# Patient Record
Sex: FEMALE | Race: WHITE
[De-identification: names, ages, dates, MRNs, and addresses within clinical notes are randomized per-mention and may not be internally consistent; named-entity substitution may affect disease eponyms.]

---

## 2020-06-03 ENCOUNTER — HOSPITAL ENCOUNTER (EMERGENCY)
Dept: HOSPITAL 8 - ED | Age: 65
End: 2020-06-03
Payer: COMMERCIAL

## 2020-06-03 VITALS — SYSTOLIC BLOOD PRESSURE: 185 MMHG | DIASTOLIC BLOOD PRESSURE: 110 MMHG

## 2020-06-03 VITALS — WEIGHT: 144.84 LBS | BODY MASS INDEX: 24.13 KG/M2 | HEIGHT: 65 IN

## 2020-06-03 DIAGNOSIS — M10.072: Primary | ICD-10-CM

## 2020-06-03 DIAGNOSIS — R60.0: ICD-10-CM

## 2020-06-03 DIAGNOSIS — F17.200: ICD-10-CM

## 2020-06-03 LAB
ALBUMIN SERPL-MCNC: 3.2 G/DL (ref 3.4–5)
ALP SERPL-CCNC: 128 U/L (ref 45–117)
ALT SERPL-CCNC: 61 U/L (ref 12–78)
ANION GAP SERPL CALC-SCNC: 5 MMOL/L (ref 5–15)
BASOPHILS # BLD AUTO: 0.01 X10^3/UL (ref 0–0.1)
BASOPHILS NFR BLD AUTO: 0 % (ref 0–1)
BILIRUB SERPL-MCNC: 0.5 MG/DL (ref 0.2–1)
CALCIUM SERPL-MCNC: 8.9 MG/DL (ref 8.5–10.1)
CHLORIDE SERPL-SCNC: 105 MMOL/L (ref 98–107)
CREAT SERPL-MCNC: 1.11 MG/DL (ref 0.55–1.02)
EOSINOPHIL # BLD AUTO: 0.28 X10^3/UL (ref 0–0.4)
EOSINOPHIL NFR BLD AUTO: 3 % (ref 1–7)
ERYTHROCYTE [DISTWIDTH] IN BLOOD BY AUTOMATED COUNT: 12.9 % (ref 9.6–15.2)
ERYTHROCYTE [SEDIMENTATION RATE] IN BLOOD BY WESTERGREN METHOD: 48 MM/HR (ref 0–20)
HCT (SEDRATE): 41.4 % (ref 34.6–47.8)
LYMPHOCYTES # BLD AUTO: 1.88 X10^3/UL (ref 1–3.4)
LYMPHOCYTES NFR BLD AUTO: 21 % (ref 22–44)
MCH RBC QN AUTO: 31.2 PG (ref 27–34.8)
MCHC RBC AUTO-ENTMCNC: 33.5 G/DL (ref 32.4–35.8)
MCV RBC AUTO: 93.2 FL (ref 80–100)
MD: NO
MONOCYTES # BLD AUTO: 0.12 X10^3/UL (ref 0.2–0.8)
MONOCYTES NFR BLD AUTO: 1 % (ref 2–9)
NEUTROPHILS # BLD AUTO: 6.83 X10^3/UL (ref 1.8–6.8)
NEUTROPHILS NFR BLD AUTO: 75 % (ref 42–75)
PLATELET # BLD AUTO: 297 X10^3/UL (ref 130–400)
PMV BLD AUTO: 7.5 FL (ref 7.4–10.4)
PROT SERPL-MCNC: 8.8 G/DL (ref 6.4–8.2)
RBC # BLD AUTO: 4.37 X10^6/UL (ref 3.82–5.3)

## 2020-06-03 PROCEDURE — 99285 EMERGENCY DEPT VISIT HI MDM: CPT

## 2020-06-03 PROCEDURE — 73630 X-RAY EXAM OF FOOT: CPT

## 2020-06-03 PROCEDURE — 96375 TX/PRO/DX INJ NEW DRUG ADDON: CPT

## 2020-06-03 PROCEDURE — 96374 THER/PROPH/DIAG INJ IV PUSH: CPT

## 2020-06-03 PROCEDURE — 80053 COMPREHEN METABOLIC PANEL: CPT

## 2020-06-03 PROCEDURE — 85025 COMPLETE CBC W/AUTO DIFF WBC: CPT

## 2020-06-03 PROCEDURE — 85651 RBC SED RATE NONAUTOMATED: CPT

## 2020-06-03 PROCEDURE — 84550 ASSAY OF BLOOD/URIC ACID: CPT

## 2020-06-03 PROCEDURE — 93970 EXTREMITY STUDY: CPT

## 2020-06-03 PROCEDURE — 36415 COLL VENOUS BLD VENIPUNCTURE: CPT

## 2020-06-03 PROCEDURE — 83880 ASSAY OF NATRIURETIC PEPTIDE: CPT

## 2020-06-03 NOTE — NUR
IV ESTABLISHED AND PT MEDICATED PER MAR, US DONE. AWAITING LAB AND RAD. PT 
RESTING IN [facility] ON MONITOR WITH CALL LIGHT WITHIN REACH. NO NEEDS AT THIS 
TIME.

## 2021-07-28 ENCOUNTER — APPOINTMENT (RX ONLY)
Dept: URBAN - METROPOLITAN AREA CLINIC 36 | Facility: CLINIC | Age: 66
Setting detail: DERMATOLOGY
End: 2021-07-28

## 2021-07-28 PROBLEM — C44.519 BASAL CELL CARCINOMA OF SKIN OF OTHER PART OF TRUNK: Status: ACTIVE | Noted: 2021-07-28

## 2021-07-28 PROCEDURE — 17314 MOHS ADDL STAGE T/A/L: CPT

## 2021-07-28 PROCEDURE — 17313 MOHS 1 STAGE T/A/L: CPT

## 2021-07-28 PROCEDURE — 13101 CMPLX RPR TRUNK 2.6-7.5 CM: CPT

## 2021-07-28 PROCEDURE — ? MOHS SURGERY WITH PERMANENT FINAL MARGIN

## 2021-07-28 PROCEDURE — ? ADDITIONAL NOTES

## 2021-07-28 NOTE — PROCEDURE: MOHS SURGERY WITH PERMANENT FINAL MARGIN
Body Location Override (Optional - Billing Will Still Be Based On Selected Body Map Location If Applicable): left upper back
Mohs Case Number: [id number]
Date Of Previous Biopsy (Optional): 7-9-21
Previous Accession (Optional): outside path
Referring Physician (Optional): Zach Oliveira
Consent Type: Consent 1 (Standard)
Eye Shield Used: No
Initial Size Of Lesion: 2
X Size Of Lesion In Cm (Optional): 1.3
Primary Defect Length In Cm (Final Defect Size - Required For Flaps/Grafts): 2.5
Primary Defect Width In Cm (Final Defect Size - Required For Flaps/Grafts): 2.2
Repair Type: Complex Repair
Oculoplastic Surgeon Procedure Text (A): After obtaining clear surgical margins the patient was sent to oculoplastics for surgical repair.  The patient understands they will receive post-surgical care and follow-up from the referring physician's office.
Otolaryngologist Procedure Text (A): After obtaining clear surgical margins the patient was sent to otolaryngology for surgical repair.  The patient understands they will receive post-surgical care and follow-up from the referring physician's office.
Plastic Surgeon Procedure Text (A): After obtaining clear surgical margins the patient was sent to plastics for surgical repair.  The patient understands they will receive post-surgical care and follow-up from the referring physician's office.
Mid-Level Procedure Text (A): After obtaining clear surgical margins the patient was sent to a mid-level provider for surgical repair.  The patient understands they will receive post-surgical care and follow-up from the mid-level provider.
Provider Procedure Text (A): After obtaining clear surgical margins the defect was repaired by another provider.
Asc Procedure Text (A): After obtaining clear surgical margins the patient was sent to an ASC for surgical repair.  The patient understands they will receive post-surgical care and follow-up from the ASC physician.
Include Suturegard In Note?: Yes
Suturegard Retention Suture: 2-0 Nylon
Retention Suture Bite Size: 3 mm
Length To Time In Minutes Device Was In Place: 60
Number Of Hemigard Strips Per Side: 1
Simple / Intermediate / Complex Repair - Final Wound Length In Cm: 5.4
Undermining Type: Entire Wound
Debridement Text: The wound edges were debrided prior to proceeding with the closure to facilitate wound healing.
Helical Rim Text: The closure involved the helical rim.
Vermilion Border Text: The closure involved the vermilion border.
Nostril Rim Text: The closure involved the nostril rim.
Retention Suture Text: Retention sutures were placed to support the closure and prevent dehiscence.
Secondary Defect Length In Cm (Required For Flaps): 0
Lab: [id number]
Lab Facility: 
Location Indication Override (Is Already Calculated Based On Selected Body Location): Area L
Area H Indication Text: Tumors in this location are included in Area H (eyelids, eyebrows, nose, lips, chin, ear, pre-auricular, post-auricular, temple, genitalia, hands, feet, ankles and areola).  Tissue conservation is critical in these anatomic locations.
Area M Indication Text: Tumors in this location are included in Area M (cheek, forehead, scalp, neck, jawline and pretibial skin).  Mohs surgery is indicated for tumors in these anatomic locations.
Area L Indication Text: Tumors in this location are included in Area L (trunk and extremities).  Mohs surgery is indicated for larger tumors, or tumors with aggressive histologic features, in these anatomic locations.
Special Stains Stage 1 - Results: Base On Clearance Noted Above
Stage 1 Add-On Histology Text: slides reviewed with Dr. Mendoza
Stage 2: Additional Anesthesia Type: 1% lidocaine with epinephrine
Include Tumor Staging In Mohs Note?: Please Select the Appropriate Response
Staging Info: By selecting yes to the question above you will include information on AJCC 8 tumor staging in your Mohs note. Information on tumor staging will be automatically added for SCCs on the head and neck. AJCC 8 includes tumor size, tumor depth, perineural involvement and bone invasion.
Tumor Depth: Less than 6mm from granular layer and no invasion beyond the subcutaneous fat
Medical Necessity Statement: Based on my medical judgement, Mohs surgery is the most appropriate treatment for this cancer compared to other treatments.
Alternatives Discussed Intro (Do Not Add Period): I discussed alternative treatments to Mohs surgery and specifically discussed the risks and benefits of
Consent 1/Introductory Paragraph: The rationale for Mohs was explained to the patient and consent was obtained. The risks, benefits and alternatives to therapy were discussed in detail. Specifically, the risks of infection, scarring, bleeding, prolonged wound healing, incomplete removal, allergy to anesthesia, nerve injury and recurrence were addressed. Prior to the procedure, the treatment site was clearly identified and confirmed by the patient. All components of Universal Protocol/PAUSE Rule completed.
Consent 2/Introductory Paragraph: Mohs surgery was explained to the patient and consent was obtained. The risks, benefits and alternatives to therapy were discussed in detail. Specifically, the risks of infection, scarring, bleeding, prolonged wound healing, incomplete removal, allergy to anesthesia, nerve injury and recurrence were addressed. Prior to the procedure, the treatment site was clearly identified and confirmed by the patient. All components of Universal Protocol/PAUSE Rule completed.
Consent 3/Introductory Paragraph: I gave the patient a chance to ask questions they had about the procedure.  Following this I explained the Mohs procedure and consent was obtained. The risks, benefits and alternatives to therapy were discussed in detail. Specifically, the risks of infection, scarring, bleeding, prolonged wound healing, incomplete removal, allergy to anesthesia, nerve injury and recurrence were addressed. Prior to the procedure, the treatment site was clearly identified and confirmed by the patient. All components of Universal Protocol/PAUSE Rule completed.
Consent (Temporal Branch)/Introductory Paragraph: The rationale for Mohs was explained to the patient and consent was obtained. The risks, benefits and alternatives to therapy were discussed in detail. Specifically, the risks of damage to the temporal branch of the facial nerve, infection, scarring, bleeding, prolonged wound healing, incomplete removal, allergy to anesthesia, and recurrence were addressed. Prior to the procedure, the treatment site was clearly identified and confirmed by the patient. All components of Universal Protocol/PAUSE Rule completed.
Consent (Marginal Mandibular)/Introductory Paragraph: The rationale for Mohs was explained to the patient and consent was obtained. The risks, benefits and alternatives to therapy were discussed in detail. Specifically, the risks of damage to the marginal mandibular branch of the facial nerve, infection, scarring, bleeding, prolonged wound healing, incomplete removal, allergy to anesthesia, and recurrence were addressed. Prior to the procedure, the treatment site was clearly identified and confirmed by the patient. All components of Universal Protocol/PAUSE Rule completed.
Consent (Spinal Accessory)/Introductory Paragraph: The rationale for Mohs was explained to the patient and consent was obtained. The risks, benefits and alternatives to therapy were discussed in detail. Specifically, the risks of damage to the spinal accessory nerve, infection, scarring, bleeding, prolonged wound healing, incomplete removal, allergy to anesthesia, and recurrence were addressed. Prior to the procedure, the treatment site was clearly identified and confirmed by the patient. All components of Universal Protocol/PAUSE Rule completed.
Consent (Near Eyelid Margin)/Introductory Paragraph: The rationale for Mohs was explained to the patient and consent was obtained. The risks, benefits and alternatives to therapy were discussed in detail. Specifically, the risks of ectropion or eyelid deformity, infection, scarring, bleeding, prolonged wound healing, incomplete removal, allergy to anesthesia, nerve injury and recurrence were addressed. Prior to the procedure, the treatment site was clearly identified and confirmed by the patient. All components of Universal Protocol/PAUSE Rule completed.
Consent (Ear)/Introductory Paragraph: The rationale for Mohs was explained to the patient and consent was obtained. The risks, benefits and alternatives to therapy were discussed in detail. Specifically, the risks of ear deformity, infection, scarring, bleeding, prolonged wound healing, incomplete removal, allergy to anesthesia, nerve injury and recurrence were addressed. Prior to the procedure, the treatment site was clearly identified and confirmed by the patient. All components of Universal Protocol/PAUSE Rule completed.
Consent (Nose)/Introductory Paragraph: The rationale for Mohs was explained to the patient and consent was obtained. The risks, benefits and alternatives to therapy were discussed in detail. Specifically, the risks of nasal deformity, changes in the flow of air through the nose, infection, scarring, bleeding, prolonged wound healing, incomplete removal, allergy to anesthesia, nerve injury and recurrence were addressed. Prior to the procedure, the treatment site was clearly identified and confirmed by the patient. All components of Universal Protocol/PAUSE Rule completed.
Consent (Lip)/Introductory Paragraph: The rationale for Mohs was explained to the patient and consent was obtained. The risks, benefits and alternatives to therapy were discussed in detail. Specifically, the risks of lip deformity, changes in the oral aperture, infection, scarring, bleeding, prolonged wound healing, incomplete removal, allergy to anesthesia, nerve injury and recurrence were addressed. Prior to the procedure, the treatment site was clearly identified and confirmed by the patient. All components of Universal Protocol/PAUSE Rule completed.
Consent (Scalp)/Introductory Paragraph: The rationale for Mohs was explained to the patient and consent was obtained. The risks, benefits and alternatives to therapy were discussed in detail. Specifically, the risks of changes in hair growth pattern secondary to repair, infection, scarring, bleeding, prolonged wound healing, incomplete removal, allergy to anesthesia, nerve injury and recurrence were addressed. Prior to the procedure, the treatment site was clearly identified and confirmed by the patient. All components of Universal Protocol/PAUSE Rule completed.
Final Margin Text: After acheiving histologic clearance, an additional specimen was obtained using a similar surgical technique as the previous stages and then sent for permanent section evaluation of the final margin.
Detail Level: Detailed
Postop Diagnosis: same
Postop Dx Override (Will Override Above Choices): melanophages in papillary dermis plan to send for further permanent staining
Anesthesia Volume In Cc: 18
Hemostasis: Electrocautery
Estimated Blood Loss (Cc): minimal
Anesthesia Volume In Cc: 6
Brow Lift Text: A midfrontal incision was made medially to the defect to allow access to the tissues just superior to the left eyebrow. Following careful dissection inferiorly in a supraperiosteal plane to the level of the left eyebrow, several 3-0 monocryl sutures were used to resuspend the eyebrow orbicularis oculi muscular unit to the superior frontal bone periosteum. This resulted in an appropriate reapproximation of static eyebrow symmetry and correction of the left brow ptosis.
Deep Sutures: 3-0 Maxon
Epidermal Sutures: 3-0 Surgipro
Epidermal Closure: running horizontal mattress
Additional Epidermal Closure (Optional): simple interrupted
Suturegard Intro: Intraoperative tissue expansion was performed, utilizing the SUTUREGARD device, in order to reduce wound tension.
Suturegard Body: The suture ends were repeatedly re-tightened and re-clamped to achieve the desired tissue expansion.
Hemigard Intro: Due to skin fragility and wound tension, it was decided to use HEMIGARD adhesive retention suture devices to permit a linear closure. The skin was cleaned and dried for a 6cm distance away from the wound. Excessive hair, if present, was removed to allow for adhesion.
Hemigard Postcare Instructions: The HEMIGARD strips are to remain completely dry for at least 5-7 days.
Donor Site Anesthesia Type: same as repair anesthesia
Graft Donor Site Bandage (Optional-Leave Blank If You Don't Want In Note): Steri-strips and a pressure bandage were applied to the donor site.
Closure 2 Information: This tab is for additional flaps and grafts, including complex repair and grafts and complex repair and flaps. You can also specify a different location for the additional defect, if the location is the same you do not need to select a new one. We will insert the automated text for the repair you select below just as we do for solitary flaps and grafts. Please note that at this time if you select a location with a different insurance zone you will need to override the ICD10 and CPT if appropriate.
Closure 3 Information: This tab is for additional flaps and grafts above and beyond our usual structured repairs.  Please note if you enter information here it will not currently bill and you will need to add the billing information manually.
Wound Care: Petrolatum
Dressing: dry sterile dressing
Suture Removal: 14 days
Unna Boot Text: An Unna boot was placed to help immobilize the limb and facilitate more rapid healing.
Home Suture Removal Text: Patient was provided instructions on removing sutures and will remove their sutures at home.  If they have any questions or difficulties they will call the office.
Post-Care Instructions: I reviewed with the patient in detail post-care instructions. Patient is not to engage in any heavy lifting, exercise, or swimming for the next 14 days. Should the patient develop any fevers, chills, bleeding, severe pain patient will contact the office immediately.
Pain Refusal Text: I offered to prescribe pain medication but the patient refused to take this medication.
Mauc Instructions: By selecting yes to the question below the MAUC number will be added into the note.  This will be calculated automatically based on the diagnosis chosen, the size entered, the body zone selected (H,M,L) and the specific indications you chose. You will also have the option to override the Mohs AUC if you disagree with the automatically calculated number and this option is found in the Case Summary tab.
Where Do You Want The Question To Include Opioid Counseling Located?: Case Summary Tab
Eye Protection Verbiage: Before proceeding with the stage, a plastic scleral shield was inserted. The globe was anesthetized with a few drops of 1% lidocaine with 1:100,000 epinephrine. Then, an appropriate sized scleral shield was chosen and coated with lacrilube ointment. The shield was gently inserted and left in place for the duration of each stage. After the stage was completed, the shield was gently removed.
Mohs Method Verbiage: An incision at a 45 degree angle following the standard Mohs approach was done and the specimen was harvested as a microscopic controlled layer.
Surgeon/Pathologist Verbiage (Will Incorporate Name Of Surgeon From Intro If Not Blank): operated in two distinct and integrated capacities as the surgeon and pathologist.
Mohs Histo Method Verbiage: Each section was then chromacoded and processed in the Mohs lab using the Mohs protocol and submitted for frozen section.
Subsequent Stages Histo Method Verbiage: Using a similar technique to that described above, a thin layer of tissue was removed from all areas where tumor was visible on the previous stage.  The tissue was again oriented, mapped, dyed, and processed as above.
Mohs Rapid Report Verbiage: The area of clinically evident tumor was marked with skin marking ink and appropriately hatched.  The initial incision was made following the Mohs approach through the skin.  The specimen was taken to the lab, divided into the necessary number of pieces, chromacoded and processed according to the Mohs protocol.  This was repeated in successive stages until a tumor free defect was achieved.
Complex Repair Preamble Text (Leave Blank If You Do Not Want): Extensive wide undermining was performed.
Intermediate Repair Preamble Text (Leave Blank If You Do Not Want): Undermining was performed with blunt dissection.
Non-Graft Cartilage Fenestration Text: The cartilage was fenestrated with a 2mm punch biopsy to help facilitate healing.
Graft Cartilage Fenestration Text: The cartilage was fenestrated with a 2mm punch biopsy to help facilitate graft survival and healing.
Secondary Intention Text (Leave Blank If You Do Not Want): The defect will heal with secondary intention.
No Repair - Repaired With Adjacent Surgical Defect Text (Leave Blank If You Do Not Want): After obtaining clear surgical margins the defect was repaired concurrently with another surgical defect which was in close approximation.
Advancement Flap (Single) Text: The defect edges were debeveled with a #15 scalpel blade.  Given the location of the defect and the proximity to free margins a single advancement flap was deemed most appropriate.  Using a sterile surgical marker, an appropriate advancement flap was drawn incorporating the defect and placing the expected incisions within the relaxed skin tension lines where possible.    The area thus outlined was incised deep to adipose tissue with a #15 scalpel blade.  The skin margins were undermined to an appropriate distance in all directions utilizing iris scissors.
Advancement Flap (Double) Text: The defect edges were debeveled with a #15 scalpel blade.  Given the location of the defect and the proximity to free margins a double advancement flap was deemed most appropriate.  Using a sterile surgical marker, the appropriate advancement flaps were drawn incorporating the defect and placing the expected incisions within the relaxed skin tension lines where possible.    The area thus outlined was incised deep to adipose tissue with a #15 scalpel blade.  The skin margins were undermined to an appropriate distance in all directions utilizing iris scissors.
Burow's Advancement Flap Text: The defect edges were debeveled with a #15 scalpel blade.  Given the location of the defect and the proximity to free margins a Burow's advancement flap was deemed most appropriate.  Using a sterile surgical marker, the appropriate advancement flap was drawn incorporating the defect and placing the expected incisions within the relaxed skin tension lines where possible.    The area thus outlined was incised deep to adipose tissue with a #15 scalpel blade.  The skin margins were undermined to an appropriate distance in all directions utilizing iris scissors.
Chonodrocutaneous Helical Advancement Flap Text: The defect edges were debeveled with a #15 scalpel blade.  Given the location of the defect and the proximity to free margins a chondrocutaneous helical advancement flap was deemed most appropriate.  Using a sterile surgical marker, the appropriate advancement flap was drawn incorporating the defect and placing the expected incisions within the relaxed skin tension lines where possible.    The area thus outlined was incised deep to adipose tissue with a #15 scalpel blade.  The skin margins were undermined to an appropriate distance in all directions utilizing iris scissors.
Crescentic Advancement Flap Text: The defect edges were debeveled with a #15 scalpel blade.  Given the location of the defect and the proximity to free margins a crescentic advancement flap was deemed most appropriate.  Using a sterile surgical marker, the appropriate advancement flap was drawn incorporating the defect and placing the expected incisions within the relaxed skin tension lines where possible.    The area thus outlined was incised deep to adipose tissue with a #15 scalpel blade.  The skin margins were undermined to an appropriate distance in all directions utilizing iris scissors.
A-T Advancement Flap Text: The defect edges were debeveled with a #15 scalpel blade.  Given the location of the defect, shape of the defect and the proximity to free margins an A-T advancement flap was deemed most appropriate.  Using a sterile surgical marker, an appropriate advancement flap was drawn incorporating the defect and placing the expected incisions within the relaxed skin tension lines where possible.    The area thus outlined was incised deep to adipose tissue with a #15 scalpel blade.  The skin margins were undermined to an appropriate distance in all directions utilizing iris scissors.
O-T Advancement Flap Text: The defect edges were debeveled with a #15 scalpel blade.  Given the location of the defect, shape of the defect and the proximity to free margins an O-T advancement flap was deemed most appropriate.  Using a sterile surgical marker, an appropriate advancement flap was drawn incorporating the defect and placing the expected incisions within the relaxed skin tension lines where possible.    The area thus outlined was incised deep to adipose tissue with a #15 scalpel blade.  The skin margins were undermined to an appropriate distance in all directions utilizing iris scissors.
O-L Flap Text: The defect edges were debeveled with a #15 scalpel blade.  Given the location of the defect, shape of the defect and the proximity to free margins an O-L flap was deemed most appropriate.  Using a sterile surgical marker, an appropriate advancement flap was drawn incorporating the defect and placing the expected incisions within the relaxed skin tension lines where possible.    The area thus outlined was incised deep to adipose tissue with a #15 scalpel blade.  The skin margins were undermined to an appropriate distance in all directions utilizing iris scissors.
O-Z Flap Text: The defect edges were debeveled with a #15 scalpel blade.  Given the location of the defect, shape of the defect and the proximity to free margins an O-Z flap was deemed most appropriate.  Using a sterile surgical marker, an appropriate transposition flap was drawn incorporating the defect and placing the expected incisions within the relaxed skin tension lines where possible. The area thus outlined was incised deep to adipose tissue with a #15 scalpel blade.  The skin margins were undermined to an appropriate distance in all directions utilizing iris scissors.
Double O-Z Flap Text: The defect edges were debeveled with a #15 scalpel blade.  Given the location of the defect, shape of the defect and the proximity to free margins a Double O-Z flap was deemed most appropriate.  Using a sterile surgical marker, an appropriate transposition flap was drawn incorporating the defect and placing the expected incisions within the relaxed skin tension lines where possible. The area thus outlined was incised deep to adipose tissue with a #15 scalpel blade.  The skin margins were undermined to an appropriate distance in all directions utilizing iris scissors.
V-Y Flap Text: The defect edges were debeveled with a #15 scalpel blade.  Given the location of the defect, shape of the defect and the proximity to free margins a V-Y flap was deemed most appropriate.  Using a sterile surgical marker, an appropriate advancement flap was drawn incorporating the defect and placing the expected incisions within the relaxed skin tension lines where possible.    The area thus outlined was incised deep to adipose tissue with a #15 scalpel blade.  The skin margins were undermined to an appropriate distance in all directions utilizing iris scissors.
Advancement-Rotation Flap Text: The defect edges were debeveled with a #15 scalpel blade.  Given the location of the defect, shape of the defect and the proximity to free margins an advancement-rotation flap was deemed most appropriate.  Using a sterile surgical marker, an appropriate flap was drawn incorporating the defect and placing the expected incisions within the relaxed skin tension lines where possible. The area thus outlined was incised deep to adipose tissue with a #15 scalpel blade.  The skin margins were undermined to an appropriate distance in all directions utilizing iris scissors.
Mercedes Flap Text: The defect edges were debeveled with a #15 scalpel blade.  Given the location of the defect, shape of the defect and the proximity to free margins a Mercedes flap was deemed most appropriate.  Using a sterile surgical marker, an appropriate advancement flap was drawn incorporating the defect and placing the expected incisions within the relaxed skin tension lines where possible. The area thus outlined was incised deep to adipose tissue with a #15 scalpel blade.  The skin margins were undermined to an appropriate distance in all directions utilizing iris scissors.
Modified Advancement Flap Text: The defect edges were debeveled with a #15 scalpel blade.  Given the location of the defect, shape of the defect and the proximity to free margins a modified advancement flap was deemed most appropriate.  Using a sterile surgical marker, an appropriate advancement flap was drawn incorporating the defect and placing the expected incisions within the relaxed skin tension lines where possible.    The area thus outlined was incised deep to adipose tissue with a #15 scalpel blade.  The skin margins were undermined to an appropriate distance in all directions utilizing iris scissors.
Mucosal Advancement Flap Text: Given the location of the defect, shape of the defect and the proximity to free margins a mucosal advancement flap was deemed most appropriate. Incisions were made with a 15 blade scalpel in the appropriate fashion along the cutaneous vermilion border and the mucosal lip. The remaining actinically damaged mucosal tissue was excised.  The mucosal advancement flap was then elevated to the gingival sulcus with care taken to preserve the neurovascular structures and advanced into the primary defect. Care was taken to ensure that precise realignment of the vermilion border was achieved.
Peng Advancement Flap Text: The defect edges were debeveled with a #15 scalpel blade.  Given the location of the defect, shape of the defect and the proximity to free margins a Peng advancement flap was deemed most appropriate.  Using a sterile surgical marker, an appropriate advancement flap was drawn incorporating the defect and placing the expected incisions within the relaxed skin tension lines where possible. The area thus outlined was incised deep to adipose tissue with a #15 scalpel blade.  The skin margins were undermined to an appropriate distance in all directions utilizing iris scissors.
Hatchet Flap Text: The defect edges were debeveled with a #15 scalpel blade.  Given the location of the defect, shape of the defect and the proximity to free margins a hatchet flap was deemed most appropriate.  Using a sterile surgical marker, an appropriate hatchet flap was drawn incorporating the defect and placing the expected incisions within the relaxed skin tension lines where possible.    The area thus outlined was incised deep to adipose tissue with a #15 scalpel blade.  The skin margins were undermined to an appropriate distance in all directions utilizing iris scissors.
Rotation Flap Text: The defect edges were debeveled with a #15 scalpel blade.  Given the location of the defect, shape of the defect and the proximity to free margins a rotation flap was deemed most appropriate.  Using a sterile surgical marker, an appropriate rotation flap was drawn incorporating the defect and placing the expected incisions within the relaxed skin tension lines where possible.    The area thus outlined was incised deep to adipose tissue with a #15 scalpel blade.  The skin margins were undermined to an appropriate distance in all directions utilizing iris scissors.
Spiral Flap Text: The defect edges were debeveled with a #15 scalpel blade.  Given the location of the defect, shape of the defect and the proximity to free margins a spiral flap was deemed most appropriate.  Using a sterile surgical marker, an appropriate rotation flap was drawn incorporating the defect and placing the expected incisions within the relaxed skin tension lines where possible. The area thus outlined was incised deep to adipose tissue with a #15 scalpel blade.  The skin margins were undermined to an appropriate distance in all directions utilizing iris scissors.
Staged Advancement Flap Text: The defect edges were debeveled with a #15 scalpel blade.  Given the location of the defect, shape of the defect and the proximity to free margins a staged advancement flap was deemed most appropriate.  Using a sterile surgical marker, an appropriate advancement flap was drawn incorporating the defect and placing the expected incisions within the relaxed skin tension lines where possible. The area thus outlined was incised deep to adipose tissue with a #15 scalpel blade.  The skin margins were undermined to an appropriate distance in all directions utilizing iris scissors.
Star Wedge Flap Text: The defect edges were debeveled with a #15 scalpel blade.  Given the location of the defect, shape of the defect and the proximity to free margins a star wedge flap was deemed most appropriate.  Using a sterile surgical marker, an appropriate rotation flap was drawn incorporating the defect and placing the expected incisions within the relaxed skin tension lines where possible. The area thus outlined was incised deep to adipose tissue with a #15 scalpel blade.  The skin margins were undermined to an appropriate distance in all directions utilizing iris scissors.
Transposition Flap Text: The defect edges were debeveled with a #15 scalpel blade.  Given the location of the defect and the proximity to free margins a transposition flap was deemed most appropriate.  Using a sterile surgical marker, an appropriate transposition flap was drawn incorporating the defect.    The area thus outlined was incised deep to adipose tissue with a #15 scalpel blade.  The skin margins were undermined to an appropriate distance in all directions utilizing iris scissors.
Muscle Hinge Flap Text: The defect edges were debeveled with a #15 scalpel blade.  Given the size, depth and location of the defect and the proximity to free margins a muscle hinge flap was deemed most appropriate.  Using a sterile surgical marker, an appropriate hinge flap was drawn incorporating the defect. The area thus outlined was incised with a #15 scalpel blade.  The skin margins were undermined to an appropriate distance in all directions utilizing iris scissors.
Nasal Turnover Hinge Flap Text: The defect edges were debeveled with a #15 scalpel blade.  Given the size, depth, location of the defect and the defect being full thickness a nasal turnover hinge flap was deemed most appropriate.  Using a sterile surgical marker, an appropriate hinge flap was drawn incorporating the defect. The area thus outlined was incised with a #15 scalpel blade. The flap was designed to recreate the nasal mucosal lining and the alar rim. The skin margins were undermined to an appropriate distance in all directions utilizing iris scissors.
Nasalis-Muscle-Based Myocutaneous Island Pedicle Flap Text: Using a #15 blade, an incision was made around the donor flap to the level of the nasalis muscle. Wide lateral undermining was then performed in both the subcutaneous plane above the nasalis muscle, and in a submuscular plane just above periosteum. This allowed the formation of a free nasalis muscle axial pedicle (based on the angular artery) which was still attached to the actual cutaneous flap, increasing its mobility and vascular viability. Hemostasis was obtained with pinpoint electrocoagulation. The flap was mobilized into position and the pivotal anchor points positioned and stabilized with buried interrupted sutures. Subcutaneous and dermal tissues were closed in a multilayered fashion with sutures. Tissue redundancies were excised, and the epidermal edges were apposed without significant tension and sutured with sutures.
Orbicularis Oris Muscle Flap Text: The defect edges were debeveled with a #15 scalpel blade.  Given that the defect affected the competency of the oral sphincter an orbicularis oris muscle flap was deemed most appropriate to restore this competency and normal muscle function.  Using a sterile surgical marker, an appropriate flap was drawn incorporating the defect. The area thus outlined was incised with a #15 scalpel blade.
Melolabial Transposition Flap Text: The defect edges were debeveled with a #15 scalpel blade.  Given the location of the defect and the proximity to free margins a melolabial flap was deemed most appropriate.  Using a sterile surgical marker, an appropriate melolabial transposition flap was drawn incorporating the defect.    The area thus outlined was incised deep to adipose tissue with a #15 scalpel blade.  The skin margins were undermined to an appropriate distance in all directions utilizing iris scissors.
Rhombic Flap Text: The defect edges were debeveled with a #15 scalpel blade.  Given the location of the defect and the proximity to free margins a rhombic flap was deemed most appropriate.  Using a sterile surgical marker, an appropriate rhombic flap was drawn incorporating the defect.    The area thus outlined was incised deep to adipose tissue with a #15 scalpel blade.  The skin margins were undermined to an appropriate distance in all directions utilizing iris scissors.
Rhomboid Transposition Flap Text: The defect edges were debeveled with a #15 scalpel blade.  Given the location of the defect and the proximity to free margins a rhomboid transposition flap was deemed most appropriate.  Using a sterile surgical marker, an appropriate rhomboid flap was drawn incorporating the defect.    The area thus outlined was incised deep to adipose tissue with a #15 scalpel blade.  The skin margins were undermined to an appropriate distance in all directions utilizing iris scissors.
Bi-Rhombic Flap Text: The defect edges were debeveled with a #15 scalpel blade.  Given the location of the defect and the proximity to free margins a bi-rhombic flap was deemed most appropriate.  Using a sterile surgical marker, an appropriate rhombic flap was drawn incorporating the defect. The area thus outlined was incised deep to adipose tissue with a #15 scalpel blade.  The skin margins were undermined to an appropriate distance in all directions utilizing iris scissors.
Helical Rim Advancement Flap Text: The defect edges were debeveled with a #15 blade scalpel.  Given the location of the defect and the proximity to free margins (helical rim) a double helical rim advancement flap was deemed most appropriate.  Using a sterile surgical marker, the appropriate advancement flaps were drawn incorporating the defect and placing the expected incisions between the helical rim and antihelix where possible.  The area thus outlined was incised through and through with a #15 scalpel blade.  With a skin hook and iris scissors, the flaps were gently and sharply undermined and freed up.
Bilateral Helical Rim Advancement Flap Text: The defect edges were debeveled with a #15 blade scalpel.  Given the location of the defect and the proximity to free margins (helical rim) a bilateral helical rim advancement flap was deemed most appropriate.  Using a sterile surgical marker, the appropriate advancement flaps were drawn incorporating the defect and placing the expected incisions between the helical rim and antihelix where possible.  The area thus outlined was incised through and through with a #15 scalpel blade.  With a skin hook and iris scissors, the flaps were gently and sharply undermined and freed up.
Ear Star Wedge Flap Text: The defect edges were debeveled with a #15 blade scalpel.  Given the location of the defect and the proximity to free margins (helical rim) an ear star wedge flap was deemed most appropriate.  Using a sterile surgical marker, the appropriate flap was drawn incorporating the defect and placing the expected incisions between the helical rim and antihelix where possible.  The area thus outlined was incised through and through with a #15 scalpel blade.
Banner Transposition Flap Text: The defect edges were debeveled with a #15 scalpel blade.  Given the location of the defect and the proximity to free margins a Banner transposition flap was deemed most appropriate.  Using a sterile surgical marker, an appropriate flap drawn around the defect. The area thus outlined was incised deep to adipose tissue with a #15 scalpel blade.  The skin margins were undermined to an appropriate distance in all directions utilizing iris scissors.
Bilobed Flap Text: The defect edges were debeveled with a #15 scalpel blade.  Given the location of the defect and the proximity to free margins a bilobe flap was deemed most appropriate.  Using a sterile surgical marker, an appropriate bilobe flap drawn around the defect.    The area thus outlined was incised deep to adipose tissue with a #15 scalpel blade.  The skin margins were undermined to an appropriate distance in all directions utilizing iris scissors.
Bilobed Transposition Flap Text: The defect edges were debeveled with a #15 scalpel blade.  Given the location of the defect and the proximity to free margins a bilobed transposition flap was deemed most appropriate.  Using a sterile surgical marker, an appropriate bilobe flap drawn around the defect.    The area thus outlined was incised deep to adipose tissue with a #15 scalpel blade.  The skin margins were undermined to an appropriate distance in all directions utilizing iris scissors.
Trilobed Flap Text: The defect edges were debeveled with a #15 scalpel blade.  Given the location of the defect and the proximity to free margins a trilobed flap was deemed most appropriate.  Using a sterile surgical marker, an appropriate trilobed flap drawn around the defect.    The area thus outlined was incised deep to adipose tissue with a #15 scalpel blade.  The skin margins were undermined to an appropriate distance in all directions utilizing iris scissors.
Dorsal Nasal Flap Text: The defect edges were debeveled with a #15 scalpel blade.  Given the location of the defect and the proximity to free margins a dorsal nasal flap was deemed most appropriate.  Using a sterile surgical marker, an appropriate dorsal nasal flap was drawn around the defect.    The area thus outlined was incised deep to adipose tissue with a #15 scalpel blade.  The skin margins were undermined to an appropriate distance in all directions utilizing iris scissors.
Island Pedicle Flap Text: The defect edges were debeveled with a #15 scalpel blade.  Given the location of the defect, shape of the defect and the proximity to free margins an island pedicle advancement flap was deemed most appropriate.  Using a sterile surgical marker, an appropriate advancement flap was drawn incorporating the defect, outlining the appropriate donor tissue and placing the expected incisions within the relaxed skin tension lines where possible.    The area thus outlined was incised deep to adipose tissue with a #15 scalpel blade.  The skin margins were undermined to an appropriate distance in all directions around the primary defect and laterally outward around the island pedicle utilizing iris scissors.  There was minimal undermining beneath the pedicle flap.
Island Pedicle Flap With Canthal Suspension Text: The defect edges were debeveled with a #15 scalpel blade.  Given the location of the defect, shape of the defect and the proximity to free margins an island pedicle advancement flap was deemed most appropriate.  Using a sterile surgical marker, an appropriate advancement flap was drawn incorporating the defect, outlining the appropriate donor tissue and placing the expected incisions within the relaxed skin tension lines where possible. The area thus outlined was incised deep to adipose tissue with a #15 scalpel blade.  The skin margins were undermined to an appropriate distance in all directions around the primary defect and laterally outward around the island pedicle utilizing iris scissors.  There was minimal undermining beneath the pedicle flap. A suspension suture was placed in the canthal tendon to prevent tension and prevent ectropion.
Alar Island Pedicle Flap Text: The defect edges were debeveled with a #15 scalpel blade.  Given the location of the defect, shape of the defect and the proximity to the alar rim an island pedicle advancement flap was deemed most appropriate.  Using a sterile surgical marker, an appropriate advancement flap was drawn incorporating the defect, outlining the appropriate donor tissue and placing the expected incisions within the nasal ala running parallel to the alar rim. The area thus outlined was incised with a #15 scalpel blade.  The skin margins were undermined minimally to an appropriate distance in all directions around the primary defect and laterally outward around the island pedicle utilizing iris scissors.  There was minimal undermining beneath the pedicle flap.
Double Island Pedicle Flap Text: The defect edges were debeveled with a #15 scalpel blade.  Given the location of the defect, shape of the defect and the proximity to free margins a double island pedicle advancement flap was deemed most appropriate.  Using a sterile surgical marker, an appropriate advancement flap was drawn incorporating the defect, outlining the appropriate donor tissue and placing the expected incisions within the relaxed skin tension lines where possible.    The area thus outlined was incised deep to adipose tissue with a #15 scalpel blade.  The skin margins were undermined to an appropriate distance in all directions around the primary defect and laterally outward around the island pedicle utilizing iris scissors.  There was minimal undermining beneath the pedicle flap.
Island Pedicle Flap-Requiring Vessel Identification Text: The defect edges were debeveled with a #15 scalpel blade.  Given the location of the defect, shape of the defect and the proximity to free margins an island pedicle advancement flap was deemed most appropriate.  Using a sterile surgical marker, an appropriate advancement flap was drawn, based on the axial vessel mentioned above, incorporating the defect, outlining the appropriate donor tissue and placing the expected incisions within the relaxed skin tension lines where possible.    The area thus outlined was incised deep to adipose tissue with a #15 scalpel blade.  The skin margins were undermined to an appropriate distance in all directions around the primary defect and laterally outward around the island pedicle utilizing iris scissors.  There was minimal undermining beneath the pedicle flap.
Keystone Flap Text: The defect edges were debeveled with a #15 scalpel blade.  Given the location of the defect, shape of the defect a keystone flap was deemed most appropriate.  Using a sterile surgical marker, an appropriate keystone flap was drawn incorporating the defect, outlining the appropriate donor tissue and placing the expected incisions within the relaxed skin tension lines where possible. The area thus outlined was incised deep to adipose tissue with a #15 scalpel blade.  The skin margins were undermined to an appropriate distance in all directions around the primary defect and laterally outward around the flap utilizing iris scissors.
O-T Plasty Text: The defect edges were debeveled with a #15 scalpel blade.  Given the location of the defect, shape of the defect and the proximity to free margins an O-T plasty was deemed most appropriate.  Using a sterile surgical marker, an appropriate O-T plasty was drawn incorporating the defect and placing the expected incisions within the relaxed skin tension lines where possible.    The area thus outlined was incised deep to adipose tissue with a #15 scalpel blade.  The skin margins were undermined to an appropriate distance in all directions utilizing iris scissors.
O-Z Plasty Text: The defect edges were debeveled with a #15 scalpel blade.  Given the location of the defect, shape of the defect and the proximity to free margins an O-Z plasty (double transposition flap) was deemed most appropriate.  Using a sterile surgical marker, the appropriate transposition flaps were drawn incorporating the defect and placing the expected incisions within the relaxed skin tension lines where possible.    The area thus outlined was incised deep to adipose tissue with a #15 scalpel blade.  The skin margins were undermined to an appropriate distance in all directions utilizing iris scissors.  Hemostasis was achieved with electrocautery.  The flaps were then transposed into place, one clockwise and the other counterclockwise, and anchored with interrupted buried subcutaneous sutures.
Double O-Z Plasty Text: The defect edges were debeveled with a #15 scalpel blade.  Given the location of the defect, shape of the defect and the proximity to free margins a Double O-Z plasty (double transposition flap) was deemed most appropriate.  Using a sterile surgical marker, the appropriate transposition flaps were drawn incorporating the defect and placing the expected incisions within the relaxed skin tension lines where possible. The area thus outlined was incised deep to adipose tissue with a #15 scalpel blade.  The skin margins were undermined to an appropriate distance in all directions utilizing iris scissors.  Hemostasis was achieved with electrocautery.  The flaps were then transposed into place, one clockwise and the other counterclockwise, and anchored with interrupted buried subcutaneous sutures.
V-Y Plasty Text: The defect edges were debeveled with a #15 scalpel blade.  Given the location of the defect, shape of the defect and the proximity to free margins an V-Y advancement flap was deemed most appropriate.  Using a sterile surgical marker, an appropriate advancement flap was drawn incorporating the defect and placing the expected incisions within the relaxed skin tension lines where possible.    The area thus outlined was incised deep to adipose tissue with a #15 scalpel blade.  The skin margins were undermined to an appropriate distance in all directions utilizing iris scissors.
H Plasty Text: Given the location of the defect, shape of the defect and the proximity to free margins a H-plasty was deemed most appropriate for repair.  Using a sterile surgical marker, the appropriate advancement arms of the H-plasty were drawn incorporating the defect and placing the expected incisions within the relaxed skin tension lines where possible. The area thus outlined was incised deep to adipose tissue with a #15 scalpel blade. The skin margins were undermined to an appropriate distance in all directions utilizing iris scissors.  The opposing advancement arms were then advanced into place in opposite direction and anchored with interrupted buried subcutaneous sutures.
W Plasty Text: The lesion was extirpated to the level of the fat with a #15 scalpel blade.  Given the location of the defect, shape of the defect and the proximity to free margins a W-plasty was deemed most appropriate for repair.  Using a sterile surgical marker, the appropriate transposition arms of the W-plasty were drawn incorporating the defect and placing the expected incisions within the relaxed skin tension lines where possible.    The area thus outlined was incised deep to adipose tissue with a #15 scalpel blade.  The skin margins were undermined to an appropriate distance in all directions utilizing iris scissors.  The opposing transposition arms were then transposed into place in opposite direction and anchored with interrupted buried subcutaneous sutures.
Z Plasty Text: The lesion was extirpated to the level of the fat with a #15 scalpel blade.  Given the location of the defect, shape of the defect and the proximity to free margins a Z-plasty was deemed most appropriate for repair.  Using a sterile surgical marker, the appropriate transposition arms of the Z-plasty were drawn incorporating the defect and placing the expected incisions within the relaxed skin tension lines where possible.    The area thus outlined was incised deep to adipose tissue with a #15 scalpel blade.  The skin margins were undermined to an appropriate distance in all directions utilizing iris scissors.  The opposing transposition arms were then transposed into place in opposite direction and anchored with interrupted buried subcutaneous sutures.
Zygomaticofacial Flap Text: Given the location of the defect, shape of the defect and the proximity to free margins a zygomaticofacial flap was deemed most appropriate for repair.  Using a sterile surgical marker, the appropriate flap was drawn incorporating the defect and placing the expected incisions within the relaxed skin tension lines where possible. The area thus outlined was incised deep to adipose tissue with a #15 scalpel blade with preservation of a vascular pedicle.  The skin margins were undermined to an appropriate distance in all directions utilizing iris scissors.  The flap was then placed into the defect and anchored with interrupted buried subcutaneous sutures.
Cheek Interpolation Flap Text: A decision was made to reconstruct the defect utilizing an interpolation axial flap and a staged reconstruction.  A telfa template was made of the defect.  This telfa template was then used to outline the Cheek Interpolation flap.  The donor area for the pedicle flap was then injected with anesthesia.  The flap was excised through the skin and subcutaneous tissue down to the layer of the underlying musculature.  The interpolation flap was carefully excised within this deep plane to maintain its blood supply.  The edges of the donor site were undermined.   The donor site was closed in a primary fashion.  The pedicle was then rotated into position and sutured.  Once the tube was sutured into place, adequate blood supply was confirmed with blanching and refill.  The pedicle was then wrapped with xeroform gauze and dressed appropriately with a telfa and gauze bandage to ensure continued blood supply and protect the attached pedicle.
Cheek-To-Nose Interpolation Flap Text: A decision was made to reconstruct the defect utilizing an interpolation axial flap and a staged reconstruction.  A telfa template was made of the defect.  This telfa template was then used to outline the Cheek-To-Nose Interpolation flap.  The donor area for the pedicle flap was then injected with anesthesia.  The flap was excised through the skin and subcutaneous tissue down to the layer of the underlying musculature.  The interpolation flap was carefully excised within this deep plane to maintain its blood supply.  The edges of the donor site were undermined.   The donor site was closed in a primary fashion.  The pedicle was then rotated into position and sutured.  Once the tube was sutured into place, adequate blood supply was confirmed with blanching and refill.  The pedicle was then wrapped with xeroform gauze and dressed appropriately with a telfa and gauze bandage to ensure continued blood supply and protect the attached pedicle.
Interpolation Flap Text: A decision was made to reconstruct the defect utilizing an interpolation axial flap and a staged reconstruction.  A telfa template was made of the defect.  This telfa template was then used to outline the interpolation flap.  The donor area for the pedicle flap was then injected with anesthesia.  The flap was excised through the skin and subcutaneous tissue down to the layer of the underlying musculature.  The interpolation flap was carefully excised within this deep plane to maintain its blood supply.  The edges of the donor site were undermined.   The donor site was closed in a primary fashion.  The pedicle was then rotated into position and sutured.  Once the tube was sutured into place, adequate blood supply was confirmed with blanching and refill.  The pedicle was then wrapped with xeroform gauze and dressed appropriately with a telfa and gauze bandage to ensure continued blood supply and protect the attached pedicle.
Melolabial Interpolation Flap Text: A decision was made to reconstruct the defect utilizing an interpolation axial flap and a staged reconstruction.  A telfa template was made of the defect.  This telfa template was then used to outline the melolabial interpolation flap.  The donor area for the pedicle flap was then injected with anesthesia.  The flap was excised through the skin and subcutaneous tissue down to the layer of the underlying musculature.  The pedicle flap was carefully excised within this deep plane to maintain its blood supply.  The edges of the donor site were undermined.   The donor site was closed in a primary fashion.  The pedicle was then rotated into position and sutured.  Once the tube was sutured into place, adequate blood supply was confirmed with blanching and refill.  The pedicle was then wrapped with xeroform gauze and dressed appropriately with a telfa and gauze bandage to ensure continued blood supply and protect the attached pedicle.
Mastoid Interpolation Flap Text: A decision was made to reconstruct the defect utilizing an interpolation axial flap and a staged reconstruction.  A telfa template was made of the defect.  This telfa template was then used to outline the mastoid interpolation flap.  The donor area for the pedicle flap was then injected with anesthesia.  The flap was excised through the skin and subcutaneous tissue down to the layer of the underlying musculature.  The pedicle flap was carefully excised within this deep plane to maintain its blood supply.  The edges of the donor site were undermined.   The donor site was closed in a primary fashion.  The pedicle was then rotated into position and sutured.  Once the tube was sutured into place, adequate blood supply was confirmed with blanching and refill.  The pedicle was then wrapped with xeroform gauze and dressed appropriately with a telfa and gauze bandage to ensure continued blood supply and protect the attached pedicle.
Posterior Auricular Interpolation Flap Text: A decision was made to reconstruct the defect utilizing an interpolation axial flap and a staged reconstruction.  A telfa template was made of the defect.  This telfa template was then used to outline the posterior auricular interpolation flap.  The donor area for the pedicle flap was then injected with anesthesia.  The flap was excised through the skin and subcutaneous tissue down to the layer of the underlying musculature.  The pedicle flap was carefully excised within this deep plane to maintain its blood supply.  The edges of the donor site were undermined.   The donor site was closed in a primary fashion.  The pedicle was then rotated into position and sutured.  Once the tube was sutured into place, adequate blood supply was confirmed with blanching and refill.  The pedicle was then wrapped with xeroform gauze and dressed appropriately with a telfa and gauze bandage to ensure continued blood supply and protect the attached pedicle.
Paramedian Forehead Flap Text: A decision was made to reconstruct the defect utilizing an interpolation axial flap and a staged reconstruction.  A telfa template was made of the defect.  This telfa template was then used to outline the paramedian forehead pedicle flap.  The donor area for the pedicle flap was then injected with anesthesia.  The flap was excised through the skin and subcutaneous tissue down to the layer of the underlying musculature.  The pedicle flap was carefully excised within this deep plane to maintain its blood supply.  The edges of the donor site were undermined.   The donor site was closed in a primary fashion.  The pedicle was then rotated into position and sutured.  Once the tube was sutured into place, adequate blood supply was confirmed with blanching and refill.  The pedicle was then wrapped with xeroform gauze and dressed appropriately with a telfa and gauze bandage to ensure continued blood supply and protect the attached pedicle.
Cheiloplasty (Less Than 50%) Text: A decision was made to reconstruct the defect with a  cheiloplasty.  The defect was undermined extensively.  Additional orbicularis oris muscle was excised with a 15 blade scalpel.  The defect was converted into a full thickness wedge, of less than 50% of the vertical height of the lip, to facilite a better cosmetic result.  Small vessels were then tied off with 5-0 monocyrl. The orbicularis oris, superficial fascia, adipose and dermis were then reapproximated.  After the deeper layers were approximated the epidermis was reapproximated with particular care given to realign the vermilion border.
Cheiloplasty (Complex) Text: A decision was made to reconstruct the defect with a  cheiloplasty.  The defect was undermined extensively.  Additional orbicularis oris muscle was excised with a 15 blade scalpel.  The defect was converted into a full thickness wedge to facilite a better cosmetic result.  Small vessels were then tied off with 5-0 monocyrl. The orbicularis oris, superficial fascia, adipose and dermis were then reapproximated.  After the deeper layers were approximated the epidermis was reapproximated with particular care given to realign the vermilion border.
Ear Wedge Repair Text: A wedge excision was completed by carrying down an excision through the full thickness of the ear and cartilage with an inward facing Burow's triangle. The wound was then closed in a layered fashion.
Full Thickness Lip Wedge Repair (Flap) Text: Given the location of the defect and the proximity to free margins a full thickness wedge repair was deemed most appropriate.  Using a sterile surgical marker, the appropriate repair was drawn incorporating the defect and placing the expected incisions perpendicular to the vermilion border.  The vermilion border was also meticulously outlined to ensure appropriate reapproximation during the repair.  The area thus outlined was incised through and through with a #15 scalpel blade.  The muscularis and dermis were reaproximated with deep sutures following hemostasis. Care was taken to realign the vermilion border before proceeding with the superficial closure.  Once the vermilion was realigned the superfical and mucosal closure was finished.
Ftsg Text: The defect edges were debeveled with a #15 scalpel blade.  Given the location of the defect, shape of the defect and the proximity to free margins a full thickness skin graft was deemed most appropriate.  Using a sterile surgical marker, the primary defect shape was transferred to the donor site. The area thus outlined was incised deep to adipose tissue with a #15 scalpel blade.  The harvested graft was then trimmed of adipose tissue until only dermis and epidermis was left.  The skin margins of the secondary defect were undermined to an appropriate distance in all directions utilizing iris scissors.  The secondary defect was closed with interrupted buried subcutaneous sutures.  The skin edges were then re-apposed with running  sutures.  The skin graft was then placed in the primary defect and oriented appropriately.
Split-Thickness Skin Graft Text: The defect edges were debeveled with a #15 scalpel blade.  Given the location of the defect, shape of the defect and the proximity to free margins a split thickness skin graft was deemed most appropriate.  Using a sterile surgical marker, the primary defect shape was transferred to the donor site. The split thickness graft was then harvested.  The skin graft was then placed in the primary defect and oriented appropriately.
Burow's Graft Text: The defect edges were debeveled with a #15 scalpel blade.  Given the location of the defect, shape of the defect, the proximity to free margins and the presence of a standing cone deformity a Burow's skin graft was deemed most appropriate. The standing cone was removed and this tissue was then trimmed to the shape of the primary defect. The adipose tissue was also removed until only dermis and epidermis were left.  The skin margins of the secondary defect were undermined to an appropriate distance in all directions utilizing iris scissors.  The secondary defect was closed with interrupted buried subcutaneous sutures.  The skin edges were then re-apposed with running  sutures.  The skin graft was then placed in the primary defect and oriented appropriately.
Cartilage Graft Text: The defect edges were debeveled with a #15 scalpel blade.  Given the location of the defect, shape of the defect, the fact the defect involved a full thickness cartilage defect a cartilage graft was deemed most appropriate.  An appropriate donor site was identified, cleansed, and anesthetized. The cartilage graft was then harvested and transferred to the recipient site, oriented appropriately and then sutured into place.  The secondary defect was then repaired using a primary closure.
Composite Graft Text: The defect edges were debeveled with a #15 scalpel blade.  Given the location of the defect, shape of the defect, the proximity to free margins and the fact the defect was full thickness a composite graft was deemed most appropriate.  The defect was outline and then transferred to the donor site.  A full thickness graft was then excised from the donor site. The graft was then placed in the primary defect, oriented appropriately and then sutured into place.  The secondary defect was then repaired using a primary closure.
Epidermal Autograft Text: The defect edges were debeveled with a #15 scalpel blade.  Given the location of the defect, shape of the defect and the proximity to free margins an epidermal autograft was deemed most appropriate.  Using a sterile surgical marker, the primary defect shape was transferred to the donor site. The epidermal graft was then harvested.  The skin graft was then placed in the primary defect and oriented appropriately.
Dermal Autograft Text: The defect edges were debeveled with a #15 scalpel blade.  Given the location of the defect, shape of the defect and the proximity to free margins a dermal autograft was deemed most appropriate.  Using a sterile surgical marker, the primary defect shape was transferred to the donor site. The area thus outlined was incised deep to adipose tissue with a #15 scalpel blade.  The harvested graft was then trimmed of adipose and epidermal tissue until only dermis was left.  The skin graft was then placed in the primary defect and oriented appropriately.
Skin Substitute Text: The defect edges were debeveled with a #15 scalpel blade.  Given the location of the defect, shape of the defect and the proximity to free margins a skin substitute graft was deemed most appropriate.  The graft material was trimmed to fit the size of the defect. The graft was then placed in the primary defect and oriented appropriately.
Tissue Cultured Epidermal Autograft Text: The defect edges were debeveled with a #15 scalpel blade.  Given the location of the defect, shape of the defect and the proximity to free margins a tissue cultured epidermal autograft was deemed most appropriate.  The graft was then trimmed to fit the size of the defect.  The graft was then placed in the primary defect and oriented appropriately.
Xenograft Text: The defect edges were debeveled with a #15 scalpel blade.  Given the location of the defect, shape of the defect and the proximity to free margins a xenograft was deemed most appropriate.  The graft was then trimmed to fit the size of the defect.  The graft was then placed in the primary defect and oriented appropriately.
Purse String (Simple) Text: Given the location of the defect and the characteristics of the surrounding skin a purse string closure was deemed most appropriate.  Undermining was performed circumfirentially around the surgical defect.  A purse string suture was then placed and tightened.
Purse String (Intermediate) Text: Given the location of the defect and the characteristics of the surrounding skin a purse string intermediate closure was deemed most appropriate.  Undermining was performed circumfirentially around the surgical defect.  A purse string suture was then placed and tightened.
Partial Purse String (Simple) Text: Given the location of the defect and the characteristics of the surrounding skin a simple purse string closure was deemed most appropriate.  Undermining was performed circumfirentially around the surgical defect.  A purse string suture was then placed and tightened. Wound tension only allowed a partial closure of the circular defect.
Partial Purse String (Intermediate) Text: Given the location of the defect and the characteristics of the surrounding skin an intermediate purse string closure was deemed most appropriate.  Undermining was performed circumfirentially around the surgical defect.  A purse string suture was then placed and tightened. Wound tension only allowed a partial closure of the circular defect.
Localized Dermabrasion With Wire Brush Text: The patient was draped in routine manner.  Localized dermabrasion using 3 x 17 mm wire brush was performed in routine manner to papillary dermis. This spot dermabrasion is being performed to complete skin cancer reconstruction. It also will eliminate the other sun damaged precancerous cells that are known to be part of the regional effect of a lifetime's worth of sun exposure. This localized dermabrasion is therapeutic and should not be considered cosmetic in any regard.
Tarsorrhaphy Text: A tarsorrhaphy was performed using Frost sutures.
Complex Repair And Flap Additional Text (Will Appearing After The Standard Complex Repair Text): The complex repair was not sufficient to completely close the primary defect. The remaining additional defect was repaired with the flap mentioned below.
Complex Repair And Graft Additional Text (Will Appearing After The Standard Complex Repair Text): The complex repair was not sufficient to completely close the primary defect. The remaining additional defect was repaired with the graft mentioned below.
Manual Repair Warning Statement: We plan on removing the manually selected variable below in favor of our much easier automatic structured text blocks found in the previous tab. We decided to do this to help make the flow better and give you the full power of structured data. Manual selection is never going to be ideal in our platform and I would encourage you to avoid using manual selection from this point on, especially since I will be sunsetting this feature. It is important that you do one of two things with the customized text below. First, you can save all of the text in a word file so you can have it for future reference. Second, transfer the text to the appropriate area in the Library tab. Lastly, if there is a flap or graft type which we do not have you need to let us know right away so I can add it in before the variable is hidden. No need to panic, we plan to give you roughly 6 months to make the change.
Same Histology In Subsequent Stages Text: The pattern and morphology of the tumor is as described in the first stage.
No Residual Tumor Seen Histology Text: There were no malignant cells seen in the sections examined.
Inflammation Suggestive Of Cancer Camouflage Histology Text: There was a dense lymphocytic infiltrate which prevented adequate histologic evaluation of adjacent structures.
Bcc Histology Text: There were numerous aggregates of basaloid cells.
Bcc Infiltrative Histology Text: There were numerous aggregates of basaloid cells demonstrating an infiltrative pattern.
Mart-1 - Positive Histology Text: MART-1 staining demonstrates areas of higher density and clustering of melanocytes with Pagetoid spread upwards within the epidermis. The surgical margins are positive for tumor cells.
Mart-1 - Negative Histology Text: MART-1 staining demonstrates a normal density and pattern of melanocytes along the dermal-epidermal junction. The surgical margins are negative for tumor cells.
Information: Selecting Yes will display possible errors in your note based on the variables you have selected. This validation is only offered as a suggestion for you. PLEASE NOTE THAT THE VALIDATION TEXT WILL BE REMOVED WHEN YOU FINALIZE YOUR NOTE. IF YOU WANT TO FAX A PRELIMINARY NOTE YOU WILL NEED TO TOGGLE THIS TO 'NO' IF YOU DO NOT WANT IT IN YOUR FAXED NOTE.
Billing Type: Third-Party Bill

## 2021-07-28 NOTE — HPI: PROCEDURE (MOHS)
Has The Growth Been Previously Biopsied?: has been previously biopsied
Additional History: History of hepatitis C
Body Location Override (Optional): Left upper back

## 2021-07-28 NOTE — PROCEDURE: ADDITIONAL NOTES
Additional Notes: clinically a pigmented macule was seen at the right superior margin superior to the basal cell carcinoma, there were multiple melanophages in the dermis at this location, reviewed slides with Dr Mendoza and will send first block for permanents to better evaluate origin of melanophages
Detail Level: Zone
Render Risk Assessment In Note?: yes

## 2021-08-11 ENCOUNTER — APPOINTMENT (RX ONLY)
Dept: URBAN - METROPOLITAN AREA CLINIC 36 | Facility: CLINIC | Age: 66
Setting detail: DERMATOLOGY
End: 2021-08-11

## 2021-08-11 DIAGNOSIS — Z48.02 ENCOUNTER FOR REMOVAL OF SUTURES: ICD-10-CM

## 2021-08-11 PROCEDURE — 99024 POSTOP FOLLOW-UP VISIT: CPT

## 2021-08-11 PROCEDURE — ? SUTURE REMOVAL (GLOBAL PERIOD)

## 2021-08-11 ASSESSMENT — LOCATION DETAILED DESCRIPTION DERM: LOCATION DETAILED: LEFT MID-UPPER BACK

## 2021-08-11 ASSESSMENT — LOCATION ZONE DERM: LOCATION ZONE: TRUNK

## 2021-08-11 ASSESSMENT — LOCATION SIMPLE DESCRIPTION DERM: LOCATION SIMPLE: LEFT UPPER BACK

## 2021-08-11 NOTE — PROCEDURE: SUTURE REMOVAL (GLOBAL PERIOD)
Detail Level: Detailed
Add 67739 Cpt? (Important Note: In 2017 The Use Of 88477 Is Being Tracked By Cms To Determine Future Global Period Reimbursement For Global Periods): yes

## 2021-09-08 ENCOUNTER — APPOINTMENT (RX ONLY)
Dept: URBAN - METROPOLITAN AREA CLINIC 36 | Facility: CLINIC | Age: 66
Setting detail: DERMATOLOGY
End: 2021-09-08

## 2021-09-08 DIAGNOSIS — L90.5 SCAR CONDITIONS AND FIBROSIS OF SKIN: ICD-10-CM

## 2021-09-08 PROCEDURE — ? COUNSELING

## 2021-09-08 NOTE — PROCEDURE: COUNSELING
Patient Specific Counseling (Will Not Stick From Patient To Patient): Dr. Monae states patients scar does not need any further treatment at this time. Patient advised to move forward full body skin exams every 6 months.
Detail Level: Detailed

## 2021-10-13 ENCOUNTER — PRE-ADMISSION TESTING (OUTPATIENT)
Dept: ADMISSIONS | Facility: MEDICAL CENTER | Age: 66
DRG: 657 | End: 2021-10-13
Attending: UROLOGY
Payer: COMMERCIAL

## 2021-10-13 ENCOUNTER — HOSPITAL ENCOUNTER (OUTPATIENT)
Dept: RADIOLOGY | Facility: MEDICAL CENTER | Age: 66
DRG: 657 | End: 2021-10-13
Attending: UROLOGY | Admitting: UROLOGY
Payer: COMMERCIAL

## 2021-10-13 DIAGNOSIS — Z01.810 PRE-OPERATIVE CARDIOVASCULAR EXAMINATION: ICD-10-CM

## 2021-10-13 DIAGNOSIS — Z01.811 PRE-OPERATIVE RESPIRATORY EXAMINATION: ICD-10-CM

## 2021-10-13 DIAGNOSIS — Z01.812 PRE-OPERATIVE LABORATORY EXAMINATION: ICD-10-CM

## 2021-10-13 LAB
ABO GROUP BLD: NORMAL
ANION GAP SERPL CALC-SCNC: 12 MMOL/L (ref 7–16)
APPEARANCE UR: CLEAR
BASOPHILS # BLD AUTO: 0.8 % (ref 0–1.8)
BASOPHILS # BLD: 0.07 K/UL (ref 0–0.12)
BILIRUB UR QL STRIP.AUTO: NEGATIVE
BLD GP AB SCN SERPL QL: NORMAL
BUN SERPL-MCNC: 11 MG/DL (ref 8–22)
CALCIUM SERPL-MCNC: 9.3 MG/DL (ref 8.5–10.5)
CHLORIDE SERPL-SCNC: 94 MMOL/L (ref 96–112)
CO2 SERPL-SCNC: 22 MMOL/L (ref 20–33)
COLOR UR: YELLOW
CREAT SERPL-MCNC: 0.95 MG/DL (ref 0.5–1.4)
EKG IMPRESSION: NORMAL
EOSINOPHIL # BLD AUTO: 0.13 K/UL (ref 0–0.51)
EOSINOPHIL NFR BLD: 1.5 % (ref 0–6.9)
ERYTHROCYTE [DISTWIDTH] IN BLOOD BY AUTOMATED COUNT: 44.5 FL (ref 35.9–50)
GLUCOSE SERPL-MCNC: 114 MG/DL (ref 65–99)
GLUCOSE UR STRIP.AUTO-MCNC: NEGATIVE MG/DL
HCT VFR BLD AUTO: 44.3 % (ref 37–47)
HGB BLD-MCNC: 15.1 G/DL (ref 12–16)
IMM GRANULOCYTES # BLD AUTO: 0.03 K/UL (ref 0–0.11)
IMM GRANULOCYTES NFR BLD AUTO: 0.3 % (ref 0–0.9)
KETONES UR STRIP.AUTO-MCNC: NEGATIVE MG/DL
LEUKOCYTE ESTERASE UR QL STRIP.AUTO: NEGATIVE
LYMPHOCYTES # BLD AUTO: 2.63 K/UL (ref 1–4.8)
LYMPHOCYTES NFR BLD: 29.8 % (ref 22–41)
MCH RBC QN AUTO: 30.6 PG (ref 27–33)
MCHC RBC AUTO-ENTMCNC: 34.1 G/DL (ref 33.6–35)
MCV RBC AUTO: 89.9 FL (ref 81.4–97.8)
MICRO URNS: NORMAL
MONOCYTES # BLD AUTO: 0.55 K/UL (ref 0–0.85)
MONOCYTES NFR BLD AUTO: 6.2 % (ref 0–13.4)
NEUTROPHILS # BLD AUTO: 5.43 K/UL (ref 2–7.15)
NEUTROPHILS NFR BLD: 61.4 % (ref 44–72)
NITRITE UR QL STRIP.AUTO: NEGATIVE
NRBC # BLD AUTO: 0 K/UL
NRBC BLD-RTO: 0 /100 WBC
PH UR STRIP.AUTO: 6.5 [PH] (ref 5–8)
PLATELET # BLD AUTO: 227 K/UL (ref 164–446)
PMV BLD AUTO: 9.6 FL (ref 9–12.9)
POTASSIUM SERPL-SCNC: 4.4 MMOL/L (ref 3.6–5.5)
PROT UR QL STRIP: NEGATIVE MG/DL
RBC # BLD AUTO: 4.93 M/UL (ref 4.2–5.4)
RBC UR QL AUTO: NEGATIVE
RH BLD: NORMAL
SODIUM SERPL-SCNC: 128 MMOL/L (ref 135–145)
SP GR UR STRIP.AUTO: 1.01
UROBILINOGEN UR STRIP.AUTO-MCNC: 0.2 MG/DL
WBC # BLD AUTO: 8.8 K/UL (ref 4.8–10.8)

## 2021-10-13 PROCEDURE — 36415 COLL VENOUS BLD VENIPUNCTURE: CPT

## 2021-10-13 PROCEDURE — 93005 ELECTROCARDIOGRAM TRACING: CPT

## 2021-10-13 PROCEDURE — 80048 BASIC METABOLIC PNL TOTAL CA: CPT

## 2021-10-13 PROCEDURE — 86901 BLOOD TYPING SEROLOGIC RH(D): CPT

## 2021-10-13 PROCEDURE — 71045 X-RAY EXAM CHEST 1 VIEW: CPT

## 2021-10-13 PROCEDURE — 81003 URINALYSIS AUTO W/O SCOPE: CPT

## 2021-10-13 PROCEDURE — 93010 ELECTROCARDIOGRAM REPORT: CPT | Performed by: INTERNAL MEDICINE

## 2021-10-13 PROCEDURE — 86900 BLOOD TYPING SEROLOGIC ABO: CPT

## 2021-10-13 PROCEDURE — 86850 RBC ANTIBODY SCREEN: CPT

## 2021-10-13 PROCEDURE — 87086 URINE CULTURE/COLONY COUNT: CPT

## 2021-10-13 PROCEDURE — 85025 COMPLETE CBC W/AUTO DIFF WBC: CPT

## 2021-10-13 RX ORDER — FLUTICASONE PROPIONATE 50 MCG
1 SPRAY, SUSPENSION (ML) NASAL DAILY
Status: ON HOLD | COMMUNITY
End: 2021-10-27

## 2021-10-13 RX ORDER — ALLOPURINOL 300 MG/1
300 TABLET ORAL DAILY
Status: ON HOLD | COMMUNITY
End: 2021-10-27

## 2021-10-13 RX ORDER — VALSARTAN 160 MG/1
160 TABLET ORAL DAILY
Status: ON HOLD | COMMUNITY
End: 2021-10-27

## 2021-10-13 RX ORDER — GABAPENTIN 300 MG/1
600 CAPSULE ORAL 2 TIMES DAILY
COMMUNITY
End: 2021-11-19

## 2021-10-15 LAB
BACTERIA UR CULT: NORMAL
SIGNIFICANT IND 70042: NORMAL
SITE SITE: NORMAL
SOURCE SOURCE: NORMAL

## 2021-10-25 ENCOUNTER — PRE-ADMISSION TESTING (OUTPATIENT)
Dept: ADMISSIONS | Facility: MEDICAL CENTER | Age: 66
DRG: 657 | End: 2021-10-25
Attending: UROLOGY
Payer: COMMERCIAL

## 2021-10-25 DIAGNOSIS — Z01.812 PRE-OPERATIVE LABORATORY EXAMINATION: ICD-10-CM

## 2021-10-25 LAB — COVID ORDER STATUS COVID19: NORMAL

## 2021-10-25 PROCEDURE — U0003 INFECTIOUS AGENT DETECTION BY NUCLEIC ACID (DNA OR RNA); SEVERE ACUTE RESPIRATORY SYNDROME CORONAVIRUS 2 (SARS-COV-2) (CORONAVIRUS DISEASE [COVID-19]), AMPLIFIED PROBE TECHNIQUE, MAKING USE OF HIGH THROUGHPUT TECHNOLOGIES AS DESCRIBED BY CMS-2020-01-R: HCPCS

## 2021-10-25 PROCEDURE — U0005 INFEC AGEN DETEC AMPLI PROBE: HCPCS

## 2021-10-26 LAB
SARS-COV-2 RNA RESP QL NAA+PROBE: NOTDETECTED
SPECIMEN SOURCE: NORMAL

## 2021-10-27 ENCOUNTER — ANESTHESIA EVENT (OUTPATIENT)
Dept: SURGERY | Facility: MEDICAL CENTER | Age: 66
DRG: 657 | End: 2021-10-27
Payer: COMMERCIAL

## 2021-10-27 ENCOUNTER — HOSPITAL ENCOUNTER (INPATIENT)
Facility: MEDICAL CENTER | Age: 66
LOS: 13 days | DRG: 657 | End: 2021-11-09
Attending: UROLOGY | Admitting: UROLOGY
Payer: COMMERCIAL

## 2021-10-27 ENCOUNTER — ANESTHESIA (OUTPATIENT)
Dept: SURGERY | Facility: MEDICAL CENTER | Age: 66
DRG: 657 | End: 2021-10-27
Payer: COMMERCIAL

## 2021-10-27 DIAGNOSIS — C64.9 RENAL CELL CARCINOMA, UNSPECIFIED LATERALITY (HCC): ICD-10-CM

## 2021-10-27 DIAGNOSIS — K56.609 SMALL BOWEL OBSTRUCTION (HCC): ICD-10-CM

## 2021-10-27 LAB
ABO + RH BLD: NORMAL
ANION GAP SERPL CALC-SCNC: 13 MMOL/L (ref 7–16)
BUN SERPL-MCNC: 15 MG/DL (ref 8–22)
CALCIUM SERPL-MCNC: 9.4 MG/DL (ref 8.5–10.5)
CHLORIDE SERPL-SCNC: 99 MMOL/L (ref 96–112)
CO2 SERPL-SCNC: 21 MMOL/L (ref 20–33)
CREAT SERPL-MCNC: 1.03 MG/DL (ref 0.5–1.4)
GLUCOSE SERPL-MCNC: 97 MG/DL (ref 65–99)
POTASSIUM SERPL-SCNC: 4.9 MMOL/L (ref 3.6–5.5)
SODIUM SERPL-SCNC: 133 MMOL/L (ref 135–145)

## 2021-10-27 PROCEDURE — 501576 HCHG TROCAR, SMTH CAN&SEAL12: Performed by: UROLOGY

## 2021-10-27 PROCEDURE — 500002 HCHG ADHESIVE, DERMABOND: Performed by: UROLOGY

## 2021-10-27 PROCEDURE — 700111 HCHG RX REV CODE 636 W/ 250 OVERRIDE (IP): Performed by: ANESTHESIOLOGY

## 2021-10-27 PROCEDURE — 0TB14ZZ EXCISION OF LEFT KIDNEY, PERCUTANEOUS ENDOSCOPIC APPROACH: ICD-10-PCS | Performed by: UROLOGY

## 2021-10-27 PROCEDURE — 160042 HCHG SURGERY MINUTES - EA ADDL 1 MIN LEVEL 5: Performed by: UROLOGY

## 2021-10-27 PROCEDURE — 700105 HCHG RX REV CODE 258: Performed by: UROLOGY

## 2021-10-27 PROCEDURE — 160009 HCHG ANES TIME/MIN: Performed by: UROLOGY

## 2021-10-27 PROCEDURE — 700101 HCHG RX REV CODE 250: Performed by: UROLOGY

## 2021-10-27 PROCEDURE — 160031 HCHG SURGERY MINUTES - 1ST 30 MINS LEVEL 5: Performed by: UROLOGY

## 2021-10-27 PROCEDURE — 160035 HCHG PACU - 1ST 60 MINS PHASE I: Performed by: UROLOGY

## 2021-10-27 PROCEDURE — 88307 TISSUE EXAM BY PATHOLOGIST: CPT

## 2021-10-27 PROCEDURE — 500376 HCHG DRAIN, J-P ROUND 15FR: Performed by: UROLOGY

## 2021-10-27 PROCEDURE — A9270 NON-COVERED ITEM OR SERVICE: HCPCS | Performed by: UROLOGY

## 2021-10-27 PROCEDURE — A9270 NON-COVERED ITEM OR SERVICE: HCPCS | Performed by: ANESTHESIOLOGY

## 2021-10-27 PROCEDURE — 770004 HCHG ROOM/CARE - ONCOLOGY PRIVATE *

## 2021-10-27 PROCEDURE — 160048 HCHG OR STATISTICAL LEVEL 1-5: Performed by: UROLOGY

## 2021-10-27 PROCEDURE — 700111 HCHG RX REV CODE 636 W/ 250 OVERRIDE (IP)

## 2021-10-27 PROCEDURE — 80048 BASIC METABOLIC PNL TOTAL CA: CPT

## 2021-10-27 PROCEDURE — 160002 HCHG RECOVERY MINUTES (STAT): Performed by: UROLOGY

## 2021-10-27 PROCEDURE — 501399 HCHG SPECIMAN BAG, ENDO CATC: Performed by: UROLOGY

## 2021-10-27 PROCEDURE — 500868 HCHG NEEDLE, SURGI(VARES): Performed by: UROLOGY

## 2021-10-27 PROCEDURE — 501571 HCHG TROCAR, SEPARATOR 12X100: Performed by: UROLOGY

## 2021-10-27 PROCEDURE — 700101 HCHG RX REV CODE 250: Performed by: ANESTHESIOLOGY

## 2021-10-27 PROCEDURE — 8E0W4CZ ROBOTIC ASSISTED PROCEDURE OF TRUNK REGION, PERCUTANEOUS ENDOSCOPIC APPROACH: ICD-10-PCS | Performed by: UROLOGY

## 2021-10-27 PROCEDURE — 700102 HCHG RX REV CODE 250 W/ 637 OVERRIDE(OP): Performed by: UROLOGY

## 2021-10-27 PROCEDURE — 502714 HCHG ROBOTIC SURGERY SERVICES: Performed by: UROLOGY

## 2021-10-27 PROCEDURE — A6402 STERILE GAUZE <= 16 SQ IN: HCPCS | Performed by: UROLOGY

## 2021-10-27 PROCEDURE — 700111 HCHG RX REV CODE 636 W/ 250 OVERRIDE (IP): Performed by: UROLOGY

## 2021-10-27 PROCEDURE — 160036 HCHG PACU - EA ADDL 30 MINS PHASE I: Performed by: UROLOGY

## 2021-10-27 PROCEDURE — 700102 HCHG RX REV CODE 250 W/ 637 OVERRIDE(OP): Performed by: ANESTHESIOLOGY

## 2021-10-27 PROCEDURE — 501838 HCHG SUTURE GENERAL: Performed by: UROLOGY

## 2021-10-27 PROCEDURE — 501338 HCHG SHEARS, ENDO: Performed by: UROLOGY

## 2021-10-27 PROCEDURE — 501570 HCHG TROCAR, SEPARATOR: Performed by: UROLOGY

## 2021-10-27 RX ORDER — ONDANSETRON 2 MG/ML
4 INJECTION INTRAMUSCULAR; INTRAVENOUS EVERY 4 HOURS PRN
Status: DISCONTINUED | OUTPATIENT
Start: 2021-10-27 | End: 2021-11-09 | Stop reason: HOSPADM

## 2021-10-27 RX ORDER — LIDOCAINE HYDROCHLORIDE 40 MG/ML
SOLUTION TOPICAL PRN
Status: DISCONTINUED | OUTPATIENT
Start: 2021-10-27 | End: 2021-10-27 | Stop reason: SURG

## 2021-10-27 RX ORDER — HYDROMORPHONE HYDROCHLORIDE 1 MG/ML
0.4 INJECTION, SOLUTION INTRAMUSCULAR; INTRAVENOUS; SUBCUTANEOUS
Status: DISCONTINUED | OUTPATIENT
Start: 2021-10-27 | End: 2021-10-27 | Stop reason: HOSPADM

## 2021-10-27 RX ORDER — CEFAZOLIN SODIUM 1 G/3ML
INJECTION, POWDER, FOR SOLUTION INTRAMUSCULAR; INTRAVENOUS PRN
Status: DISCONTINUED | OUTPATIENT
Start: 2021-10-27 | End: 2021-10-27 | Stop reason: SURG

## 2021-10-27 RX ORDER — SCOLOPAMINE TRANSDERMAL SYSTEM 1 MG/1
1 PATCH, EXTENDED RELEASE TRANSDERMAL
Status: DISCONTINUED | OUTPATIENT
Start: 2021-10-27 | End: 2021-10-29

## 2021-10-27 RX ORDER — ONDANSETRON 2 MG/ML
INJECTION INTRAMUSCULAR; INTRAVENOUS
Status: DISPENSED
Start: 2021-10-27 | End: 2021-10-28

## 2021-10-27 RX ORDER — ATROPA BELLADONNA AND OPIUM 16.2; 6 MG/1; MG/1
60 SUPPOSITORY RECTAL EVERY 12 HOURS PRN
Status: DISCONTINUED | OUTPATIENT
Start: 2021-10-27 | End: 2021-10-30

## 2021-10-27 RX ORDER — HYDROMORPHONE HYDROCHLORIDE 1 MG/ML
0.1 INJECTION, SOLUTION INTRAMUSCULAR; INTRAVENOUS; SUBCUTANEOUS
Status: DISCONTINUED | OUTPATIENT
Start: 2021-10-27 | End: 2021-10-27 | Stop reason: HOSPADM

## 2021-10-27 RX ORDER — HYDRALAZINE HYDROCHLORIDE 20 MG/ML
5 INJECTION INTRAMUSCULAR; INTRAVENOUS
Status: DISCONTINUED | OUTPATIENT
Start: 2021-10-27 | End: 2021-10-27 | Stop reason: HOSPADM

## 2021-10-27 RX ORDER — NICOTINE 21 MG/24HR
1 PATCH, TRANSDERMAL 24 HOURS TRANSDERMAL EVERY 24 HOURS
Status: DISCONTINUED | OUTPATIENT
Start: 2021-10-28 | End: 2021-11-09 | Stop reason: HOSPADM

## 2021-10-27 RX ORDER — BUPIVACAINE HYDROCHLORIDE AND EPINEPHRINE 5; 5 MG/ML; UG/ML
INJECTION, SOLUTION EPIDURAL; INTRACAUDAL; PERINEURAL
Status: DISCONTINUED | OUTPATIENT
Start: 2021-10-27 | End: 2021-10-27 | Stop reason: HOSPADM

## 2021-10-27 RX ORDER — VALSARTAN 320 MG/1
320 TABLET ORAL
Status: ON HOLD | COMMUNITY
End: 2021-11-16

## 2021-10-27 RX ORDER — ACETAMINOPHEN 500 MG
1000 TABLET ORAL ONCE
COMMUNITY

## 2021-10-27 RX ORDER — SODIUM CHLORIDE, SODIUM LACTATE, POTASSIUM CHLORIDE, CALCIUM CHLORIDE 600; 310; 30; 20 MG/100ML; MG/100ML; MG/100ML; MG/100ML
INJECTION, SOLUTION INTRAVENOUS CONTINUOUS
Status: DISCONTINUED | OUTPATIENT
Start: 2021-10-27 | End: 2021-10-27 | Stop reason: HOSPADM

## 2021-10-27 RX ORDER — DEXAMETHASONE SODIUM PHOSPHATE 4 MG/ML
4 INJECTION, SOLUTION INTRA-ARTICULAR; INTRALESIONAL; INTRAMUSCULAR; INTRAVENOUS; SOFT TISSUE
Status: DISCONTINUED | OUTPATIENT
Start: 2021-10-27 | End: 2021-10-29

## 2021-10-27 RX ORDER — SIMETHICONE 80 MG
80 TABLET,CHEWABLE ORAL EVERY 8 HOURS PRN
Status: DISCONTINUED | OUTPATIENT
Start: 2021-10-27 | End: 2021-11-09 | Stop reason: HOSPADM

## 2021-10-27 RX ORDER — CEFAZOLIN SODIUM 1 G/50ML
1 INJECTION, SOLUTION INTRAVENOUS EVERY 8 HOURS
Status: COMPLETED | OUTPATIENT
Start: 2021-10-28 | End: 2021-10-28

## 2021-10-27 RX ORDER — ACETAMINOPHEN 500 MG
1000 TABLET ORAL ONCE
Status: DISCONTINUED | OUTPATIENT
Start: 2021-10-27 | End: 2021-10-27 | Stop reason: HOSPADM

## 2021-10-27 RX ORDER — ACETAMINOPHEN 500 MG
1000 TABLET ORAL EVERY 6 HOURS PRN
Status: DISCONTINUED | OUTPATIENT
Start: 2021-11-02 | End: 2021-11-03

## 2021-10-27 RX ORDER — DIPHENHYDRAMINE HYDROCHLORIDE 50 MG/ML
12.5 INJECTION INTRAMUSCULAR; INTRAVENOUS
Status: DISCONTINUED | OUTPATIENT
Start: 2021-10-27 | End: 2021-10-27 | Stop reason: HOSPADM

## 2021-10-27 RX ORDER — PROPOFOL 10 MG/ML
INJECTION, EMULSION INTRAVENOUS PRN
Status: DISCONTINUED | OUTPATIENT
Start: 2021-10-27 | End: 2021-10-27 | Stop reason: SURG

## 2021-10-27 RX ORDER — SODIUM CHLORIDE, SODIUM LACTATE, POTASSIUM CHLORIDE, CALCIUM CHLORIDE 600; 310; 30; 20 MG/100ML; MG/100ML; MG/100ML; MG/100ML
INJECTION, SOLUTION INTRAVENOUS CONTINUOUS
Status: ACTIVE | OUTPATIENT
Start: 2021-10-27 | End: 2021-10-27

## 2021-10-27 RX ORDER — GABAPENTIN 300 MG/1
600 CAPSULE ORAL 2 TIMES DAILY
Status: DISCONTINUED | OUTPATIENT
Start: 2021-10-27 | End: 2021-11-03

## 2021-10-27 RX ORDER — ONDANSETRON 2 MG/ML
INJECTION INTRAMUSCULAR; INTRAVENOUS PRN
Status: DISCONTINUED | OUTPATIENT
Start: 2021-10-27 | End: 2021-10-27 | Stop reason: SURG

## 2021-10-27 RX ORDER — SODIUM CHLORIDE 9 MG/ML
500 INJECTION, SOLUTION INTRAVENOUS
Status: COMPLETED | OUTPATIENT
Start: 2021-10-27 | End: 2021-10-29

## 2021-10-27 RX ORDER — GABAPENTIN 300 MG/1
300 CAPSULE ORAL ONCE
Status: DISCONTINUED | OUTPATIENT
Start: 2021-10-27 | End: 2021-10-27 | Stop reason: HOSPADM

## 2021-10-27 RX ORDER — ALLOPURINOL 300 MG/1
300 TABLET ORAL DAILY
Status: DISCONTINUED | OUTPATIENT
Start: 2021-10-28 | End: 2021-11-09 | Stop reason: HOSPADM

## 2021-10-27 RX ORDER — OXYCODONE HCL 5 MG/5 ML
10 SOLUTION, ORAL ORAL
Status: COMPLETED | OUTPATIENT
Start: 2021-10-27 | End: 2021-10-27

## 2021-10-27 RX ORDER — HYDROMORPHONE HYDROCHLORIDE 1 MG/ML
0.2 INJECTION, SOLUTION INTRAMUSCULAR; INTRAVENOUS; SUBCUTANEOUS
Status: DISCONTINUED | OUTPATIENT
Start: 2021-10-27 | End: 2021-10-27 | Stop reason: HOSPADM

## 2021-10-27 RX ORDER — DEXAMETHASONE SODIUM PHOSPHATE 4 MG/ML
INJECTION, SOLUTION INTRA-ARTICULAR; INTRALESIONAL; INTRAMUSCULAR; INTRAVENOUS; SOFT TISSUE PRN
Status: DISCONTINUED | OUTPATIENT
Start: 2021-10-27 | End: 2021-10-27 | Stop reason: SURG

## 2021-10-27 RX ORDER — AMLODIPINE BESYLATE 10 MG/1
10 TABLET ORAL EVERY EVENING
COMMUNITY

## 2021-10-27 RX ORDER — HYDROMORPHONE HYDROCHLORIDE 2 MG/ML
INJECTION, SOLUTION INTRAMUSCULAR; INTRAVENOUS; SUBCUTANEOUS PRN
Status: DISCONTINUED | OUTPATIENT
Start: 2021-10-27 | End: 2021-10-27 | Stop reason: SURG

## 2021-10-27 RX ORDER — OXYCODONE HCL 5 MG/5 ML
5 SOLUTION, ORAL ORAL
Status: COMPLETED | OUTPATIENT
Start: 2021-10-27 | End: 2021-10-27

## 2021-10-27 RX ORDER — HALOPERIDOL 5 MG/ML
1 INJECTION INTRAMUSCULAR
Status: DISCONTINUED | OUTPATIENT
Start: 2021-10-27 | End: 2021-10-27 | Stop reason: HOSPADM

## 2021-10-27 RX ORDER — PHENAZOPYRIDINE HYDROCHLORIDE 200 MG/1
200 TABLET, FILM COATED ORAL
Status: DISCONTINUED | OUTPATIENT
Start: 2021-10-28 | End: 2021-10-30

## 2021-10-27 RX ORDER — LIDOCAINE HYDROCHLORIDE 20 MG/ML
INJECTION, SOLUTION EPIDURAL; INFILTRATION; INTRACAUDAL; PERINEURAL PRN
Status: DISCONTINUED | OUTPATIENT
Start: 2021-10-27 | End: 2021-10-27 | Stop reason: SURG

## 2021-10-27 RX ORDER — ACETAMINOPHEN 500 MG
1000 TABLET ORAL EVERY 6 HOURS
Status: DISPENSED | OUTPATIENT
Start: 2021-10-28 | End: 2021-11-01

## 2021-10-27 RX ORDER — LEVOTHYROXINE SODIUM 0.07 MG/1
37.5 TABLET ORAL
Status: DISCONTINUED | OUTPATIENT
Start: 2021-10-28 | End: 2021-11-04

## 2021-10-27 RX ORDER — ALLOPURINOL 300 MG/1
TABLET ORAL
COMMUNITY

## 2021-10-27 RX ORDER — OXYCODONE HYDROCHLORIDE 10 MG/1
10 TABLET ORAL
Status: DISCONTINUED | OUTPATIENT
Start: 2021-10-27 | End: 2021-10-29

## 2021-10-27 RX ORDER — AMOXICILLIN 250 MG
2 CAPSULE ORAL
Status: DISCONTINUED | OUTPATIENT
Start: 2021-10-27 | End: 2021-11-09 | Stop reason: HOSPADM

## 2021-10-27 RX ORDER — NICOTINE 21 MG/24H
1 PATCH, EXTENDED RELEASE TRANSDERMAL EVERY 24 HOURS
COMMUNITY
Start: 2021-10-15

## 2021-10-27 RX ORDER — AMLODIPINE BESYLATE 10 MG/1
10 TABLET ORAL EVERY EVENING
Status: DISCONTINUED | OUTPATIENT
Start: 2021-10-27 | End: 2021-10-28

## 2021-10-27 RX ORDER — ONDANSETRON 2 MG/ML
4 INJECTION INTRAMUSCULAR; INTRAVENOUS
Status: COMPLETED | OUTPATIENT
Start: 2021-10-27 | End: 2021-10-27

## 2021-10-27 RX ORDER — LEVOTHYROXINE SODIUM 0.03 MG/1
37.5 TABLET ORAL
Status: ON HOLD | COMMUNITY
Start: 2021-08-31 | End: 2021-11-16

## 2021-10-27 RX ORDER — LABETALOL HYDROCHLORIDE 5 MG/ML
5 INJECTION, SOLUTION INTRAVENOUS
Status: DISCONTINUED | OUTPATIENT
Start: 2021-10-27 | End: 2021-10-27 | Stop reason: HOSPADM

## 2021-10-27 RX ORDER — HYDROMORPHONE HYDROCHLORIDE 1 MG/ML
0.5 INJECTION, SOLUTION INTRAMUSCULAR; INTRAVENOUS; SUBCUTANEOUS
Status: DISCONTINUED | OUTPATIENT
Start: 2021-10-27 | End: 2021-10-28

## 2021-10-27 RX ORDER — AMOXICILLIN AND CLAVULANATE POTASSIUM 875; 125 MG/1; MG/1
1 TABLET, FILM COATED ORAL 2 TIMES DAILY
Status: ON HOLD | COMMUNITY
End: 2021-11-16

## 2021-10-27 RX ORDER — SODIUM CHLORIDE 9 MG/ML
INJECTION, SOLUTION INTRAVENOUS CONTINUOUS
Status: DISCONTINUED | OUTPATIENT
Start: 2021-10-27 | End: 2021-10-28

## 2021-10-27 RX ORDER — VALSARTAN 80 MG/1
320 TABLET ORAL
Status: DISCONTINUED | OUTPATIENT
Start: 2021-10-27 | End: 2021-10-28

## 2021-10-27 RX ORDER — OXYCODONE HYDROCHLORIDE 5 MG/1
5 TABLET ORAL
Status: DISCONTINUED | OUTPATIENT
Start: 2021-10-27 | End: 2021-10-29

## 2021-10-27 RX ORDER — HYDROXYZINE 50 MG/1
25 TABLET, FILM COATED ORAL NIGHTLY PRN
Status: DISCONTINUED | OUTPATIENT
Start: 2021-10-27 | End: 2021-11-09 | Stop reason: HOSPADM

## 2021-10-27 RX ORDER — HALOPERIDOL 5 MG/ML
1 INJECTION INTRAMUSCULAR EVERY 6 HOURS PRN
Status: DISCONTINUED | OUTPATIENT
Start: 2021-10-27 | End: 2021-10-31

## 2021-10-27 RX ORDER — ROCURONIUM BROMIDE 10 MG/ML
INJECTION, SOLUTION INTRAVENOUS PRN
Status: DISCONTINUED | OUTPATIENT
Start: 2021-10-27 | End: 2021-10-27 | Stop reason: SURG

## 2021-10-27 RX ORDER — DIPHENHYDRAMINE HYDROCHLORIDE 50 MG/ML
25 INJECTION INTRAMUSCULAR; INTRAVENOUS EVERY 6 HOURS PRN
Status: DISCONTINUED | OUTPATIENT
Start: 2021-10-27 | End: 2021-11-09 | Stop reason: HOSPADM

## 2021-10-27 RX ADMIN — ONDANSETRON 4 MG: 2 INJECTION INTRAMUSCULAR; INTRAVENOUS at 18:45

## 2021-10-27 RX ADMIN — FENTANYL CITRATE 50 MCG: 50 INJECTION, SOLUTION INTRAMUSCULAR; INTRAVENOUS at 19:04

## 2021-10-27 RX ADMIN — OXYCODONE HYDROCHLORIDE 10 MG: 5 SOLUTION ORAL at 19:18

## 2021-10-27 RX ADMIN — DEXAMETHASONE SODIUM PHOSPHATE 8 MG: 4 INJECTION, SOLUTION INTRA-ARTICULAR; INTRALESIONAL; INTRAMUSCULAR; INTRAVENOUS; SOFT TISSUE at 16:11

## 2021-10-27 RX ADMIN — PROPOFOL 200 MG: 10 INJECTION, EMULSION INTRAVENOUS at 15:59

## 2021-10-27 RX ADMIN — ROCURONIUM BROMIDE 50 MG: 10 INJECTION, SOLUTION INTRAVENOUS at 15:59

## 2021-10-27 RX ADMIN — HYDROMORPHONE HYDROCHLORIDE 0.2 MG: 1 INJECTION, SOLUTION INTRAMUSCULAR; INTRAVENOUS; SUBCUTANEOUS at 20:35

## 2021-10-27 RX ADMIN — LIDOCAINE HYDROCHLORIDE 4 ML: 40 SOLUTION TOPICAL at 15:59

## 2021-10-27 RX ADMIN — FENTANYL CITRATE 50 MCG: 50 INJECTION, SOLUTION INTRAMUSCULAR; INTRAVENOUS at 18:50

## 2021-10-27 RX ADMIN — SODIUM CHLORIDE: 9 INJECTION, SOLUTION INTRAVENOUS at 23:34

## 2021-10-27 RX ADMIN — ACETAMINOPHEN 1000 MG: 500 TABLET ORAL at 23:20

## 2021-10-27 RX ADMIN — ONDANSETRON 4 MG: 2 INJECTION INTRAMUSCULAR; INTRAVENOUS at 18:29

## 2021-10-27 RX ADMIN — GABAPENTIN 600 MG: 300 CAPSULE ORAL at 23:19

## 2021-10-27 RX ADMIN — ROCURONIUM BROMIDE 20 MG: 10 INJECTION, SOLUTION INTRAVENOUS at 17:29

## 2021-10-27 RX ADMIN — FENTANYL CITRATE 75 MCG: 50 INJECTION, SOLUTION INTRAMUSCULAR; INTRAVENOUS at 15:59

## 2021-10-27 RX ADMIN — LIDOCAINE HYDROCHLORIDE 0.5 ML: 10 INJECTION, SOLUTION EPIDURAL; INFILTRATION; INTRACAUDAL; PERINEURAL at 13:45

## 2021-10-27 RX ADMIN — HYDROMORPHONE HYDROCHLORIDE 1 MG: 2 INJECTION, SOLUTION INTRAMUSCULAR; INTRAVENOUS; SUBCUTANEOUS at 17:53

## 2021-10-27 RX ADMIN — OXYCODONE 10 MG: 5 TABLET ORAL at 23:20

## 2021-10-27 RX ADMIN — HYDROMORPHONE HYDROCHLORIDE 0.2 MG: 1 INJECTION, SOLUTION INTRAMUSCULAR; INTRAVENOUS; SUBCUTANEOUS at 20:52

## 2021-10-27 RX ADMIN — HYDROMORPHONE HYDROCHLORIDE 0.5 MG: 2 INJECTION, SOLUTION INTRAMUSCULAR; INTRAVENOUS; SUBCUTANEOUS at 18:33

## 2021-10-27 RX ADMIN — SUGAMMADEX 200 MG: 100 INJECTION, SOLUTION INTRAVENOUS at 18:29

## 2021-10-27 RX ADMIN — AMLODIPINE BESYLATE 10 MG: 10 TABLET ORAL at 23:21

## 2021-10-27 RX ADMIN — FENTANYL CITRATE 25 MCG: 50 INJECTION, SOLUTION INTRAMUSCULAR; INTRAVENOUS at 16:16

## 2021-10-27 RX ADMIN — LIDOCAINE HYDROCHLORIDE 80 MG: 20 INJECTION, SOLUTION EPIDURAL; INFILTRATION; INTRACAUDAL at 15:59

## 2021-10-27 RX ADMIN — HYDROMORPHONE HYDROCHLORIDE 0.2 MG: 1 INJECTION, SOLUTION INTRAMUSCULAR; INTRAVENOUS; SUBCUTANEOUS at 20:42

## 2021-10-27 RX ADMIN — CEFAZOLIN SODIUM 1 G: 1 INJECTION, SOLUTION INTRAVENOUS at 23:37

## 2021-10-27 RX ADMIN — SODIUM CHLORIDE, POTASSIUM CHLORIDE, SODIUM LACTATE AND CALCIUM CHLORIDE: 600; 310; 30; 20 INJECTION, SOLUTION INTRAVENOUS at 15:53

## 2021-10-27 RX ADMIN — HALOPERIDOL LACTATE 1 MG: 5 INJECTION, SOLUTION INTRAMUSCULAR at 19:09

## 2021-10-27 RX ADMIN — CEFAZOLIN 2 G: 330 INJECTION, POWDER, FOR SOLUTION INTRAMUSCULAR; INTRAVENOUS at 16:36

## 2021-10-27 ASSESSMENT — PAIN DESCRIPTION - PAIN TYPE
TYPE: SURGICAL PAIN

## 2021-10-27 NOTE — ANESTHESIA PROCEDURE NOTES
Airway    Date/Time: 10/27/2021 4:00 PM  Performed by: Gautam Garcia M.D.  Authorized by: Gautam Garcia M.D.     Location:  OR  Urgency:  Elective  Indications for Airway Management:  Anesthesia      Spontaneous Ventilation: absent    Sedation Level:  Deep  Preoxygenated: Yes    Patient Position:  Sniffing  Mask Difficulty Assessment:  0 - not attempted  Final Airway Type:  Endotracheal airway  Final Endotracheal Airway:  ETT  Cuffed: Yes    Technique Used for Successful ETT Placement:  Direct laryngoscopy    Insertion Site:  Oral  Blade Type:  Malgorzata  Laryngoscope Blade/Videolaryngoscope Blade Size:  3  ETT Size (mm):  7.0  Measured from:  Lips  ETT to Lips (cm):  21  Placement Verified by: auscultation and capnometry    Cormack-Lehane Classification:  Grade I - full view of glottis  Number of Attempts at Approach:  1

## 2021-10-27 NOTE — ANESTHESIA PREPROCEDURE EVALUATION
HTN, hep C, smoker, renal mass. Pt fainted yesterday and EMS was called. Her BP was low but other vitals / EKG were normal. She states she started a new BP med 10 days ago and has had dizzy spells since then. She was also taking a bowel prep. BP normal today.     Relevant Problems   No relevant active problems       Physical Exam    Airway   Mallampati: II  TM distance: >3 FB  Neck ROM: full       Cardiovascular - normal exam  Rhythm: regular  Rate: normal  (-) murmur     Dental - normal exam           Pulmonary - normal exam  Breath sounds clear to auscultation     Abdominal    Neurological - normal exam                 Anesthesia Plan    ASA 2       Plan - general       Airway plan will be ETT          Induction: intravenous    Postoperative Plan: Postoperative administration of opioids is intended.    Pertinent diagnostic labs and testing reviewed    Informed Consent:    Anesthetic plan and risks discussed with patient.    Use of blood products discussed with: patient whom consented to blood products.

## 2021-10-28 ENCOUNTER — APPOINTMENT (OUTPATIENT)
Dept: RADIOLOGY | Facility: MEDICAL CENTER | Age: 66
DRG: 657 | End: 2021-10-28
Attending: STUDENT IN AN ORGANIZED HEALTH CARE EDUCATION/TRAINING PROGRAM
Payer: COMMERCIAL

## 2021-10-28 PROBLEM — I95.2 HYPOTENSION DUE TO DRUGS: Status: ACTIVE | Noted: 2021-10-28

## 2021-10-28 PROBLEM — R73.9 HYPERGLYCEMIA: Status: ACTIVE | Noted: 2021-10-28

## 2021-10-28 PROBLEM — N28.89 RENAL MASS: Status: ACTIVE | Noted: 2021-10-28

## 2021-10-28 PROBLEM — R79.89 ELEVATED LACTIC ACID LEVEL: Status: ACTIVE | Noted: 2021-10-28

## 2021-10-28 PROBLEM — E87.1 HYPONATREMIA: Status: ACTIVE | Noted: 2021-10-28

## 2021-10-28 PROBLEM — D72.829 LEUKOCYTOSIS: Status: ACTIVE | Noted: 2021-10-28

## 2021-10-28 LAB
ALBUMIN SERPL BCP-MCNC: 3 G/DL (ref 3.2–4.9)
ALBUMIN/GLOB SERPL: 0.8 G/DL
ALP SERPL-CCNC: 79 U/L (ref 30–99)
ALT SERPL-CCNC: 17 U/L (ref 2–50)
ANION GAP SERPL CALC-SCNC: 12 MMOL/L (ref 7–16)
ANION GAP SERPL CALC-SCNC: 9 MMOL/L (ref 7–16)
AST SERPL-CCNC: 33 U/L (ref 12–45)
BASOPHILS # BLD AUTO: 0.2 % (ref 0–1.8)
BASOPHILS # BLD: 0.04 K/UL (ref 0–0.12)
BILIRUB SERPL-MCNC: 0.2 MG/DL (ref 0.1–1.5)
BUN SERPL-MCNC: 16 MG/DL (ref 8–22)
BUN SERPL-MCNC: 21 MG/DL (ref 8–22)
CALCIUM SERPL-MCNC: 7.7 MG/DL (ref 8.5–10.5)
CALCIUM SERPL-MCNC: 8.4 MG/DL (ref 8.5–10.5)
CHLORIDE SERPL-SCNC: 97 MMOL/L (ref 96–112)
CHLORIDE SERPL-SCNC: 99 MMOL/L (ref 96–112)
CO2 SERPL-SCNC: 19 MMOL/L (ref 20–33)
CO2 SERPL-SCNC: 20 MMOL/L (ref 20–33)
CREAT SERPL-MCNC: 1.27 MG/DL (ref 0.5–1.4)
CREAT SERPL-MCNC: 2.05 MG/DL (ref 0.5–1.4)
EKG IMPRESSION: NORMAL
EOSINOPHIL # BLD AUTO: 0.02 K/UL (ref 0–0.51)
EOSINOPHIL NFR BLD: 0.1 % (ref 0–6.9)
ERYTHROCYTE [DISTWIDTH] IN BLOOD BY AUTOMATED COUNT: 47.2 FL (ref 35.9–50)
ERYTHROCYTE [DISTWIDTH] IN BLOOD BY AUTOMATED COUNT: 48.8 FL (ref 35.9–50)
GLOBULIN SER CALC-MCNC: 3.7 G/DL (ref 1.9–3.5)
GLUCOSE BLD-MCNC: 104 MG/DL (ref 65–99)
GLUCOSE SERPL-MCNC: 102 MG/DL (ref 65–99)
GLUCOSE SERPL-MCNC: 198 MG/DL (ref 65–99)
HCT VFR BLD AUTO: 37.8 % (ref 37–47)
HCT VFR BLD AUTO: 41.3 % (ref 37–47)
HGB BLD-MCNC: 12.7 G/DL (ref 12–16)
HGB BLD-MCNC: 13.7 G/DL (ref 12–16)
IMM GRANULOCYTES # BLD AUTO: 0.08 K/UL (ref 0–0.11)
IMM GRANULOCYTES NFR BLD AUTO: 0.5 % (ref 0–0.9)
LACTATE BLD-SCNC: 2.2 MMOL/L (ref 0.5–2)
LYMPHOCYTES # BLD AUTO: 2.34 K/UL (ref 1–4.8)
LYMPHOCYTES NFR BLD: 14.1 % (ref 22–41)
MCH RBC QN AUTO: 30.5 PG (ref 27–33)
MCH RBC QN AUTO: 31.9 PG (ref 27–33)
MCHC RBC AUTO-ENTMCNC: 33.2 G/DL (ref 33.6–35)
MCHC RBC AUTO-ENTMCNC: 33.6 G/DL (ref 33.6–35)
MCV RBC AUTO: 92 FL (ref 81.4–97.8)
MCV RBC AUTO: 95 FL (ref 81.4–97.8)
MONOCYTES # BLD AUTO: 1.66 K/UL (ref 0–0.85)
MONOCYTES NFR BLD AUTO: 10 % (ref 0–13.4)
NEUTROPHILS # BLD AUTO: 12.48 K/UL (ref 2–7.15)
NEUTROPHILS NFR BLD: 75.1 % (ref 44–72)
NRBC # BLD AUTO: 0 K/UL
NRBC BLD-RTO: 0 /100 WBC
OSMOLALITY SERPL: 270 MOSM/KG H2O (ref 278–298)
PATHOLOGY CONSULT NOTE: NORMAL
PLATELET # BLD AUTO: 275 K/UL (ref 164–446)
PLATELET # BLD AUTO: 286 K/UL (ref 164–446)
PMV BLD AUTO: 9.5 FL (ref 9–12.9)
PMV BLD AUTO: 9.5 FL (ref 9–12.9)
POTASSIUM SERPL-SCNC: 4.2 MMOL/L (ref 3.6–5.5)
POTASSIUM SERPL-SCNC: 4.5 MMOL/L (ref 3.6–5.5)
PROT SERPL-MCNC: 6.7 G/DL (ref 6–8.2)
RBC # BLD AUTO: 3.98 M/UL (ref 4.2–5.4)
RBC # BLD AUTO: 4.49 M/UL (ref 4.2–5.4)
SODIUM SERPL-SCNC: 126 MMOL/L (ref 135–145)
SODIUM SERPL-SCNC: 130 MMOL/L (ref 135–145)
WBC # BLD AUTO: 11.6 K/UL (ref 4.8–10.8)
WBC # BLD AUTO: 16.6 K/UL (ref 4.8–10.8)

## 2021-10-28 PROCEDURE — 700101 HCHG RX REV CODE 250: Performed by: STUDENT IN AN ORGANIZED HEALTH CARE EDUCATION/TRAINING PROGRAM

## 2021-10-28 PROCEDURE — 99221 1ST HOSP IP/OBS SF/LOW 40: CPT | Performed by: STUDENT IN AN ORGANIZED HEALTH CARE EDUCATION/TRAINING PROGRAM

## 2021-10-28 PROCEDURE — 82962 GLUCOSE BLOOD TEST: CPT

## 2021-10-28 PROCEDURE — 80053 COMPREHEN METABOLIC PANEL: CPT

## 2021-10-28 PROCEDURE — 700111 HCHG RX REV CODE 636 W/ 250 OVERRIDE (IP): Performed by: UROLOGY

## 2021-10-28 PROCEDURE — 700102 HCHG RX REV CODE 250 W/ 637 OVERRIDE(OP): Performed by: UROLOGY

## 2021-10-28 PROCEDURE — 80048 BASIC METABOLIC PNL TOTAL CA: CPT

## 2021-10-28 PROCEDURE — 700105 HCHG RX REV CODE 258: Performed by: UROLOGY

## 2021-10-28 PROCEDURE — A9270 NON-COVERED ITEM OR SERVICE: HCPCS | Performed by: UROLOGY

## 2021-10-28 PROCEDURE — 71045 X-RAY EXAM CHEST 1 VIEW: CPT

## 2021-10-28 PROCEDURE — 93005 ELECTROCARDIOGRAM TRACING: CPT | Performed by: UROLOGY

## 2021-10-28 PROCEDURE — A9270 NON-COVERED ITEM OR SERVICE: HCPCS | Performed by: PHYSICIAN ASSISTANT

## 2021-10-28 PROCEDURE — 700102 HCHG RX REV CODE 250 W/ 637 OVERRIDE(OP): Performed by: STUDENT IN AN ORGANIZED HEALTH CARE EDUCATION/TRAINING PROGRAM

## 2021-10-28 PROCEDURE — 700102 HCHG RX REV CODE 250 W/ 637 OVERRIDE(OP): Performed by: PHYSICIAN ASSISTANT

## 2021-10-28 PROCEDURE — 85025 COMPLETE CBC W/AUTO DIFF WBC: CPT

## 2021-10-28 PROCEDURE — 85027 COMPLETE CBC AUTOMATED: CPT

## 2021-10-28 PROCEDURE — 700105 HCHG RX REV CODE 258: Performed by: STUDENT IN AN ORGANIZED HEALTH CARE EDUCATION/TRAINING PROGRAM

## 2021-10-28 PROCEDURE — A9270 NON-COVERED ITEM OR SERVICE: HCPCS | Performed by: STUDENT IN AN ORGANIZED HEALTH CARE EDUCATION/TRAINING PROGRAM

## 2021-10-28 PROCEDURE — 83605 ASSAY OF LACTIC ACID: CPT

## 2021-10-28 PROCEDURE — 83930 ASSAY OF BLOOD OSMOLALITY: CPT

## 2021-10-28 PROCEDURE — 93010 ELECTROCARDIOGRAM REPORT: CPT | Performed by: INTERNAL MEDICINE

## 2021-10-28 PROCEDURE — 770004 HCHG ROOM/CARE - ONCOLOGY PRIVATE *

## 2021-10-28 RX ORDER — SODIUM CHLORIDE 9 MG/ML
INJECTION, SOLUTION INTRAVENOUS CONTINUOUS
Status: ACTIVE | OUTPATIENT
Start: 2021-10-28 | End: 2021-10-29

## 2021-10-28 RX ORDER — SODIUM CHLORIDE 9 MG/ML
1000 INJECTION, SOLUTION INTRAVENOUS ONCE
Status: COMPLETED | OUTPATIENT
Start: 2021-10-28 | End: 2021-10-29

## 2021-10-28 RX ORDER — SODIUM CHLORIDE 9 MG/ML
500 INJECTION, SOLUTION INTRAVENOUS ONCE
Status: COMPLETED | OUTPATIENT
Start: 2021-10-28 | End: 2021-10-29

## 2021-10-28 RX ORDER — LIDOCAINE 50 MG/G
1 PATCH TOPICAL EVERY 24 HOURS
Status: DISCONTINUED | OUTPATIENT
Start: 2021-10-28 | End: 2021-11-06

## 2021-10-28 RX ORDER — GUAIFENESIN 600 MG/1
600 TABLET, EXTENDED RELEASE ORAL EVERY 12 HOURS PRN
Status: DISCONTINUED | OUTPATIENT
Start: 2021-10-28 | End: 2021-11-09 | Stop reason: HOSPADM

## 2021-10-28 RX ORDER — MIDODRINE HYDROCHLORIDE 5 MG/1
5 TABLET ORAL ONCE
Status: COMPLETED | OUTPATIENT
Start: 2021-10-28 | End: 2021-10-28

## 2021-10-28 RX ORDER — DOCUSATE SODIUM 100 MG/1
100 CAPSULE, LIQUID FILLED ORAL 2 TIMES DAILY
Status: DISCONTINUED | OUTPATIENT
Start: 2021-10-28 | End: 2021-10-30

## 2021-10-28 RX ADMIN — CEFAZOLIN SODIUM 1 G: 1 INJECTION, SOLUTION INTRAVENOUS at 08:00

## 2021-10-28 RX ADMIN — ENOXAPARIN SODIUM 40 MG: 40 INJECTION SUBCUTANEOUS at 06:27

## 2021-10-28 RX ADMIN — SODIUM CHLORIDE 500 ML: 9 INJECTION, SOLUTION INTRAVENOUS at 20:44

## 2021-10-28 RX ADMIN — LEVOTHYROXINE SODIUM 37.5 MCG: 0.03 TABLET ORAL at 06:26

## 2021-10-28 RX ADMIN — MIDODRINE HYDROCHLORIDE 5 MG: 5 TABLET ORAL at 20:49

## 2021-10-28 RX ADMIN — GABAPENTIN 600 MG: 300 CAPSULE ORAL at 04:27

## 2021-10-28 RX ADMIN — ALLOPURINOL 300 MG: 100 TABLET ORAL at 06:27

## 2021-10-28 RX ADMIN — ACETAMINOPHEN 1000 MG: 500 TABLET ORAL at 11:47

## 2021-10-28 RX ADMIN — LIDOCAINE 1 PATCH: 50 PATCH CUTANEOUS at 20:49

## 2021-10-28 RX ADMIN — ONDANSETRON 4 MG: 2 INJECTION INTRAMUSCULAR; INTRAVENOUS at 04:28

## 2021-10-28 RX ADMIN — SODIUM CHLORIDE 500 ML: 9 INJECTION, SOLUTION INTRAVENOUS at 20:43

## 2021-10-28 RX ADMIN — OXYCODONE 10 MG: 5 TABLET ORAL at 04:27

## 2021-10-28 RX ADMIN — PHENAZOPYRIDINE HYDROCHLORIDE 200 MG: 200 TABLET ORAL at 11:47

## 2021-10-28 RX ADMIN — PHENAZOPYRIDINE HYDROCHLORIDE 200 MG: 200 TABLET ORAL at 08:00

## 2021-10-28 RX ADMIN — ACETAMINOPHEN 1000 MG: 500 TABLET ORAL at 17:41

## 2021-10-28 RX ADMIN — OXYCODONE 10 MG: 5 TABLET ORAL at 08:50

## 2021-10-28 RX ADMIN — GUAIFENESIN 600 MG: 600 TABLET, EXTENDED RELEASE ORAL at 20:49

## 2021-10-28 RX ADMIN — PHENAZOPYRIDINE HYDROCHLORIDE 200 MG: 200 TABLET ORAL at 17:41

## 2021-10-28 RX ADMIN — SODIUM CHLORIDE: 9 INJECTION, SOLUTION INTRAVENOUS at 20:44

## 2021-10-28 RX ADMIN — DOCUSATE SODIUM 100 MG: 100 CAPSULE, LIQUID FILLED ORAL at 17:40

## 2021-10-28 RX ADMIN — SODIUM CHLORIDE 500 ML: 9 INJECTION, SOLUTION INTRAVENOUS at 20:54

## 2021-10-28 RX ADMIN — LIDOCAINE 1 PATCH: 50 PATCH CUTANEOUS at 21:15

## 2021-10-28 RX ADMIN — SODIUM CHLORIDE 1000 ML: 9 INJECTION, SOLUTION INTRAVENOUS at 20:15

## 2021-10-28 RX ADMIN — OXYCODONE 10 MG: 5 TABLET ORAL at 15:35

## 2021-10-28 RX ADMIN — GABAPENTIN 600 MG: 300 CAPSULE ORAL at 17:41

## 2021-10-28 RX ADMIN — ACETAMINOPHEN 1000 MG: 500 TABLET ORAL at 04:27

## 2021-10-28 RX ADMIN — NICOTINE TRANSDERMAL SYSTEM 21 MG: 21 PATCH, EXTENDED RELEASE TRANSDERMAL at 06:27

## 2021-10-28 RX ADMIN — AMLODIPINE BESYLATE 10 MG: 10 TABLET ORAL at 17:41

## 2021-10-28 ASSESSMENT — ENCOUNTER SYMPTOMS
CHILLS: 0
PALPITATIONS: 0
NAUSEA: 0
WEAKNESS: 0
ORTHOPNEA: 0
COUGH: 0
WHEEZING: 0
CONSTIPATION: 0
EYE PAIN: 0
DIARRHEA: 0
FEVER: 0
BACK PAIN: 1
SPUTUM PRODUCTION: 0
BLOOD IN STOOL: 0
COUGH: 1
HEMOPTYSIS: 0
VOMITING: 0
BLURRED VISION: 0
LOSS OF CONSCIOUSNESS: 0
SHORTNESS OF BREATH: 0
DIZZINESS: 1
ABDOMINAL PAIN: 1
SORE THROAT: 0
WEIGHT LOSS: 0
MYALGIAS: 0

## 2021-10-28 ASSESSMENT — PAIN DESCRIPTION - PAIN TYPE
TYPE: ACUTE PAIN;SURGICAL PAIN
TYPE: SURGICAL PAIN

## 2021-10-28 NOTE — OR NURSING
Patient in PACU in stable condition. VSS. Surgical dressing clean dry intact with Khoi to suction. Will continue to monitor and medicate appropriately.

## 2021-10-28 NOTE — OR SURGEON
Immediate Post OP Note    PreOp Diagnosis: left renal mass      PostOp Diagnosis: same      Procedure(s):  NEPHRECTOMY, PARTIAL, ROBOT-ASSISTED, USING DA NAT XI - Wound Class: Clean  ULTRASOUND GUIDANCE - Wound Class: Clean    Surgeon(s):  OFELIA Carlisle M.D.    Anesthesiologist/Type of Anesthesia:  Anesthesiologist: Gautam Garcia M.D./General    Surgical Staff:  Circulator: Mercedes Grajeda R.N.; Liz Wei R.N.  Relief Circulator: Stormy Vidales R.N.  Scrub Person: Allan Vaughn    Specimens removed if any:  ID Type Source Tests Collected by Time Destination   A : LEFT RENAL MASS Tissue Kidney PATHOLOGY SPECIMEN Peter Mccracken M.D. 10/27/2021  6:04 PM        Estimated Blood Loss: 100 cc    Findings: exophytic solid and cystic renal mass    Complications: none        10/27/2021 6:38 PM Peter Mccracken M.D.

## 2021-10-28 NOTE — CARE PLAN
COVID-19 surge in effect.     The patient is Stable - Low risk of patient condition declining or worsening    Shift Goals  Clinical Goals: Pain control; Safety  Patient Goals: Pain control    Progress made toward(s) clinical / shift goals: Patient medicated for pain per MAR. Patient educated on need to call for assistance before getting out of bed, pt verbalized understanding. Bed alarm active and sounding, bed locked and in lowest position, call light and personal belongings within reach.      Problem: Pain - Standard  Goal: Alleviation of pain or a reduction in pain to the patient’s comfort goal  Outcome: Progressing     Problem: Knowledge Deficit - Standard  Goal: Patient and family/care givers will demonstrate understanding of plan of care, disease process/condition, diagnostic tests and medications  Outcome: Progressing

## 2021-10-28 NOTE — DIETARY
Nutrition Services: Day 1 of admit.  Thais Munroe is a 66 y.o. female with admitting DX of Renal mass, left  Pt requested oral nutrition supplements. RD will place order and provide according to diet order/advancement.

## 2021-10-28 NOTE — ANESTHESIA TIME REPORT
Anesthesia Start and Stop Event Times     Date Time Event    10/27/2021 1540 Ready for Procedure     1553 Anesthesia Start     1842 Anesthesia Stop        Responsible Staff  10/27/21    Name Role Begin End    Gautam Garcia M.D. Anesth 1553 1842        Preop Diagnosis (Free Text):  Pre-op Diagnosis     LEFT RENAL MASS        Preop Diagnosis (Codes):    Premium Reason  A. 3PM - 7AM    Comments:

## 2021-10-28 NOTE — OP REPORT
DATE OF SERVICE:  10/27/2021     PREOPERATIVE DIAGNOSIS:  Left renal mass.     POSTOPERATIVE DIAGNOSIS:  Left renal mass.     PROCEDURES:  1.  Robotic-assisted laparoscopic left partial nephrectomy.  2.  Intraoperative renal ultrasonography for guidance.     SURGEON:  Peter Mccracken MD     ASSISTANT:  Los Castillo MD     ANESTHESIOLOGIST:  Gautam Garcia MD     TYPE OF ANESTHESIA:  General endotracheal tube.     INDICATIONS:  A 66-year-old female was noted to have a left renal mass on   abdominal imaging for pain.  Evaluation suggested a renal cell carcinoma.  She   is taken for partial nephrectomy.     FINDINGS:  There was an exophytic and endophytic renal mass.  Margins were   defined with ultrasound.  It was excised.  Renorrhaphy accomplished.  Under 24   minutes of warm ischemia and she tolerated the procedure well with minimal   blood loss.     DESCRIPTION OF PROCEDURE:  The patient was identified in the holding area.    She was taken to the operative suite.  General anesthesia was administered by   Dr. Garcia.  She was positioned in a supine position, an urethral catheter   placed.  She was transferred to the lateral decubitus position, left side up   and secured to the table with towels and tape.  Table was flexed at the level   of the iliac crest.  She was then sterilely prepped and draped.  Timeout was   called.  Correct patient and site of surgery was confirmed after administering   cefazolin intravenously.     A Veress needle was placed into the peritoneal cavity.  She was then   insufflated to 15 mmHg with carbon dioxide.  Robotic port sites were marked   from the left epigastrium just lateral to the rectus in a linear fashion out   towards the left anterior superior iliac spine.  Four robotic 8 mm ports were   placed, the first was placed blindly and the next 3 under direct vision.  A 12   mm assist port was placed in a periumbilical location and an EndoClose #1   Vicryl suture was placed to  close the fascia at the end of the procedure.     Robot was docked to the ports and the procedure initiated by mobilizing the   left colon and splenic flexure letting that fall medially.  The ureter and   gonadal vessels were identified in the retroperitoneum and retracted   laterally, dissected cephalad up to the renal hilum.  Attachments between the   fascia and lateral surface of the aorta were taken down with electrocautery.    The renal vein was identified and the origin of the renal artery skeletonized   just below this.  It was skeletonized just enough to place 2 bulldog clamps   later during the procedure.     I then divided Gerota's fascia in a craniocaudad fashion over the   anterolateral surface of the kidney.  This was then mobilized allowing me to   rotate the kidney medially.  Complete mobility of the upper and lower poles   was obtained for easy mobility.  The mass was identified visually and then   margins were marked using intraoperative renal ultrasonography to define the   margins and a safe surgical margin.  These were marked on the cortical surface   of the kidney with electrocautery.     Two bulldog clamps were placed on the renal artery and under 24 minutes of   warm ischemia time the mass was excised.  It was set aside.  Renorrhaphy   accomplished closing the open renal collecting system with 3-0 Vicryl and then   running the corticomedullary portion of the kidney with a 3-0 Monocryl suture   secured on each end on the cortical surface of the kidney with   Hem-o-adore/Lapra-Ty technique.  I then reapproximated the cut cortical edge of   the kidney with 5 separate 0 Vicryl sutures using the Hem-o-adore/Lapra-Ty   technique to cinch down the edges of the kidney.     Bulldog clamps were then removed.  There was minimal venous ooze, which was   controlled by tightening down the Hem-o-Adore on the Vicryl sutures.  Vistaseal   was then placed over the cut cortical edge of the kidney and this was tucked    back inside Gerota's fascia, which was reapproximated around the kidney and a   drain.  Drain was brought out through the #3 port site and secured to the skin   with 0 silk.     The ports were then removed under direct vision.  No active bleeding was   noted.  The 0 Vicryl suture was utilized to close the assist port site.  This   has been placed with an EndoClose at the beginning of the procedure.     Port sites were irrigated.  The most inferior lateral port site was extended   through a muscle splitting technique and the specimen extracted at that   location.  Fascia at that location was closed with 0 Vicryl.  The skin at all   sites was closed with 4-0 Monocryl subcuticular stitches and then Dermabond   was applied over this.     The specimen was sent for pathologic analysis.     Estimated blood loss was approximately 100 mL.     No intraoperative complications were noted.  The patient was sent to recovery   room in stable condition.        ______________________________  MD KENROY Maharaj/MARVIN    DD:  10/27/2021 18:46  DT:  10/27/2021 20:06    Job#:  [id number]

## 2021-10-28 NOTE — PROGRESS NOTES
Note to reader: this note follows the APSO format rather than the historical SOAP format. Assessment and plan located at the top of the note for ease of use.    Chief Complaint  66 y.o. year old female here with a left renal mass    Assessment/Plan  Interval History   Left renal mass s/p robotic assisted laparoscopic partial left nephrectomy 10/27/21    Ambulate TID  Advance diet  Encouraged non narcotic analgesics  Labs in am      Case discussed with patient and Urology-Dr. Nawaf NASCIMENTO  Patient seen and examined    10/28. POD #1. Somnolent during visit. Reports poor pain control initially but now improved. On soft diet but minimal intake thus far. No flatus. Has not yet ambulated. On Lovenox and has SCDs.  JARRETT 180 cc / 24 hours.            Review of Systems  Physical Exam   Review of Systems   Constitutional: Negative for chills and fever.   Respiratory: Negative for cough.    Cardiovascular: Negative for chest pain.   Gastrointestinal: Positive for abdominal pain. Negative for nausea and vomiting.   Genitourinary: Negative for hematuria.     Vitals:    10/27/21 2329 10/28/21 0430 10/28/21 0825 10/28/21 1000   BP: 129/91 117/83 107/65    Pulse: (!) 101 (!) 106 86    Resp: 18 18 18    Temp: 36.2 °C (97.1 °F) 36.3 °C (97.4 °F) 36.4 °C (97.5 °F)    TempSrc: Temporal Temporal Temporal    SpO2: 97% 97% 97% 93%   Weight:       Height:         Physical Exam  Vitals and nursing note reviewed.   Constitutional:       Appearance: Normal appearance.   Pulmonary:      Effort: Pulmonary effort is normal.   Abdominal:      Comments: Protuberant abdomen with mild diffuse tenderness. Post surgical incisions well approximated. JARRETT with serosanguinous drainage.    Skin:     General: Skin is warm and dry.   Neurological:      Mental Status: She is alert and oriented to person, place, and time.          Hematology Chemistry   Lab Results   Component Value Date/Time    WBC 11.6 (H) 10/28/2021 12:28 AM    HEMOGLOBIN 13.7 10/28/2021 12:28  AM    HEMATOCRIT 41.3 10/28/2021 12:28 AM    PLATELETCT 286 10/28/2021 12:28 AM     Lab Results   Component Value Date/Time    SODIUM 130 (L) 10/28/2021 12:28 AM    POTASSIUM 4.5 10/28/2021 12:28 AM    CHLORIDE 99 10/28/2021 12:28 AM    CO2 19 (L) 10/28/2021 12:28 AM    GLUCOSE 198 (H) 10/28/2021 12:28 AM    BUN 16 10/28/2021 12:28 AM    CREATININE 1.27 10/28/2021 12:28 AM         Labs not explicitly included in this progress note were reviewed by the author.   Radiology/imaging not explicitly included in this progress note was reviewed by the author.     Medications reviewed and Labs reviewed

## 2021-10-28 NOTE — OR NURSING
Patient to floor with transport in stable condition. VSS. Surgical dressing clean dry intact with Khoi to suction. Aox4 and on 3 L o2 attached to full tank. Belongings on bed. No further needs.

## 2021-10-29 ENCOUNTER — APPOINTMENT (OUTPATIENT)
Dept: RADIOLOGY | Facility: MEDICAL CENTER | Age: 66
DRG: 657 | End: 2021-10-29
Attending: STUDENT IN AN ORGANIZED HEALTH CARE EDUCATION/TRAINING PROGRAM
Payer: COMMERCIAL

## 2021-10-29 ENCOUNTER — APPOINTMENT (OUTPATIENT)
Dept: RADIOLOGY | Facility: MEDICAL CENTER | Age: 66
DRG: 657 | End: 2021-10-29
Attending: NURSE PRACTITIONER
Payer: COMMERCIAL

## 2021-10-29 PROBLEM — K56.609 BOWEL OBSTRUCTION (HCC): Status: ACTIVE | Noted: 2021-10-29

## 2021-10-29 PROBLEM — I95.9 HYPOTENSION, UNSPECIFIED: Status: ACTIVE | Noted: 2021-10-28

## 2021-10-29 LAB
ALBUMIN SERPL BCP-MCNC: 2.8 G/DL (ref 3.2–4.9)
ALBUMIN/GLOB SERPL: 0.8 G/DL
ALP SERPL-CCNC: 73 U/L (ref 30–99)
ALT SERPL-CCNC: 13 U/L (ref 2–50)
ANION GAP SERPL CALC-SCNC: 10 MMOL/L (ref 7–16)
ANION GAP SERPL CALC-SCNC: 13 MMOL/L (ref 7–16)
APPEARANCE UR: ABNORMAL
AST SERPL-CCNC: 35 U/L (ref 12–45)
BACTERIA #/AREA URNS HPF: ABNORMAL /HPF
BASE EXCESS BLDA CALC-SCNC: -12 MMOL/L (ref -4–3)
BASOPHILS # BLD AUTO: 0.2 % (ref 0–1.8)
BASOPHILS # BLD AUTO: 0.3 % (ref 0–1.8)
BASOPHILS # BLD: 0.03 K/UL (ref 0–0.12)
BASOPHILS # BLD: 0.04 K/UL (ref 0–0.12)
BILIRUB SERPL-MCNC: 0.2 MG/DL (ref 0.1–1.5)
BILIRUB UR QL STRIP.AUTO: NEGATIVE
BODY TEMPERATURE: ABNORMAL CENTIGRADE
BUN SERPL-MCNC: 18 MG/DL (ref 8–22)
BUN SERPL-MCNC: 20 MG/DL (ref 8–22)
CALCIUM SERPL-MCNC: 7.2 MG/DL (ref 8.5–10.5)
CALCIUM SERPL-MCNC: 7.4 MG/DL (ref 8.5–10.5)
CHLORIDE SERPL-SCNC: 100 MMOL/L (ref 96–112)
CHLORIDE SERPL-SCNC: 99 MMOL/L (ref 96–112)
CO2 SERPL-SCNC: 14 MMOL/L (ref 20–33)
CO2 SERPL-SCNC: 16 MMOL/L (ref 20–33)
COLOR UR: ABNORMAL
CORTIS SERPL-MCNC: 14.5 UG/DL (ref 0–23)
CREAT SERPL-MCNC: 1.55 MG/DL (ref 0.5–1.4)
CREAT SERPL-MCNC: 1.85 MG/DL (ref 0.5–1.4)
EKG IMPRESSION: NORMAL
EOSINOPHIL # BLD AUTO: 0.02 K/UL (ref 0–0.51)
EOSINOPHIL # BLD AUTO: 0.04 K/UL (ref 0–0.51)
EOSINOPHIL NFR BLD: 0.1 % (ref 0–6.9)
EOSINOPHIL NFR BLD: 0.3 % (ref 0–6.9)
EPI CELLS #/AREA URNS HPF: ABNORMAL /HPF
ERYTHROCYTE [DISTWIDTH] IN BLOOD BY AUTOMATED COUNT: 47.2 FL (ref 35.9–50)
ERYTHROCYTE [DISTWIDTH] IN BLOOD BY AUTOMATED COUNT: 50.2 FL (ref 35.9–50)
GLOBULIN SER CALC-MCNC: 3.4 G/DL (ref 1.9–3.5)
GLUCOSE SERPL-MCNC: 113 MG/DL (ref 65–99)
GLUCOSE SERPL-MCNC: 129 MG/DL (ref 65–99)
GLUCOSE UR STRIP.AUTO-MCNC: 250 MG/DL
HCO3 BLDA-SCNC: 15 MMOL/L (ref 17–25)
HCT VFR BLD AUTO: 36.9 % (ref 37–47)
HCT VFR BLD AUTO: 38.6 % (ref 37–47)
HGB BLD-MCNC: 12.4 G/DL (ref 12–16)
HGB BLD-MCNC: 12.4 G/DL (ref 12–16)
IMM GRANULOCYTES # BLD AUTO: 0.07 K/UL (ref 0–0.11)
IMM GRANULOCYTES # BLD AUTO: 0.09 K/UL (ref 0–0.11)
IMM GRANULOCYTES NFR BLD AUTO: 0.5 % (ref 0–0.9)
IMM GRANULOCYTES NFR BLD AUTO: 0.6 % (ref 0–0.9)
INR PPP: 1.18 (ref 0.87–1.13)
KETONES UR STRIP.AUTO-MCNC: 15 MG/DL
LACTATE BLD-SCNC: 0.8 MMOL/L (ref 0.5–2)
LACTATE BLD-SCNC: 1.1 MMOL/L (ref 0.5–2)
LACTATE BLD-SCNC: 3.2 MMOL/L (ref 0.5–2)
LEUKOCYTE ESTERASE UR QL STRIP.AUTO: ABNORMAL
LYMPHOCYTES # BLD AUTO: 2.03 K/UL (ref 1–4.8)
LYMPHOCYTES # BLD AUTO: 2.62 K/UL (ref 1–4.8)
LYMPHOCYTES NFR BLD: 13.7 % (ref 22–41)
LYMPHOCYTES NFR BLD: 17 % (ref 22–41)
MAGNESIUM SERPL-MCNC: 2.1 MG/DL (ref 1.5–2.5)
MCH RBC QN AUTO: 31.1 PG (ref 27–33)
MCH RBC QN AUTO: 31.2 PG (ref 27–33)
MCHC RBC AUTO-ENTMCNC: 32.1 G/DL (ref 33.6–35)
MCHC RBC AUTO-ENTMCNC: 33.6 G/DL (ref 33.6–35)
MCV RBC AUTO: 92.5 FL (ref 81.4–97.8)
MCV RBC AUTO: 97 FL (ref 81.4–97.8)
MICRO URNS: ABNORMAL
MONOCYTES # BLD AUTO: 1.14 K/UL (ref 0–0.85)
MONOCYTES # BLD AUTO: 1.25 K/UL (ref 0–0.85)
MONOCYTES NFR BLD AUTO: 7.4 % (ref 0–13.4)
MONOCYTES NFR BLD AUTO: 8.4 % (ref 0–13.4)
NEUTROPHILS # BLD AUTO: 11.42 K/UL (ref 2–7.15)
NEUTROPHILS # BLD AUTO: 11.53 K/UL (ref 2–7.15)
NEUTROPHILS NFR BLD: 74.7 % (ref 44–72)
NEUTROPHILS NFR BLD: 76.8 % (ref 44–72)
NITRITE UR QL STRIP.AUTO: POSITIVE
NRBC # BLD AUTO: 0 K/UL
NRBC # BLD AUTO: 0 K/UL
NRBC BLD-RTO: 0 /100 WBC
NRBC BLD-RTO: 0 /100 WBC
OSMOLALITY UR: 357 MOSM/KG H2O (ref 300–900)
PCO2 BLDA: 36.8 MMHG (ref 26–37)
PH BLDA: 7.23 [PH] (ref 7.4–7.5)
PH UR STRIP.AUTO: 5 [PH] (ref 5–8)
PLATELET # BLD AUTO: 234 K/UL (ref 164–446)
PLATELET # BLD AUTO: 250 K/UL (ref 164–446)
PMV BLD AUTO: 9.2 FL (ref 9–12.9)
PMV BLD AUTO: 9.4 FL (ref 9–12.9)
PO2 BLDA: 71.3 MMHG (ref 64–87)
POTASSIUM SERPL-SCNC: 4.1 MMOL/L (ref 3.6–5.5)
POTASSIUM SERPL-SCNC: 4.4 MMOL/L (ref 3.6–5.5)
PROCALCITONIN SERPL-MCNC: 0.06 NG/ML
PROT SERPL-MCNC: 6.2 G/DL (ref 6–8.2)
PROT UR QL STRIP: 100 MG/DL
PROTHROMBIN TIME: 14.7 SEC (ref 12–14.6)
RBC # BLD AUTO: 3.98 M/UL (ref 4.2–5.4)
RBC # BLD AUTO: 3.99 M/UL (ref 4.2–5.4)
RBC # URNS HPF: ABNORMAL /HPF
RBC UR QL AUTO: ABNORMAL
SAO2 % BLDA: 93.2 % (ref 93–99)
SODIUM SERPL-SCNC: 126 MMOL/L (ref 135–145)
SODIUM SERPL-SCNC: 126 MMOL/L (ref 135–145)
SODIUM UR-SCNC: <20 MMOL/L
SP GR UR STRIP.AUTO: 1.02
TROPONIN T SERPL-MCNC: 11 NG/L (ref 6–19)
UROBILINOGEN UR STRIP.AUTO-MCNC: >=8 MG/DL
WBC # BLD AUTO: 14.9 K/UL (ref 4.8–10.8)
WBC # BLD AUTO: 15.4 K/UL (ref 4.8–10.8)

## 2021-10-29 PROCEDURE — 83735 ASSAY OF MAGNESIUM: CPT

## 2021-10-29 PROCEDURE — 85610 PROTHROMBIN TIME: CPT

## 2021-10-29 PROCEDURE — 82533 TOTAL CORTISOL: CPT

## 2021-10-29 PROCEDURE — 700111 HCHG RX REV CODE 636 W/ 250 OVERRIDE (IP): Performed by: STUDENT IN AN ORGANIZED HEALTH CARE EDUCATION/TRAINING PROGRAM

## 2021-10-29 PROCEDURE — 93005 ELECTROCARDIOGRAM TRACING: CPT | Performed by: INTERNAL MEDICINE

## 2021-10-29 PROCEDURE — 93010 ELECTROCARDIOGRAM REPORT: CPT | Performed by: INTERNAL MEDICINE

## 2021-10-29 PROCEDURE — 83605 ASSAY OF LACTIC ACID: CPT | Mod: 91

## 2021-10-29 PROCEDURE — 700111 HCHG RX REV CODE 636 W/ 250 OVERRIDE (IP): Performed by: SURGERY

## 2021-10-29 PROCEDURE — 700105 HCHG RX REV CODE 258: Performed by: INTERNAL MEDICINE

## 2021-10-29 PROCEDURE — 82803 BLOOD GASES ANY COMBINATION: CPT

## 2021-10-29 PROCEDURE — 84145 PROCALCITONIN (PCT): CPT

## 2021-10-29 PROCEDURE — 74019 RADEX ABDOMEN 2 VIEWS: CPT

## 2021-10-29 PROCEDURE — 81001 URINALYSIS AUTO W/SCOPE: CPT

## 2021-10-29 PROCEDURE — 700101 HCHG RX REV CODE 250: Performed by: SURGERY

## 2021-10-29 PROCEDURE — 700102 HCHG RX REV CODE 250 W/ 637 OVERRIDE(OP): Performed by: UROLOGY

## 2021-10-29 PROCEDURE — 84484 ASSAY OF TROPONIN QUANT: CPT

## 2021-10-29 PROCEDURE — A9270 NON-COVERED ITEM OR SERVICE: HCPCS | Performed by: STUDENT IN AN ORGANIZED HEALTH CARE EDUCATION/TRAINING PROGRAM

## 2021-10-29 PROCEDURE — A9270 NON-COVERED ITEM OR SERVICE: HCPCS | Performed by: UROLOGY

## 2021-10-29 PROCEDURE — 700105 HCHG RX REV CODE 258: Performed by: STUDENT IN AN ORGANIZED HEALTH CARE EDUCATION/TRAINING PROGRAM

## 2021-10-29 PROCEDURE — 80048 BASIC METABOLIC PNL TOTAL CA: CPT

## 2021-10-29 PROCEDURE — 74018 RADEX ABDOMEN 1 VIEW: CPT

## 2021-10-29 PROCEDURE — 83935 ASSAY OF URINE OSMOLALITY: CPT

## 2021-10-29 PROCEDURE — 99233 SBSQ HOSP IP/OBS HIGH 50: CPT | Performed by: INTERNAL MEDICINE

## 2021-10-29 PROCEDURE — 87086 URINE CULTURE/COLONY COUNT: CPT

## 2021-10-29 PROCEDURE — 74176 CT ABD & PELVIS W/O CONTRAST: CPT

## 2021-10-29 PROCEDURE — 700111 HCHG RX REV CODE 636 W/ 250 OVERRIDE (IP): Performed by: UROLOGY

## 2021-10-29 PROCEDURE — 770022 HCHG ROOM/CARE - ICU (200)

## 2021-10-29 PROCEDURE — 99291 CRITICAL CARE FIRST HOUR: CPT | Mod: GC | Performed by: INTERNAL MEDICINE

## 2021-10-29 PROCEDURE — 700101 HCHG RX REV CODE 250: Performed by: STUDENT IN AN ORGANIZED HEALTH CARE EDUCATION/TRAINING PROGRAM

## 2021-10-29 PROCEDURE — 87040 BLOOD CULTURE FOR BACTERIA: CPT

## 2021-10-29 PROCEDURE — 700111 HCHG RX REV CODE 636 W/ 250 OVERRIDE (IP): Performed by: INTERNAL MEDICINE

## 2021-10-29 PROCEDURE — 700102 HCHG RX REV CODE 250 W/ 637 OVERRIDE(OP): Performed by: STUDENT IN AN ORGANIZED HEALTH CARE EDUCATION/TRAINING PROGRAM

## 2021-10-29 PROCEDURE — 85025 COMPLETE CBC W/AUTO DIFF WBC: CPT

## 2021-10-29 PROCEDURE — 84300 ASSAY OF URINE SODIUM: CPT

## 2021-10-29 PROCEDURE — 80053 COMPREHEN METABOLIC PANEL: CPT

## 2021-10-29 RX ORDER — SODIUM CHLORIDE 9 MG/ML
1000 INJECTION, SOLUTION INTRAVENOUS ONCE
Status: COMPLETED | OUTPATIENT
Start: 2021-10-29 | End: 2021-10-29

## 2021-10-29 RX ORDER — ALUMINA, MAGNESIA, AND SIMETHICONE 2400; 2400; 240 MG/30ML; MG/30ML; MG/30ML
10 SUSPENSION ORAL 4 TIMES DAILY PRN
Status: DISCONTINUED | OUTPATIENT
Start: 2021-10-29 | End: 2021-11-09 | Stop reason: HOSPADM

## 2021-10-29 RX ORDER — MORPHINE SULFATE 4 MG/ML
2-4 INJECTION, SOLUTION INTRAMUSCULAR; INTRAVENOUS EVERY 4 HOURS PRN
Status: DISCONTINUED | OUTPATIENT
Start: 2021-10-29 | End: 2021-10-31

## 2021-10-29 RX ORDER — SODIUM CHLORIDE, SODIUM LACTATE, POTASSIUM CHLORIDE, AND CALCIUM CHLORIDE .6; .31; .03; .02 G/100ML; G/100ML; G/100ML; G/100ML
1000 INJECTION, SOLUTION INTRAVENOUS
Status: DISCONTINUED | OUTPATIENT
Start: 2021-10-29 | End: 2021-10-30

## 2021-10-29 RX ORDER — SODIUM CHLORIDE 9 MG/ML
500 INJECTION, SOLUTION INTRAVENOUS ONCE
Status: DISCONTINUED | OUTPATIENT
Start: 2021-10-29 | End: 2021-10-29

## 2021-10-29 RX ORDER — NOREPINEPHRINE BITARTRATE 0.03 MG/ML
0-30 INJECTION, SOLUTION INTRAVENOUS CONTINUOUS
Status: DISCONTINUED | OUTPATIENT
Start: 2021-10-29 | End: 2021-10-30

## 2021-10-29 RX ORDER — ENEMA 19; 7 G/133ML; G/133ML
1 ENEMA RECTAL ONCE
Status: COMPLETED | OUTPATIENT
Start: 2021-10-29 | End: 2021-10-29

## 2021-10-29 RX ORDER — MIDODRINE HYDROCHLORIDE 5 MG/1
5 TABLET ORAL
Status: DISCONTINUED | OUTPATIENT
Start: 2021-10-29 | End: 2021-10-30

## 2021-10-29 RX ORDER — PANTOPRAZOLE SODIUM 40 MG/10ML
40 INJECTION, POWDER, LYOPHILIZED, FOR SOLUTION INTRAVENOUS
Status: DISCONTINUED | OUTPATIENT
Start: 2021-10-29 | End: 2021-10-31

## 2021-10-29 RX ADMIN — SODIUM PHOSPHATE 133 ML: 7; 19 ENEMA RECTAL at 13:27

## 2021-10-29 RX ADMIN — ALUMINUM HYDROXIDE, MAGNESIUM HYDROXIDE, AND DIMETHICONE 10 ML: 400; 400; 40 SUSPENSION ORAL at 01:47

## 2021-10-29 RX ADMIN — SODIUM CHLORIDE 1000 ML: 9 INJECTION, SOLUTION INTRAVENOUS at 14:51

## 2021-10-29 RX ADMIN — OXYCODONE 5 MG: 5 TABLET ORAL at 01:43

## 2021-10-29 RX ADMIN — SODIUM CHLORIDE 1000 ML: 9 INJECTION, SOLUTION INTRAVENOUS at 00:34

## 2021-10-29 RX ADMIN — CEFTRIAXONE SODIUM 2 G: 2 INJECTION, POWDER, FOR SOLUTION INTRAMUSCULAR; INTRAVENOUS at 06:26

## 2021-10-29 RX ADMIN — MORPHINE SULFATE 4 MG: 4 INJECTION INTRAVENOUS at 18:23

## 2021-10-29 RX ADMIN — ACETAMINOPHEN 1000 MG: 500 TABLET ORAL at 00:38

## 2021-10-29 RX ADMIN — MORPHINE SULFATE 4 MG: 4 INJECTION INTRAVENOUS at 13:27

## 2021-10-29 RX ADMIN — PIPERACILLIN AND TAZOBACTAM 4.5 G: 4; .5 INJECTION, POWDER, LYOPHILIZED, FOR SOLUTION INTRAVENOUS; PARENTERAL at 21:00

## 2021-10-29 RX ADMIN — MIDODRINE HYDROCHLORIDE 5 MG: 5 TABLET ORAL at 00:37

## 2021-10-29 RX ADMIN — LIDOCAINE 1 PATCH: 50 PATCH CUTANEOUS at 21:00

## 2021-10-29 RX ADMIN — ACETAMINOPHEN 1000 MG: 500 TABLET ORAL at 23:44

## 2021-10-29 RX ADMIN — LIDOCAINE 1 PATCH: 50 PATCH CUTANEOUS at 21:15

## 2021-10-29 RX ADMIN — PIPERACILLIN AND TAZOBACTAM 4.5 G: 4; .5 INJECTION, POWDER, LYOPHILIZED, FOR SOLUTION INTRAVENOUS; PARENTERAL at 11:23

## 2021-10-29 RX ADMIN — PIPERACILLIN AND TAZOBACTAM 4.5 G: 4; .5 INJECTION, POWDER, LYOPHILIZED, FOR SOLUTION INTRAVENOUS; PARENTERAL at 14:50

## 2021-10-29 RX ADMIN — ONDANSETRON 4 MG: 2 INJECTION INTRAMUSCULAR; INTRAVENOUS at 04:40

## 2021-10-29 RX ADMIN — MORPHINE SULFATE 4 MG: 4 INJECTION INTRAVENOUS at 23:44

## 2021-10-29 RX ADMIN — MIDODRINE HYDROCHLORIDE 5 MG: 5 TABLET ORAL at 08:31

## 2021-10-29 RX ADMIN — ONDANSETRON 4 MG: 2 INJECTION INTRAMUSCULAR; INTRAVENOUS at 11:46

## 2021-10-29 RX ADMIN — NICOTINE TRANSDERMAL SYSTEM 21 MG: 21 PATCH, EXTENDED RELEASE TRANSDERMAL at 08:30

## 2021-10-29 RX ADMIN — SODIUM CHLORIDE: 9 INJECTION, SOLUTION INTRAVENOUS at 11:23

## 2021-10-29 ASSESSMENT — PAIN DESCRIPTION - PAIN TYPE: TYPE: ACUTE PAIN;SURGICAL PAIN

## 2021-10-29 NOTE — ASSESSMENT & PLAN NOTE
-Lactic acid 2.2, then down to 1.1, surgery recs q 4 lactic acids, ordered  -Continue with IV fluids as above  -Follow-up CXR procalcitonin to rule out infectious etiology

## 2021-10-29 NOTE — CONSULTS
Surgical History and Physical    Date of Service: 10/29/2021    Requesting Physician: John Ireland MD - Critical Care    Reason for Consultation: Abdominal distention    HPI: This is a 66 y.o. female who is s/p left partial nephrectomy POD 2 who developed significant abdominal distention in the preceding 24hrs.  According to the Mid-level practitioner that had been seeing the patient on [facility] 3, she has been having issues with post-op pain control and has not been mobilizing/OOB.  This morning she was noted to be hypotensive with an elevated lactic acid.  Generous IV fluid resuscitation was commenced.  A non-contrast CT was obtained and showed significant distention of the ascending and transverse colon without wall thickening, inflammation, edema, or pneumatosis.  Her lactate completely normalized on 2 checks and her vitals improved after several liters of IV crystalloid.      I evaluated the patient at bedside in the ICU.  She is awake and able to answer questions.  An NGT was placed and returned minimal enteric contents.  She complains of ongoing pain control issues.    PAST MEDICAL HISTORY:   Past Medical History:   Diagnosis Date   • Blood clotting disorder (HCC)     leg    • Cancer (HCC)     basil cell   • Cancer (HCC) 10/13/2021    renal   • Coughing blood 10/13/2021    pt states dry clear cough   • Dental disorder 10/13/2021    upper lower dentures   • Disorder of thyroid    • Hepatitis C    • Hypertension 10/13/2021    pt states well controlled on meds   • Pain 10/13/2021    spine   • Urinary incontinence         PAST SURGICAL HISTORY:   Past Surgical History:   Procedure Laterality Date   • PB LAP,PARTIAL NEPHRECTOMY Left 10/27/2021    Procedure: NEPHRECTOMY, PARTIAL, ROBOT-ASSISTED, USING DA NAT XI;  Surgeon: Peter Mccracken M.D.;  Location: SURGERY [facility];  Service: Uro Robotic   • PB ULTRASONIC GUIDANCE, INTRAOPERATIVE Left 10/27/2021    Procedure: ULTRASOUND GUIDANCE;  Surgeon: Peter PERERA  OFELIA Mccracken;  Location: SURGERY [facility];  Service: Uro Robotic   • OTHER  1960    tonsils   • OTHER      basal cell removal back          ALLERGIES: Patient has no known allergies.       CURRENT MEDICATIONS:   Outpatient Medications Marked as Taking for the 10/27/21 encounter (Hospital Encounter)   Medication Sig   • valsartan (DIOVAN) 320 MG tablet Take 320 mg by mouth at bedtime.   • amLODIPine (NORVASC) 10 MG Tab Take 10 mg by mouth every evening.   • acetaminophen (TYLENOL) 500 MG Tab Take 1,000 mg by mouth one time. Indications: Pain         FAMILY HISTORY:   Reviewed and found to be non-contributory in regards to the above presentation    SOCIAL HISTORY:  reports that she has been smoking cigarettes. She has a 75.00 pack-year smoking history. She has never used smokeless tobacco. She reports current alcohol use. She reports previous drug use.      Review of Systems:  Constitutional: Negative for fever, chills, weight loss, malaise/fatigue and diaphoresis.   HENT: Negative for hearing loss, ear pain, nosebleeds, congestion, sore throat, neck pain, tinnitus and ear discharge.    Eyes: Negative for blurred vision, double vision, photophobia, pain, discharge and redness.   Respiratory: Negative for cough, hemoptysis, sputum production, shortness of breath, wheezing and stridor.    Cardiovascular: Negative for chest pain, palpitations, orthopnea, claudication, leg swelling and PND.   Gastrointestinal: Negative for heartburn, nausea, vomiting, abdominal pain, diarrhea, constipation, blood in stool and melena.   Genitourinary: Negative for dysuria, urgency, frequency, hematuria and flank pain.   Musculoskeletal: Negative for myalgias, back pain, joint pain and falls.   Skin: Negative for itching and rash.  Neurological: Negative for dizziness, tingling, tremors, sensory change, speech change, focal weakness, seizures, loss of consciousness, weakness and headaches.   Endo/Heme/Allergies: Negative for  "environmental allergies and polydipsia. Does not bruise/bleed easily.   Psychiatric/Behavioral: Negative for depression, suicidal ideas, hallucinations, memory loss and substance abuse. The patient is not nervous/anxious and does not have insomnia.    Physical Exam:  /61   Pulse 80   Temp 36.5 °C (97.7 °F) (Temporal)   Resp (!) 22   Ht 1.651 m (5' 5\")   Wt 77.3 kg (170 lb 6.7 oz)   SpO2 96%   Vitals:    10/29/21 1010   BP: 102/61   Pulse: 80   Resp: (!) 22   Temp:    SpO2: 96%     GENERAL:  Otherwise tired and ill appearing and in no acute distress  HEENT:  Atraumatic, normocephalic.  Normal pinna bilaterally.  External auditory canals are without discharge.  Conjunctivae and sclerae are clear. Extraocular movements are full. Pupils are equal, round, and reactive to light.  Oral mucosa is moist.  NECK:  Soft and supple without lymphadenopathy. No masses are noted.  Thyroid is of normal size and texture.  Trachea is midline.  CHEST:  Lungs are clear to auscultation bilaterally.  No masses, lesions, or signs of trauma were noted.     CARDIOVASCULAR:  Regular rate and rhythm.  No murmurs appreciated.  No JVD.  Palpable pulses present in all four extremities.   ABDOMEN:  Soft, tender x4 quadrants, markedly distended.  Tympanitic.  No hepatomegaly or splenomegaly.  No incisional, umbilical, or inguinal hernias were appreciated.  LLQ drain with minimal serous output.  GENITOURINARY:  Normal external reproductive anatomy.  D[place] in place draining arthur appearing urine.  MUSCULOSKELETAL: Normal range of motion x4 extremities.    SKIN:  Warm and well perfused. No rashes.  NEUROLOGIC:  Alert and oriented. Cranial nerves II through XII are grossly intact. Motor and sensory exams are normal in all four extremities. Motor and sensory reflexes are 2+ and symmetric with bilateral plantar responses.  PSYCHIATRIC: Affect and mood is appropriate for age and condition.    Labs:  Recent Labs     10/28/21  2019 " 10/29/21  0123 10/29/21  1046   WBC 16.6* 15.4* 14.9*   RBC 3.98* 3.98* 3.99*   HEMOGLOBIN 12.7 12.4 12.4   HEMATOCRIT 37.8 38.6 36.9*   MCV 95.0 97.0 92.5   MCH 31.9 31.2 31.1   MCHC 33.6 32.1* 33.6   RDW 48.8 50.2* 47.2   PLATELETCT 275 234 250   MPV 9.5 9.2 9.4     Recent Labs     10/28/21  2019 10/29/21  0123 10/29/21  1046   SODIUM 126* 126* 126*   POTASSIUM 4.2 4.4 4.1   CHLORIDE 97 99 100   CO2 20 14* 16*   GLUCOSE 102* 129* 113*   BUN 21 20 18   CREATININE 2.05* 1.85* 1.55*   CALCIUM 7.7* 7.4* 7.2*     Recent Labs     10/29/21  1046   INR 1.18*     Recent Labs     10/28/21  2019 10/29/21  1046   ASTSGOT 33 35   ALTSGPT 17 13   TBILIRUBIN 0.2 0.2   ALKPHOSPHAT 79 73   GLOBULIN 3.7* 3.4   INR  --  1.18*       Radiology:  [id number]   Final Result      1.  Mild colonic dilation      2.  Enteric catheter appears appropriately located      3.  Mild right basilar atelectasis and/or pleural effusion      DX-ABDOMEN FOR TUBE PLACEMENT   Final Result      Enteric tube has been placed and the tip projects over the stomach.      CT-ABDOMEN-PELVIS W/O   Final Result         1.  Air-filled distention of the cecum, follow-up recommended to exclude progression to [place]'s   2.  Fluid and air-filled distended loops of small bowel in the left abdomen, consider component of ileus.   3.  Intra-abdominal foci of free air, a nonspecific finding for recent postop changes with surgical drain in place   4.  Scattered mild abdominal ascites and hazy mesenteric inflammatory changes.   5.  Small right and trace left pleural effusion.   6.  Linear densities in the bilateral lung bases favors changes of atelectasis.   7.  Left nephrolithiasis without visualized obstructive changes.   8.  Hepatomegaly and changes of cirrhosis   9.  Diverticulosis   10.  Atherosclerosis      UH-[id number] VIEW   Final Result         1.  Air-filled distended bowel loops, appearance compatible with evolving obstruction versus ileus. Recommend  radiographic follow-up to resolution.      DX-CHEST-PORTABLE (1 VIEW)   Final Result         1.  No acute cardiopulmonary disease.   2.  Cardiomegaly      [id number] VIEWS    (Results Pending)   IR-PICC LINE PLACEMENT W/ GUIDANCE > AGE 5    (Results Pending)       Assessment/Plan:   1) Post-operative Colonic Ileus:    Likely secondary to age, recent intra-abdominal surgery, post-op narcotic use, and modest mobility/ambulation in the post-op period.      -NPO with NG  -Minimize narcotics, though I have ordered some prn IV morphine as she is only POD2  -Mobilize as tolerated  -IS and RT work  -Trend serial lactates  -Try Fleet enema to stimulate the colon from below  -No indication for surgery at this time    I independently reviewed pertinent clinical lab tests from the last 48 hours and ordered additional follow up clinical lab tests.  I independently reviewed pertinent radiographic images and the radiologist's reports from the last 48 hours and ordered additional follow up radiographic studies.  The details of the available patient records in [facility] (including laboratory tests, culture data, medications, imaging, and other pertinent diagnostic tests) and that information was utilized as warranted in today's medical decision making for this patient.  I personally evaluated the patient condition at bedside.    Aggregated care time spent evaluating, reassessing, reviewing documentation, providing care, and managing this patient exclusive of procedures: 60 minutes  ____________________________________   Mc Kate MD, FACS   U / MICHAEL     DD: 10/29/2021   DT: 11:37 AM

## 2021-10-29 NOTE — CARE PLAN
The patient is Watcher - Medium risk of patient condition declining or worsening    Shift Goals  Clinical Goals: (P) Stable BP/ Pain control  Patient Goals: (P) Pain control    Progress made toward(s) clinical / shift goals:  BP currently stable.     Patient is not progressing towards the following goals: Pt complaining of severe pain       Problem: Hemodynamics  Goal: Patient's hemodynamics, fluid balance and neurologic status will be stable or improve  Outcome: Progressing  Note: Pt was a HLC for HOTN and potentially septic shock. Boluses given PTA. Pt currently normotensive and not requiring vasopressors at this time      Problem: Respiratory  Goal: Patient will achieve/maintain optimum respiratory ventilation and gas exchange  Outcome: Progressing  Note: Pt currently on 3L nasal cannula. Will provide optimal positioning and actively attempt to wean O2 to patient baseline

## 2021-10-29 NOTE — HOSPITAL COURSE
Thais Munroe is a 66 y.o. female with PMH of hypertension, hep C, smoker, history of blood clots, hypothyroidism, and renal mass.  Who presented 10/27/2021 for resection of left adrenal mass with Dr. Mccracken.  Patient underwent robotic assisted laparoscopic partial left nephrectomy on 10/27/2021.  Surgery went without complication with an EBL of 100 mL, and a JARRETT drain was placed.  Was reported to have 180 cc / 24 hours.  Patient initially with complaints of poor pain control, but now improved.  Her postop course was complicated by pain control issues requiring significant narcotics.  She developed worsening abdominal pain and nausea/vomiting.  She was ultimately transferred to the ICU for hypotension refractory to fluid boluses.  Her antibiotics were changed to Zosyn.  General surgery was consulted CT scan ordered which showed significant distention of the ascending and transverse colon with wall thickening, inflammation, edema and possible pneumatosis.  NG tube was placed with minimal output at that time.  There was concern that she had either ileus versus colonic pseudoobstruction.  GI was consulted and she underwent colonoscopy with decompression however no mechanical obstruction was identified.  Repeat imaging showed SBO with incarcerated incisional hernia.  Patient went to the OR for reduction and repair on 11/2. Patient had improvement in her symptoms, passing gas and stool.  Had more distention 11/7 so KUB obtained, enema given 11/8.  Will having follow up with Dr. Banegas in 10 days and Dr. Mccracken next week to discuss cancer treatments/therapy.

## 2021-10-29 NOTE — ASSESSMENT & PLAN NOTE
Holding opiates for now secondary to hypotension.  Tylenol as needed.  Lidocaine patches for back pain.  Follow-up CBCs.  Can consider CT imaging if concern for be bleeding.  Further management per Urology.

## 2021-10-29 NOTE — ASSESSMENT & PLAN NOTE
White count 16.6 yesterday, 15.4 today.  Likely some reactive component secondary to recent surgery.  Question urosepsis versus postsurgical complication versus Jailene syndrome in the setting of recent hypotension.  Antibiotics escalated from ceftriaxone to Zosyn.

## 2021-10-29 NOTE — ASSESSMENT & PLAN NOTE
WBC 11.6-> 16.6  Possibly elevated secondary to recent surgery  -Follow-up with CXR and procalcitonin patient noted to have mild rhonchi and is currently requiring nasal cannula  -We will order UA, however, results may be unreliable as patient had recent renal surgery  -U/a dirty, started rocephin

## 2021-10-29 NOTE — PROGRESS NOTES
Tech from Lab called with critical result of arterial pH 7.23 at 1113. Critical lab result read back to tech. Primary RN notified.

## 2021-10-29 NOTE — CONSULTS
Pulmonary & Critical Care Consult Note    DATE OF CONSULTATION:  10/29/2021     REFERRING PHYSICIAN:  Peter Mccracken MD     CONSULTANT:  John Ireland III, MD  [facility] RESIDENT: Joshua Glover MD     REASON FOR CONSULTATION:  Hypotension     HISTORY OF PRESENT ILLNESS: 66-year-old female with past medical history of hypertension, hepatitis C, tobacco use, previous blood clots, hypothyroidism and renal mass.  Underwent laparoscopic partial left nephrectomy 2 days ago with Dr. Mccracken.  Overnight with hypotension to 57/48.  3 L fluid administered, rapid response called this morning around 9:30 AM for hypotension to 70s/40s.  Fourth liter running at the time of arrival.  Patient had been lethargic, dizzy, lightheaded overnight.  Alert and oriented at the time of arrival.  Recent urinalysis positive for bacteria, nitrates and leukocyte esterase.  Started on Rocephin this morning for UTI.  Patient reporting worsening abdominal pain along with distention and bloating.  Stat CT abdomen performed at 5 AM showing air-filled distention of cecum, small bowel, predominantly left side.  Bedside radiograph performed at time of rapid response consistent with previous findings.  Concern for urosepsis versus progression to Jailene syndrome versus postoperative changes.     PAST MEDICAL HISTORY:    Past Medical History:   Diagnosis Date   • Blood clotting disorder (HCC)     leg    • Cancer (HCC)     basil cell   • Cancer (HCC) 10/13/2021    renal   • Coughing blood 10/13/2021    pt states dry clear cough   • Dental disorder 10/13/2021    upper lower dentures   • Disorder of thyroid    • Hepatitis C    • Hypertension 10/13/2021    pt states well controlled on meds   • Pain 10/13/2021    spine   • Urinary incontinence         PAST SURGICAL HISTORY:    Past Surgical History:   Procedure Laterality Date   • PB LAP,PARTIAL NEPHRECTOMY Left 10/27/2021    Procedure: NEPHRECTOMY, PARTIAL, ROBOT-ASSISTED, USING DA NAT XI;  Surgeon: Peter PERERA  OFELIA Mccracken;  Location: SURGERY [facility];  Service: Uro Robotic   • PB ULTRASONIC GUIDANCE, INTRAOPERATIVE Left 10/27/2021    Procedure: ULTRASOUND GUIDANCE;  Surgeon: Peter Mccracken M.D.;  Location: SURGERY [facility];  Service: Uro Robotic   • OTHER  1960    tonsils   • OTHER      basal cell removal back        ALLERGIES:    Patient has no known allergies.     MEDICATIONS PRIOR TO ADMISSION:   No current facility-administered medications on file prior to encounter.     Current Outpatient Medications on File Prior to Encounter   Medication Sig Dispense Refill   • valsartan (DIOVAN) 320 MG tablet Take 320 mg by mouth at bedtime.     • amLODIPine (NORVASC) 10 MG Tab Take 10 mg by mouth every evening.     • acetaminophen (TYLENOL) 500 MG Tab Take 1,000 mg by mouth one time. Indications: Pain     • levothyroxine (SYNTHROID) 25 MCG Tab Take 37.5 mcg by mouth every morning on an empty stomach.     • amoxicillin-clavulanate (AUGMENTIN) 875-125 MG Tab Take 1 Tablet by mouth 2 times a day.     • [facility] NICOTINE 21 MG/24HR PATCH 24 HR Place 1 Patch on the skin every 24 hours.     • allopurinol (ZYLOPRIM) 300 MG Tab allopurinol 300 mg tablet   TAKE 1 TABLET BY MOUTH EVERY DAY         SOCIAL HISTORY:    Social History     Socioeconomic History   • Marital status: Single     Spouse name: Not on file   • Number of children: Not on file   • Years of education: Not on file   • Highest education level: Not on file   Occupational History   • Not on file   Tobacco Use   • Smoking status: Current Some Day Smoker     Packs/day: 1.50     Years: 50.00     Pack years: 75.00     Types: Cigarettes   • Smokeless tobacco: Never Used   Vaping Use   • Vaping Use: Never used   Substance and Sexual Activity   • Alcohol use: Yes     Comment: daily   • Drug use: Not Currently   • Sexual activity: Not on file   Other Topics Concern   • Not on file   Social History Narrative   • Not on file     Social Determinants of Health     Financial Resource  "Strain:    • Difficulty of Paying Living Expenses:    Food Insecurity:    • Worried About Running Out of Food in the Last Year:    • Ran Out of Food in the Last Year:    Transportation Needs:    • Lack of Transportation (Medical):    • Lack of Transportation (Non-Medical):    Physical Activity:    • Days of Exercise per Week:    • Minutes of Exercise per Session:    Stress:    • Feeling of Stress :    Social Connections:    • Frequency of Communication with Friends and Family:    • Frequency of Social Gatherings with Friends and Family:    • Attends [employer] Services:    • Active Member of Clubs or Organizations:    • Attends Club or Organization Meetings:    • Marital Status:    Intimate Partner Violence:    • Fear of Current or Ex-Partner:    • Emotionally Abused:    • Physically Abused:    • Sexually Abused:        FAMILY HISTORY:   History reviewed. No pertinent family history.     REVIEW OF SYSTEMS:    Constitutional: Lethargy, fatigue, no fever, no chills,  Respiratory: No cough, no hemoptysis, no dyspnea  Cardiovascular: No chest pain, no palpitations, no orthopnea, no PND, no lower extremity swelling  GI: Nausea, vomiting, abdominal pain, no diarrhea, no constipation, no hematochezia, no melena  : no dysuria, no frequency, no urgency  Endocrine: No polyuria, no polydipsia, no hair loss  Hematology: No easy bruising, no bleeding  Musculoskeletal: No joint swelling, no joint erythema, no arthralgia, no myalgias  Neuro: Headache, dizziness, No seizures, no numbness, no tingling sensation, no extremity weakness, no change in vision.  Psychiatry: no depression, no suicidal ideation     PHYSICAL EXAMINATION:  /61   Pulse 80   Temp 36.5 °C (97.7 °F) (Temporal)   Resp (!) 22   Ht 1.651 m (5' 5\")   Wt 77.3 kg (170 lb 6.7 oz)   SpO2 96% Comment: 4L  BMI 28.36 kg/m²     Physical Exam  Vitals and nursing note reviewed.   Constitutional:       General: Acute distress.     Appearance: Ill-appearing. "   HENT:      Mouth/Throat:      Mouth: Mucous membranes are moist.   Eyes:      Extraocular Movements: Extraocular movements intact.   Cardiovascular:      Rate and Rhythm: Normal rate and regular rhythm.      Pulses: Normal pulses.      Heart sounds: Normal heart sounds. No murmur heard. No gallop.    Pulmonary:      Effort: Pulmonary effort is normal. No respiratory distress.      Breath sounds: Rhonchi (mild, diffuse, possibly from upper airways) present. Diminished at bases.  Abdominal:      General: Abdomen is distended. Bowel sounds are normal. Tenderness: There is abdominal tenderness (mild). There is no right CVA tenderness, left CVA tenderness, guarding or rebound.      Hernia: No hernia is present.      Comments: JARRETT drain left lower quadrant with small amount of blood.  No significant bleeding around bandage site.  Musculoskeletal:         General: Tenderness (lower back) present. No deformity. Normal range of motion.      Right lower leg: No edema.      Left lower leg: No edema.   Skin:     General: Skin is warm and dry.      Coloration: Skin is not jaundiced.      Findings: No bruising (no flank bruising) or rash.   Neurological:      General: No focal deficit present.      Mental Status: She is alert and oriented to person, place, and time.      Motor: No weakness.   Psychiatric:         Mood and Affect: Mood normal.    LABORATORY DATA:    Lab Results   Component Value Date/Time    WBC 15.4 (H) 10/29/2021 01:23 AM    RBC 3.98 (L) 10/29/2021 01:23 AM    HEMOGLOBIN 12.4 10/29/2021 01:23 AM    HEMATOCRIT 38.6 10/29/2021 01:23 AM    MCV 97.0 10/29/2021 01:23 AM    MCH 31.2 10/29/2021 01:23 AM    MCHC 32.1 (L) 10/29/2021 01:23 AM    MPV 9.2 10/29/2021 01:23 AM    NEUTSPOLYS 74.70 (H) 10/29/2021 01:23 AM    LYMPHOCYTES 17.00 (L) 10/29/2021 01:23 AM    MONOCYTES 7.40 10/29/2021 01:23 AM    EOSINOPHILS 0.10 10/29/2021 01:23 AM    BASOPHILS 0.20 10/29/2021 01:23 AM      Lab Results   Component Value Date/Time     SODIUM 126 (L) 10/29/2021 01:23 AM    POTASSIUM 4.4 10/29/2021 01:23 AM    CHLORIDE 99 10/29/2021 01:23 AM    CO2 14 (L) 10/29/2021 01:23 AM    GLUCOSE 129 (H) 10/29/2021 01:23 AM    BUN 20 10/29/2021 01:23 AM    CREATININE 1.85 (H) 10/29/2021 01:23 AM      No results found for: PROTHROMBTM, INR      IMAGING:   CXR (personally reviewed) - Colonic dilation, mild right basilar atelectasis.  [id number] VIEWS   Final Result      1.  Mild colonic dilation      2.  Enteric catheter appears appropriately located      3.  Mild right basilar atelectasis and/or pleural effusion      DX-ABDOMEN FOR TUBE PLACEMENT   Final Result      Enteric tube has been placed and the tip projects over the stomach.      CT-ABDOMEN-PELVIS W/O   Final Result         1.  Air-filled distention of the cecum, follow-up recommended to exclude progression to Jailene's   2.  Fluid and air-filled distended loops of small bowel in the left abdomen, consider component of ileus.   3.  Intra-abdominal foci of free air, a nonspecific finding for recent postop changes with surgical drain in place   4.  Scattered mild abdominal ascites and hazy mesenteric inflammatory changes.   5.  Small right and trace left pleural effusion.   6.  Linear densities in the bilateral lung bases favors changes of atelectasis.   7.  Left nephrolithiasis without visualized obstructive changes.   8.  Hepatomegaly and changes of cirrhosis   9.  Diverticulosis   10.  Atherosclerosis      [id number] VIEW   Final Result         1.  Air-filled distended bowel loops, appearance compatible with evolving obstruction versus ileus. Recommend radiographic follow-up to resolution.      DX-CHEST-PORTABLE (1 VIEW)   Final Result         1.  No acute cardiopulmonary disease.   2.  Cardiomegaly      [id number] VIEWS    (Results Pending)   IR-PICC LINE PLACEMENT W/ GUIDANCE > AGE 5    (Results Pending)          ASSESSMENT/PLAN:    * Hypotension, unspecified  Assessment & Plan  Persistent  hypotension overnight with lowest BP reading of 57/48.  Improved status post bolus administration then hypotensive again.  3 L already administered, fourth in progress, fifth ordered.  Hemoglobin currently 12.4, monitor for dilution.  No obvious signs of bleeding on examination. Possibly secondary to amlodipine use, however noted to be normotensive yesterday.    At this point suspecting urosepsis versus postoperative complication.    If she remains hypotensive after fifth liter or during administration of fourth liter, will consider central line placement.  Blood cultures and labs ordered.  Antibiotics escalated to Zosyn.  Transfer to ICU.    Elevated lactic acid level  Assessment & Plan  2.2 then 3.2 then 1.1.  Likely decreasing in the setting of heavy IV fluid administration.  Chest x-ray clear for infectious etiology.  Procalcitonin negative.  Question urosepsis versus postoperative complication.    Leukocytosis  Assessment & Plan  White count 16.6 yesterday, 15.4 today.  Likely some reactive component secondary to recent surgery.  Question urosepsis versus postsurgical complication versus Jailene syndrome in the setting of recent hypotension.  Antibiotics escalated from ceftriaxone to Zosyn.    Hyponatremia  Assessment & Plan  Continue normal saline.  Follow-up repeat BMPs and daily labs.  Urine sodium, urine osmolarity, serum osmolarity.    Renal mass  Assessment & Plan  Holding opiates for now secondary to hypotension.  Tylenol as needed.  Lidocaine patches for back pain.  Follow-up CBCs.  Can consider CT imaging if concern for be bleeding.  Further management per Urology.    Patient is critically ill at this time.  I have spent 40 minutes examining this    patient, all lab data, x-ray, and discussion with RN, RT, Dr. John Ireland (attending physician).

## 2021-10-29 NOTE — PROGRESS NOTES
"Urology Progress Note    Left renal mass s/p robotic assisted laparoscopic partial left nephrectomy 10/27/21    S: Rapid response called due to hypotension.  Patient transferred to ICU.  Patel in place and draining well. NG tube placed.    O:   /66   Pulse 84   Temp 35.9 °C (96.6 °F) (Temporal)   Resp (!) 10   Ht 1.651 m (5' 5\")   Wt 77.3 kg (170 lb 6.7 oz)   SpO2 96%   Recent Labs     10/28/21  2019 10/29/21  0123 10/29/21  1046   SODIUM 126* 126* 126*   POTASSIUM 4.2 4.4 4.1   CHLORIDE 97 99 100   CO2 20 14* 16*   GLUCOSE 102* 129* 113*   BUN 21 20 18   CREATININE 2.05* 1.85* 1.55*   CALCIUM 7.7* 7.4* 7.2*     Recent Labs     10/28/21  2019 10/29/21  0123 10/29/21  1046   WBC 16.6* 15.4* 14.9*   RBC 3.98* 3.98* 3.99*   HEMOGLOBIN 12.7 12.4 12.4   HEMATOCRIT 37.8 38.6 36.9*   MCV 95.0 97.0 92.5   MCH 31.9 31.2 31.1   MCHC 33.6 32.1* 33.6   RDW 48.8 50.2* 47.2   PLATELETCT 275 234 250   MPV 9.5 9.2 9.4         Intake/Output Summary (Last 24 hours) at 10/29/2021 1616  Last data filed at 10/29/2021 1400  Gross per 24 hour   Intake 8279.42 ml   Output 780 ml   Net 7499.42 ml       Exam:  Gen: NAD   Abd: Distended.  Some abdominal tenderness. JARRETT with serosanguinous output  Incisions clean, dry, intact.   Urine: Patel with clear orange output    A/P:    Active Hospital Problems    Diagnosis    • Bowel obstruction (HCC) [K56.609]      - CT scan shows    Air-filled distention of the cecum, follow-up recommended to exclude progression to Jailene's   - NG tube to suction  -general surgery Dr Howard consulted     • Hypotension, unspecified [I95.9]    • Renal mass [N28.89]    • Hyponatremia [E87.1]    • Leukocytosis [D72.829]    • Elevated lactic acid level [R79.89]      Left renal mass s/p robotic assisted laparoscopic partial left nephrectomy 10/27/21. Transferred to ICU due to hypotension refractory to fluid bolus. Antibiotics changed to Zosyn.   Plan:  - monitor output via patel  - monitor pain control  - " Incentive spirometry  - monitor H/H  - appreciate hospitalist giana     Case reviewed with Dr. Mccracken

## 2021-10-29 NOTE — PROGRESS NOTES
Hospital Medicine Daily Progress Note    Date of Service  10/29/2021    Chief Complaint  Thais Munroe is a 66 y.o. female admitted 10/27/2021 with renal mass    Hospital Course  66 y.o. female with PMH of hypertension, hep C, smoker, history of blood clots, hypothyroidism, and renal mass.  Who presented 10/27/2021 for resection of left adrenal mass with Dr. Mccracken.  Patient underwent robotic assisted laparoscopic partial left nephrectomy on 10/27/2021.  Surgery went without complication with an EBL of 100 mL, and a JARRETT drain was placed.  Was reported to have 180 cc / 24 hours.  Patient initially with complaints of poor pain control, but now improved.  10/28 patient noted to have incident of hypotension with a low BP of 57/48.  Patient was ordered 1 L of NS in hospital medicine was consulted for further evaluation and assistance in management of hypotension.  On physician arrival, patient was reportedly to be very lethargic, dizzy, and lightheaded.  After completion of NS 1 L on physician arrival, patient now more alert and oriented, and reports minimal dizziness/lightheadedness.  Her blood pressure is also improved to 100/60, but appears to have decreased to 80s over 60s during initial physical examination that was confirmed with manual BP measurements.  On questioning, patient does admit that she has been having issues of low blood pressure for the past few weeks.  She reports that 10 days before surgery she was found to have a SBP into the 190s at which time she was started on amlodipine 10 mg.  Patient reports that she has been taking it as prescribed, but she has been having incidences of low blood pressure as well as syncope since initiation.  She reported that she had another fainting episode day prior to her arrival to the the hospital.  On review of MAR, patient is noted to be on amlodipine 10 mg and valsartan 320 mg.  However, patient has not received valsartan 320 mg since she has arrived to the  hospital, and her last dose of amlodipine 10 mg was last night.  On questioning, patient endorses diffuse abdominal pain, reports it is mild, and appears to be unchanged from prior.  She continues to endorse lower back pain which is chronic and is otherwise denying any pain at this time.  Additionally, patient endorses a nonproductive cough and congestion that she reports has been ongoing.  She denies any fevers, chills, chest pain, shortness of breath, nausea, vomiting, diarrhea.  She is noted to be on 1 L nasal cannula which was transitioned down from 6 L previously.    Early morning 10/29, UA found to have few bacteria but also with Nitrates and Leukocyte esterase. She was also noted to have SHEBA which improved with IVF as well as increasing lactic acid level. Pt was started on the 3rd liter of NS with improvement of BP to 100/68. She was also initiated on Rocephin for possible sepsis from UTI. Around 5am, received notification from overnight nurse that pt had vomiting episode of brown/orang liquid. She additionally reported recurrence of Hypotension to 85/60 as well as new onset worsening abdominal pain as well as distention and firmness.  pt remains AAOx4 but is in acute distress and moaning in pain. Abdomen appears mildly increased in size, and pt reports severe abdominal pain on the left and right side. There may be some slight increase in firmness to palpation. BP remains 85/60, but able to maintain a MAP 68. Blood cultures were ordered and follow-up with urine culture, broaden antibiotic coverage to Vanco and Rocephin.  Holding opiates for now due to hypotension, stat CT abdomen without contrast secondary to SHEBA ordered.  Continuing fluids NS at 125, monitoring closing, possible transfer to ICU.        Interval Problem Update  10/29 patient continued to decompensate despite fluid bolus, ABD is distended and patient did vom NG tube was placed per NOC hospitalist order. Rapid response was called and patient  will be transferred to unit. CT shows    Air-filled distention of the cecum, follow-up recommended to exclude progression to Provo's   General surgery, Dr Howard, consulted. Recommended Q 4 lactic acids, ordered.    I have personally seen and examined the patient at bedside. I discussed the plan of care with patient, bedside RN and critical care.    Consultants/Specialty  critical care   Surgery  urology    Code Status  Full Code    Disposition  Patient is not medically cleared.   Anticipate discharge to to skilled nursing facility.  I have placed the appropriate orders for post-discharge needs.    Review of Systems  ROS     Physical Exam  Temp:  [36.1 °C (96.9 °F)-36.4 °C (97.5 °F)] 36.2 °C (97.1 °F)  Pulse:  [66-86] 76  Resp:  [16-18] 18  BP: ()/(44-68) 85/60  SpO2:  [90 %-97 %] 94 %    Physical Exam    Fluids    Intake/Output Summary (Last 24 hours) at 10/29/2021 0636  Last data filed at 10/29/2021 0055  Gross per 24 hour   Intake 120 ml   Output 200 ml   Net -80 ml       Laboratory  Recent Labs     10/28/21  0028 10/28/21  2019 10/29/21  0123   WBC 11.6* 16.6* 15.4*   RBC 4.49 3.98* 3.98*   HEMOGLOBIN 13.7 12.7 12.4   HEMATOCRIT 41.3 37.8 38.6   MCV 92.0 95.0 97.0   MCH 30.5 31.9 31.2   MCHC 33.2* 33.6 32.1*   RDW 47.2 48.8 50.2*   PLATELETCT 286 275 234   MPV 9.5 9.5 9.2     Recent Labs     10/28/21  0028 10/28/21  2019 10/29/21  0123   SODIUM 130* 126* 126*   POTASSIUM 4.5 4.2 4.4   CHLORIDE 99 97 99   CO2 19* 20 14*   GLUCOSE 198* 102* 129*   BUN 16 21 20   CREATININE 1.27 2.05* 1.85*   CALCIUM 8.4* 7.7* 7.4*                   Imaging  CT-ABDOMEN-PELVIS W/O   Final Result         1.  Air-filled distention of the cecum, follow-up recommended to exclude progression to Provo's   2.  Fluid and air-filled distended loops of small bowel in the left abdomen, consider component of ileus.   3.  Intra-abdominal foci of free air, a nonspecific finding for recent postop changes with surgical drain in place   4.   Scattered mild abdominal ascites and hazy mesenteric inflammatory changes.   5.  Small right and trace left pleural effusion.   6.  Linear densities in the bilateral lung bases favors changes of atelectasis.   7.  Left nephrolithiasis without visualized obstructive changes.   8.  Hepatomegaly and changes of cirrhosis   9.  Diverticulosis   10.  Atherosclerosis      [id number] VIEW   Final Result         1.  Air-filled distended bowel loops, appearance compatible with evolving obstruction versus ileus. Recommend radiographic follow-up to resolution.      DX-CHEST-PORTABLE (1 VIEW)   Final Result         1.  No acute cardiopulmonary disease.   2.  Cardiomegaly      [id number] VIEWS    (Results Pending)   [id number] VIEWS    (Results Pending)   DX-ABDOMEN FOR TUBE PLACEMENT    (Results Pending)   IR-PICC LINE PLACEMENT W/ GUIDANCE > AGE 5    (Results Pending)        Assessment/Plan  * Hypotension due to drugs- (present on admission)  Assessment & Plan  Incident of hypotension with lowest BP reading of 57/48  Transient provement of BP status post 1 L NS  Hb 13.7-> 12.7 currently  No obvious signs of bleeding during my physical examination  Possibly secondary to amlodipine use, however, noted to be normotensive earlier in the day  Patient currently reports significant improvement of symptoms  -We will order additional 1 L NS  -Increase NS infusion from 75 cc to 125 cc/h following  -We will administer midodrine 5 mg p.o. x1  -We will DC amlodipine and losartan for now  -Follow-up with repeat CBC and consider CT imaging of the abdomen if there is concern for bleeding  -Follow-up with procalcitonin and CXR for possible infectious etiology as cause of hypotension  -Continue to monitor throughout the night, and will reevaluate after patient has finished IV fluids and midodrine    Elevated lactic acid level- (present on admission)  Assessment & Plan  Lactic acid 2.2  -Continue with IV fluids as above  -Follow-up CXR  procalcitonin to rule out infectious etiology      Leukocytosis- (present on admission)  Assessment & Plan  WBC 11.6-> 16.6  Possibly elevated secondary to recent surgery  -Follow-up with CXR and procalcitonin patient noted to have mild rhonchi and is currently requiring nasal cannula  -We will order UA, however, results may be unreliable as patient had recent renal surgery    Hyponatremia- (present on admission)  Assessment & Plan  Continue with NS  F/u with repeat bmp already drawn  Monitor  Urine Na, urine osm, Serum Osm    Renal mass- (present on admission)  Assessment & Plan  Holding opiate analgesics for now secondary to hypotension  Continue with Tylenol  Have ordered lidocaine patches for back pain  Follow-up with repeat CBC and consider CT imaging if there is concern for bleeding  Further management per urology       VTE prophylaxis: enoxaparin ppx    I have performed a physical exam and reviewed and updated ROS and Plan today (10/29/2021). In review of yesterday's note (10/28/2021), there are no changes except as documented above.

## 2021-10-29 NOTE — ASSESSMENT & PLAN NOTE
Persistent hypotension overnight with lowest BP reading of 57/48.  Improved status post bolus administration then hypotensive again.  3 L already administered, fourth in progress, fifth ordered.  Hemoglobin currently 12.4, monitor for dilution.  No obvious signs of bleeding on examination. Possibly secondary to amlodipine use, however noted to be normotensive yesterday.    At this point suspecting urosepsis versus postoperative complication.    If she remains hypotensive after fifth liter or during administration of fourth liter, will consider central line placement.  Blood cultures and labs ordered.  Antibiotics escalated to Zosyn.  Transfer to ICU.

## 2021-10-29 NOTE — CONSULTS
Hospital Medicine Consultation    Date of Service  10/28/2021    Referring Physician  Peter Mccracken M.D.    Consulting Physician  Nathan Castro M.D.    Reason for Consultation  Hypotension    History of Presenting Illness  66 y.o. female with PMH of hypertension, hep C, smoker, history of blood clots, hypothyroidism, and renal mass.  Who presented 10/27/2021 for resection of left adrenal mass with Dr. Mccracken.  Patient underwent robotic assisted laparoscopic partial left nephrectomy on 10/27/2021.  Surgery went without complication with an EBL of 100 mL, and a JARRETT drain was placed.  Was reported to have 180 cc / 24 hours.  Patient initially with complaints of poor pain control, but now improved.  Tonight, patient noted to have incident of hypotension with a low BP of 57/48.  Patient was ordered 1 L of NS in hospital medicine was consulted for further evaluation and assistance in management of hypotension.  On my arrival, patient was reportedly to be very lethargic, dizzy, and lightheaded.  After completion of NS 1 L on my arrival, patient now more alert and oriented, and reports minimal dizziness/lightheadedness.  Her blood pressure is also improved to 100/60, but appears to have decreased to 80s over 60s during my physical examination that was confirmed with manual BP measurements.  On questioning, patient does admit that she has been having issues of low blood pressure for the past few weeks.  She reports that 10 days before surgery she was found to have a SBP into the 190s at which time she was started on amlodipine 10 mg.  Patient reports that she has been taking it as prescribed, but she has been having incidences of low blood pressure as well as syncope since initiation.  She reported that she had another fainting episode day prior to her arrival to the the hospital.  On review of MAR, patient is noted to be on amlodipine 10 mg and valsartan 320 mg.  However, patient has not received valsartan 320 mg since she  has arrived to the hospital, and her last dose of amlodipine 10 mg was last night.  On questioning, patient endorses diffuse abdominal pain, reports it is mild, and appears to be unchanged from prior.  She continues to endorse lower back pain which is chronic and is otherwise denying any pain at this time.  Additionally, patient endorses a nonproductive cough and congestion that she reports has been ongoing.  She denies any fevers, chills, chest pain, shortness of breath, nausea, vomiting, diarrhea.  She is noted to be on 1 L nasal cannula which was transitioned down from 6 L previously.    Review of Systems  Review of Systems   Constitutional: Positive for malaise/fatigue. Negative for chills, fever and weight loss.   HENT: Negative for hearing loss and sore throat.    Eyes: Negative for blurred vision and pain.   Respiratory: Positive for cough. Negative for hemoptysis, sputum production, shortness of breath and wheezing.    Cardiovascular: Negative for chest pain, palpitations, orthopnea and leg swelling.   Gastrointestinal: Positive for abdominal pain. Negative for blood in stool, constipation, diarrhea, nausea and vomiting.   Genitourinary: Negative for dysuria and hematuria.   Musculoskeletal: Positive for back pain. Negative for joint pain and myalgias.   Skin: Negative for rash.   Neurological: Positive for dizziness. Negative for loss of consciousness and weakness.       Past Medical History   has a past medical history of Blood clotting disorder (HCC), Cancer (HCC), Cancer (HCC) (10/13/2021), Coughing blood (10/13/2021), Dental disorder (10/13/2021), Disorder of thyroid, Hepatitis C, Hypertension (10/13/2021), Pain (10/13/2021), and Urinary incontinence.    Surgical History   has a past surgical history that includes other (1960); other; pr lap,partial nephrectomy (Left, 10/27/2021); and pr ultrasonic guidance, intraoperative (Left, 10/27/2021).    Family History  family history is not on file.    Social  History   reports that she has been smoking cigarettes. She has a 75.00 pack-year smoking history. She has never used smokeless tobacco. She reports current alcohol use. She reports previous drug use.    Medications  Prior to Admission Medications   Prescriptions Last Dose Informant Patient Reported? Taking?   [facility] NICOTINE 21 MG/24HR PATCH 24 HR 10/27/2021 at on 1000 Patient Yes No   Sig: Place 1 Patch on the skin every 24 hours.   acetaminophen (TYLENOL) 500 MG Tab 10/26/2021 at 1300 Patient Yes Yes   Sig: Take 1,000 mg by mouth one time. Indications: Pain   allopurinol (ZYLOPRIM) 300 MG Tab 10/26/2021 at 2200 Patient Yes No   Sig: allopurinol 300 mg tablet   TAKE 1 TABLET BY MOUTH EVERY DAY   amLODIPine (NORVASC) 10 MG Tab 10/25/2021 at pm Patient Yes Yes   Sig: Take 10 mg by mouth every evening.   amoxicillin-clavulanate (AUGMENTIN) 875-125 MG Tab 10/26/2021 at finished Patient Yes No   Sig: Take 1 Tablet by mouth 2 times a day.   gabapentin (NEURONTIN) 300 MG Cap 10/26/2021 at 2200 Patient Yes No   Sig: Take 600 mg by mouth 2 times a day.   levothyroxine (SYNTHROID) 25 MCG Tab 10/27/2021 at 0630 Patient Yes No   Sig: Take 37.5 mcg by mouth every morning on an empty stomach.   valsartan (DIOVAN) 320 MG tablet 10/27/2021 at 0730 Patient Yes Yes   Sig: Take 320 mg by mouth at bedtime.      Facility-Administered Medications: None       Allergies  No Known Allergies    Physical Exam  Temp:  [36.1 °C (97 °F)-36.4 °C (97.5 °F)] 36.2 °C (97.2 °F)  Pulse:  [] 86  Resp:  [12-18] 18  BP: ()/(44-91) 80/54  SpO2:  [90 %-97 %] 92 %    Physical Exam  Vitals and nursing note reviewed.   Constitutional:       General: She is not in acute distress.     Appearance: Normal appearance. She is not ill-appearing.   HENT:      Mouth/Throat:      Mouth: Mucous membranes are moist.   Eyes:      Extraocular Movements: Extraocular movements intact.   Cardiovascular:      Rate and Rhythm: Normal rate and regular rhythm.       Pulses: Normal pulses.      Heart sounds: Normal heart sounds. No murmur heard.   No gallop.    Pulmonary:      Effort: Pulmonary effort is normal. No respiratory distress.      Breath sounds: Rhonchi (mild, diffuse, possibly from upper airways) present. No wheezing or rales.   Abdominal:      General: Abdomen is flat. Bowel sounds are normal. There is no distension.      Palpations: Abdomen is soft. There is no mass.      Tenderness: There is abdominal tenderness (mild). There is no right CVA tenderness, left CVA tenderness, guarding or rebound.      Hernia: No hernia is present.      Comments: JARRETT drain left lower quadrant with small amount of blood.  No significant bleeding around bandage site   Musculoskeletal:         General: Tenderness (lower back) present. No deformity. Normal range of motion.      Right lower leg: No edema.      Left lower leg: No edema.   Skin:     General: Skin is warm and dry.      Coloration: Skin is not jaundiced.      Findings: No bruising (no flank bruising) or rash.   Neurological:      General: No focal deficit present.      Mental Status: She is alert and oriented to person, place, and time.      Motor: No weakness.   Psychiatric:         Mood and Affect: Mood normal.         Fluids  Date 10/28/21 0700 - 10/29/21 0659   Shift 1378-5104 2647-2525 5468-4417 24 Hour Total   INTAKE   P.O. 120   120   Shift Total 120   120   OUTPUT   Drains 70 40  110   Shift Total 70 40  110   Weight (kg) 77.3 77.3 77.3 77.3       Laboratory  Recent Labs     10/28/21  0028 10/28/21  2019   WBC 11.6* 16.6*   RBC 4.49 3.98*   HEMOGLOBIN 13.7 12.7   HEMATOCRIT 41.3 37.8   MCV 92.0 95.0   MCH 30.5 31.9   MCHC 33.2* 33.6   RDW 47.2 48.8   PLATELETCT 286 275   MPV 9.5 9.5     Recent Labs     10/27/21  1340 10/28/21  0028   SODIUM 133* 130*   POTASSIUM 4.9 4.5   CHLORIDE 99 99   CO2 21 19*   GLUCOSE 97 198*   BUN 15 16   CREATININE 1.03 1.27   CALCIUM 9.4 8.4*                     Imaging  DX-CHEST-PORTABLE (1  VIEW)   Final Result         1.  No acute cardiopulmonary disease.   2.  Cardiomegaly          Assessment/Plan  * Hypotension due to drugs- (present on admission)  Assessment & Plan  Incident of hypotension with lowest BP reading of 57/48  Transient provement of BP status post 1 L NS  Hb 13.7-> 12.7 currently  No obvious signs of bleeding during my physical examination  Possibly secondary to amlodipine use, however, noted to be normotensive earlier in the day  Patient currently reports significant improvement of symptoms  -We will order additional 1 L NS  -Increase NS infusion from 75 cc to 125 cc/h following  -We will administer midodrine 5 mg p.o. x1  -We will DC amlodipine and losartan for now  -Follow-up with repeat CBC and consider CT imaging of the abdomen if there is concern for bleeding  -Follow-up with procalcitonin and CXR for possible infectious etiology as cause of hypotension  -Continue to monitor throughout the night, and will reevaluate after patient has finished IV fluids and midodrine    Elevated lactic acid level- (present on admission)  Assessment & Plan  Lactic acid 2.2  -Continue with IV fluids as above  -Follow-up CXR procalcitonin to rule out infectious etiology      Leukocytosis- (present on admission)  Assessment & Plan  WBC 11.6-> 16.6  Possibly elevated secondary to recent surgery  -Follow-up with CXR and procalcitonin patient noted to have mild rhonchi and is currently requiring nasal cannula  -We will order UA, however, results may be unreliable as patient had recent renal surgery    Hyponatremia- (present on admission)  Assessment & Plan  Continue with NS  F/u with repeat bmp already drawn  Monitor  Urine Na, urine osm, Serum Osm    Renal mass- (present on admission)  Assessment & Plan  Holding opiate analgesics for now secondary to hypotension  Continue with Tylenol  Have ordered lidocaine patches for back pain  Follow-up with repeat CBC and consider CT imaging if there is concern for  bleeding  Further management per urology

## 2021-10-29 NOTE — ASSESSMENT & PLAN NOTE
-Holding opiate analgesics for now secondary to hypotension  -Continue with Tylenol  -Have ordered lidocaine patches for back pain  -Follow-up with repeat CBC and consider CT imaging if there is concern for bleeding  -Further management per urology

## 2021-10-29 NOTE — ASSESSMENT & PLAN NOTE
Continue normal saline.  Follow-up repeat BMPs and daily labs.  Urine sodium, urine osmolarity, serum osmolarity.

## 2021-10-29 NOTE — PROGRESS NOTES
Change in patient condition due to: Other, hypotension  Rapid Response called at: 0945  Physician Baltazar SHERMAN notified at 0945    See Code Blue timeline for rapid response events. Patient Transferred to Higher Level of Care

## 2021-10-29 NOTE — ASSESSMENT & PLAN NOTE
2.2 then 3.2 then 1.1.  Likely decreasing in the setting of heavy IV fluid administration.  Chest x-ray clear for infectious etiology.  Procalcitonin negative.  Question urosepsis versus postoperative complication.

## 2021-10-29 NOTE — PROGRESS NOTES
Overnight, UA was performed and found to have few bacteria but also with Nitrates and Leukocyte esterase. She was also noted to have SHEBA which improved with IVF as well as increasing lactic acid level. Pt was started on the 3rd liter of NS with improvement of BP to 100/68. She was also initiated on Rocephin for possible sepsis from UTI. Around 5am, received notification from overnight nurse that pt had vomiting episode of brown/orang liquid. She additionally reported recurrence of Hypotension to 85/60 as well as new onset worsening abdominal pain as well as distention and firmness. ON my arrival to bedside, pt remains AAOx4 but is in acute distress and moaning in pain. Abdomen appears mildly increased in size, and pt reports severe abdominal pain on the left and right side. There may be some slight increase in firmness to palpation. BP remains 85/60, but able to maintain a MAP 68.     - F/u with Blood cultures and Urine culture  - will broaden abx coverage to Vancomycin/Rocephin  - continue to hold opiate analgesics for now due to hypotension  - Have ordered Stat CT abd w/o contrast secondary to SHEBA  - c/w current NS 125cc/hr  - If pt does not improve will consult ICU for possible pressor support

## 2021-10-29 NOTE — PROGRESS NOTES
Pt arrived to S-120 with RR RN and CC tech @ 1100.    VITALS  HR: 86  BP: 134/69  O2: 96  Temp: 96.9f    Belongings brought to room: clothing, drivers license, backpack not gone through. Cell phone and glasses placed in book bag.     4 Eyes Skin Assessment Completed by Randal RN and PATRICK Velásquez.    Head WDL  Ears WDL  Nose WDL  Mouth WDL  Neck WDL  Breast/Chest WDL  Shoulder Blades WDL  Spine WDL  (R) Arm/Elbow/Hand Redness, Blanching and Abrasion  (L) Arm/Elbow/Hand Redness, Blanching and Abrasion  Abdomen Redness and Incision. RUQ JARRETT in place, 3 additional incision sites   Groin WDL  Scrotum/Coccyx/Buttocks WDL  (R) Leg Blanching. Dry cracked  (L) Leg Blanching. Dry cracked   (R) Heel/Foot/Toe Blanching  (L) Heel/Foot/Toe Blanching          Devices In Places ECG, Blood Pressure Cuff, Pulse Ox, [place], SCD's, OG/NG and Nasal Cannula      Interventions In Place Gray Ear Foams, Sacral Mepilex, Pillows, Q2 Turns and Low Air Loss Mattress    Possible Skin Injury No    Pictures Uploaded Into Epic N/A  Wound Consult Placed N/A  RN Wound Prevention Protocol Ordered Yes

## 2021-10-29 NOTE — CARE PLAN
COVID-19 surge in effect.     The patient is Watcher - Medium risk of patient condition declining or worsening    Shift Goals  Clinical Goals: Improved BP; Pain control  Patient Goals: Pain control    Progress made toward(s) clinical / shift goals:    Problem: Pain - Standard  Goal: Alleviation of pain or a reduction in pain to the patient’s comfort goal  Outcome: Progressing     Problem: Knowledge Deficit - Standard  Goal: Patient and family/care givers will demonstrate understanding of plan of care, disease process/condition, diagnostic tests and medications  Outcome: Progressing     Problem: Fall Risk  Goal: Patient will remain free from falls  Outcome: Progressing       Patient is not progressing towards the following goals:     Called rapid response at 1936 for BP 57/48. Patient lethargic but Aox4 and with difficulty keeping eyes open and carrying on a conversation. Patient reports dizziness and intermittent blurry vision. Blood glucose 104. Rapid team at bedside, infusing 1L NS bolus per rapid protocol, stat EKG ordered. MD paged and updated, lab orders received and MD Ruggiero going to consult hospitalist service. MD Ip at bedside, orders received. BP slightly improving with fluid boluses (see flowsheets). Patient educated on inability to medicate for pain with narcotics at this time r/t BP, pt verbalized understanding. Orders received for non-narcotic analgesics.     Updated MD Ip at 0100 regarding BP 72/56, pink tinged urine and only 30 mls out this shift, orders received.     MD Ip back at bedside at 0138 to assess patient. OK per MD to give 5mg oxy for pain as BP is 100/68. More orders received.     0432 - Patient's BP 85/60, pt dizzy, c/o abdominal pain, on assessment pts abdomen distended and very firm, pt vomited ~300 cc orange brown liquid. MD Wolf updated, stat abd xray ordered. MD Castro rounding at bedside, stat CT ordered. Patient down to CT with RN and Rapid RN and brought back to room after. MD  Luciano placing more orders. Patient strict NPO.    Problem: Hemodynamics  Goal: Patient's hemodynamics, fluid balance and neurologic status will be stable or improve  Outcome: Not Progressing

## 2021-10-30 ENCOUNTER — APPOINTMENT (OUTPATIENT)
Dept: RADIOLOGY | Facility: MEDICAL CENTER | Age: 66
DRG: 657 | End: 2021-10-30
Attending: STUDENT IN AN ORGANIZED HEALTH CARE EDUCATION/TRAINING PROGRAM
Payer: COMMERCIAL

## 2021-10-30 PROBLEM — K59.81 OGILVIE'S SYNDROME: Status: ACTIVE | Noted: 2021-10-29

## 2021-10-30 PROBLEM — N17.9 AKI (ACUTE KIDNEY INJURY) (HCC): Status: ACTIVE | Noted: 2021-10-30

## 2021-10-30 PROBLEM — M10.9 GOUT: Status: ACTIVE | Noted: 2021-10-30

## 2021-10-30 PROBLEM — G89.18 POST-OP PAIN: Status: ACTIVE | Noted: 2021-10-30

## 2021-10-30 PROBLEM — M10.9 GOUT: Chronic | Status: ACTIVE | Noted: 2021-10-30

## 2021-10-30 LAB
ANION GAP SERPL CALC-SCNC: 9 MMOL/L (ref 7–16)
BASOPHILS # BLD AUTO: 0.3 % (ref 0–1.8)
BASOPHILS # BLD: 0.04 K/UL (ref 0–0.12)
BUN SERPL-MCNC: 18 MG/DL (ref 8–22)
CALCIUM SERPL-MCNC: 7.5 MG/DL (ref 8.5–10.5)
CHLORIDE SERPL-SCNC: 105 MMOL/L (ref 96–112)
CO2 SERPL-SCNC: 16 MMOL/L (ref 20–33)
CREAT SERPL-MCNC: 1.72 MG/DL (ref 0.5–1.4)
EOSINOPHIL # BLD AUTO: 0.03 K/UL (ref 0–0.51)
EOSINOPHIL NFR BLD: 0.2 % (ref 0–6.9)
ERYTHROCYTE [DISTWIDTH] IN BLOOD BY AUTOMATED COUNT: 49.1 FL (ref 35.9–50)
GLUCOSE SERPL-MCNC: 112 MG/DL (ref 65–99)
HCT VFR BLD AUTO: 38.2 % (ref 37–47)
HGB BLD-MCNC: 12.5 G/DL (ref 12–16)
IMM GRANULOCYTES # BLD AUTO: 0.07 K/UL (ref 0–0.11)
IMM GRANULOCYTES NFR BLD AUTO: 0.5 % (ref 0–0.9)
LYMPHOCYTES # BLD AUTO: 1.4 K/UL (ref 1–4.8)
LYMPHOCYTES NFR BLD: 9.6 % (ref 22–41)
MAGNESIUM SERPL-MCNC: 2.1 MG/DL (ref 1.5–2.5)
MCH RBC QN AUTO: 31.2 PG (ref 27–33)
MCHC RBC AUTO-ENTMCNC: 32.7 G/DL (ref 33.6–35)
MCV RBC AUTO: 95.3 FL (ref 81.4–97.8)
MONOCYTES # BLD AUTO: 1.37 K/UL (ref 0–0.85)
MONOCYTES NFR BLD AUTO: 9.4 % (ref 0–13.4)
NEUTROPHILS # BLD AUTO: 11.62 K/UL (ref 2–7.15)
NEUTROPHILS NFR BLD: 80 % (ref 44–72)
NRBC # BLD AUTO: 0 K/UL
NRBC BLD-RTO: 0 /100 WBC
PLATELET # BLD AUTO: 249 K/UL (ref 164–446)
PMV BLD AUTO: 9.2 FL (ref 9–12.9)
POTASSIUM SERPL-SCNC: 4.2 MMOL/L (ref 3.6–5.5)
RBC # BLD AUTO: 4.01 M/UL (ref 4.2–5.4)
SODIUM SERPL-SCNC: 130 MMOL/L (ref 135–145)
WBC # BLD AUTO: 14.5 K/UL (ref 4.8–10.8)

## 2021-10-30 PROCEDURE — 700111 HCHG RX REV CODE 636 W/ 250 OVERRIDE (IP): Performed by: SURGERY

## 2021-10-30 PROCEDURE — A9270 NON-COVERED ITEM OR SERVICE: HCPCS | Performed by: PHYSICIAN ASSISTANT

## 2021-10-30 PROCEDURE — 700105 HCHG RX REV CODE 258: Performed by: NURSE PRACTITIONER

## 2021-10-30 PROCEDURE — 74019 RADEX ABDOMEN 2 VIEWS: CPT

## 2021-10-30 PROCEDURE — A9270 NON-COVERED ITEM OR SERVICE: HCPCS | Performed by: UROLOGY

## 2021-10-30 PROCEDURE — 99232 SBSQ HOSP IP/OBS MODERATE 35: CPT | Performed by: SURGERY

## 2021-10-30 PROCEDURE — 700102 HCHG RX REV CODE 250 W/ 637 OVERRIDE(OP): Performed by: UROLOGY

## 2021-10-30 PROCEDURE — 700102 HCHG RX REV CODE 250 W/ 637 OVERRIDE(OP): Performed by: NURSE PRACTITIONER

## 2021-10-30 PROCEDURE — 700105 HCHG RX REV CODE 258: Performed by: INTERNAL MEDICINE

## 2021-10-30 PROCEDURE — 700111 HCHG RX REV CODE 636 W/ 250 OVERRIDE (IP): Performed by: INTERNAL MEDICINE

## 2021-10-30 PROCEDURE — A9270 NON-COVERED ITEM OR SERVICE: HCPCS | Performed by: NURSE PRACTITIONER

## 2021-10-30 PROCEDURE — 85025 COMPLETE CBC W/AUTO DIFF WBC: CPT

## 2021-10-30 PROCEDURE — 99291 CRITICAL CARE FIRST HOUR: CPT | Performed by: NURSE PRACTITIONER

## 2021-10-30 PROCEDURE — 80048 BASIC METABOLIC PNL TOTAL CA: CPT

## 2021-10-30 PROCEDURE — 700102 HCHG RX REV CODE 250 W/ 637 OVERRIDE(OP): Performed by: PHYSICIAN ASSISTANT

## 2021-10-30 PROCEDURE — A9270 NON-COVERED ITEM OR SERVICE: HCPCS | Performed by: INTERNAL MEDICINE

## 2021-10-30 PROCEDURE — 770022 HCHG ROOM/CARE - ICU (200)

## 2021-10-30 PROCEDURE — 700102 HCHG RX REV CODE 250 W/ 637 OVERRIDE(OP): Performed by: INTERNAL MEDICINE

## 2021-10-30 PROCEDURE — 700111 HCHG RX REV CODE 636 W/ 250 OVERRIDE (IP): Performed by: NURSE PRACTITIONER

## 2021-10-30 PROCEDURE — 83735 ASSAY OF MAGNESIUM: CPT

## 2021-10-30 PROCEDURE — 700101 HCHG RX REV CODE 250: Performed by: STUDENT IN AN ORGANIZED HEALTH CARE EDUCATION/TRAINING PROGRAM

## 2021-10-30 RX ORDER — SODIUM CHLORIDE, SODIUM LACTATE, POTASSIUM CHLORIDE, CALCIUM CHLORIDE 600; 310; 30; 20 MG/100ML; MG/100ML; MG/100ML; MG/100ML
INJECTION, SOLUTION INTRAVENOUS CONTINUOUS
Status: DISCONTINUED | OUTPATIENT
Start: 2021-10-30 | End: 2021-10-31

## 2021-10-30 RX ORDER — MIDODRINE HYDROCHLORIDE 5 MG/1
10 TABLET ORAL
Status: DISCONTINUED | OUTPATIENT
Start: 2021-10-30 | End: 2021-11-01

## 2021-10-30 RX ORDER — ENEMA 19; 7 G/133ML; G/133ML
1 ENEMA RECTAL ONCE
Status: DISCONTINUED | OUTPATIENT
Start: 2021-10-30 | End: 2021-10-30

## 2021-10-30 RX ORDER — DOCUSATE SODIUM 50 MG/5ML
100 LIQUID ORAL 2 TIMES DAILY
Status: DISCONTINUED | OUTPATIENT
Start: 2021-10-30 | End: 2021-11-05

## 2021-10-30 RX ORDER — HEPARIN SODIUM 5000 [USP'U]/ML
5000 INJECTION, SOLUTION INTRAVENOUS; SUBCUTANEOUS EVERY 8 HOURS
Status: DISCONTINUED | OUTPATIENT
Start: 2021-10-30 | End: 2021-11-03

## 2021-10-30 RX ADMIN — LIDOCAINE 1 PATCH: 50 PATCH CUTANEOUS at 21:13

## 2021-10-30 RX ADMIN — MORPHINE SULFATE 4 MG: 4 INJECTION INTRAVENOUS at 09:27

## 2021-10-30 RX ADMIN — ALLOPURINOL 300 MG: 100 TABLET ORAL at 05:18

## 2021-10-30 RX ADMIN — LIDOCAINE 1 PATCH: 50 PATCH CUTANEOUS at 21:14

## 2021-10-30 RX ADMIN — PIPERACILLIN AND TAZOBACTAM 4.5 G: 4; .5 INJECTION, POWDER, LYOPHILIZED, FOR SOLUTION INTRAVENOUS; PARENTERAL at 13:29

## 2021-10-30 RX ADMIN — MORPHINE SULFATE 4 MG: 4 INJECTION INTRAVENOUS at 13:59

## 2021-10-30 RX ADMIN — SODIUM CHLORIDE, POTASSIUM CHLORIDE, SODIUM LACTATE AND CALCIUM CHLORIDE: 600; 310; 30; 20 INJECTION, SOLUTION INTRAVENOUS at 09:31

## 2021-10-30 RX ADMIN — MIDODRINE HYDROCHLORIDE 5 MG: 5 TABLET ORAL at 09:20

## 2021-10-30 RX ADMIN — ACETAMINOPHEN 1000 MG: 500 TABLET ORAL at 11:41

## 2021-10-30 RX ADMIN — MORPHINE SULFATE 4 MG: 4 INJECTION INTRAVENOUS at 04:17

## 2021-10-30 RX ADMIN — ACETAMINOPHEN 1000 MG: 500 TABLET ORAL at 17:38

## 2021-10-30 RX ADMIN — PHENAZOPYRIDINE HYDROCHLORIDE 200 MG: 200 TABLET ORAL at 09:20

## 2021-10-30 RX ADMIN — ACETAMINOPHEN 1000 MG: 500 TABLET ORAL at 05:19

## 2021-10-30 RX ADMIN — MIDODRINE HYDROCHLORIDE 10 MG: 5 TABLET ORAL at 11:41

## 2021-10-30 RX ADMIN — HEPARIN SODIUM 5000 UNITS: 5000 INJECTION, SOLUTION INTRAVENOUS; SUBCUTANEOUS at 21:11

## 2021-10-30 RX ADMIN — DOCUSATE SODIUM 100 MG: 100 CAPSULE, LIQUID FILLED ORAL at 05:19

## 2021-10-30 RX ADMIN — PIPERACILLIN AND TAZOBACTAM 4.5 G: 4; .5 INJECTION, POWDER, LYOPHILIZED, FOR SOLUTION INTRAVENOUS; PARENTERAL at 21:11

## 2021-10-30 RX ADMIN — LEVOTHYROXINE SODIUM 37.5 MCG: 0.03 TABLET ORAL at 05:19

## 2021-10-30 RX ADMIN — DOCUSATE SODIUM 100 MG: 50 LIQUID ORAL at 17:38

## 2021-10-30 RX ADMIN — PIPERACILLIN AND TAZOBACTAM 4.5 G: 4; .5 INJECTION, POWDER, LYOPHILIZED, FOR SOLUTION INTRAVENOUS; PARENTERAL at 04:17

## 2021-10-30 RX ADMIN — GABAPENTIN 600 MG: 300 CAPSULE ORAL at 17:39

## 2021-10-30 RX ADMIN — MORPHINE SULFATE 4 MG: 4 INJECTION INTRAVENOUS at 21:54

## 2021-10-30 RX ADMIN — NEOSTIGMINE METHYLSULFATE 0.4 MG/HR: 1 INJECTION INTRAVENOUS at 13:22

## 2021-10-30 RX ADMIN — HEPARIN SODIUM 5000 UNITS: 5000 INJECTION, SOLUTION INTRAVENOUS; SUBCUTANEOUS at 13:29

## 2021-10-30 RX ADMIN — NICOTINE TRANSDERMAL SYSTEM 21 MG: 21 PATCH, EXTENDED RELEASE TRANSDERMAL at 05:19

## 2021-10-30 RX ADMIN — GABAPENTIN 600 MG: 300 CAPSULE ORAL at 05:19

## 2021-10-30 ASSESSMENT — ENCOUNTER SYMPTOMS
SHORTNESS OF BREATH: 0
COUGH: 1
FLANK PAIN: 0
WHEEZING: 0
PALPITATIONS: 0
BLURRED VISION: 0
HALLUCINATIONS: 0
DIZZINESS: 0
CONSTIPATION: 1
CHILLS: 0
FEVER: 0
ABDOMINAL PAIN: 1
VOMITING: 0
NAUSEA: 0
HEADACHES: 0
DOUBLE VISION: 0

## 2021-10-30 ASSESSMENT — PAIN DESCRIPTION - PAIN TYPE
TYPE: ACUTE PAIN

## 2021-10-30 ASSESSMENT — LIFESTYLE VARIABLES: SUBSTANCE_ABUSE: 0

## 2021-10-30 ASSESSMENT — FIBROSIS 4 INDEX: FIB4 SCORE: 2.5

## 2021-10-30 NOTE — OP REPORT
DATE OF SERVICE:  10/29/2021     There is no dictation available.        ______________________________  MD KENROY Maharaj/FANNY/CHAR    DD:  10/29/2021 15:06  DT:  10/29/2021 15:33    Job#:  [id number]

## 2021-10-30 NOTE — PROGRESS NOTES
Paged by Radiology, Dr. White regarding recent imaging updates. Abd xray shows Air-filled distention of the cecum measuring 12.3 cm, appearance concerning for Fence's. Similar to prior study.     GI consult is needed for management in AM.

## 2021-10-30 NOTE — ASSESSMENT & PLAN NOTE
-Multifactorial due to recent surgery, hypotension  -Avoid nephrotoxins  -Renally dose medications as indicated  -Follow CBC

## 2021-10-30 NOTE — ASSESSMENT & PLAN NOTE
-Left  -Status post nephrectomy 10/27  -Pathology: Clear cell renal cell carcinoma, ISUP grade 1  -will need Hem/Onc consult once ileus resolved

## 2021-10-30 NOTE — PROGRESS NOTES
"Urology Progress Note    Left renal mass s/p robotic assisted laparoscopic partial left nephrectomy 10/27/21    S: Pt seen and examined in bed on rounds in NAD. AFVSS. JARRETT output 410 mL in last 24h, urine clear and straw colored in patel tubing. Pt reports abdominal pain/tenderness to palpation. WBC 14.5 (14.9), Cr 1.72 (1.55). Gen surg consulted for colonic pseudo-obstruction, NGT placed. GI consult pending.    O:   /69   Pulse (!) 107   Temp 36.6 °C (97.9 °F)   Resp 16   Ht 1.651 m (5' 5\")   Wt 88.2 kg (194 lb 7.1 oz)   SpO2 98%   Recent Labs     10/29/21  0123 10/29/21  1046 10/30/21  0424   SODIUM 126* 126* 130*   POTASSIUM 4.4 4.1 4.2   CHLORIDE 99 100 105   CO2 14* 16* 16*   GLUCOSE 129* 113* 112*   BUN 20 18 18   CREATININE 1.85* 1.55* 1.72*   CALCIUM 7.4* 7.2* 7.5*     Recent Labs     10/29/21  0123 10/29/21  1046 10/30/21  0424   WBC 15.4* 14.9* 14.5*   RBC 3.98* 3.99* 4.01*   HEMOGLOBIN 12.4 12.4 12.5   HEMATOCRIT 38.6 36.9* 38.2   MCV 97.0 92.5 95.3   MCH 31.2 31.1 31.2   MCHC 32.1* 33.6 32.7*   RDW 50.2* 47.2 49.1   PLATELETCT 234 250 249   MPV 9.2 9.4 9.2         Intake/Output Summary (Last 24 hours) at 10/29/2021 1616  Last data filed at 10/29/2021 1400  Gross per 24 hour   Intake 8279.42 ml   Output 780 ml   Net 7499.42 ml       Exam:  Gen: NAD   Abd: Distended.  Some abdominal tenderness. JARRETT with serosanguinous output  Incisions clean, dry, intact. Area of erythema extending over anterior surface of abdomen and around to L hip, tender to palpation. No purulent discharge from incisions.   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 Transferred to ICU due to hypotension refractory to fluid bolus. Antibiotics changed to Zosyn.     Plan:  -Continue cares per ICU team  - monitor output via patel; patel to remain until deemed no longer necessary for medical management  - monitor pain control  - Incentive spirometry  - monitor H/H  -Continue zosyn  - appreciate hospitalist recs     Case reviewed with Dr. Feldman

## 2021-10-30 NOTE — PROGRESS NOTES
"  DATE: 10/30/2021    POD 3: Partial left nephrectomy    Interval Events:  No hemodynamic issues overnight  Lactate cleared  WBC remains elevated at 14  Creatinine up slightly  In good spirits, would like to eat    PHYSICAL EXAMINATION:  Constitutional:     Vital Signs: BP (!) 87/58   Pulse 98   Temp 36.7 °C (98 °F) (Temporal)   Resp (!) 11   Ht 1.651 m (5' 5\")   Wt 88.2 kg (194 lb 7.1 oz)   SpO2 95%   GENERAL:  No acute distress  HEENT: Pupils equal, round and reactive to light bilaterally.  Sclera are clear bilaterally.  No otorrhea.  No rhinorrhea.  Mucus membranes are moist.  CHEST:  Lungs are clear to auscultation bilaterally  CARDIOVASCULAR:  Regular rate and rhythm  ABDOMEN: Markedly distended and tympanitic, drain with serosanguinous output, appropriately tender.  GENITOURINARY:  No patel in place, voiding without issues  EXTREMITIES:  Good range of motion through out  NEUROLOGIC:  Alert and oriented.  Cranial Nerves II through XII are grossly intact, no focal deficits appreciated  PSYCHIATRIC:  Normal affect and mood appropriate for age and condition  SKIN:  Warm and well perfused, no rashes    Laboratory Values:   Recent Labs     10/29/21  0123 10/29/21  1046 10/30/21  0424   WBC 15.4* 14.9* 14.5*   RBC 3.98* 3.99* 4.01*   HEMOGLOBIN 12.4 12.4 12.5   HEMATOCRIT 38.6 36.9* 38.2   MCV 97.0 92.5 95.3   MCH 31.2 31.1 31.2   MCHC 32.1* 33.6 32.7*   RDW 50.2* 47.2 49.1   PLATELETCT 234 250 249   MPV 9.2 9.4 9.2     Recent Labs     10/29/21  0123 10/29/21  1046 10/30/21  0424   SODIUM 126* 126* 130*   POTASSIUM 4.4 4.1 4.2   CHLORIDE 99 100 105   CO2 14* 16* 16*   GLUCOSE 129* 113* 112*   BUN 20 18 18   CREATININE 1.85* 1.55* 1.72*   CALCIUM 7.4* 7.2* 7.5*     Recent Labs     10/28/21  2019 10/29/21  1046   ASTSGOT 33 35   ALTSGPT 17 13   TBILIRUBIN 0.2 0.2   ALKPHOSPHAT 79 73   GLOBULIN 3.7* 3.4   INR  --  1.18*     Recent Labs     10/29/21  1046   INR 1.18*        Imaging:   [id number] VIEWS   Final " Result         1.  Air-filled distention of the cecum measuring 12.3 cm, appearance concerning for Jailene's.  Similar to prior study.      These findings were discussed with the patient's clinician, Angeles Wolf, on 10/30/2021 4:19 AM.      [id number] VIEWS   Final Result      1.  Mild colonic dilation      2.  Enteric catheter appears appropriately located      3.  Mild right basilar atelectasis and/or pleural effusion      DX-ABDOMEN FOR TUBE PLACEMENT   Final Result      Enteric tube has been placed and the tip projects over the stomach.      CT-ABDOMEN-PELVIS W/O   Final Result         1.  Air-filled distention of the cecum, follow-up recommended to exclude progression to Jailene's   2.  Fluid and air-filled distended loops of small bowel in the left abdomen, consider component of ileus.   3.  Intra-abdominal foci of free air, a nonspecific finding for recent postop changes with surgical drain in place   4.  Scattered mild abdominal ascites and hazy mesenteric inflammatory changes.   5.  Small right and trace left pleural effusion.   6.  Linear densities in the bilateral lung bases favors changes of atelectasis.   7.  Left nephrolithiasis without visualized obstructive changes.   8.  Hepatomegaly and changes of cirrhosis   9.  Diverticulosis   10.  Atherosclerosis      [id number] VIEW   Final Result         1.  Air-filled distended bowel loops, appearance compatible with evolving obstruction versus ileus. Recommend radiographic follow-up to resolution.      DX-CHEST-PORTABLE (1 VIEW)   Final Result         1.  No acute cardiopulmonary disease.   2.  Cardiomegaly      [id number] VIEWS    (Results Pending)   [id number] VIEWS    (Results Pending)       ASSESSMENT AND PLAN:   1) Colonic Pseudo-obstruction:    X-ray earlier this morning further corroborates this.  Discussed with SANTI ACOSTA for consideration for neostigmine therapy from my perspective while being monitored in the ICU.  Agree with GI  consultation for endoscopic decompression and possible tube placement if they are amenable.  There does not appear to be any significant obstruction/mass/stricture based on the CT from yesterday.    I independently reviewed pertinent clinical lab tests from the last 48 hours and ordered additional follow up clinical lab tests.  I independently reviewed pertinent radiographic images and the radiologist's reports from the last 48 hours and ordered additional follow up radiographic studies.  The details of the available [facility] records in [facility] (including laboratory tests, culture data, medications, imaging, and other pertinent diagnostic tests) and that information was utilized as warranted in today's medical decision making for this patient.  I personally evaluated the patient condition at bedside.    Aggregated care time spent evaluating, reassessing, reviewing documentation, providing care, and managing this patient exclusive of procedures: 35 minutes     ____________________________________     Charles Kate M.D.

## 2021-10-30 NOTE — ASSESSMENT & PLAN NOTE
-Multifactorial due to recent surgical procedure, immobility, anesthesia and narcotics  -CT abdomen/pelvis air-filled distention of the cecum is seen measuring 8 cm  -fleets enema, no results  -bowel protocol  -s/p neostigmine x 2 - no results. Has also received Relastor  -GI for decompression today  -general surgery has consulted, no need for surgical intervention at this time  -NGT  -NPO

## 2021-10-30 NOTE — ASSESSMENT & PLAN NOTE
-Multifactorial due to recent surgery/anesthesia, dehydration, narcotics for postop pain  -Status post greater than 5 L fluid resuscitation  -Midodrine   10/31 -resolved

## 2021-10-30 NOTE — PROGRESS NOTES
Critical Care Progress Note    Date of admission  10/27/2021    Chief Complaint  Hypotension    Hospital Course  Ms. Munroe is a 66-year-old female with PMH significant for HTN, hep C, tobacco dependence, history of blood clots, hypothyroidism and renal mass who presented 10/27/2021 for resection of left adrenal mass with urology, Dr. Mccracken.  She underwent robotic assisted lap partial left nephrectomy 10/27/2021 without complications,  mL.  Postoperatively she had issues with poor pain control and hypotension.  Rapid response was called on 10/29 for hypotension and she received 5 L fluid resuscitation.  She also had increased abdominal distention.  CT abdomen pelvis identified 8 cm cecum dilatation.  General surgery was consulted and recommended n.p.o. with NG tube and fleets enema with no indications for surgery at this time.    Interval Problem Update  SBP's 80's. Sleeping but easily arousable, oriented x 4. Increased Midodrine  Fleets yesterday, no results. Abdomen distended, slightly firm, tender throughout.  Discussed with general surgery.  They are okay with neostigmine versus decompression per GI recommendations.  GI consulted  Afebrile  Heart rates 90s to 110s SR/ST  N.p.o.  IV fluid  ABX, Zosyn day 2.  Blood cultures negative to date  Mobility 1    Review of Systems  Review of Systems   Constitutional: Positive for malaise/fatigue. Negative for chills and fever.   HENT: Negative.    Eyes: Negative for blurred vision and double vision.   Respiratory: Positive for cough. Negative for shortness of breath and wheezing.    Cardiovascular: Negative for chest pain and palpitations.   Gastrointestinal: Positive for abdominal pain and constipation. Negative for nausea and vomiting.   Genitourinary: Negative for flank pain and hematuria.   Neurological: Negative for dizziness and headaches.   Psychiatric/Behavioral: Negative for hallucinations and substance abuse.   All other systems reviewed and are  negative.       Vital Signs for last 24 hours   Temp:  [36.4 °C (97.5 °F)-36.9 °C (98.4 °F)] 36.7 °C (98 °F)  Pulse:  [] 98  Resp:  [9-35] 11  BP: ()/(37-77) 87/58  SpO2:  [92 %-99 %] 95 %    Hemodynamic parameters for last 24 hours       Respiratory Information for the last 24 hours       Physical Exam   Physical Exam  Vitals and nursing note reviewed.   Constitutional:       General: She is sleeping. She is not in acute distress.     Appearance: She is obese. She is ill-appearing. She is not toxic-appearing.      Interventions: Nasal cannula in place.   HENT:      Head: Normocephalic.      Right Ear: Hearing normal.      Left Ear: Hearing normal.      Nose: Nose normal.      Mouth/Throat:      Lips: Pink.      Mouth: Mucous membranes are moist.   Eyes:      Pupils: Pupils are equal, round, and reactive to light.   Cardiovascular:      Rate and Rhythm: Tachycardia present.      Pulses: Decreased pulses.           Dorsalis pedis pulses are 1+ on the right side and 1+ on the left side.      Heart sounds: Heart sounds are distant.   Pulmonary:      Effort: Pulmonary effort is normal. No respiratory distress.      Breath sounds: Decreased breath sounds present. No wheezing.      Comments: Upper airway congestion  Weak cough    Abdominal:      General: Bowel sounds are absent. There is distension.      Tenderness: There is generalized abdominal tenderness.      Comments: Abdomen red/warm   Musculoskeletal:      Comments: SERRANO 4-5/5   Skin:     General: Skin is warm and dry.      Comments: Right great toe red/inflammed, tender (history of gout)   Neurological:      Mental Status: She is oriented to person, place, and time and easily aroused.      Sensory: Sensation is intact.      Motor: Motor function is intact.   Psychiatric:         Mood and Affect: Mood normal.         Behavior: Behavior is cooperative.         Cognition and Memory: Cognition normal.         Judgment: Judgment normal.          Medications  Current Facility-Administered Medications   Medication Dose Route Frequency Provider Last Rate Last Admin   • lactated ringers infusion   Intravenous Continuous Abirafi Kang, A.P.R.N. 125 mL/hr at 10/30/21 0931 New Bag at 10/30/21 0931   • midodrine (PROAMATINE) tablet 10 mg  10 mg Oral TID WITH MEALS Abi Kang, A.P.R.N.   10 mg at 10/30/21 1141   • mag hydrox-al hydrox-simeth (MAALOX PLUS ES or MYLANTA DS) suspension 10 mL  10 mL Oral 4X/DAY PRN Nathan Castro, M.DJoann   10 mL at 10/29/21 0147   • pantoprazole (PROTONIX) injection 40 mg  40 mg Intravenous QDAY PRN Nathan Castro M.D.       • piperacillin-tazobactam (ZOSYN) 4.5 g in  mL IVPB  4.5 g Intravenous Q8HRS John Ireland M.D.   Stopped at 10/30/21 0817   • morphine (pf) 4 mg/mL injection 2-4 mg  2-4 mg Intravenous Q4HRS PRN Charles Kate M.D.   4 mg at 10/30/21 0927   • docusate sodium (COLACE) capsule 100 mg  100 mg Oral BID ARMANDO Blank.AJoann-CELINE   100 mg at 10/30/21 0519   • lidocaine (LIDODERM) 5 % 1 Patch  1 Patch Transdermal Q24HR Nathan Castro, M.DJoann   1 Patch at 10/29/21 2100   • guaiFENesin ER (MUCINEX) tablet 600 mg  600 mg Oral Q12HRS PRN Nathan Castro, M.DJoann   600 mg at 10/28/21 2049   • lidocaine (LIDODERM) 5 % 1 Patch  1 Patch Transdermal Q24HR Nathan Castro, M.D.   1 Patch at 10/29/21 2115   • allopurinol (ZYLOPRIM) tablet 300 mg  300 mg Oral DAILY Peter Mccracken M.D.   300 mg at 10/30/21 0518   • nicotine (NICODERM) 21 MG/24HR 21 mg  1 Patch Transdermal Q24HRS Peter Mccracken M.D.   21 mg at 10/30/21 0519   • gabapentin (NEURONTIN) capsule 600 mg  600 mg Oral BID Peter Mccracken M.D.   600 mg at 10/30/21 0519   • levothyroxine (SYNTHROID) tablet 37.5 mcg  37.5 mcg Oral AM ES Peter Mccracken M.D.   37.5 mcg at 10/30/21 0519   • Pharmacy Consult Request ...Pain Management Review 1 Each  1 Each Other PHARMACY TO DOSE Peter Mccracken M.D.       • opium-belladonna (B&O SUPPRETTES) suppository 60 mg  60 mg Rectal Q12HRS PRN  Peter Mccracken M.D.       • ondansetron (ZOFRAN) syringe/vial injection 4 mg  4 mg Intravenous Q4HRS PRN Peter Mccracken M.D.   4 mg at 10/29/21 1146   • diphenhydrAMINE (BENADRYL) injection 25 mg  25 mg Intravenous Q6HRS PRN Peter Mccracken M.D.       • haloperidol lactate (HALDOL) injection 1 mg  1 mg Intravenous Q6HRS PRN Peter Mccracken M.D.       • hydrOXYzine HCl (ATARAX) tablet 25 mg  25 mg Oral HS PRN Peter Mccracken M.D.       • simethicone (MYLICON) chewable tab 80 mg  80 mg Oral Q8HRS PRN Peter Mccracken M.D.       • senna-docusate (PERICOLACE or SENOKOT S) 8.6-50 MG per tablet 2 Tablet  2 Tablet Oral QPM PRN Peter Mccracken M.D.       • acetaminophen (TYLENOL) tablet 1,000 mg  1,000 mg Oral Q6HRS Peter Mccracken M.D.   1,000 mg at 10/30/21 1141    Followed by   • [START ON 11/2/2021] acetaminophen (TYLENOL) tablet 1,000 mg  1,000 mg Oral Q6HRS PRN Peter Mccracken M.D.           Fluids    Intake/Output Summary (Last 24 hours) at 10/30/2021 1144  Last data filed at 10/30/2021 0800  Gross per 24 hour   Intake 09001.34 ml   Output 1945 ml   Net 8127.34 ml       Laboratory  Recent Labs     10/29/21  1046   [id number] 7.23*   [id number] 36.8   [id number] 71.3   GUER9TOV 93.2   ARTHCO3 15*   ARTBE -12*         Recent Labs     10/29/21  0123 10/29/21  1046 10/30/21  0424   SODIUM 126* 126* 130*   POTASSIUM 4.4 4.1 4.2   CHLORIDE 99 100 105   CO2 14* 16* 16*   BUN 20 18 18   CREATININE 1.85* 1.55* 1.72*   MAGNESIUM  --  2.1 2.1   CALCIUM 7.4* 7.2* 7.5*     Recent Labs     10/28/21  2019 10/28/21  2019 10/29/21  0123 10/29/21  1046 10/30/21  0424   ALTSGPT 17  --   --  13  --    ASTSGOT 33  --   --  35  --    ALKPHOSPHAT 79  --   --  73  --    TBILIRUBIN 0.2  --   --  0.2  --    GLUCOSE 102*   < > 129* 113* 112*    < > = values in this interval not displayed.     Recent Labs     10/28/21  2019 10/28/21  2019 10/29/21  0123 10/29/21  1046 10/30/21  0424   WBC 16.6*   < > 15.4* 14.9* 14.5*   NEUTSPOLYS 75.10*   < >  74.70* 76.80* 80.00*   LYMPHOCYTES 14.10*   < > 17.00* 13.70* 9.60*   MONOCYTES 10.00   < > 7.40 8.40 9.40   EOSINOPHILS 0.10   < > 0.10 0.30 0.20   BASOPHILS 0.20   < > 0.20 0.30 0.30   ASTSGOT 33  --   --  35  --    ALTSGPT 17  --   --  13  --    ALKPHOSPHAT 79  --   --  73  --    TBILIRUBIN 0.2  --   --  0.2  --     < > = values in this interval not displayed.     Recent Labs     10/29/21  0123 10/29/21  1046 10/30/21  0424   RBC 3.98* 3.99* 4.01*   HEMOGLOBIN 12.4 12.4 12.5   HEMATOCRIT 38.6 36.9* 38.2   PLATELETCT 234 250 249   PROTHROMBTM  --  14.7*  --    INR  --  1.18*  --        Imaging  X-Ray:  I have personally reviewed the images and compared with prior images.  CT:    Reviewed    Assessment/Plan  * Hypotension- (present on admission)  Assessment & Plan  -Multifactorial due to recent surgery/anesthesia, dehydration, narcotics for postop pain  -Status post greater than 5 L fluid resuscitation  -Midodrine dosing increased today    SHEBA (acute kidney injury) (HCC)  Assessment & Plan  -Multifactorial due to recent surgery, hypotension  -Avoid nephrotoxins  -Renally dose medications as indicated  -Follow CBC      Jailene's syndrome  Assessment & Plan  -Multifactorial due to recent surgical procedure, immobility, anesthesia and narcotics  -CT abdomen/pelvis air-filled distention of the cecum is seen measuring 8 cm  -fleets enema yesterday, no results  -bowel protocol  -consider neostigmine if no improvement  -GI consult today  -general surgery has consulted, no need for surgical intervention at this time  -NGT  -NPO    Gout- (present on admission)  Assessment & Plan  -history of  -home Allopurinol    Post-op pain  Assessment & Plan  -POD #3 Robotic-assisted laparoscopic left partial nephrectomy.  -Judicious use of IV morphine for pain  -Encourage mobility  -hold all narcotics for RR <12 and/or AMS    Elevated lactic acid level- (present on admission)  Assessment & Plan  -Resolved    Leukocytosis- (present on  admission)  Assessment & Plan  -Improving  -POD #3 -continue to encourage incentive spirometry  -On ABX  -Afebrile  -Follow lab    Hyponatremia- (present on admission)  Assessment & Plan  -due to hypovolemia  -improving  -follow BMP    Renal mass- (present on admission)  Assessment & Plan  -Left  -Status post nephrectomy 10/27  -Follow-up pathology       VTE:  Heparin  Ulcer: Not Indicated  Lines: Davenport Catheter  Ongoing indication addressed    I have performed a physical exam and reviewed and updated ROS and Plan today (10/30/2021). In review of yesterday's note (10/29/2021), there are no changes except as documented above.     Discussed patient condition and risk of morbidity and/or mortality with RN, Pharmacy, Charge nurse / hot rounds, Patient, general surgery, GI and urology and My attending, Dr. Gonda  The patient remains critically ill.  Critical care time = 55 minutes in directly providing and coordinating critical care and extensive data review.  No time overlap and excludes procedures.    Please note that this dictation was created using voice recognition software. I have made every reasonable attempt to correct obvious errors, but there may be errors of grammar and possibly content that I did not discover before finalizing the note.    NAPOLEON Reveles.

## 2021-10-30 NOTE — ASSESSMENT & PLAN NOTE
-POD #5 Robotic-assisted laparoscopic left partial nephrectomy.  -Judicious use of IV Dilaudid for pain  -Encourage mobility  -hold all narcotics for RR <12 and/or AMS

## 2021-10-30 NOTE — HOSPITAL COURSE
Ms. Munroe is a 66-year-old female with PMH significant for HTN, hep C, tobacco dependence, history of blood clots, hypothyroidism and renal mass who presented 10/27/2021 for resection of left adrenal mass with urology, Dr. Mccracken.  She underwent robotic assisted lap partial left nephrectomy 10/27/2021 without complications,  mL.  Postoperatively she had issues with poor pain control and hypotension.  Rapid response was called on 10/29 for hypotension and she received 5 L fluid resuscitation.  She also had increased abdominal distention.  CT abdomen pelvis identified 8 cm cecum dilatation.  General surgery was consulted and recommended n.p.o. with NG tube and fleets enema with no indications for surgery at this time.    10/29 - transfer to ICU for hypotension.   10/30 - 12cm cecum dilatation. Neostigmine. Hypotension improving. Has not required pressors.

## 2021-10-31 ENCOUNTER — APPOINTMENT (OUTPATIENT)
Dept: RADIOLOGY | Facility: MEDICAL CENTER | Age: 66
DRG: 657 | End: 2021-10-31
Attending: NURSE PRACTITIONER
Payer: COMMERCIAL

## 2021-10-31 PROBLEM — R79.89 ELEVATED LACTIC ACID LEVEL: Status: RESOLVED | Noted: 2021-10-28 | Resolved: 2021-10-31

## 2021-10-31 PROBLEM — E16.2 HYPOGLYCEMIA: Status: ACTIVE | Noted: 2021-10-31

## 2021-10-31 LAB
ANION GAP SERPL CALC-SCNC: 10 MMOL/L (ref 7–16)
BACTERIA UR CULT: NORMAL
BASOPHILS # BLD AUTO: 0.4 % (ref 0–1.8)
BASOPHILS # BLD: 0.06 K/UL (ref 0–0.12)
BUN SERPL-MCNC: 21 MG/DL (ref 8–22)
CALCIUM SERPL-MCNC: 8.3 MG/DL (ref 8.5–10.5)
CHLORIDE SERPL-SCNC: 105 MMOL/L (ref 96–112)
CO2 SERPL-SCNC: 19 MMOL/L (ref 20–33)
CREAT SERPL-MCNC: 1.96 MG/DL (ref 0.5–1.4)
EOSINOPHIL # BLD AUTO: 0.07 K/UL (ref 0–0.51)
EOSINOPHIL NFR BLD: 0.5 % (ref 0–6.9)
ERYTHROCYTE [DISTWIDTH] IN BLOOD BY AUTOMATED COUNT: 48.8 FL (ref 35.9–50)
GLUCOSE BLD-MCNC: 101 MG/DL (ref 65–99)
GLUCOSE SERPL-MCNC: 76 MG/DL (ref 65–99)
HCT VFR BLD AUTO: 37 % (ref 37–47)
HGB BLD-MCNC: 12.2 G/DL (ref 12–16)
IMM GRANULOCYTES # BLD AUTO: 0.09 K/UL (ref 0–0.11)
IMM GRANULOCYTES NFR BLD AUTO: 0.7 % (ref 0–0.9)
LYMPHOCYTES # BLD AUTO: 1.01 K/UL (ref 1–4.8)
LYMPHOCYTES NFR BLD: 7.5 % (ref 22–41)
MCH RBC QN AUTO: 31.2 PG (ref 27–33)
MCHC RBC AUTO-ENTMCNC: 33 G/DL (ref 33.6–35)
MCV RBC AUTO: 94.6 FL (ref 81.4–97.8)
MONOCYTES # BLD AUTO: 1.1 K/UL (ref 0–0.85)
MONOCYTES NFR BLD AUTO: 8.2 % (ref 0–13.4)
NEUTROPHILS # BLD AUTO: 11.05 K/UL (ref 2–7.15)
NEUTROPHILS NFR BLD: 82.7 % (ref 44–72)
NRBC # BLD AUTO: 0 K/UL
NRBC BLD-RTO: 0 /100 WBC
PLATELET # BLD AUTO: 253 K/UL (ref 164–446)
PMV BLD AUTO: 9.9 FL (ref 9–12.9)
POTASSIUM SERPL-SCNC: 4 MMOL/L (ref 3.6–5.5)
RBC # BLD AUTO: 3.91 M/UL (ref 4.2–5.4)
SIGNIFICANT IND 70042: NORMAL
SITE SITE: NORMAL
SODIUM SERPL-SCNC: 134 MMOL/L (ref 135–145)
SOURCE SOURCE: NORMAL
WBC # BLD AUTO: 13.4 K/UL (ref 4.8–10.8)

## 2021-10-31 PROCEDURE — 770022 HCHG ROOM/CARE - ICU (200)

## 2021-10-31 PROCEDURE — 82962 GLUCOSE BLOOD TEST: CPT

## 2021-10-31 PROCEDURE — 85025 COMPLETE CBC W/AUTO DIFF WBC: CPT

## 2021-10-31 PROCEDURE — 74019 RADEX ABDOMEN 2 VIEWS: CPT

## 2021-10-31 PROCEDURE — 700102 HCHG RX REV CODE 250 W/ 637 OVERRIDE(OP): Performed by: UROLOGY

## 2021-10-31 PROCEDURE — 700102 HCHG RX REV CODE 250 W/ 637 OVERRIDE(OP): Performed by: NURSE PRACTITIONER

## 2021-10-31 PROCEDURE — A9270 NON-COVERED ITEM OR SERVICE: HCPCS | Performed by: UROLOGY

## 2021-10-31 PROCEDURE — 700105 HCHG RX REV CODE 258: Performed by: NURSE PRACTITIONER

## 2021-10-31 PROCEDURE — 700111 HCHG RX REV CODE 636 W/ 250 OVERRIDE (IP): Performed by: UROLOGY

## 2021-10-31 PROCEDURE — 99291 CRITICAL CARE FIRST HOUR: CPT | Performed by: NURSE PRACTITIONER

## 2021-10-31 PROCEDURE — 700111 HCHG RX REV CODE 636 W/ 250 OVERRIDE (IP): Performed by: INTERNAL MEDICINE

## 2021-10-31 PROCEDURE — 700105 HCHG RX REV CODE 258: Performed by: INTERNAL MEDICINE

## 2021-10-31 PROCEDURE — 700101 HCHG RX REV CODE 250: Performed by: STUDENT IN AN ORGANIZED HEALTH CARE EDUCATION/TRAINING PROGRAM

## 2021-10-31 PROCEDURE — 700111 HCHG RX REV CODE 636 W/ 250 OVERRIDE (IP): Performed by: NURSE PRACTITIONER

## 2021-10-31 PROCEDURE — 99231 SBSQ HOSP IP/OBS SF/LOW 25: CPT | Performed by: SURGERY

## 2021-10-31 PROCEDURE — 80048 BASIC METABOLIC PNL TOTAL CA: CPT

## 2021-10-31 PROCEDURE — A9270 NON-COVERED ITEM OR SERVICE: HCPCS | Performed by: NURSE PRACTITIONER

## 2021-10-31 PROCEDURE — 700102 HCHG RX REV CODE 250 W/ 637 OVERRIDE(OP): Performed by: INTERNAL MEDICINE

## 2021-10-31 PROCEDURE — 700111 HCHG RX REV CODE 636 W/ 250 OVERRIDE (IP): Performed by: SURGERY

## 2021-10-31 PROCEDURE — A9270 NON-COVERED ITEM OR SERVICE: HCPCS | Performed by: INTERNAL MEDICINE

## 2021-10-31 RX ORDER — DEXTROSE MONOHYDRATE 25 G/50ML
50 INJECTION, SOLUTION INTRAVENOUS
Status: DISCONTINUED | OUTPATIENT
Start: 2021-10-31 | End: 2021-11-09 | Stop reason: HOSPADM

## 2021-10-31 RX ORDER — DEXTROSE, SODIUM CHLORIDE, SODIUM LACTATE, POTASSIUM CHLORIDE, AND CALCIUM CHLORIDE 5; .6; .31; .03; .02 G/100ML; G/100ML; G/100ML; G/100ML; G/100ML
INJECTION, SOLUTION INTRAVENOUS CONTINUOUS
Status: DISCONTINUED | OUTPATIENT
Start: 2021-10-31 | End: 2021-11-01

## 2021-10-31 RX ORDER — HYDROMORPHONE HYDROCHLORIDE 1 MG/ML
.5-1 INJECTION, SOLUTION INTRAMUSCULAR; INTRAVENOUS; SUBCUTANEOUS
Status: DISCONTINUED | OUTPATIENT
Start: 2021-10-31 | End: 2021-11-01

## 2021-10-31 RX ORDER — CALCIUM CARBONATE 500 MG/1
500 TABLET, CHEWABLE ORAL 3 TIMES DAILY PRN
Status: DISCONTINUED | OUTPATIENT
Start: 2021-10-31 | End: 2021-11-06

## 2021-10-31 RX ORDER — NEOSTIGMINE METHYLSULFATE 1 MG/ML
1 INJECTION, SOLUTION INTRAVENOUS ONCE
Status: COMPLETED | OUTPATIENT
Start: 2021-10-31 | End: 2021-10-31

## 2021-10-31 RX ADMIN — METHYLNALTREXONE BROMIDE 6 MG: 12 INJECTION, SOLUTION SUBCUTANEOUS at 21:59

## 2021-10-31 RX ADMIN — ALLOPURINOL 300 MG: 100 TABLET ORAL at 06:17

## 2021-10-31 RX ADMIN — DIPHENHYDRAMINE HYDROCHLORIDE 25 MG: 50 INJECTION INTRAMUSCULAR; INTRAVENOUS at 08:05

## 2021-10-31 RX ADMIN — PIPERACILLIN AND TAZOBACTAM 4.5 G: 4; .5 INJECTION, POWDER, LYOPHILIZED, FOR SOLUTION INTRAVENOUS; PARENTERAL at 20:55

## 2021-10-31 RX ADMIN — DOCUSATE SODIUM 100 MG: 50 LIQUID ORAL at 06:13

## 2021-10-31 RX ADMIN — LIDOCAINE 1 PATCH: 50 PATCH CUTANEOUS at 20:50

## 2021-10-31 RX ADMIN — HALOPERIDOL LACTATE 1 MG: 5 INJECTION, SOLUTION INTRAMUSCULAR at 10:03

## 2021-10-31 RX ADMIN — DOCUSATE SODIUM 100 MG: 50 LIQUID ORAL at 17:54

## 2021-10-31 RX ADMIN — CALCIUM CARBONATE 500 MG: 500 TABLET, CHEWABLE ORAL at 10:40

## 2021-10-31 RX ADMIN — NICOTINE TRANSDERMAL SYSTEM 21 MG: 21 PATCH, EXTENDED RELEASE TRANSDERMAL at 06:13

## 2021-10-31 RX ADMIN — MORPHINE SULFATE 4 MG: 4 INJECTION INTRAVENOUS at 08:06

## 2021-10-31 RX ADMIN — HEPARIN SODIUM 5000 UNITS: 5000 INJECTION, SOLUTION INTRAVENOUS; SUBCUTANEOUS at 06:14

## 2021-10-31 RX ADMIN — SODIUM CHLORIDE, SODIUM LACTATE, POTASSIUM CHLORIDE, CALCIUM CHLORIDE AND DEXTROSE MONOHYDRATE: 5; 600; 310; 30; 20 INJECTION, SOLUTION INTRAVENOUS at 10:07

## 2021-10-31 RX ADMIN — NEOSTIGMINE METHYLSULFATE 1 MG: 1 INJECTION INTRAVENOUS at 08:12

## 2021-10-31 RX ADMIN — PIPERACILLIN AND TAZOBACTAM 4.5 G: 4; .5 INJECTION, POWDER, LYOPHILIZED, FOR SOLUTION INTRAVENOUS; PARENTERAL at 06:13

## 2021-10-31 RX ADMIN — HYDROMORPHONE HYDROCHLORIDE 1 MG: 1 INJECTION, SOLUTION INTRAMUSCULAR; INTRAVENOUS; SUBCUTANEOUS at 15:35

## 2021-10-31 RX ADMIN — MORPHINE SULFATE 4 MG: 4 INJECTION INTRAVENOUS at 04:03

## 2021-10-31 RX ADMIN — ACETAMINOPHEN 1000 MG: 500 TABLET ORAL at 17:53

## 2021-10-31 RX ADMIN — HYDROMORPHONE HYDROCHLORIDE 1 MG: 1 INJECTION, SOLUTION INTRAMUSCULAR; INTRAVENOUS; SUBCUTANEOUS at 12:49

## 2021-10-31 RX ADMIN — ACETAMINOPHEN 1000 MG: 500 TABLET ORAL at 12:49

## 2021-10-31 RX ADMIN — GABAPENTIN 600 MG: 300 CAPSULE ORAL at 06:12

## 2021-10-31 RX ADMIN — HEPARIN SODIUM 5000 UNITS: 5000 INJECTION, SOLUTION INTRAVENOUS; SUBCUTANEOUS at 20:57

## 2021-10-31 RX ADMIN — ACETAMINOPHEN 1000 MG: 500 TABLET ORAL at 06:11

## 2021-10-31 RX ADMIN — MIDODRINE HYDROCHLORIDE 10 MG: 5 TABLET ORAL at 17:53

## 2021-10-31 RX ADMIN — LEVOTHYROXINE SODIUM 37.5 MCG: 0.03 TABLET ORAL at 06:11

## 2021-10-31 RX ADMIN — MIDODRINE HYDROCHLORIDE 10 MG: 5 TABLET ORAL at 12:10

## 2021-10-31 RX ADMIN — GABAPENTIN 600 MG: 300 CAPSULE ORAL at 17:53

## 2021-10-31 RX ADMIN — PIPERACILLIN AND TAZOBACTAM 4.5 G: 4; .5 INJECTION, POWDER, LYOPHILIZED, FOR SOLUTION INTRAVENOUS; PARENTERAL at 12:49

## 2021-10-31 RX ADMIN — HYDROMORPHONE HYDROCHLORIDE 1 MG: 1 INJECTION, SOLUTION INTRAMUSCULAR; INTRAVENOUS; SUBCUTANEOUS at 10:40

## 2021-10-31 RX ADMIN — HEPARIN SODIUM 5000 UNITS: 5000 INJECTION, SOLUTION INTRAVENOUS; SUBCUTANEOUS at 14:35

## 2021-10-31 RX ADMIN — MIDODRINE HYDROCHLORIDE 10 MG: 5 TABLET ORAL at 08:05

## 2021-10-31 ASSESSMENT — ENCOUNTER SYMPTOMS
WEAKNESS: 1
WEIGHT LOSS: 1
HEADACHES: 0
NAUSEA: 1
COUGH: 1
NAUSEA: 0
DIZZINESS: 0
FEVER: 0
BLOOD IN STOOL: 0
CONSTIPATION: 1
PALPITATIONS: 0
DOUBLE VISION: 0
NECK PAIN: 0
VOMITING: 0
CHILLS: 0
WHEEZING: 0
COUGH: 0
FLANK PAIN: 0
MEMORY LOSS: 0
EYE PAIN: 0
MYALGIAS: 0
INSOMNIA: 0
HALLUCINATIONS: 0
SHORTNESS OF BREATH: 1
SHORTNESS OF BREATH: 0
POLYDIPSIA: 0
EYE DISCHARGE: 0
FLANK PAIN: 1
ABDOMINAL PAIN: 1
LOSS OF CONSCIOUSNESS: 0
BACK PAIN: 1
BLURRED VISION: 0

## 2021-10-31 ASSESSMENT — PAIN DESCRIPTION - PAIN TYPE
TYPE: ACUTE PAIN;SURGICAL PAIN
TYPE: ACUTE PAIN

## 2021-10-31 ASSESSMENT — LIFESTYLE VARIABLES: SUBSTANCE_ABUSE: 0

## 2021-10-31 NOTE — PROGRESS NOTES
"Urology Progress Note    Left renal mass s/p robotic assisted laparoscopic partial left nephrectomy 10/27/21    S: Pt seen and examined in bed on rounds, somnolent. Tachycardic but otherwise VSS, afebrile. JARRETT output 200 mL in last 24h, serosanguinous in bulb. Urine clear and bright orange colored in tubing 2/2 pyridium administration yesterday, urine output decreased to 640mL in 24h period. Cr uptrending to 1.96 (1.72)(1.55). Pt reports abdominal pain/tenderness to palpation. WBC down to 13.4 (14.5). Gen surg consulted for colonic pseudo-obstruction, NGT placed and 850cc out. GI consult pending.    O:   /80   Pulse (!) 115   Temp 36 °C (96.8 °F) (Temporal)   Resp 20   Ht 1.651 m (5' 5\")   Wt 88.2 kg (194 lb 7.1 oz)   SpO2 92%   Recent Labs     10/29/21  1046 10/30/21  0424 10/31/21  0341   SODIUM 126* 130* 134*   POTASSIUM 4.1 4.2 4.0   CHLORIDE 100 105 105   CO2 16* 16* 19*   GLUCOSE 113* 112* 76   BUN 18 18 21   CREATININE 1.55* 1.72* 1.96*   CALCIUM 7.2* 7.5* 8.3*     Recent Labs     10/29/21  1046 10/30/21  0424 10/31/21  0341   WBC 14.9* 14.5* 13.4*   RBC 3.99* 4.01* 3.91*   HEMOGLOBIN 12.4 12.5 12.2   HEMATOCRIT 36.9* 38.2 37.0   MCV 92.5 95.3 94.6   MCH 31.1 31.2 31.2   MCHC 33.6 32.7* 33.0*   RDW 47.2 49.1 48.8   PLATELETCT 250 249 253   MPV 9.4 9.2 9.9         Intake/Output Summary (Last 24 hours) at 10/29/2021 1616  Last data filed at 10/29/2021 1400  Gross per 24 hour   Intake 8279.42 ml   Output 780 ml   Net 7499.42 ml       Exam:  Gen: NAD   Abd: Distended.  Some abdominal tenderness. JARRETT with serosanguinous output  Incisions clean, dry, intact. Area of erythema extending over anterior surface of abdomen and around to L hip, tender to palpation, has not increased in size from yesterday. No purulent discharge from incisions.   Urine: [place] with clear orange output    A/P:    Active Hospital Problems    Diagnosis    • Post-op pain [G89.18]    • Gout [M10.9]    • SHEBA (acute kidney injury) (HCC) " [N17.9]    • [place]'s syndrome [K59.81]    • Hypotension [I95.9]    • Renal mass [N28.89]    • Hyponatremia [E87.1]    • Leukocytosis [D72.829]    • Elevated lactic acid level [R79.89]      Left renal mass s/p robotic assisted laparoscopic partial left nephrectomy 10/27/21. Transferred to ICU due to hypotension refractory to fluid bolus. Antibiotics changed to Zosyn.     Plan:  -Continue cares per ICU team  - monitor output via patel; patel to remain until deemed no longer necessary for medical management. Of note, urine output has been low, will need to continue to monitor rising Cr and checking serial labs  - monitor pain control  - Incentive spirometry  - monitor H/H  -Continue zosyn  - appreciate hospitalist giana     Case reviewed with Dr. Feldman

## 2021-10-31 NOTE — PROGRESS NOTES
"  DATE: 10/31/2021    POD 4: Partial left nephrectomy    Interval Events:  No hemodynamic issues overnight  More tired this morning  Two 1mg doses of neostigmine given yesterday and this morning without significant effect, tolerated from a hemodynamic perspective.    PHYSICAL EXAMINATION:  Constitutional:     Vital Signs: /80   Pulse (!) 115   Temp 36 °C (96.8 °F) (Temporal)   Resp 20   Ht 1.651 m (5' 5\")   Wt 88.2 kg (194 lb 7.1 oz)   SpO2 92%   GENERAL:  No acute distress  HEENT: Pupils equal, round and reactive to light bilaterally.  Sclera are clear bilaterally.  No otorrhea.  No rhinorrhea.  Mucus membranes are moist.  CHEST:  Lungs are clear to auscultation bilaterally  CARDIOVASCULAR:  Regular rate and rhythm  ABDOMEN: Markedly distended and tympanitic, drain with serosanguinous output, appropriately tender.  GENITOURINARY:  No patel in place, voiding without issues  EXTREMITIES:  Good range of motion through out  NEUROLOGIC:  Alert and oriented.  Cranial Nerves II through XII are grossly intact, no focal deficits appreciated  PSYCHIATRIC:  Normal affect and mood appropriate for age and condition  SKIN:  Warm and well perfused, no rashes    Laboratory Values:   Recent Labs     10/29/21  1046 10/30/21  0424 10/31/21  0341   WBC 14.9* 14.5* 13.4*   RBC 3.99* 4.01* 3.91*   HEMOGLOBIN 12.4 12.5 12.2   HEMATOCRIT 36.9* 38.2 37.0   MCV 92.5 95.3 94.6   MCH 31.1 31.2 31.2   MCHC 33.6 32.7* 33.0*   RDW 47.2 49.1 48.8   PLATELETCT 250 249 253   MPV 9.4 9.2 9.9     Recent Labs     10/29/21  1046 10/30/21  0424 10/31/21  0341   SODIUM 126* 130* 134*   POTASSIUM 4.1 4.2 4.0   CHLORIDE 100 105 105   CO2 16* 16* 19*   GLUCOSE 113* 112* 76   BUN 18 18 21   CREATININE 1.55* 1.72* 1.96*   CALCIUM 7.2* 7.5* 8.3*     Recent Labs     10/28/21  2019 10/29/21  1046   ASTSGOT 33 35   ALTSGPT 17 13   TBILIRUBIN 0.2 0.2   ALKPHOSPHAT 79 73   GLOBULIN 3.7* 3.4   INR  --  1.18*     Recent Labs     10/29/21  1046   INR 1.18* "        Imaging:   [id number] VIEWS   Final Result      1.  There is a similar appearance to the mildly distended air-filled colon with relatively decompressed sigmoid and rectum.   2.  There is no free intraperitoneal air.      [id number] VIEWS   Final Result         1.  Air-filled distention of the cecum measuring 12.3 cm, appearance concerning for Armstrong's.  Similar to prior study.      These findings were discussed with the patient's clinician, Angeles Wolf, on 10/30/2021 4:19 AM.      [id number] VIEWS   Final Result      1.  Mild colonic dilation      2.  Enteric catheter appears appropriately located      3.  Mild right basilar atelectasis and/or pleural effusion      DX-ABDOMEN FOR TUBE PLACEMENT   Final Result      Enteric tube has been placed and the tip projects over the stomach.      CT-ABDOMEN-PELVIS W/O   Final Result         1.  Air-filled distention of the cecum, follow-up recommended to exclude progression to Jailene's   2.  Fluid and air-filled distended loops of small bowel in the left abdomen, consider component of ileus.   3.  Intra-abdominal foci of free air, a nonspecific finding for recent postop changes with surgical drain in place   4.  Scattered mild abdominal ascites and hazy mesenteric inflammatory changes.   5.  Small right and trace left pleural effusion.   6.  Linear densities in the bilateral lung bases favors changes of atelectasis.   7.  Left nephrolithiasis without visualized obstructive changes.   8.  Hepatomegaly and changes of cirrhosis   9.  Diverticulosis   10.  Atherosclerosis      [id number] VIEW   Final Result         1.  Air-filled distended bowel loops, appearance compatible with evolving obstruction versus ileus. Recommend radiographic follow-up to resolution.      DX-CHEST-PORTABLE (1 VIEW)   Final Result         1.  No acute cardiopulmonary disease.   2.  Cardiomegaly          ASSESSMENT AND PLAN:   1) Colonic Pseudo-obstruction:    No clinical evidence of  intestinal compromise at this time.  WBC down a bit.  Urine output maintained.  There does not appear to be any significant obstruction/mass/stricture based on the CT from yesterday.  No indication for surgery at this time.    -Consider 2mg dose of neostigmine  -Consider GI consultation for endoscopic decompression and decompressive tube placement.    -Mobilize frequently as able  -Minimize narcotics  -TPN/PICC POD7 if symptoms persist    I independently reviewed pertinent clinical lab tests from the last 48 hours and ordered additional follow up clinical lab tests.  I independently reviewed pertinent radiographic images and the radiologist's reports from the last 48 hours and ordered additional follow up radiographic studies.  The details of the available patient records in [facility] (including laboratory tests, culture data, medications, imaging, and other pertinent diagnostic tests) and that information was utilized as warranted in today's medical decision making for this patient.  I personally evaluated the patient condition at bedside.    Aggregated care time spent evaluating, reassessing, reviewing documentation, providing care, and managing this patient exclusive of procedures: 35 minutes     ____________________________________     Charles Kate M.D.

## 2021-10-31 NOTE — PROGRESS NOTES
"Critical Care Progress Note    Date of admission  10/27/2021    Chief Complaint  Abdominal pain    Hospital Course  Ms. Munroe is a 66-year-old female with PMH significant for HTN, hep C, tobacco dependence, history of blood clots, hypothyroidism and renal mass who presented 10/27/2021 for resection of left adrenal mass with urology, Dr. Mccracken.  She underwent robotic assisted lap partial left nephrectomy 10/27/2021 without complications,  mL.  Postoperatively she had issues with poor pain control and hypotension.  Rapid response was called on 10/29 for hypotension and she received 5 L fluid resuscitation.  She also had increased abdominal distention.  CT abdomen pelvis identified 8 cm cecum dilatation.  General surgery was consulted and recommended n.p.o. with NG tube and fleets enema with no indications for surgery at this time.    10/29 - transfer to ICU for hypotension.   10/30 - 12cm cecum dilatation. Neostigmine. Hypotension improving. Has not required pressors.       Interval Problem Update  Worsening SHEBA today.  Neostigmine x 2 - smear. Repeat Abdominal xray pending  C/o pain \"everywhere\"  NGT - small output  SR/ST   120's SBP's - MAP > 65 goal  Afebrile  4L NC   dark concentrated  PIV's    JARRETT 100 out  Heparin  Mobility 1    Review of Systems  Review of Systems   Constitutional: Positive for malaise/fatigue. Negative for chills and fever.   HENT: Negative.    Eyes: Negative for blurred vision and double vision.   Respiratory: Positive for cough. Negative for shortness of breath and wheezing.    Cardiovascular: Negative for chest pain and palpitations.   Gastrointestinal: Positive for abdominal pain and constipation. Negative for nausea and vomiting.   Genitourinary: Negative for flank pain and hematuria.   Musculoskeletal: Positive for back pain and joint pain.   Neurological: Negative for dizziness and headaches.   Psychiatric/Behavioral: Negative for hallucinations and substance abuse.   All " other systems reviewed and are negative.       Vital Signs for last 24 hours   Temp:  [35.7 °C (96.3 °F)-36.2 °C (97.1 °F)] 36 °C (96.8 °F)  Pulse:  [] 115  Resp:  [8-29] 20  BP: ()/(56-81) 116/80  SpO2:  [92 %-97 %] 92 %    Hemodynamic parameters for last 24 hours       Respiratory Information for the last 24 hours       Physical Exam   Physical Exam  Vitals and nursing note reviewed.   Constitutional:       General: She is sleeping. She is not in acute distress.     Appearance: She is obese. She is ill-appearing. She is not toxic-appearing.      Interventions: Nasal cannula in place.   HENT:      Head: Normocephalic.      Right Ear: Hearing normal.      Left Ear: Hearing normal.      Nose: Nose normal.      Mouth/Throat:      Lips: Pink.      Mouth: Mucous membranes are moist.   Eyes:      Pupils: Pupils are equal, round, and reactive to light.   Cardiovascular:      Rate and Rhythm: Tachycardia present.      Pulses: Decreased pulses.           Dorsalis pedis pulses are 1+ on the right side and 1+ on the left side.      Heart sounds: Heart sounds are distant.   Pulmonary:      Effort: Pulmonary effort is normal. No respiratory distress.      Breath sounds: Decreased breath sounds present. No wheezing.      Comments: Upper airway congestion  Weak cough    Abdominal:      General: Bowel sounds are decreased. There is distension.      Tenderness: There is generalized abdominal tenderness.      Comments: Passing flatus   Musculoskeletal:      Comments: SERRANO 4-5/5   Skin:     General: Skin is warm and dry.   Neurological:      Mental Status: She is oriented to person, place, and time and easily aroused.      Sensory: Sensation is intact.      Motor: Motor function is intact.   Psychiatric:         Mood and Affect: Mood normal.         Behavior: Behavior is cooperative.         Cognition and Memory: Cognition normal.         Judgment: Judgment normal.         Medications  Current Facility-Administered  Medications   Medication Dose Route Frequency Provider Last Rate Last Admin   • calcium carbonate (TUMS) chewable tab 500 mg  500 mg Oral TID PRN Abi Kang, A.P.R.N.   500 mg at 10/31/21 1040   • D5LR infusion   Intravenous Continuous Abi Kang, A.P.R.N. 50 mL/hr at 10/31/21 1007 New Bag at 10/31/21 1007   • HYDROmorphone (Dilaudid) injection 0.5-1 mg  0.5-1 mg Intravenous Q2HRS PRN Abi Kang, A.P.R.N.   1 mg at 10/31/21 1249   • glucose 4 g chewable tablet 16 g  16 g Oral Q15 MIN PRN Abi Kang, A.P.R.N.        And   • dextrose 50% (D50W) injection 50 mL  50 mL Intravenous Q15 MIN PRN Abi Kang, A.P.R.N.       • midodrine (PROAMATINE) tablet 10 mg  10 mg Oral TID WITH MEALS Abi Kang, A.P.R.N.   10 mg at 10/31/21 0805   • heparin injection 5,000 Units  5,000 Units Subcutaneous Q8HRS Abi Kang, A.P.R.N.   5,000 Units at 10/31/21 0614   • atropine injection 0.5 mg  0.5 mg Intravenous Q4HRS PRN Abi Kang, A.P.R.N.       • docusate sodium (Colace) oral solution 100 mg  100 mg Oral BID Jeremy M Gonda, M.D.   100 mg at 10/31/21 0613   • mag hydrox-al hydrox-simeth (MAALOX PLUS ES or MYLANTA DS) suspension 10 mL  10 mL Oral 4X/DAY PRN Nathan Castro M.D.   10 mL at 10/29/21 0147   • piperacillin-tazobactam (ZOSYN) 4.5 g in  mL IVPB  4.5 g Intravenous Q8HRS John Ireland M.D. 25 mL/hr at 10/31/21 1249 4.5 g at 10/31/21 1249   • lidocaine (LIDODERM) 5 % 1 Patch  1 Patch Transdermal Q24HR Nathan Castro M.D.   1 Patch at 10/30/21 2114   • guaiFENesin ER (MUCINEX) tablet 600 mg  600 mg Oral Q12HRS PRN Nathan Castro M.D.   600 mg at 10/28/21 2049   • lidocaine (LIDODERM) 5 % 1 Patch  1 Patch Transdermal Q24HR Nathan Castro M.D.   1 Patch at 10/30/21 2113   • allopurinol (ZYLOPRIM) tablet 300 mg  300 mg Oral DAILY Peter Mccracken M.D.   300 mg at 10/31/21 0617   • nicotine (NICODERM) 21 MG/24HR 21 mg  1 Patch Transdermal Q24HRS Peter Mccracken M.D.   21 mg at 10/31/21 0613   • gabapentin  (NEURONTIN) capsule 600 mg  600 mg Oral BID Peter Mccracken M.D.   600 mg at 10/31/21 0612   • levothyroxine (SYNTHROID) tablet 37.5 mcg  37.5 mcg Oral AM ES Peter Mccracken M.D.   37.5 mcg at 10/31/21 0611   • Pharmacy Consult Request ...Pain Management Review 1 Each  1 Each Other PHARMACY TO DOSE Peter Mccracken M.D.       • ondansetron (ZOFRAN) syringe/vial injection 4 mg  4 mg Intravenous Q4HRS PRN Peter Mccracken M.D.   4 mg at 10/29/21 1146   • diphenhydrAMINE (BENADRYL) injection 25 mg  25 mg Intravenous Q6HRS PRN Peter Mccracken M.D.   25 mg at 10/31/21 0805   • hydrOXYzine HCl (ATARAX) tablet 25 mg  25 mg Oral HS PRN Peter Mccracken M.D.       • simethicone (MYLICON) chewable tab 80 mg  80 mg Oral Q8HRS PRN Peter Mccracken M.D.       • senna-docusate (PERICOLACE or SENOKOT S) 8.6-50 MG per tablet 2 Tablet  2 Tablet Oral QPM PRN Peter Mccracken M.D.       • acetaminophen (TYLENOL) tablet 1,000 mg  1,000 mg Oral Q6HRS Peter Mccracken M.D.   1,000 mg at 10/31/21 1249    Followed by   • [START ON 11/2/2021] acetaminophen (TYLENOL) tablet 1,000 mg  1,000 mg Oral Q6HRS PRNILAM Mccracken M.D.           Fluids    Intake/Output Summary (Last 24 hours) at 10/31/2021 1312  Last data filed at 10/31/2021 0600  Gross per 24 hour   Intake 849.95 ml   Output 1455 ml   Net -605.05 ml       Laboratory  Recent Labs     10/29/21  1046   [id number] 7.23*   [id number] 36.8   IQFBZ087Y 71.3   UJNH0LOA 93.2   ARTHCO3 15*   ARTBE -12*         Recent Labs     10/29/21  1046 10/30/21  0424 10/31/21  0341   SODIUM 126* 130* 134*   POTASSIUM 4.1 4.2 4.0   CHLORIDE 100 105 105   CO2 16* 16* 19*   BUN 18 18 21   CREATININE 1.55* 1.72* 1.96*   MAGNESIUM 2.1 2.1  --    CALCIUM 7.2* 7.5* 8.3*     Recent Labs     10/28/21  2019 10/29/21  0123 10/29/21  1046 10/30/21  0424 10/31/21  0341   ALTSGPT 17  --  13  --   --    ASTSGOT 33  --  35  --   --    ALKPHOSPHAT 79  --  73  --   --    TBILIRUBIN 0.2  --  0.2  --   --    GLUCOSE 102*   < >  113* 112* 76    < > = values in this interval not displayed.     Recent Labs     10/28/21  2019 10/29/21  0123 10/29/21  1046 10/30/21  0424 10/31/21  0341   WBC 16.6*   < > 14.9* 14.5* 13.4*   NEUTSPOLYS 75.10*   < > 76.80* 80.00* 82.70*   LYMPHOCYTES 14.10*   < > 13.70* 9.60* 7.50*   MONOCYTES 10.00   < > 8.40 9.40 8.20   EOSINOPHILS 0.10   < > 0.30 0.20 0.50   BASOPHILS 0.20   < > 0.30 0.30 0.40   ASTSGOT 33  --  35  --   --    ALTSGPT 17  --  13  --   --    ALKPHOSPHAT 79  --  73  --   --    TBILIRUBIN 0.2  --  0.2  --   --     < > = values in this interval not displayed.     Recent Labs     10/29/21  1046 10/30/21  0424 10/31/21  0341   RBC 3.99* 4.01* 3.91*   HEMOGLOBIN 12.4 12.5 12.2   HEMATOCRIT 36.9* 38.2 37.0   PLATELETCT 250 249 253   PROTHROMBTM 14.7*  --   --    INR 1.18*  --   --        Imaging  X-Ray:  I have personally reviewed the images and compared with prior images.    Assessment/Plan  * Hypotension- (present on admission)  Assessment & Plan  -Multifactorial due to recent surgery/anesthesia, dehydration, narcotics for postop pain  -Status post greater than 5 L fluid resuscitation  -Midodrine   10/31 -resolved    SHEBA (acute kidney injury) (HCC)  Assessment & Plan  -Multifactorial due to recent surgery, hypotension  -Avoid nephrotoxins  -Renally dose medications as indicated  -Follow CBC      Jailene's syndrome  Assessment & Plan  -Multifactorial due to recent surgical procedure, immobility, anesthesia and narcotics  -CT abdomen/pelvis air-filled distention of the cecum is seen measuring 8 cm  -fleets enema, no results  -bowel protocol  -s/p neostigmine x 2 - no results. Discussed with GI who will consult today  -general surgery has consulted, no need for surgical intervention at this time  -NGT  -NPO    Hypoglycemia  Assessment & Plan  -due to NPO status due to ileus  -starting D5LR  -follow lab  -hypoglycemia protocol    Gout- (present on admission)  Assessment & Plan  -history of  -home  Allopurinol    Post-op pain  Assessment & Plan  -POD #4 Robotic-assisted laparoscopic left partial nephrectomy.  -Judicious use of IV Dilaudid for pain  -Encourage mobility  -hold all narcotics for RR <12 and/or AMS    Leukocytosis- (present on admission)  Assessment & Plan  -Improving  -POD #4 -continue to encourage incentive spirometry  -On ABX  -Afebrile  -Follow lab    Hyponatremia- (present on admission)  Assessment & Plan  -due to hypovolemia  -improving  -follow BMP    Renal mass- (present on admission)  Assessment & Plan  -Left  -Status post nephrectomy 10/27  -Pathology: Clear cell renal cell carcinoma, ISUP grade 1  -will need Hem/Onc consult once ileus resolved       VTE:  Heparin  Ulcer: Not Indicated  Lines: [place] Catheter  Ongoing indication addressed    I have performed a physical exam and reviewed and updated ROS and Plan today (10/31/2021). In review of yesterday's note (10/30/2021), there are no changes except as documented above.     Discussed patient condition and risk of morbidity and/or mortality with RN, Pharmacy, Charge nurse / hot rounds, Patient, general surgery, GI and urology and my attending, Dr. Winn  The patient remains critically ill.  Critical care time = 49 minutes in directly providing and coordinating critical care and extensive data review.  No time overlap and excludes procedures.    Please note that this dictation was created using voice recognition software. I have made every reasonable attempt to correct obvious errors, but there may be errors of grammar and possibly content that I did not discover before finalizing the note.    NAPOLEON Reveles.

## 2021-10-31 NOTE — CONSULTS
Gastroenterology Initial Consult Note               Author:  Jose Antonio Magana M.D. with CHASITY Mendez Date & Time Created: 10/31/2021 4:45 PM       Patient ID:  Name:             Thais Munroe  YOB: 1955  Age:                 66 y.o.  female  MRN:               [id number]      Referring Provider:  RUBEN Tucker      Presenting Chief Complaint:  Abdominal distension      History of Present Illness:    She is a 66-year-old female patient who was seen in consultation for abdominal distention.  The patient is postop day 4 status post robotic assisted laparoscopic partial left nephrectomy on 10/27/2021 for left renal mass.  Her postoperative period has been complicated by pain control issues requiring significant narcotics, lack of mobilization, and increasing abdominal distention.  She was initially seen by Dr. Kate with surgery on 10/29/2021 with findings of increased abdominal distention, and noncontrast CT which showed significant distention of the ascending and transverse colon with wall thickening, inflammation, edema, and possible pneumatosis.  She was hypotensive at the time.  NG tube was placed at that time with minimal output.  At that time she was suspected to have ileus versus colonic pseudoobstruction.  She has had persistent pain, distention, and radiographic imaging suggesting pseudoobstruction.  She has now received 2 doses of neostigmine in the ICU without improvement.    Currently the patient has diffuse abdominal pain, NG tube to suction with dark brown appearing contents      Review of Systems:  Review of Systems   Constitutional: Positive for weight loss. Negative for fever.   Eyes: Negative for pain and discharge.   Respiratory: Positive for shortness of breath. Negative for cough and wheezing.    Cardiovascular: Negative for chest pain and palpitations.   Gastrointestinal: Positive for abdominal pain and nausea. Negative for blood in stool, melena and  vomiting.   Genitourinary: Positive for dysuria and flank pain.   Musculoskeletal: Negative for myalgias and neck pain.   Skin: Negative for itching and rash.   Neurological: Positive for weakness. Negative for loss of consciousness.   Endo/Heme/Allergies: Negative for environmental allergies and polydipsia.   Psychiatric/Behavioral: Negative for memory loss. The patient does not have insomnia.              Past Medical History:  Past Medical History:   Diagnosis Date   • Blood clotting disorder (HCC)     leg    • Cancer (HCC)     basil cell   • Cancer (HCC) 10/13/2021    renal   • Coughing blood 10/13/2021    pt states dry clear cough   • Dental disorder 10/13/2021    upper lower dentures   • Disorder of thyroid    • Hepatitis C    • Hypertension 10/13/2021    pt states well controlled on meds   • Pain 10/13/2021    spine   • Urinary incontinence      Active Hospital Problems    Diagnosis    • Hypoglycemia [E16.2]    • Post-op pain [G89.18]    • Gout [M10.9]    • SHEBA (acute kidney injury) (HCC) [N17.9]    • 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]    • Hypotension [I95.9]    • Renal mass [N28.89]    • Hyponatremia [E87.1]    • Leukocytosis [D72.829]          Past Surgical History:  Past Surgical History:   Procedure Laterality Date   • PB LAP,PARTIAL NEPHRECTOMY Left 10/27/2021    Procedure: NEPHRECTOMY, PARTIAL, ROBOT-ASSISTED, USING DA NAT XI;  Surgeon: Peter Mccracken M.D.;  Location: SURGERY [facility];  Service: Uro Robotic   • PB ULTRASONIC GUIDANCE, INTRAOPERATIVE Left 10/27/2021    Procedure: ULTRASOUND GUIDANCE;  Surgeon: Peter Mccracken M.D.;  Location: SURGERY [facility];  Service: Uro Robotic   • OTHER  1960    tonsils   • OTHER      basal cell removal back         Hospital Medications:  Current Facility-Administered Medications   Medication Dose Frequency Provider Last Rate Last Admin   • calcium carbonate (TUMS) chewable tab 500 mg  500 mg TID PRN Abi Kang AJoannP.R.N.   500 mg at 10/31/21 1040   • D5LR  infusion   Continuous Abi De Leoner, A.P.R.N. 50 mL/hr at 10/31/21 1007 New Bag at 10/31/21 1007   • HYDROmorphone (Dilaudid) injection 0.5-1 mg  0.5-1 mg Q2HRS PRN Abi Kang, A.P.R.N.   1 mg at 10/31/21 1535   • glucose 4 g chewable tablet 16 g  16 g Q15 MIN PRN Abi Kang, A.P.R.N.        And   • dextrose 50% (D50W) injection 50 mL  50 mL Q15 MIN PRN Abi Kang, A.P.R.N.       • midodrine (PROAMATINE) tablet 10 mg  10 mg TID WITH MEALS Abi Kang, A.P.R.N.   10 mg at 10/31/21 0805   • heparin injection 5,000 Units  5,000 Units Q8HRS Abi Kang, A.P.R.N.   5,000 Units at 10/31/21 1435   • atropine injection 0.5 mg  0.5 mg Q4HRS PRN Abi Kang, A.P.R.N.       • docusate sodium (Colace) oral solution 100 mg  100 mg BID Jeremy M Gonda, M.D.   100 mg at 10/31/21 0613   • mag hydrox-al hydrox-simeth (MAALOX PLUS ES or MYLANTA DS) suspension 10 mL  10 mL 4X/DAY PRN Nathan Castro M.D.   10 mL at 10/29/21 0147   • piperacillin-tazobactam (ZOSYN) 4.5 g in  mL IVPB  4.5 g Q8HRS John Ireland M.D. 25 mL/hr at 10/31/21 1249 4.5 g at 10/31/21 1249   • lidocaine (LIDODERM) 5 % 1 Patch  1 Patch Q24HR Nathan Castro M.D.   1 Patch at 10/30/21 2114   • guaiFENesin ER (MUCINEX) tablet 600 mg  600 mg Q12HRS PRN ANDRIA GibbsDJoann   600 mg at 10/28/21 2049   • lidocaine (LIDODERM) 5 % 1 Patch  1 Patch Q24HR Nathan Castro M.D.   1 Patch at 10/30/21 2113   • allopurinol (ZYLOPRIM) tablet 300 mg  300 mg DAILY Peter Mccracken M.D.   300 mg at 10/31/21 0617   • nicotine (NICODERM) 21 MG/24HR 21 mg  1 Patch Q24HRS Peter Mccracken M.D.   21 mg at 10/31/21 0613   • gabapentin (NEURONTIN) capsule 600 mg  600 mg BID Peter Mccracken M.D.   600 mg at 10/31/21 0612   • levothyroxine (SYNTHROID) tablet 37.5 mcg  37.5 mcg AM ES Peter Mccracken M.D.   37.5 mcg at 10/31/21 0611   • Pharmacy Consult Request ...Pain Management Review 1 Each  1 Each PHARMACY TO DOSE Peter Mccracken M.D.       • ondansetron (ZOFRAN) syringe/vial  injection 4 mg  4 mg Q4HRS PRN Peter Mccracken M.D.   4 mg at 10/29/21 1146   • diphenhydrAMINE (BENADRYL) injection 25 mg  25 mg Q6HRS PRN Peter Mccracken M.D.   25 mg at 10/31/21 0805   • hydrOXYzine HCl (ATARAX) tablet 25 mg  25 mg HS PRN Peter Mccracken M.D.       • simethicone (MYLICON) chewable tab 80 mg  80 mg Q8HRS PRN Peter Mccracken M.D.       • senna-docusate (PERICOLACE or SENOKOT S) 8.6-50 MG per tablet 2 Tablet  2 Tablet QPM PRN Peter Mccracken M.D.       • acetaminophen (TYLENOL) tablet 1,000 mg  1,000 mg Q6HRS Peter Mccracken M.D.   1,000 mg at 10/31/21 1249    Followed by   • [START ON 11/2/2021] acetaminophen (TYLENOL) tablet 1,000 mg  1,000 mg Q6HRS PRNILAM Mccracken M.D.       Last reviewed on 10/27/2021  3:23 PM by Mercedes Grajeda R.N.        Current Outpatient Medications:  Medications Prior to Admission   Medication Sig Dispense Refill Last Dose   • valsartan (DIOVAN) 320 MG tablet Take 320 mg by mouth at bedtime.   10/27/2021 at 0730   • amLODIPine (NORVASC) 10 MG Tab Take 10 mg by mouth every evening.   10/25/2021 at pm   • acetaminophen (TYLENOL) 500 MG Tab Take 1,000 mg by mouth one time. Indications: Pain   10/26/2021 at 1300   • levothyroxine (SYNTHROID) 25 MCG Tab Take 37.5 mcg by mouth every morning on an empty stomach.   10/27/2021 at 0630   • amoxicillin-clavulanate (AUGMENTIN) 875-125 MG Tab Take 1 Tablet by mouth 2 times a day.   10/26/2021 at finished   • [facility] NICOTINE 21 MG/24HR PATCH 24 HR Place 1 Patch on the skin every 24 hours.   10/27/2021 at on 1000   • allopurinol (ZYLOPRIM) 300 MG Tab allopurinol 300 mg tablet   TAKE 1 TABLET BY MOUTH EVERY DAY   10/26/2021 at 2200   • gabapentin (NEURONTIN) 300 MG Cap Take 600 mg by mouth 2 times a day.   10/26/2021 at 2200         Medication Allergies:  No Known Allergies      Family Medical History:  History reviewed. No pertinent family history.      Social History:  Social History     Socioeconomic History   • Marital status: Single  "    Spouse name: Not on file   • Number of children: Not on file   • Years of education: Not on file   • Highest education level: Not on file   Occupational History   • Not on file   Tobacco Use   • Smoking status: Current Some Day Smoker     Packs/day: 1.50     Years: 50.00     Pack years: 75.00     Types: Cigarettes   • Smokeless tobacco: Never Used   Vaping Use   • Vaping Use: Never used   Substance and Sexual Activity   • Alcohol use: Yes     Comment: daily   • Drug use: Not Currently   • Sexual activity: Not on file   Other Topics Concern   • Not on file   Social History Narrative   • Not on file     Social Determinants of Health     Financial Resource Strain:    • Difficulty of Paying Living Expenses:    Food Insecurity:    • Worried About Running Out of Food in the Last Year:    • Ran Out of Food in the Last Year:    Transportation Needs:    • Lack of Transportation (Medical):    • Lack of Transportation (Non-Medical):    Physical Activity:    • Days of Exercise per Week:    • Minutes of Exercise per Session:    Stress:    • Feeling of Stress :    Social Connections:    • Frequency of Communication with Friends and Family:    • Frequency of Social Gatherings with Friends and Family:    • Attends Muslim Services:    • Active Member of Clubs or Organizations:    • Attends Club or Organization Meetings:    • Marital Status:    Intimate Partner Violence:    • Fear of Current or Ex-Partner:    • Emotionally Abused:    • Physically Abused:    • Sexually Abused:          Vital signs:  Weight/BMI: Body mass index is 32.36 kg/m².  /70   Pulse 97   Temp 36.8 °C (98.2 °F)   Resp (!) 10   Ht 1.651 m (5' 5\")   Wt 88.2 kg (194 lb 7.1 oz)   SpO2 95%   Vitals:    10/31/21 1300 10/31/21 1400 10/31/21 1500 10/31/21 1600   BP: 122/76 122/70  122/70   Pulse: 94 91 92 97   Resp: (!) 8 (!) 9 (!) 8 (!) 10   Temp:  36.8 °C (98.2 °F)     TempSrc:       SpO2: 95% 96% 96% 95%   Weight:       Height:         Oxygen " Therapy:  Pulse Oximetry: 95 %, O2 (LPM): 3, O2 Delivery Device: Silicone Nasal Cannula    Intake/Output Summary (Last 24 hours) at 10/31/2021 1645  Last data filed at 10/31/2021 0600  Gross per 24 hour   Intake 265 ml   Output 1315 ml   Net -1050 ml         Physical Exam:  Physical Exam  Vitals and nursing note reviewed.   Constitutional:       Appearance: She is ill-appearing.      Comments: Sleepy   HENT:      Head: Normocephalic and atraumatic.      Right Ear: External ear normal.      Left Ear: External ear normal.      Nose: Nose normal.      Mouth/Throat:      Mouth: Mucous membranes are dry.      Pharynx: Oropharynx is clear.   Eyes:      General: No scleral icterus.  Cardiovascular:      Rate and Rhythm: Regular rhythm. Tachycardia present.      Pulses: Normal pulses.      Heart sounds: Normal heart sounds. No murmur heard.     Pulmonary:      Breath sounds: Normal breath sounds. No wheezing or rales.   Abdominal:      General: There is distension (Diffuse).      Tenderness: There is abdominal tenderness (Diffuse). There is guarding.   Musculoskeletal:      Cervical back: Neck supple.      Right lower leg: No edema.      Left lower leg: No edema.   Lymphadenopathy:      Cervical: No cervical adenopathy.   Skin:     General: Skin is warm.      Coloration: Skin is pale.   Neurological:      Mental Status: She is oriented to person, place, and time. She is lethargic.   Psychiatric:         Attention and Perception: She is inattentive.      Comments: Sedated           Labs:  Recent Labs     10/29/21  1046 10/30/21  0424 10/31/21  0341   SODIUM 126* 130* 134*   POTASSIUM 4.1 4.2 4.0   CHLORIDE 100 105 105   CO2 16* 16* 19*   BUN 18 18 21   CREATININE 1.55* 1.72* 1.96*   MAGNESIUM 2.1 2.1  --    CALCIUM 7.2* 7.5* 8.3*     Recent Labs     10/28/21  2019 10/29/21  0123 10/29/21  1046 10/30/21  0424 10/31/21  0341   ALTSGPT 17  --  13  --   --    ASTSGOT 33  --  35  --   --    ALKPHOSPHAT 79  --  73  --   --     TBILIRUBIN 0.2  --  0.2  --   --    GLUCOSE 102*   < > 113* 112* 76    < > = values in this interval not displayed.     Recent Labs     10/28/21  2019 10/29/21  0123 10/29/21  1046 10/30/21  0424 10/31/21  0341   WBC 16.6*   < > 14.9* 14.5* 13.4*   NEUTSPOLYS 75.10*   < > 76.80* 80.00* 82.70*   LYMPHOCYTES 14.10*   < > 13.70* 9.60* 7.50*   MONOCYTES 10.00   < > 8.40 9.40 8.20   EOSINOPHILS 0.10   < > 0.30 0.20 0.50   BASOPHILS 0.20   < > 0.30 0.30 0.40   ASTSGOT 33  --  35  --   --    ALTSGPT 17  --  13  --   --    ALKPHOSPHAT 79  --  73  --   --    TBILIRUBIN 0.2  --  0.2  --   --     < > = values in this interval not displayed.     Recent Labs     10/29/21  1046 10/30/21  0424 10/31/21  0341   RBC 3.99* 4.01* 3.91*   HEMOGLOBIN 12.4 12.5 12.2   HEMATOCRIT 36.9* 38.2 37.0   PLATELETCT 250 249 253   PROTHROMBTM 14.7*  --   --    INR 1.18*  --   --      Recent Results (from the past 24 hour(s))   CBC WITH DIFFERENTIAL    Collection Time: 10/31/21  3:41 AM   Result Value Ref Range    WBC 13.4 (H) 4.8 - 10.8 K/uL    RBC 3.91 (L) 4.20 - 5.40 M/uL    Hemoglobin 12.2 12.0 - 16.0 g/dL    Hematocrit 37.0 37.0 - 47.0 %    MCV 94.6 81.4 - 97.8 fL    MCH 31.2 27.0 - 33.0 pg    MCHC 33.0 (L) 33.6 - 35.0 g/dL    RDW 48.8 35.9 - 50.0 fL    Platelet Count 253 164 - 446 K/uL    MPV 9.9 9.0 - 12.9 fL    Neutrophils-Polys 82.70 (H) 44.00 - 72.00 %    Lymphocytes 7.50 (L) 22.00 - 41.00 %    Monocytes 8.20 0.00 - 13.40 %    Eosinophils 0.50 0.00 - 6.90 %    Basophils 0.40 0.00 - 1.80 %    Immature Granulocytes 0.70 0.00 - 0.90 %    Nucleated RBC 0.00 /100 WBC    Neutrophils (Absolute) 11.05 (H) 2.00 - 7.15 K/uL    Lymphs (Absolute) 1.01 1.00 - 4.80 K/uL    Monos (Absolute) 1.10 (H) 0.00 - 0.85 K/uL    Eos (Absolute) 0.07 0.00 - 0.51 K/uL    Baso (Absolute) 0.06 0.00 - 0.12 K/uL    Immature Granulocytes (abs) 0.09 0.00 - 0.11 K/uL    NRBC (Absolute) 0.00 K/uL   Basic Metabolic Panel    Collection Time: 10/31/21  3:41 AM   Result Value  Ref Range    Sodium 134 (L) 135 - 145 mmol/L    Potassium 4.0 3.6 - 5.5 mmol/L    Chloride 105 96 - 112 mmol/L    Co2 19 (L) 20 - 33 mmol/L    Glucose 76 65 - 99 mg/dL    Bun 21 8 - 22 mg/dL    Creatinine 1.96 (H) 0.50 - 1.40 mg/dL    Calcium 8.3 (L) 8.5 - 10.5 mg/dL    Anion Gap 10.0 7.0 - 16.0   ESTIMATED GFR    Collection Time: 10/31/21  3:41 AM   Result Value Ref Range    GFR If  31 (A) >60 mL/min/1.73 m 2    GFR If Non African American 26 (A) >60 mL/min/1.73 m 2         Radiology Review:  [id number] VIEWS   Final Result      1.  Overall stable predominantly cecal colonic dilatation with the cecum located in the midline and just to the left of midline possibly related to a cecal bascule with volvulus considered a more remote possibility due to the fact there has been no    interval progression.   2.  There is no pneumatosis or free intraperitoneal air.      [id number] VIEWS   Final Result      1.  There is a similar appearance to the mildly distended air-filled colon with relatively decompressed sigmoid and rectum.   2.  There is no free intraperitoneal air.      [id number] VIEWS   Final Result         1.  Air-filled distention of the cecum measuring 12.3 cm, appearance concerning for Jailene's.  Similar to prior study.      These findings were discussed with the patient's clinician, Angeles Wolf, on 10/30/2021 4:19 AM.      [id number] VIEWS   Final Result      1.  Mild colonic dilation      2.  Enteric catheter appears appropriately located      3.  Mild right basilar atelectasis and/or pleural effusion      DX-ABDOMEN FOR TUBE PLACEMENT   Final Result      Enteric tube has been placed and the tip projects over the stomach.      CT-ABDOMEN-PELVIS W/O   Final Result         1.  Air-filled distention of the cecum, follow-up recommended to exclude progression to [place]'s   2.  Fluid and air-filled distended loops of small bowel in the left abdomen, consider component of ileus.   3.   Intra-abdominal foci of free air, a nonspecific finding for recent postop changes with surgical drain in place   4.  Scattered mild abdominal ascites and hazy mesenteric inflammatory changes.   5.  Small right and trace left pleural effusion.   6.  Linear densities in the bilateral lung bases favors changes of atelectasis.   7.  Left nephrolithiasis without visualized obstructive changes.   8.  Hepatomegaly and changes of cirrhosis   9.  Diverticulosis   10.  Atherosclerosis      [id number] VIEW   Final Result         1.  Air-filled distended bowel loops, appearance compatible with evolving obstruction versus ileus. Recommend radiographic follow-up to resolution.      DX-CHEST-PORTABLE (1 VIEW)   Final Result         1.  No acute cardiopulmonary disease.   2.  Cardiomegaly            Medical Decision Making, by Problem:  Active Hospital Problems    Diagnosis    • Hypoglycemia [E16.2]    • Post-op pain [G89.18]    • Gout [M10.9]    • SHEBA (acute kidney injury) (HCC) [N17.9]    • [place]'s syndrome [K59.81]    • Hypotension [I95.9]    • Renal mass [N28.89]    • Hyponatremia [E87.1]    • Leukocytosis [D72.829]            Assessment/Recommendations:  Assessment:  1.  Abdominal pain and distention  2.  Colonic pseudoobstruction-status post NG tube decompression, electrolyte correction, and neostigmine without improvement  3.  Renal mass status post nephrectomy on 10/27/2021  4.  Leukocytosis  5.  Hyponatremia, improving  6.  Acute kidney injury    Plan:  1.  Unprepped colonoscopy with colonic decompression with Dr. Jesus Bland at 11:30 AM on 11/1/2021. Patient seen and examined before proceeding.    Risks, benefits, and alternatives of aforementioned procedures were discussed with patient and daughter Yarelis.  Consenting person(s) were given opportunities to ask questions and discuss other options.  Risks including but not limited to perforation approximately 3%, infection, bleeding, missed lesion(s), possible need  for surgery(ies) and/or interventional radiology, possible need for repeat procedure(s) and/or additional testing, hospitalization possibly prolonged, cardiac and/or pulmonary event, aspiration, hypoxia, stroke, medication and/or anesthesia reaction, indefinite diagnosis, discomfort, unsuccessful and/or incomplete procedure, ineffective therapy and/or persistent symptoms, damage to adjacent organs and/or vascular structures, and other adverse events possibly life-threatening.  Interactive discussion was undertaken with Layman's terms. I answered questions in full and to satisfaction.  Consenting person(s) stated understanding and acceptance of these risks, and wished to proceed.  Informed consent was given in clear state of mind.    2.  Continue n.p.o. and NG tube decompression    3.  Continued electrolyte correction    4.  Minimize narcotics    5.  Appreciate urology and surgical recommendations    6.  Will trial single dose of methylnaltrexone (Relistor)    GI Attending -    Patient seen, examined, chart reviewed and case discussed and plan arrived at with A.P.R.N.  Agree with this  note.    Jose Antonio Magana M.D.   Andrea Pollack A.P.R.N.      Thank you for inviting me to participate in the care of this patient. Please do not hesitate to call GI consultants with additional questions/concerns or changes in the patient's clinical status at [phone].      MDM (Data Review):   -Records reviewed and summarized in current documentation  -I personally reviewed and interpreted the laboratory results  -I personally reviewed the radiology images        Core Quality Measures   Reviewed items:  Labs, Medications and Radiology reports reviewed

## 2021-11-01 ENCOUNTER — ANESTHESIA (OUTPATIENT)
Dept: SURGERY | Facility: MEDICAL CENTER | Age: 66
DRG: 657 | End: 2021-11-01
Payer: COMMERCIAL

## 2021-11-01 ENCOUNTER — ANESTHESIA EVENT (OUTPATIENT)
Dept: SURGERY | Facility: MEDICAL CENTER | Age: 66
DRG: 657 | End: 2021-11-01
Payer: COMMERCIAL

## 2021-11-01 PROBLEM — I95.9 HYPOTENSION: Status: RESOLVED | Noted: 2021-10-28 | Resolved: 2021-11-01

## 2021-11-01 PROBLEM — E87.6 HYPOKALEMIA: Status: ACTIVE | Noted: 2021-11-01

## 2021-11-01 LAB
ANION GAP SERPL CALC-SCNC: 8 MMOL/L (ref 7–16)
ANION GAP SERPL CALC-SCNC: 8 MMOL/L (ref 7–16)
BASOPHILS # BLD AUTO: 0.3 % (ref 0–1.8)
BASOPHILS # BLD: 0.03 K/UL (ref 0–0.12)
BUN SERPL-MCNC: 21 MG/DL (ref 8–22)
BUN SERPL-MCNC: 22 MG/DL (ref 8–22)
CALCIUM SERPL-MCNC: 6.3 MG/DL (ref 8.5–10.5)
CALCIUM SERPL-MCNC: 7.2 MG/DL (ref 8.5–10.5)
CHLORIDE SERPL-SCNC: 117 MMOL/L (ref 96–112)
CHLORIDE SERPL-SCNC: 117 MMOL/L (ref 96–112)
CO2 SERPL-SCNC: 16 MMOL/L (ref 20–33)
CO2 SERPL-SCNC: 18 MMOL/L (ref 20–33)
CREAT SERPL-MCNC: 1.7 MG/DL (ref 0.5–1.4)
CREAT SERPL-MCNC: 1.85 MG/DL (ref 0.5–1.4)
EOSINOPHIL # BLD AUTO: 0.07 K/UL (ref 0–0.51)
EOSINOPHIL NFR BLD: 0.7 % (ref 0–6.9)
ERYTHROCYTE [DISTWIDTH] IN BLOOD BY AUTOMATED COUNT: 50.6 FL (ref 35.9–50)
GLUCOSE SERPL-MCNC: 101 MG/DL (ref 65–99)
GLUCOSE SERPL-MCNC: 140 MG/DL (ref 65–99)
HCT VFR BLD AUTO: 28.2 % (ref 37–47)
HCT VFR BLD AUTO: 33.5 % (ref 37–47)
HGB BLD-MCNC: 11.1 G/DL (ref 12–16)
HGB BLD-MCNC: 9 G/DL (ref 12–16)
IMM GRANULOCYTES # BLD AUTO: 0.06 K/UL (ref 0–0.11)
IMM GRANULOCYTES NFR BLD AUTO: 0.6 % (ref 0–0.9)
LYMPHOCYTES # BLD AUTO: 0.9 K/UL (ref 1–4.8)
LYMPHOCYTES NFR BLD: 9.5 % (ref 22–41)
MCH RBC QN AUTO: 30.8 PG (ref 27–33)
MCHC RBC AUTO-ENTMCNC: 31.9 G/DL (ref 33.6–35)
MCV RBC AUTO: 96.6 FL (ref 81.4–97.8)
MONOCYTES # BLD AUTO: 0.86 K/UL (ref 0–0.85)
MONOCYTES NFR BLD AUTO: 9.1 % (ref 0–13.4)
NEUTROPHILS # BLD AUTO: 7.53 K/UL (ref 2–7.15)
NEUTROPHILS NFR BLD: 79.8 % (ref 44–72)
NRBC # BLD AUTO: 0 K/UL
NRBC BLD-RTO: 0 /100 WBC
PLATELET # BLD AUTO: 196 K/UL (ref 164–446)
PMV BLD AUTO: 9.3 FL (ref 9–12.9)
POTASSIUM SERPL-SCNC: 3.1 MMOL/L (ref 3.6–5.5)
POTASSIUM SERPL-SCNC: 3.3 MMOL/L (ref 3.6–5.5)
RBC # BLD AUTO: 2.92 M/UL (ref 4.2–5.4)
SODIUM SERPL-SCNC: 141 MMOL/L (ref 135–145)
SODIUM SERPL-SCNC: 143 MMOL/L (ref 135–145)
WBC # BLD AUTO: 9.5 K/UL (ref 4.8–10.8)

## 2021-11-01 PROCEDURE — 85025 COMPLETE CBC W/AUTO DIFF WBC: CPT

## 2021-11-01 PROCEDURE — 80048 BASIC METABOLIC PNL TOTAL CA: CPT

## 2021-11-01 PROCEDURE — 160207 HCHG ENDO MINUTES - EA ADDL 1 MIN LEVEL 3: Performed by: STUDENT IN AN ORGANIZED HEALTH CARE EDUCATION/TRAINING PROGRAM

## 2021-11-01 PROCEDURE — 160202 HCHG ENDO MINUTES - 1ST 30 MINS LEVEL 3: Performed by: STUDENT IN AN ORGANIZED HEALTH CARE EDUCATION/TRAINING PROGRAM

## 2021-11-01 PROCEDURE — 700102 HCHG RX REV CODE 250 W/ 637 OVERRIDE(OP): Performed by: NURSE PRACTITIONER

## 2021-11-01 PROCEDURE — 700111 HCHG RX REV CODE 636 W/ 250 OVERRIDE (IP): Performed by: INTERNAL MEDICINE

## 2021-11-01 PROCEDURE — 160002 HCHG RECOVERY MINUTES (STAT): Performed by: STUDENT IN AN ORGANIZED HEALTH CARE EDUCATION/TRAINING PROGRAM

## 2021-11-01 PROCEDURE — 700102 HCHG RX REV CODE 250 W/ 637 OVERRIDE(OP): Performed by: UROLOGY

## 2021-11-01 PROCEDURE — 700101 HCHG RX REV CODE 250: Performed by: ANESTHESIOLOGY

## 2021-11-01 PROCEDURE — 85018 HEMOGLOBIN: CPT

## 2021-11-01 PROCEDURE — 700102 HCHG RX REV CODE 250 W/ 637 OVERRIDE(OP): Performed by: INTERNAL MEDICINE

## 2021-11-01 PROCEDURE — 700105 HCHG RX REV CODE 258: Performed by: ANESTHESIOLOGY

## 2021-11-01 PROCEDURE — 160048 HCHG OR STATISTICAL LEVEL 1-5: Performed by: STUDENT IN AN ORGANIZED HEALTH CARE EDUCATION/TRAINING PROGRAM

## 2021-11-01 PROCEDURE — 99291 CRITICAL CARE FIRST HOUR: CPT | Performed by: NURSE PRACTITIONER

## 2021-11-01 PROCEDURE — 700111 HCHG RX REV CODE 636 W/ 250 OVERRIDE (IP): Performed by: NURSE PRACTITIONER

## 2021-11-01 PROCEDURE — 700105 HCHG RX REV CODE 258: Performed by: NURSE PRACTITIONER

## 2021-11-01 PROCEDURE — A9270 NON-COVERED ITEM OR SERVICE: HCPCS | Performed by: INTERNAL MEDICINE

## 2021-11-01 PROCEDURE — 160035 HCHG PACU - 1ST 60 MINS PHASE I: Performed by: STUDENT IN AN ORGANIZED HEALTH CARE EDUCATION/TRAINING PROGRAM

## 2021-11-01 PROCEDURE — 770022 HCHG ROOM/CARE - ICU (200)

## 2021-11-01 PROCEDURE — 0DJD8ZZ INSPECTION OF LOWER INTESTINAL TRACT, VIA NATURAL OR ARTIFICIAL OPENING ENDOSCOPIC: ICD-10-PCS | Performed by: STUDENT IN AN ORGANIZED HEALTH CARE EDUCATION/TRAINING PROGRAM

## 2021-11-01 PROCEDURE — 700105 HCHG RX REV CODE 258: Performed by: INTERNAL MEDICINE

## 2021-11-01 PROCEDURE — A9270 NON-COVERED ITEM OR SERVICE: HCPCS | Performed by: UROLOGY

## 2021-11-01 PROCEDURE — 700101 HCHG RX REV CODE 250: Performed by: STUDENT IN AN ORGANIZED HEALTH CARE EDUCATION/TRAINING PROGRAM

## 2021-11-01 PROCEDURE — 99231 SBSQ HOSP IP/OBS SF/LOW 25: CPT | Performed by: SURGERY

## 2021-11-01 PROCEDURE — 85014 HEMATOCRIT: CPT

## 2021-11-01 PROCEDURE — 700111 HCHG RX REV CODE 636 W/ 250 OVERRIDE (IP): Performed by: ANESTHESIOLOGY

## 2021-11-01 PROCEDURE — A9270 NON-COVERED ITEM OR SERVICE: HCPCS | Performed by: NURSE PRACTITIONER

## 2021-11-01 PROCEDURE — 700111 HCHG RX REV CODE 636 W/ 250 OVERRIDE (IP): Performed by: UROLOGY

## 2021-11-01 PROCEDURE — 160009 HCHG ANES TIME/MIN: Performed by: STUDENT IN AN ORGANIZED HEALTH CARE EDUCATION/TRAINING PROGRAM

## 2021-11-01 RX ORDER — VALPROATE SODIUM 100 MG/ML
500 INJECTION INTRAVENOUS ONCE
Status: DISCONTINUED | OUTPATIENT
Start: 2021-11-01 | End: 2021-11-01

## 2021-11-01 RX ORDER — POTASSIUM CHLORIDE 7.45 MG/ML
10 INJECTION INTRAVENOUS
Status: DISPENSED | OUTPATIENT
Start: 2021-11-01 | End: 2021-11-01

## 2021-11-01 RX ORDER — MIDODRINE HYDROCHLORIDE 5 MG/1
5 TABLET ORAL
Status: DISCONTINUED | OUTPATIENT
Start: 2021-11-01 | End: 2021-11-02

## 2021-11-01 RX ORDER — LABETALOL HYDROCHLORIDE 5 MG/ML
5 INJECTION, SOLUTION INTRAVENOUS
Status: DISCONTINUED | OUTPATIENT
Start: 2021-11-01 | End: 2021-11-01 | Stop reason: HOSPADM

## 2021-11-01 RX ORDER — LIDOCAINE HYDROCHLORIDE 20 MG/ML
INJECTION, SOLUTION EPIDURAL; INFILTRATION; INTRACAUDAL; PERINEURAL PRN
Status: DISCONTINUED | OUTPATIENT
Start: 2021-11-01 | End: 2021-11-01 | Stop reason: SURG

## 2021-11-01 RX ORDER — ONDANSETRON 2 MG/ML
4 INJECTION INTRAMUSCULAR; INTRAVENOUS
Status: DISCONTINUED | OUTPATIENT
Start: 2021-11-01 | End: 2021-11-01 | Stop reason: HOSPADM

## 2021-11-01 RX ORDER — HALOPERIDOL 5 MG/ML
1 INJECTION INTRAMUSCULAR
Status: DISCONTINUED | OUTPATIENT
Start: 2021-11-01 | End: 2021-11-01 | Stop reason: HOSPADM

## 2021-11-01 RX ORDER — CALCIUM GLUCONATE 20 MG/ML
1 INJECTION, SOLUTION INTRAVENOUS ONCE
Status: COMPLETED | OUTPATIENT
Start: 2021-11-01 | End: 2021-11-01

## 2021-11-01 RX ORDER — PHENYLEPHRINE HCL IN 0.9% NACL 0.5 MG/5ML
SYRINGE (ML) INTRAVENOUS PRN
Status: DISCONTINUED | OUTPATIENT
Start: 2021-11-01 | End: 2021-11-01 | Stop reason: SURG

## 2021-11-01 RX ORDER — SODIUM CHLORIDE, SODIUM LACTATE, POTASSIUM CHLORIDE, CALCIUM CHLORIDE 600; 310; 30; 20 MG/100ML; MG/100ML; MG/100ML; MG/100ML
INJECTION, SOLUTION INTRAVENOUS
Status: DISCONTINUED | OUTPATIENT
Start: 2021-11-01 | End: 2021-11-01 | Stop reason: SURG

## 2021-11-01 RX ORDER — MIDAZOLAM HYDROCHLORIDE 1 MG/ML
INJECTION INTRAMUSCULAR; INTRAVENOUS PRN
Status: DISCONTINUED | OUTPATIENT
Start: 2021-11-01 | End: 2021-11-01 | Stop reason: SURG

## 2021-11-01 RX ORDER — SODIUM CHLORIDE, SODIUM LACTATE, POTASSIUM CHLORIDE, CALCIUM CHLORIDE 600; 310; 30; 20 MG/100ML; MG/100ML; MG/100ML; MG/100ML
INJECTION, SOLUTION INTRAVENOUS CONTINUOUS
Status: DISCONTINUED | OUTPATIENT
Start: 2021-11-01 | End: 2021-11-01 | Stop reason: HOSPADM

## 2021-11-01 RX ORDER — DIPHENHYDRAMINE HYDROCHLORIDE 50 MG/ML
12.5 INJECTION INTRAMUSCULAR; INTRAVENOUS
Status: DISCONTINUED | OUTPATIENT
Start: 2021-11-01 | End: 2021-11-01 | Stop reason: HOSPADM

## 2021-11-01 RX ORDER — LABETALOL HYDROCHLORIDE 5 MG/ML
10 INJECTION, SOLUTION INTRAVENOUS EVERY 4 HOURS PRN
Status: DISCONTINUED | OUTPATIENT
Start: 2021-11-01 | End: 2021-11-09 | Stop reason: HOSPADM

## 2021-11-01 RX ORDER — ROCURONIUM BROMIDE 10 MG/ML
INJECTION, SOLUTION INTRAVENOUS PRN
Status: DISCONTINUED | OUTPATIENT
Start: 2021-11-01 | End: 2021-11-01 | Stop reason: SURG

## 2021-11-01 RX ADMIN — ONDANSETRON 4 MG: 2 INJECTION INTRAMUSCULAR; INTRAVENOUS at 19:33

## 2021-11-01 RX ADMIN — DOCUSATE SODIUM 100 MG: 50 LIQUID ORAL at 18:09

## 2021-11-01 RX ADMIN — PIPERACILLIN AND TAZOBACTAM 4.5 G: 4; .5 INJECTION, POWDER, LYOPHILIZED, FOR SOLUTION INTRAVENOUS; PARENTERAL at 13:45

## 2021-11-01 RX ADMIN — SODIUM CHLORIDE, POTASSIUM CHLORIDE, SODIUM LACTATE AND CALCIUM CHLORIDE: 600; 310; 30; 20 INJECTION, SOLUTION INTRAVENOUS at 11:29

## 2021-11-01 RX ADMIN — LIDOCAINE 1 PATCH: 50 PATCH CUTANEOUS at 21:34

## 2021-11-01 RX ADMIN — PIPERACILLIN AND TAZOBACTAM 4.5 G: 4; .5 INJECTION, POWDER, LYOPHILIZED, FOR SOLUTION INTRAVENOUS; PARENTERAL at 04:51

## 2021-11-01 RX ADMIN — GABAPENTIN 600 MG: 300 CAPSULE ORAL at 19:17

## 2021-11-01 RX ADMIN — Medication 100 MCG: at 11:32

## 2021-11-01 RX ADMIN — SUGAMMADEX 200 MG: 100 INJECTION, SOLUTION INTRAVENOUS at 11:45

## 2021-11-01 RX ADMIN — MIDAZOLAM HYDROCHLORIDE 1 MG: 1 INJECTION, SOLUTION INTRAMUSCULAR; INTRAVENOUS at 11:29

## 2021-11-01 RX ADMIN — Medication 200 MCG: at 11:40

## 2021-11-01 RX ADMIN — DIPHENHYDRAMINE HYDROCHLORIDE 25 MG: 50 INJECTION INTRAMUSCULAR; INTRAVENOUS at 07:55

## 2021-11-01 RX ADMIN — ROCURONIUM BROMIDE 50 MG: 10 INJECTION, SOLUTION INTRAVENOUS at 11:29

## 2021-11-01 RX ADMIN — SODIUM CHLORIDE, SODIUM LACTATE, POTASSIUM CHLORIDE, CALCIUM CHLORIDE AND DEXTROSE MONOHYDRATE: 5; 600; 310; 30; 20 INJECTION, SOLUTION INTRAVENOUS at 04:51

## 2021-11-01 RX ADMIN — ACETAMINOPHEN 1000 MG: 500 TABLET ORAL at 18:09

## 2021-11-01 RX ADMIN — ONDANSETRON 4 MG: 2 INJECTION INTRAMUSCULAR; INTRAVENOUS at 15:38

## 2021-11-01 RX ADMIN — MIDODRINE HYDROCHLORIDE 5 MG: 5 TABLET ORAL at 18:10

## 2021-11-01 RX ADMIN — POTASSIUM CHLORIDE: 2 INJECTION, SOLUTION, CONCENTRATE INTRAVENOUS at 08:03

## 2021-11-01 RX ADMIN — HYDROMORPHONE HYDROCHLORIDE 1 MG: 1 INJECTION, SOLUTION INTRAMUSCULAR; INTRAVENOUS; SUBCUTANEOUS at 10:14

## 2021-11-01 RX ADMIN — PIPERACILLIN AND TAZOBACTAM 4.5 G: 4; .5 INJECTION, POWDER, LYOPHILIZED, FOR SOLUTION INTRAVENOUS; PARENTERAL at 21:33

## 2021-11-01 RX ADMIN — LIDOCAINE 1 PATCH: 50 PATCH CUTANEOUS at 21:33

## 2021-11-01 RX ADMIN — POTASSIUM CHLORIDE 10 MEQ: 7.45 INJECTION INTRAVENOUS at 10:02

## 2021-11-01 RX ADMIN — LIDOCAINE HYDROCHLORIDE 50 MG: 20 INJECTION, SOLUTION EPIDURAL; INFILTRATION; INTRACAUDAL at 11:29

## 2021-11-01 RX ADMIN — HEPARIN SODIUM 5000 UNITS: 5000 INJECTION, SOLUTION INTRAVENOUS; SUBCUTANEOUS at 15:08

## 2021-11-01 RX ADMIN — GABAPENTIN 600 MG: 300 CAPSULE ORAL at 19:33

## 2021-11-01 RX ADMIN — PROPOFOL 150 MG: 10 INJECTION, EMULSION INTRAVENOUS at 11:29

## 2021-11-01 RX ADMIN — HEPARIN SODIUM 5000 UNITS: 5000 INJECTION, SOLUTION INTRAVENOUS; SUBCUTANEOUS at 21:33

## 2021-11-01 RX ADMIN — CALCIUM GLUCONATE 1 G: 20 INJECTION, SOLUTION INTRAVENOUS at 07:55

## 2021-11-01 RX ADMIN — HYDROMORPHONE HYDROCHLORIDE 1 MG: 1 INJECTION, SOLUTION INTRAMUSCULAR; INTRAVENOUS; SUBCUTANEOUS at 07:55

## 2021-11-01 ASSESSMENT — PAIN DESCRIPTION - PAIN TYPE
TYPE: ACUTE PAIN
TYPE: ACUTE PAIN
TYPE: SURGICAL PAIN
TYPE: SURGICAL PAIN
TYPE: ACUTE PAIN
TYPE: ACUTE PAIN

## 2021-11-01 ASSESSMENT — PAIN SCALES - GENERAL
PAIN_LEVEL: 2
PAIN_LEVEL: 2

## 2021-11-01 ASSESSMENT — ENCOUNTER SYMPTOMS
DIZZINESS: 0
COUGH: 1
NAUSEA: 0
HEADACHES: 0
BLURRED VISION: 0
CHILLS: 0
PALPITATIONS: 0
WHEEZING: 0
ABDOMINAL PAIN: 1
DOUBLE VISION: 0
VOMITING: 0
FEVER: 0
BACK PAIN: 1
CONSTIPATION: 1
HALLUCINATIONS: 0
SHORTNESS OF BREATH: 0
FLANK PAIN: 0

## 2021-11-01 ASSESSMENT — PATIENT HEALTH QUESTIONNAIRE - PHQ9
2. FEELING DOWN, DEPRESSED, IRRITABLE, OR HOPELESS: NOT AT ALL
SUM OF ALL RESPONSES TO PHQ9 QUESTIONS 1 AND 2: 0
1. LITTLE INTEREST OR PLEASURE IN DOING THINGS: NOT AT ALL

## 2021-11-01 ASSESSMENT — LIFESTYLE VARIABLES: SUBSTANCE_ABUSE: 0

## 2021-11-01 NOTE — PROCEDURES
Patient Name: Thais Munroe    Colonoscopy Procedure Note  Date of Procedure: 11/1/2021  Attending Physician: Jesus Bland M.D.      Acute colonic pseudoobstruction with cecal diameter temporarily greater than 12 cm  Indications: See above  Previous colonoscopy: Patient unable to state  Sedation: General    Procedure Details:    Colonoscopy  Pre-Anesthesia Assessment:  Prior to the procedure, a History and Physical was performed, and patient medications and allergies were reviewed. The patient’s tolerance of previous anesthesia was also reviewed. The risks and benefits of the procedure and the sedation options and risks were discussed with the patient including but not limited to infection, bleeding, aspiration, perforation, adverse medication reaction, missed diagnosis, and missed lesions. The patient verbalized understanding. All questions were answered, and informed consent was obtained.     Prior Anticoagulants: Patient has taken none  ASA Grade Assessment: 4    After I obtained informed consent from the patient, the patient was placed in the left lateral position. Appropriate time-out protocol was followed: the correct patient, the correct procedure, and the correct equipment in the room were confirmed. Throughout the procedure, the patient’s blood pressure, pulse, and oxygen saturations were monitored continuously.The Olympus variable stiffness colonoscope was introduced through the anus and passed under direct vision with significant water irrigation and minimal insufflation to reduce the risk of perforation.     The scope was advanced to the cecum, identified by the appendiceal orifice and ileocecal valve. The colonscopy was performed with  significant difficulty due to the lack of bowel prep. The patient tolerated the procedure well. The quality of the bowel preparation was poor since none was given.  Findings and interventions were performed and documented below. The endoscope was then removed and  the procedure was terminated. There were no immediate postoperative complications.        Findings and Impressions: Thick brown semisolid stool was seen throughout the colon greatest in the ascending colon and cecum.  No mechanical obstruction was identified  Large nonbleeding, noninflamed diverticuli were seen in the sigmoid colon.  A subcentimeter sessile benign-appearing distal transverse colon polyp was seen left in place.  This was left in place as the lack of prep and presence of solid stool would interfere with removal and retrieval.  -A full bottle of water was used to irrigate the entire colon essentially functioning as a full colonic enema.  Patient had a gush of brown stool towards the end of the procedure. Air was suctioned on withdrawal.      Recommendations: -Continue nasogastric tube to low intermittent wall suction  -Diet per primary team  -Avoid opiates  -Patient will need a repeat colonoscopy within 6 to 12 months to remove the identified polyp and search for any additional polyps        This procedure took twice my average length of time due to the difficulties listed above.      This note was generated using voice recognition software which has a small chance of producing errors of grammar and possibly content. I have made every reasonable attempt to find and correct any obvious errors, but expect that some may not be found prior to finalization of this note    Jesus Bland MD, [facility]  [facility]

## 2021-11-01 NOTE — ANESTHESIA PREPROCEDURE EVALUATION
Relevant Problems      (positive) SHEBA (acute kidney injury) (HCC)       Physical Exam    Airway   Mallampati: II  TM distance: >3 FB  Neck ROM: full       Cardiovascular   Rhythm: regular  Rate: normal     Dental - normal exam           Pulmonary   Breath sounds clear to auscultation     Abdominal - normal exam     Neurological              Anesthesia Plan    ASA 2       Plan - general       Airway plan will be ETT          Induction: intravenous      Pertinent diagnostic labs and testing reviewed    Informed Consent:    Anesthetic plan and risks discussed with patient.    Use of blood products discussed with: whom consented to blood products.

## 2021-11-01 NOTE — PROGRESS NOTES
"  DATE: 11/1/2021    Post Operative Day  5 Partial left nephrectomy / ileus.    Interval Events:  Colonoscopy with cecal decompression this am.    PHYSICAL EXAMINATION:  Constitutional:     Vital Signs: /77   Pulse 93   Temp 36.7 °C (98 °F) (Temporal)   Resp (!) 11   Ht 1.651 m (5' 5\")   Wt 88.2 kg (194 lb 7.1 oz)   SpO2 95%    General Appearance: The patient is a pleasant  and cooperative elderly woman in no critical distress.    Abdomen:   Inspection: Abdominal inspection reveals no abrasions, contusions, lacerations or penetrating wounds.   Palpation: Palpation is remarkable for no significant tenderness, guarding, or peritoneal findings.Abdominal distension is present      Laboratory Values:   Recent Labs     10/30/21  0424 10/30/21  0424 10/31/21  0341 11/01/21  0400 11/01/21  0823   WBC 14.5*  --  13.4* 9.5  --    RBC 4.01*  --  3.91* 2.92*  --    HEMOGLOBIN 12.5   < > 12.2 9.0* 11.1*   HEMATOCRIT 38.2   < > 37.0 28.2* 33.5*   MCV 95.3  --  94.6 96.6  --    MCH 31.2  --  31.2 30.8  --    MCHC 32.7*  --  33.0* 31.9*  --    RDW 49.1  --  48.8 50.6*  --    PLATELETCT 249  --  253 196  --    MPV 9.2  --  9.9 9.3  --     < > = values in this interval not displayed.     Recent Labs     10/30/21  0424 10/31/21  0341 11/01/21  0400   SODIUM 130* 134* 141   POTASSIUM 4.2 4.0 3.1*   CHLORIDE 105 105 117*   CO2 16* 19* 16*   GLUCOSE 112* 76 140*   BUN 18 21 21   CREATININE 1.72* 1.96* 1.70*   CALCIUM 7.5* 8.3* 6.3*     Recent Labs     10/29/21  1046   ASTSGOT 35   ALTSGPT 13   TBILIRUBIN 0.2   ALKPHOSPHAT 73   GLOBULIN 3.4   INR 1.18*     Recent Labs     10/29/21  1046   INR 1.18*        Imaging:   [id number] VIEWS   Final Result      1.  Overall stable predominantly cecal colonic dilatation with the cecum located in the midline and just to the left of midline possibly related to a cecal bascule with volvulus considered a more remote possibility due to the fact there has been no    interval progression. "   2.  There is no pneumatosis or free intraperitoneal air.      [id number] VIEWS   Final Result      1.  There is a similar appearance to the mildly distended air-filled colon with relatively decompressed sigmoid and rectum.   2.  There is no free intraperitoneal air.      [id number] VIEWS   Final Result         1.  Air-filled distention of the cecum measuring 12.3 cm, appearance concerning for Tecumseh's.  Similar to prior study.      These findings were discussed with the patient's clinician, Angeles Wolf, on 10/30/2021 4:19 AM.      [id number] VIEWS   Final Result      1.  Mild colonic dilation      2.  Enteric catheter appears appropriately located      3.  Mild right basilar atelectasis and/or pleural effusion      DX-ABDOMEN FOR TUBE PLACEMENT   Final Result      Enteric tube has been placed and the tip projects over the stomach.      CT-ABDOMEN-PELVIS W/O   Final Result         1.  Air-filled distention of the cecum, follow-up recommended to exclude progression to Jailene's   2.  Fluid and air-filled distended loops of small bowel in the left abdomen, consider component of ileus.   3.  Intra-abdominal foci of free air, a nonspecific finding for recent postop changes with surgical drain in place   4.  Scattered mild abdominal ascites and hazy mesenteric inflammatory changes.   5.  Small right and trace left pleural effusion.   6.  Linear densities in the bilateral lung bases favors changes of atelectasis.   7.  Left nephrolithiasis without visualized obstructive changes.   8.  Hepatomegaly and changes of cirrhosis   9.  Diverticulosis   10.  Atherosclerosis      VE-[id number]-3 VIEW   Final Result         1.  Air-filled distended bowel loops, appearance compatible with evolving obstruction versus ileus. Recommend radiographic follow-up to resolution.      DX-CHEST-PORTABLE (1 VIEW)   Final Result         1.  No acute cardiopulmonary disease.   2.  Cardiomegaly          ASSESSMENT AND PLAN:     Colonic  Pseudo-obstruction - S/P endoscopic decompression     No clinical evidence of intestinal compromise at this time.  WBC down.    Urine output maintained.   No indication for surgery at this time.          ____________________________________     Peter Holland M.D.    DD: 11/1/2021  9:46 AM

## 2021-11-01 NOTE — PROGRESS NOTES
Gastroenterology Progress Note     Author: Jesus Bland M.D.   Date & Time Created: 11/1/2021 9:53 AM    Chief Complaint:  Acute colonic pseudoobstruction in the setting of postop status and opiate pain management    Interval History:  From the October 31 HPI:  66-year-old female patient who was seen in consultation for abdominal distention.  The patient is postop day 4 status post robotic assisted laparoscopic partial left nephrectomy on 10/27/2021 for left renal mass.  Her postoperative period has been complicated by pain control issues requiring significant narcotics, lack of mobilization, and increasing abdominal distention.  She was initially seen by Dr. Kate with surgery on 10/29/2021 with findings of increased abdominal distention, and noncontrast CT which showed significant distention of the ascending and transverse colon with wall thickening, inflammation, edema, and possible pneumatosis.  She was hypotensive at the time.  NG tube was placed at that time with minimal output.  At that time she was suspected to have ileus versus colonic pseudoobstruction.  She has had persistent pain, distention, and radiographic imaging suggesting pseudoobstruction.  She has now received 2 doses of neostigmine in the ICU without improvement.     Currently the patient has diffuse abdominal pain, NG tube to suction with dark brown appearing contents    November 1: Patient is confused with waxing and waning level of consciousness.  She will mumble albeit appropriate timing in response to questions posed to her.  She initially states she does not have pain.  However, I discussed the case with nursing at bedside and she had pain overnight and continuously Dilaudid.  The MAR shows at least 3 doses given overnight.  Patient is able to convey that she is thirsty.        Review of Systems:  ROS  Unable to completely obtain due to patient's waxing and waning mental status.  Physical Exam:  Physical Exam    General: Patient  looks mildly distressed.   HEENT: NG tube in place with brown fluid.  Nonicteric sclera.  Abdomen: Markedly distended with hypoactive bowel sounds and diffuse tenderness to palpation  Lower extremities: No lower extremity edema    Labs:  Recent Labs     10/29/21  1046   MEMFC84Q 7.23*   [id number] 36.8   NKUED099S 71.3   ZCTN0LAO 93.2   ARTHCO3 15*   ARTBE -12*         Recent Labs     10/29/21  1046 10/29/21  1046 10/30/21  0424 10/31/21  0341 11/01/21  0400   SODIUM 126*   < > 130* 134* 141   POTASSIUM 4.1   < > 4.2 4.0 3.1*   CHLORIDE 100   < > 105 105 117*   CO2 16*   < > 16* 19* 16*   BUN 18   < > 18 21 21   CREATININE 1.55*   < > 1.72* 1.96* 1.70*   MAGNESIUM 2.1  --  2.1  --   --    CALCIUM 7.2*   < > 7.5* 8.3* 6.3*    < > = values in this interval not displayed.     Recent Labs     10/29/21  1046 10/29/21  1046 10/30/21  0424 10/31/21  0341 11/01/21  0400   ALTSGPT 13  --   --   --   --    ASTSGOT 35  --   --   --   --    ALKPHOSPHAT 73  --   --   --   --    TBILIRUBIN 0.2  --   --   --   --    GLUCOSE 113*   < > 112* 76 140*    < > = values in this interval not displayed.     Recent Labs     10/29/21  1046 10/29/21  1046 10/30/21  0424 10/30/21  0424 10/31/21  0341 11/01/21  0400 11/01/21  0823   RBC 3.99*   < > 4.01*  --  3.91* 2.92*  --    HEMOGLOBIN 12.4   < > 12.5   < > 12.2 9.0* 11.1*   HEMATOCRIT 36.9*   < > 38.2   < > 37.0 28.2* 33.5*   PLATELETCT 250   < > 249  --  253 196  --    PROTHROMBTM 14.7*  --   --   --   --   --   --    INR 1.18*  --   --   --   --   --   --     < > = values in this interval not displayed.     Recent Labs     10/29/21  1046 10/29/21  1046 10/30/21  0424 10/31/21  0341 11/01/21  0400   WBC 14.9*   < > 14.5* 13.4* 9.5   NEUTSPOLYS 76.80*   < > 80.00* 82.70* 79.80*   LYMPHOCYTES 13.70*   < > 9.60* 7.50* 9.50*   MONOCYTES 8.40   < > 9.40 8.20 9.10   EOSINOPHILS 0.30   < > 0.20 0.50 0.70   BASOPHILS 0.30   < > 0.30 0.40 0.30   ASTSGOT 35  --   --   --   --    ALTSGPT 13  --   --    --   --    ALKPHOSPHAT 73  --   --   --   --    TBILIRUBIN 0.2  --   --   --   --     < > = values in this interval not displayed.     Hemodynamics:  Temp (24hrs), Av.5 °C (97.7 °F), Min:36.1 °C (97 °F), Max:36.8 °C (98.2 °F)  Temperature: 36.7 °C (98 °F)  Pulse  Av  Min: 53  Max: 116   Blood Pressure: 121/77     Respiratory:    Respiration: (!) 11, Pulse Oximetry: 95 %     Work Of Breathing / Effort: Shallow  RUL Breath Sounds: Crackles, RML Breath Sounds: Crackles, RLL Breath Sounds: Rhonchi, SCOTTY Breath Sounds: Crackles, LLL Breath Sounds: Rhonchi  Fluids:    Intake/Output Summary (Last 24 hours) at 2021 0953  Last data filed at 2021 0600  Gross per 24 hour   Intake 1294.47 ml   Output 765 ml   Net 529.47 ml        GI/Nutrition:  Orders Placed This Encounter   Procedures   • Diet NPO Restrict to: Strict     Standing Status:   Standing     Number of Occurrences:   1     Order Specific Question:   Diet NPO Restrict to:     Answer:   Strict [1]     Medical Decision Making, by Problem:  Active Hospital Problems    Diagnosis    • *Hypotension [I95.9]    • Hypoglycemia [E16.2]    • Post-op pain [G89.18]    • Gout [M10.9]    • SHEBA (acute kidney injury) (HCC) [N17.9]    • 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  No mechanical obstruction was identified  Large nonbleeding, noninflamed diverticuli were seen in the sigmoid colon.  A subcentimeter sessile benign-appearing distal transverse colon polyp was seen left in place.  This was left in place as the lack of prep and presence of solid stool would interfere with removal and retrieval.  -A full bottle of water was used to irrigate the entire colon essentially functioning as a full colonic enema.  Patient had a gush  of brown stool towards the end of the procedure. Air was suctioned on withdrawal.      Plan:  -Continue nasogastric tube to low intermittent wall suction  -Diet per primary team  -Avoid opiates  -Patient will need a repeat colonoscopy within 6 to 12 months to remove the identified polyp and search for any additional polyps  -Absolutely no opiates; may have Tylenol up to 4 g daily for postop pain  -Obtain urine sodium and creatinine to assess for SHEBA  -Wean midodrine as tolerated    My total time spent caring for the patient on the day of the encounter was 32 minutes including critical care time.   This does not include time spent on separately billable procedures/tests.    Jesus Bland MD, [facility]  [facility]          Quality-Core Measures

## 2021-11-01 NOTE — OR NURSING
1150 from OR to PACU 5. Connected to monitor. Report received from anesthesia & RN. VSS. 02 6L via mask. Breaths calm.      1159 Report given to Kathy NGUYEN for lunch.     1235 Pt transported to RICU via gurney with transport monitor and 02, accompanied by ACLS RN and CNA.

## 2021-11-01 NOTE — PROGRESS NOTES
Critical Care Progress Note    Date of admission  10/27/2021    Chief Complaint  Abdominal pain    Hospital Course  Ms. Munroe is a 66-year-old female with PMH significant for HTN, hep C, tobacco dependence, history of blood clots, hypothyroidism and renal mass who presented 10/27/2021 for resection of left adrenal mass with urology, Dr. Mccracken.  She underwent robotic assisted lap partial left nephrectomy 10/27/2021 without complications,  mL.  Postoperatively she had issues with poor pain control and hypotension.  Rapid response was called on 10/29 for hypotension and she received 5 L fluid resuscitation.  She also had increased abdominal distention.  CT abdomen pelvis identified 8 cm cecum dilatation.  General surgery was consulted and recommended n.p.o. with NG tube and fleets enema with no indications for surgery at this time.    10/29 - transfer to ICU for hypotension.   10/30 - 12cm cecum dilatation. Neostigmine. Hypotension improving. Has not required pressors.       Interval Problem Update  Dose of Relistor, no response  GI consulted, plan for colonoscopy for decompression today   Afebrile  Heart rates 90s to 100s SR/ST  3 L nasal cannula 94% -   130s  Hgb 9 repeat 11.1   Replacing potassium and calcium  N.p.o. with NG tube  I/O equals 1300/765 (+8900 since admit)  Ongoing SHEBA, improving  Heparin  Mobility 1    Review of Systems  Review of Systems   Constitutional: Positive for malaise/fatigue. Negative for chills and fever.   HENT: Negative.    Eyes: Negative for blurred vision and double vision.   Respiratory: Positive for cough. Negative for shortness of breath and wheezing.    Cardiovascular: Negative for chest pain and palpitations.   Gastrointestinal: Positive for abdominal pain and constipation. Negative for nausea and vomiting.   Genitourinary: Negative for flank pain and hematuria.   Musculoskeletal: Positive for back pain and joint pain.   Neurological: Negative for dizziness  and headaches.   Psychiatric/Behavioral: Negative for hallucinations and substance abuse.   All other systems reviewed and are negative.       Vital Signs for last 24 hours   Temp:  [36.1 °C (97 °F)-36.8 °C (98.2 °F)] 36.1 °C (97 °F)  Pulse:  [] 103  Resp:  [8-16] 14  BP: (108-150)/(68-90) 150/90  SpO2:  [94 %-97 %] 94 %    Hemodynamic parameters for last 24 hours       Respiratory Information for the last 24 hours       Physical Exam   Physical Exam  Vitals and nursing note reviewed.   Constitutional:       General: She is sleeping. She is not in acute distress.     Appearance: She is obese. She is ill-appearing. She is not toxic-appearing.      Interventions: Nasal cannula in place.   HENT:      Head: Normocephalic.      Right Ear: Hearing normal.      Left Ear: Hearing normal.      Nose: Nose normal.      Mouth/Throat:      Lips: Pink.      Mouth: Mucous membranes are moist.   Eyes:      Pupils: Pupils are equal, round, and reactive to light.   Cardiovascular:      Rate and Rhythm: Tachycardia present.      Pulses: Decreased pulses.           Dorsalis pedis pulses are 1+ on the right side and 1+ on the left side.      Heart sounds: Heart sounds are distant.   Pulmonary:      Effort: Pulmonary effort is normal. No respiratory distress.      Breath sounds: Decreased breath sounds present. No wheezing.   Abdominal:      General: Bowel sounds are decreased. There is distension.      Tenderness: There is generalized abdominal tenderness.   Musculoskeletal:      Comments: SERRANO 4-5/5   Skin:     General: Skin is warm and dry.   Neurological:      Mental Status: She is oriented to person, place, and time and easily aroused.      Sensory: Sensation is intact.      Motor: Motor function is intact.   Psychiatric:         Mood and Affect: Mood normal.         Behavior: Behavior is cooperative.         Cognition and Memory: Cognition normal.         Judgment: Judgment normal.         Medications  Current  Facility-Administered Medications   Medication Dose Route Frequency Provider Last Rate Last Admin   • potassium chloride 40 mEq in LR 1,000 mL infusion   Intravenous Continuous Abi Kang, A.P.R.N. 75 mL/hr at 11/01/21 0803 New Bag at 11/01/21 0803   • [MAR Hold] potassium chloride (KCL) ivpb 10 mEq  10 mEq Intravenous Q HOUR Abi Kang, A.P.R.N. 100 mL/hr at 11/01/21 1002 10 mEq at 11/01/21 1002   • [MAR Hold] calcium carbonate (TUMS) chewable tab 500 mg  500 mg Oral TID PRN Abi Kang, A.P.R.N.   500 mg at 10/31/21 1040   • [MAR Hold] HYDROmorphone (Dilaudid) injection 0.5-1 mg  0.5-1 mg Intravenous Q2HRS PRN Abi Kang, A.P.R.N.   1 mg at 11/01/21 0755   • [MAR Hold] glucose 4 g chewable tablet 16 g  16 g Oral Q15 MIN PRN Abi Kang, A.P.R.N.        And   • [MAR Hold] dextrose 50% (D50W) injection 50 mL  50 mL Intravenous Q15 MIN PRN Abi Kang, A.P.R.N.       • [MAR Hold] midodrine (PROAMATINE) tablet 10 mg  10 mg Oral TID WITH MEALS Abi Kang, A.P.R.N.   10 mg at 10/31/21 1753   • [MAR Hold] heparin injection 5,000 Units  5,000 Units Subcutaneous Q8HRS Abi Kang, A.P.R.N.   5,000 Units at 10/31/21 2057   • [MAR Hold] atropine injection 0.5 mg  0.5 mg Intravenous Q4HRS PRN Abi Kang, A.P.R.N.       • [MAR Hold] docusate sodium (Colace) oral solution 100 mg  100 mg Oral BID Jeremy M Gonda, M.D.   100 mg at 10/31/21 1754   • [MAR Hold] mag hydrox-al hydrox-simeth (MAALOX PLUS ES or MYLANTA DS) suspension 10 mL  10 mL Oral 4X/DAY PRN Nathan Castro M.D.   10 mL at 10/29/21 0147   • [MAR Hold] piperacillin-tazobactam (ZOSYN) 4.5 g in  mL IVPB  4.5 g Intravenous Q8HRS John Ireland M.D.   Stopped at 11/01/21 0851   • [MAR Hold] lidocaine (LIDODERM) 5 % 1 Patch  1 Patch Transdermal Q24HR Nathan Castro M.D.   1 Patch at 10/31/21 2050   • [MAR Hold] guaiFENesin ER (MUCINEX) tablet 600 mg  600 mg Oral Q12HRS PRN Nathan Castro M.D.   600 mg at 10/28/21 2049   • [MAR Hold] lidocaine  (LIDODERM) 5 % 1 Patch  1 Patch Transdermal Q24HR Nathan Castro M.D.   1 Patch at 10/31/21 2050   • [MAR Hold] allopurinol (ZYLOPRIM) tablet 300 mg  300 mg Oral DAILY Peter Mccracken M.D.   300 mg at 10/31/21 0617   • [MAR Hold] nicotine (NICODERM) 21 MG/24HR 21 mg  1 Patch Transdermal Q24HRS Peter Mccracken M.D.   21 mg at 10/31/21 0613   • [MAR Hold] gabapentin (NEURONTIN) capsule 600 mg  600 mg Oral BID Peter Mccracken M.D.   600 mg at 10/31/21 1753   • [MAR Hold] levothyroxine (SYNTHROID) tablet 37.5 mcg  37.5 mcg Oral AM ES Peter Mccracken M.D.   37.5 mcg at 10/31/21 0611   • [MAR Hold] Pharmacy Consult Request ...Pain Management Review 1 Each  1 Each Other PHARMACY TO DOSE Peter Mccracken M.D.       • [MAR Hold] ondansetron (ZOFRAN) syringe/vial injection 4 mg  4 mg Intravenous Q4HRS PRN Peter Mccracken M.D.   4 mg at 10/29/21 1146   • [MAR Hold] diphenhydrAMINE (BENADRYL) injection 25 mg  25 mg Intravenous Q6HRS PRN Peter Mccracken M.D.   25 mg at 11/01/21 0755   • [MAR Hold] hydrOXYzine HCl (ATARAX) tablet 25 mg  25 mg Oral HS PRN Peter Mccracken M.D.       • [MAR Hold] simethicone (MYLICON) chewable tab 80 mg  80 mg Oral Q8HRS PRN Peter Mccracken M.D.       • [MAR Hold] senna-docusate (PERICOLACE or SENOKOT S) 8.6-50 MG per tablet 2 Tablet  2 Tablet Oral QPM PRN Peter Mccracken M.D.       • [MAR Hold] acetaminophen (TYLENOL) tablet 1,000 mg  1,000 mg Oral Q6HRS Peter Mccracken M.D.   1,000 mg at 10/31/21 1753    Followed by   • [MAR Hold] acetaminophen (TYLENOL) tablet 1,000 mg  1,000 mg Oral Q6HRS PRN Peter Mccracken M.D.           Fluids    Intake/Output Summary (Last 24 hours) at 11/1/2021 1045  Last data filed at 11/1/2021 0600  Gross per 24 hour   Intake 1294.47 ml   Output 765 ml   Net 529.47 ml       Laboratory  Recent Labs     10/29/21  1046   ZZUPR79P 7.23*   [id number] 36.8   [id number] 71.3   UJWR7AYN 93.2   ARTHCO3 15*   ARTBE -12*         Recent Labs     10/29/21  1046 10/29/21  1046 10/30/21  0424  10/30/21  0424 10/31/21  0341 11/01/21  0400 11/01/21  0858   SODIUM 126*   < > 130*   < > 134* 141 143   POTASSIUM 4.1   < > 4.2   < > 4.0 3.1* 3.3*   CHLORIDE 100   < > 105   < > 105 117* 117*   CO2 16*   < > 16*   < > 19* 16* 18*   BUN 18   < > 18   < > 21 21 22   CREATININE 1.55*   < > 1.72*   < > 1.96* 1.70* 1.85*   MAGNESIUM 2.1  --  2.1  --   --   --   --    CALCIUM 7.2*   < > 7.5*   < > 8.3* 6.3* 7.2*    < > = values in this interval not displayed.     Recent Labs     10/29/21  1046 10/30/21  0424 10/31/21  0341 11/01/21  0400 11/01/21  0858   ALTSGPT 13  --   --   --   --    ASTSGOT 35  --   --   --   --    ALKPHOSPHAT 73  --   --   --   --    TBILIRUBIN 0.2  --   --   --   --    GLUCOSE 113*   < > 76 140* 101*    < > = values in this interval not displayed.     Recent Labs     10/29/21  1046 10/29/21  1046 10/30/21  0424 10/31/21  0341 11/01/21  0400   WBC 14.9*   < > 14.5* 13.4* 9.5   NEUTSPOLYS 76.80*   < > 80.00* 82.70* 79.80*   LYMPHOCYTES 13.70*   < > 9.60* 7.50* 9.50*   MONOCYTES 8.40   < > 9.40 8.20 9.10   EOSINOPHILS 0.30   < > 0.20 0.50 0.70   BASOPHILS 0.30   < > 0.30 0.40 0.30   ASTSGOT 35  --   --   --   --    ALTSGPT 13  --   --   --   --    ALKPHOSPHAT 73  --   --   --   --    TBILIRUBIN 0.2  --   --   --   --     < > = values in this interval not displayed.     Recent Labs     10/29/21  1046 10/29/21  1046 10/30/21  0424 10/30/21  0424 10/31/21  0341 11/01/21  0400 11/01/21  0823   RBC 3.99*   < > 4.01*  --  3.91* 2.92*  --    HEMOGLOBIN 12.4   < > 12.5   < > 12.2 9.0* 11.1*   HEMATOCRIT 36.9*   < > 38.2   < > 37.0 28.2* 33.5*   PLATELETCT 250   < > 249  --  253 196  --    PROTHROMBTM 14.7*  --   --   --   --   --   --    INR 1.18*  --   --   --   --   --   --     < > = values in this interval not displayed.       Imaging  No new imaging toda    Assessment/Plan  * Jailene's syndrome  Assessment & Plan  -Multifactorial due to recent surgical procedure, immobility, anesthesia and  narcotics  -CT abdomen/pelvis air-filled distention of the cecum is seen measuring 8 cm  -fleets enema, no results  -bowel protocol  -s/p neostigmine x 2 - no results. Has also received Relastor  -GI for decompression today  -general surgery has consulted, no need for surgical intervention at this time  -NGT  -NPO    SHEBA (acute kidney injury) (HCC)  Assessment & Plan  -Multifactorial due to recent surgery, hypotension  -Avoid nephrotoxins  -Renally dose medications as indicated  -Follow CBC      Hypokalemia  Assessment & Plan  -replacing  -follow BMP    Hypoglycemia  Assessment & Plan  -due to NPO status due to ileus  -resolved    Gout- (present on admission)  Assessment & Plan  -history of  -home Allopurinol    Post-op pain  Assessment & Plan  -POD #5 Robotic-assisted laparoscopic left partial nephrectomy.  -Judicious use of IV Dilaudid for pain  -Encourage mobility  -hold all narcotics for RR <12 and/or AMS    Leukocytosis- (present on admission)  Assessment & Plan  -resolved    Hyponatremia- (present on admission)  Assessment & Plan  -resolved    Renal mass- (present on admission)  Assessment & Plan  -Left  -Status post nephrectomy 10/27  -Pathology: Clear cell renal cell carcinoma, ISUP grade 1  -will need Hem/Onc consult once ileus resolved       VTE:  Heparin  Ulcer: Not Indicated  Lines: [place] Catheter  Ongoing indication addressed    I have performed a physical exam and reviewed and updated ROS and Plan today (11/1/2021). In review of yesterday's note (10/31/2021), there are no changes except as documented above.     Discussed patient condition and risk of morbidity and/or mortality with RN, Pharmacy, Charge nurse / hot rounds, Patient, general surgery, GI and urology and My attending, Dr. Winn  The patient remains critically ill.  Critical care time = 35 minutes in directly providing and coordinating critical care and extensive data review.  No time overlap and excludes procedures.    Please note that this  dictation was created using voice recognition software. I have made every reasonable attempt to correct obvious errors, but there may be errors of grammar and possibly content that I did not discover before finalizing the note.    NAPOLEON Reveles.

## 2021-11-01 NOTE — PROGRESS NOTES
"Urology Progress Note    Left renal mass s/p robotic assisted laparoscopic partial left nephrectomy 10/27/21    S: Pt seen and examined in bed on rounds, somnolent. JARRETT output 115mL in last 24h, serosanguinous in bulb. Urine output is decreased at 650mL/24hrs (640mL). Cr downtrending today now 1.7 (1.96)(1.72)(1.55). NPO for colonoscopy today.   O:   /77   Pulse 93   Temp 36.7 °C (98 °F) (Temporal)   Resp (!) 11   Ht 1.651 m (5' 5\")   Wt 88.2 kg (194 lb 7.1 oz)   SpO2 95%   Recent Labs     10/30/21  0424 10/31/21  0341 11/01/21  0400   SODIUM 130* 134* 141   POTASSIUM 4.2 4.0 3.1*   CHLORIDE 105 105 117*   CO2 16* 19* 16*   GLUCOSE 112* 76 140*   BUN 18 21 21   CREATININE 1.72* 1.96* 1.70*   CALCIUM 7.5* 8.3* 6.3*     Recent Labs     10/30/21  0424 10/30/21  0424 10/31/21  0341 11/01/21  0400 11/01/21  0823   WBC 14.5*  --  13.4* 9.5  --    RBC 4.01*  --  3.91* 2.92*  --    HEMOGLOBIN 12.5   < > 12.2 9.0* 11.1*   HEMATOCRIT 38.2   < > 37.0 28.2* 33.5*   MCV 95.3  --  94.6 96.6  --    MCH 31.2  --  31.2 30.8  --    MCHC 32.7*  --  33.0* 31.9*  --    RDW 49.1  --  48.8 50.6*  --    PLATELETCT 249  --  253 196  --    MPV 9.2  --  9.9 9.3  --     < > = values in this interval not displayed.         Intake/Output Summary (Last 24 hours) at 10/29/2021 1616  Last data filed at 10/29/2021 1400  Gross per 24 hour   Intake 8279.42 ml   Output 780 ml   Net 7499.42 ml       Exam:  Gen: NAD   Abd: Distended.  Some abdominal tenderness. JARRETT with serosanguinous output  Incisions clean, dry, intact. Area of erythema extending over anterior surface of abdomen and around to L hip, tender to palpation, has not increased in size from yesterday. No purulent discharge from incisions.   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 Transferred to ICU due to hypotension refractory to fluid bolus. Antibiotics changed to Zosyn.     Plan:  -Continue cares per ICU team  - monitor output via patel; patel to remain until deemed no longer necessary for medical management. Of note, urine output has been low, will need to continue to monitor rising Cr and checking serial labs  - monitor pain control  - Incentive spirometry  - monitor H/H  - Continue zosyn  - appreciate hospitalist, GI, and gen surg recs   - NPO for colonoscopy today with GI  - Urology will continue to follow. If Cr remains elevated, may consider further imaging.

## 2021-11-01 NOTE — ANESTHESIA PROCEDURE NOTES
Airway    Date/Time: 11/1/2021 11:25 AM  Performed by: Alma Rosa Dinh M.D.  Authorized by: Alma Rosa Dinh M.D.     Location:  OR  Urgency:  Elective  Difficult Airway: No    Indications for Airway Management:  Anesthesia      Spontaneous Ventilation: present    Sedation Level:  Deep  Preoxygenated: Yes    Patient Position:  Sniffing  MILS Maintained Throughout: No    Mask Difficulty Assessment:  1 - vent by mask  Final Airway Type:  Endotracheal airway  Final Endotracheal Airway:  ETT  Cuffed: Yes    Technique Used for Successful ETT Placement:  Direct laryngoscopy  Devices/Methods Used in Placement:  Intubating stylet    Insertion Site:  Oral  Blade Type:  Valdez  Laryngoscope Blade/Videolaryngoscope Blade Size:  2  ETT Size (mm):  6.5  Cormack-Lehane Classification:  Grade I - full view of glottis           cognition/Pt had great difficulty maintaining NWB RLE. To take a few steps to the chair pt was educated and demonstrated on hopping but could not return demonstrate this- therefore putting weight into his R foot./impaired coordination/impaired balance

## 2021-11-01 NOTE — ANESTHESIA POSTPROCEDURE EVALUATION
Patient: Thais Munroe    Procedure Summary     Date: 10/27/21 Room / Location: [place] [address] / SURGERY [facility]    Anesthesia Start: 1553 Anesthesia Stop: 1842    Procedures:       NEPHRECTOMY, PARTIAL, ROBOT-ASSISTED, USING DA NAT XI (Left Abdomen)      ULTRASOUND GUIDANCE (Left Abdomen) Diagnosis: (LEFT RENAL MASS)    Surgeons: Peter Mccracken M.D. Responsible Provider: Gautam Garcia M.D.    Anesthesia Type: general ASA Status: 2          Final Anesthesia Type: general  Last vitals  BP   Blood Pressure: 141/79    Temp   36.2 °C (97.2 °F)    Pulse   (!) 103   Resp   14    SpO2   95 %      Anesthesia Post Evaluation    Patient location during evaluation: PACU  Patient participation: complete - patient participated  Level of consciousness: awake and alert  Pain score: 2    Airway patency: patent  Anesthetic complications: no  Cardiovascular status: hemodynamically stable  Respiratory status: acceptable  Hydration status: euvolemic    PONV: none          There were no known complications for this encounter.     Nurse Pain Score: 2 (NPRS)

## 2021-11-01 NOTE — CARE PLAN
Problem: Nutritional:  Goal: Achieve adequate nutritional intake  Description: Advance diet as tolerated past clear liquids. Patient will consume >50% of meals.  Outcome: Not Met

## 2021-11-01 NOTE — OR NURSING
1200- Handoff report received from PATRICK De La Paz     1219- Report called to PATRICK Barry.     1233- pt cleaned up, on new sheets.      1236- transferred out of PACU, back to ICU.

## 2021-11-01 NOTE — DIETARY
Nutrition Services: Update   Pt is day #3 of NPO or clear liquid diet due to Fellows's syndrome. S/P colonoscopy today for decompression. Diet now advanced to clear liquid.    Recommendations/Plan:  1. Recommend continue to advance diet as tolerated past clear liquids.  2. Encourage intake of meals >50%  3. Document intake of all meals as % taken in ADL's to provide interdisciplinary communication across all shifts.   4. Monitor weight.  5. Nutrition rep will continue to see patient for ongoing meal and snack preferences.  6. Obtain supplement order per RD as needed.    RD following

## 2021-11-01 NOTE — ANESTHESIA POSTPROCEDURE EVALUATION
Patient: Thais Munroe    Procedure Summary     Date: 11/01/21 Room / Location: [facility] ROOM 26 / SURGERY SAME DAY [facility]    Anesthesia Start: 1113 Anesthesia Stop: 1154    Procedure: COLONOSCOPY (N/A Anus) Diagnosis: (DIVERTICULOSIS, STOOL, TRANSVERSE COLON POLYP LEFT IN PLACE)    Surgeons: Jesus Bland M.D. Responsible Provider: Alma Rosa Dinh M.D.    Anesthesia Type: general ASA Status: 2          Final Anesthesia Type: general  Last vitals  BP   Blood Pressure: 141/79    Temp   36.2 °C (97.2 °F)    Pulse   (!) 103   Resp   14    SpO2   95 %      Anesthesia Post Evaluation    Patient location during evaluation: PACU  Patient participation: complete - patient participated  Level of consciousness: awake and alert  Pain score: 2    Airway patency: patent  Anesthetic complications: no  Cardiovascular status: adequate  Respiratory status: acceptable and face mask  Hydration status: acceptable    PONV: none          No complications documented.     Nurse Pain Score: 2 (NPRS)

## 2021-11-01 NOTE — THERAPY
SLP Contact Note    Orders for clinical swallow evaluation received. Per RN and APRN, pt is strict NPO for GI sx, does not need a swallow eval. RN to cancel. Please reorder with any new concerns re: oropharyngeal swallow s/p sx.

## 2021-11-02 ENCOUNTER — APPOINTMENT (OUTPATIENT)
Dept: RADIOLOGY | Facility: MEDICAL CENTER | Age: 66
DRG: 657 | End: 2021-11-02
Attending: STUDENT IN AN ORGANIZED HEALTH CARE EDUCATION/TRAINING PROGRAM
Payer: COMMERCIAL

## 2021-11-02 ENCOUNTER — ANESTHESIA EVENT (OUTPATIENT)
Dept: SURGERY | Facility: MEDICAL CENTER | Age: 66
DRG: 657 | End: 2021-11-02
Payer: COMMERCIAL

## 2021-11-02 ENCOUNTER — ANESTHESIA (OUTPATIENT)
Dept: SURGERY | Facility: MEDICAL CENTER | Age: 66
DRG: 657 | End: 2021-11-02
Payer: COMMERCIAL

## 2021-11-02 ENCOUNTER — APPOINTMENT (OUTPATIENT)
Dept: RADIOLOGY | Facility: MEDICAL CENTER | Age: 66
DRG: 657 | End: 2021-11-02
Attending: PHYSICIAN ASSISTANT
Payer: COMMERCIAL

## 2021-11-02 PROBLEM — E87.6 HYPOKALEMIA: Status: RESOLVED | Noted: 2021-11-01 | Resolved: 2021-11-02

## 2021-11-02 PROBLEM — D72.829 LEUKOCYTOSIS: Status: RESOLVED | Noted: 2021-10-28 | Resolved: 2021-11-02

## 2021-11-02 PROBLEM — C64.9 RENAL CELL CARCINOMA (HCC): Status: ACTIVE | Noted: 2021-11-02

## 2021-11-02 PROBLEM — N28.89 RENAL MASS: Status: RESOLVED | Noted: 2021-10-28 | Resolved: 2021-11-02

## 2021-11-02 PROBLEM — I10 HYPERTENSION: Status: ACTIVE | Noted: 2021-10-13

## 2021-11-02 PROBLEM — E87.1 HYPONATREMIA: Status: RESOLVED | Noted: 2021-10-28 | Resolved: 2021-11-02

## 2021-11-02 LAB
ANION GAP SERPL CALC-SCNC: 12 MMOL/L (ref 7–16)
BASOPHILS # BLD AUTO: 0.7 % (ref 0–1.8)
BASOPHILS # BLD: 0.08 K/UL (ref 0–0.12)
BODY FLD TYPE: NORMAL
BUN SERPL-MCNC: 23 MG/DL (ref 8–22)
CALCIUM SERPL-MCNC: 8.7 MG/DL (ref 8.5–10.5)
CHLORIDE SERPL-SCNC: 110 MMOL/L (ref 96–112)
CO2 SERPL-SCNC: 17 MMOL/L (ref 20–33)
CREAT FLD-MCNC: 2.22 MG/DL
CREAT SERPL-MCNC: 1.98 MG/DL (ref 0.5–1.4)
EOSINOPHIL # BLD AUTO: 0.14 K/UL (ref 0–0.51)
EOSINOPHIL NFR BLD: 1.2 % (ref 0–6.9)
ERYTHROCYTE [DISTWIDTH] IN BLOOD BY AUTOMATED COUNT: 50.9 FL (ref 35.9–50)
EXTERNAL QUALITY CONTROL: NORMAL
GLUCOSE SERPL-MCNC: 106 MG/DL (ref 65–99)
HCT VFR BLD AUTO: 36.1 % (ref 37–47)
HGB BLD-MCNC: 11.7 G/DL (ref 12–16)
IMM GRANULOCYTES # BLD AUTO: 0.05 K/UL (ref 0–0.11)
IMM GRANULOCYTES NFR BLD AUTO: 0.4 % (ref 0–0.9)
LYMPHOCYTES # BLD AUTO: 1.07 K/UL (ref 1–4.8)
LYMPHOCYTES NFR BLD: 9.5 % (ref 22–41)
MCH RBC QN AUTO: 31.3 PG (ref 27–33)
MCHC RBC AUTO-ENTMCNC: 32.4 G/DL (ref 33.6–35)
MCV RBC AUTO: 96.5 FL (ref 81.4–97.8)
MONOCYTES # BLD AUTO: 0.86 K/UL (ref 0–0.85)
MONOCYTES NFR BLD AUTO: 7.6 % (ref 0–13.4)
NEUTROPHILS # BLD AUTO: 9.1 K/UL (ref 2–7.15)
NEUTROPHILS NFR BLD: 80.6 % (ref 44–72)
NRBC # BLD AUTO: 0 K/UL
NRBC BLD-RTO: 0 /100 WBC
PLATELET # BLD AUTO: 293 K/UL (ref 164–446)
PMV BLD AUTO: 9.7 FL (ref 9–12.9)
POTASSIUM SERPL-SCNC: 5.1 MMOL/L (ref 3.6–5.5)
RBC # BLD AUTO: 3.74 M/UL (ref 4.2–5.4)
SARS-COV+SARS-COV-2 AG RESP QL IA.RAPID: NEGATIVE
SODIUM SERPL-SCNC: 139 MMOL/L (ref 135–145)
WBC # BLD AUTO: 11.3 K/UL (ref 4.8–10.8)

## 2021-11-02 PROCEDURE — 97166 OT EVAL MOD COMPLEX 45 MIN: CPT

## 2021-11-02 PROCEDURE — 770001 HCHG ROOM/CARE - MED/SURG/GYN PRIV*

## 2021-11-02 PROCEDURE — 700105 HCHG RX REV CODE 258: Performed by: NURSE PRACTITIONER

## 2021-11-02 PROCEDURE — 700101 HCHG RX REV CODE 250: Performed by: ANESTHESIOLOGY

## 2021-11-02 PROCEDURE — 87426 SARSCOV CORONAVIRUS AG IA: CPT | Performed by: SURGERY

## 2021-11-02 PROCEDURE — 0WQF0ZZ REPAIR ABDOMINAL WALL, OPEN APPROACH: ICD-10-PCS | Performed by: SURGERY

## 2021-11-02 PROCEDURE — 700101 HCHG RX REV CODE 250: Performed by: STUDENT IN AN ORGANIZED HEALTH CARE EDUCATION/TRAINING PROGRAM

## 2021-11-02 PROCEDURE — 160028 HCHG SURGERY MINUTES - 1ST 30 MINS LEVEL 3: Performed by: SURGERY

## 2021-11-02 PROCEDURE — 74176 CT ABD & PELVIS W/O CONTRAST: CPT

## 2021-11-02 PROCEDURE — 74018 RADEX ABDOMEN 1 VIEW: CPT

## 2021-11-02 PROCEDURE — 85025 COMPLETE CBC W/AUTO DIFF WBC: CPT

## 2021-11-02 PROCEDURE — 160002 HCHG RECOVERY MINUTES (STAT): Performed by: SURGERY

## 2021-11-02 PROCEDURE — 700111 HCHG RX REV CODE 636 W/ 250 OVERRIDE (IP): Performed by: STUDENT IN AN ORGANIZED HEALTH CARE EDUCATION/TRAINING PROGRAM

## 2021-11-02 PROCEDURE — 80048 BASIC METABOLIC PNL TOTAL CA: CPT

## 2021-11-02 PROCEDURE — 501838 HCHG SUTURE GENERAL: Performed by: SURGERY

## 2021-11-02 PROCEDURE — A9270 NON-COVERED ITEM OR SERVICE: HCPCS | Performed by: INTERNAL MEDICINE

## 2021-11-02 PROCEDURE — 700101 HCHG RX REV CODE 250: Performed by: PSYCHIATRY & NEUROLOGY

## 2021-11-02 PROCEDURE — 700111 HCHG RX REV CODE 636 W/ 250 OVERRIDE (IP): Performed by: NURSE PRACTITIONER

## 2021-11-02 PROCEDURE — 160036 HCHG PACU - EA ADDL 30 MINS PHASE I: Performed by: SURGERY

## 2021-11-02 PROCEDURE — 700105 HCHG RX REV CODE 258: Performed by: INTERNAL MEDICINE

## 2021-11-02 PROCEDURE — 700105 HCHG RX REV CODE 258: Performed by: ANESTHESIOLOGY

## 2021-11-02 PROCEDURE — 700111 HCHG RX REV CODE 636 W/ 250 OVERRIDE (IP): Performed by: ANESTHESIOLOGY

## 2021-11-02 PROCEDURE — 97162 PT EVAL MOD COMPLEX 30 MIN: CPT

## 2021-11-02 PROCEDURE — 700102 HCHG RX REV CODE 250 W/ 637 OVERRIDE(OP): Performed by: INTERNAL MEDICINE

## 2021-11-02 PROCEDURE — 160009 HCHG ANES TIME/MIN: Performed by: SURGERY

## 2021-11-02 PROCEDURE — 160035 HCHG PACU - 1ST 60 MINS PHASE I: Performed by: SURGERY

## 2021-11-02 PROCEDURE — 160039 HCHG SURGERY MINUTES - EA ADDL 1 MIN LEVEL 3: Performed by: SURGERY

## 2021-11-02 PROCEDURE — 160048 HCHG OR STATISTICAL LEVEL 1-5: Performed by: SURGERY

## 2021-11-02 PROCEDURE — 700102 HCHG RX REV CODE 250 W/ 637 OVERRIDE(OP): Performed by: UROLOGY

## 2021-11-02 PROCEDURE — 99231 SBSQ HOSP IP/OBS SF/LOW 25: CPT | Performed by: NURSE PRACTITIONER

## 2021-11-02 PROCEDURE — A9270 NON-COVERED ITEM OR SERVICE: HCPCS | Performed by: UROLOGY

## 2021-11-02 PROCEDURE — 502240 HCHG MISC OR SUPPLY RC 0272: Performed by: SURGERY

## 2021-11-02 PROCEDURE — 99233 SBSQ HOSP IP/OBS HIGH 50: CPT | Performed by: HOSPITALIST

## 2021-11-02 PROCEDURE — 700111 HCHG RX REV CODE 636 W/ 250 OVERRIDE (IP): Performed by: INTERNAL MEDICINE

## 2021-11-02 PROCEDURE — 700111 HCHG RX REV CODE 636 W/ 250 OVERRIDE (IP): Performed by: UROLOGY

## 2021-11-02 PROCEDURE — 82570 ASSAY OF URINE CREATININE: CPT

## 2021-11-02 RX ORDER — SODIUM CHLORIDE, SODIUM LACTATE, POTASSIUM CHLORIDE, CALCIUM CHLORIDE 600; 310; 30; 20 MG/100ML; MG/100ML; MG/100ML; MG/100ML
INJECTION, SOLUTION INTRAVENOUS CONTINUOUS
Status: DISCONTINUED | OUTPATIENT
Start: 2021-11-02 | End: 2021-11-02 | Stop reason: HOSPADM

## 2021-11-02 RX ORDER — SODIUM CHLORIDE, SODIUM LACTATE, POTASSIUM CHLORIDE, CALCIUM CHLORIDE 600; 310; 30; 20 MG/100ML; MG/100ML; MG/100ML; MG/100ML
INJECTION, SOLUTION INTRAVENOUS
Status: DISCONTINUED | OUTPATIENT
Start: 2021-11-02 | End: 2021-11-02 | Stop reason: SURG

## 2021-11-02 RX ORDER — HYDRALAZINE HYDROCHLORIDE 20 MG/ML
5 INJECTION INTRAMUSCULAR; INTRAVENOUS
Status: DISCONTINUED | OUTPATIENT
Start: 2021-11-02 | End: 2021-11-02 | Stop reason: HOSPADM

## 2021-11-02 RX ORDER — IPRATROPIUM BROMIDE AND ALBUTEROL SULFATE 2.5; .5 MG/3ML; MG/3ML
3 SOLUTION RESPIRATORY (INHALATION)
Status: DISCONTINUED | OUTPATIENT
Start: 2021-11-02 | End: 2021-11-02 | Stop reason: HOSPADM

## 2021-11-02 RX ORDER — OXYCODONE HCL 5 MG/5 ML
5 SOLUTION, ORAL ORAL
Status: DISCONTINUED | OUTPATIENT
Start: 2021-11-02 | End: 2021-11-02 | Stop reason: HOSPADM

## 2021-11-02 RX ORDER — DIPHENHYDRAMINE HYDROCHLORIDE 50 MG/ML
12.5 INJECTION INTRAMUSCULAR; INTRAVENOUS
Status: DISCONTINUED | OUTPATIENT
Start: 2021-11-02 | End: 2021-11-02 | Stop reason: HOSPADM

## 2021-11-02 RX ORDER — HALOPERIDOL 5 MG/ML
1 INJECTION INTRAMUSCULAR
Status: DISCONTINUED | OUTPATIENT
Start: 2021-11-02 | End: 2021-11-02 | Stop reason: HOSPADM

## 2021-11-02 RX ORDER — LIDOCAINE HYDROCHLORIDE 40 MG/ML
SOLUTION TOPICAL PRN
Status: DISCONTINUED | OUTPATIENT
Start: 2021-11-02 | End: 2021-11-02 | Stop reason: SURG

## 2021-11-02 RX ORDER — MIDODRINE HYDROCHLORIDE 5 MG/1
2.5 TABLET ORAL 2 TIMES DAILY
Status: ACTIVE | OUTPATIENT
Start: 2021-11-02 | End: 2021-11-03

## 2021-11-02 RX ORDER — LIDOCAINE HYDROCHLORIDE 20 MG/ML
INJECTION, SOLUTION EPIDURAL; INFILTRATION; INTRACAUDAL; PERINEURAL PRN
Status: DISCONTINUED | OUTPATIENT
Start: 2021-11-02 | End: 2021-11-02 | Stop reason: SURG

## 2021-11-02 RX ORDER — ROCURONIUM BROMIDE 10 MG/ML
INJECTION, SOLUTION INTRAVENOUS PRN
Status: DISCONTINUED | OUTPATIENT
Start: 2021-11-02 | End: 2021-11-02 | Stop reason: SURG

## 2021-11-02 RX ORDER — HYDRALAZINE HYDROCHLORIDE 20 MG/ML
20 INJECTION INTRAMUSCULAR; INTRAVENOUS EVERY 6 HOURS PRN
Status: DISCONTINUED | OUTPATIENT
Start: 2021-11-02 | End: 2021-11-09 | Stop reason: HOSPADM

## 2021-11-02 RX ORDER — HYDROMORPHONE HYDROCHLORIDE 1 MG/ML
0.2 INJECTION, SOLUTION INTRAMUSCULAR; INTRAVENOUS; SUBCUTANEOUS
Status: DISCONTINUED | OUTPATIENT
Start: 2021-11-02 | End: 2021-11-02 | Stop reason: HOSPADM

## 2021-11-02 RX ORDER — LABETALOL HYDROCHLORIDE 5 MG/ML
5 INJECTION, SOLUTION INTRAVENOUS
Status: DISCONTINUED | OUTPATIENT
Start: 2021-11-02 | End: 2021-11-02 | Stop reason: HOSPADM

## 2021-11-02 RX ORDER — ONDANSETRON 2 MG/ML
INJECTION INTRAMUSCULAR; INTRAVENOUS PRN
Status: DISCONTINUED | OUTPATIENT
Start: 2021-11-02 | End: 2021-11-02 | Stop reason: SURG

## 2021-11-02 RX ORDER — MAGNESIUM SULFATE HEPTAHYDRATE 40 MG/ML
INJECTION, SOLUTION INTRAVENOUS PRN
Status: DISCONTINUED | OUTPATIENT
Start: 2021-11-02 | End: 2021-11-02 | Stop reason: SURG

## 2021-11-02 RX ORDER — DEXAMETHASONE SODIUM PHOSPHATE 4 MG/ML
INJECTION, SOLUTION INTRA-ARTICULAR; INTRALESIONAL; INTRAMUSCULAR; INTRAVENOUS; SOFT TISSUE PRN
Status: DISCONTINUED | OUTPATIENT
Start: 2021-11-02 | End: 2021-11-02 | Stop reason: SURG

## 2021-11-02 RX ORDER — MORPHINE SULFATE 4 MG/ML
2 INJECTION, SOLUTION INTRAMUSCULAR; INTRAVENOUS EVERY 4 HOURS PRN
Status: DISPENSED | OUTPATIENT
Start: 2021-11-02 | End: 2021-11-03

## 2021-11-02 RX ORDER — OXYCODONE HCL 5 MG/5 ML
10 SOLUTION, ORAL ORAL
Status: DISCONTINUED | OUTPATIENT
Start: 2021-11-02 | End: 2021-11-02 | Stop reason: HOSPADM

## 2021-11-02 RX ORDER — FUROSEMIDE 10 MG/ML
20 INJECTION INTRAMUSCULAR; INTRAVENOUS ONCE
Status: COMPLETED | OUTPATIENT
Start: 2021-11-02 | End: 2021-11-02

## 2021-11-02 RX ORDER — PHENYLEPHRINE HCL IN 0.9% NACL 0.5 MG/5ML
SYRINGE (ML) INTRAVENOUS PRN
Status: DISCONTINUED | OUTPATIENT
Start: 2021-11-02 | End: 2021-11-02 | Stop reason: SURG

## 2021-11-02 RX ORDER — SUCCINYLCHOLINE CHLORIDE 20 MG/ML
INJECTION INTRAMUSCULAR; INTRAVENOUS PRN
Status: DISCONTINUED | OUTPATIENT
Start: 2021-11-02 | End: 2021-11-02 | Stop reason: SURG

## 2021-11-02 RX ORDER — HYDROMORPHONE HYDROCHLORIDE 1 MG/ML
0.1 INJECTION, SOLUTION INTRAMUSCULAR; INTRAVENOUS; SUBCUTANEOUS
Status: DISCONTINUED | OUTPATIENT
Start: 2021-11-02 | End: 2021-11-02 | Stop reason: HOSPADM

## 2021-11-02 RX ORDER — HYDROMORPHONE HYDROCHLORIDE 1 MG/ML
0.4 INJECTION, SOLUTION INTRAMUSCULAR; INTRAVENOUS; SUBCUTANEOUS
Status: DISCONTINUED | OUTPATIENT
Start: 2021-11-02 | End: 2021-11-02 | Stop reason: HOSPADM

## 2021-11-02 RX ADMIN — HYDRALAZINE HYDROCHLORIDE 20 MG: 20 INJECTION INTRAMUSCULAR; INTRAVENOUS at 10:34

## 2021-11-02 RX ADMIN — SUCCINYLCHOLINE CHLORIDE 88.2 MG: 20 INJECTION, SOLUTION INTRAMUSCULAR; INTRAVENOUS; PARENTERAL at 16:06

## 2021-11-02 RX ADMIN — NICOTINE TRANSDERMAL SYSTEM 21 MG: 21 PATCH, EXTENDED RELEASE TRANSDERMAL at 05:16

## 2021-11-02 RX ADMIN — PIPERACILLIN AND TAZOBACTAM 4.5 G: 4; .5 INJECTION, POWDER, LYOPHILIZED, FOR SOLUTION INTRAVENOUS; PARENTERAL at 05:11

## 2021-11-02 RX ADMIN — ONDANSETRON 4 MG: 2 INJECTION INTRAMUSCULAR; INTRAVENOUS at 03:05

## 2021-11-02 RX ADMIN — FENTANYL CITRATE 25 MCG: 50 INJECTION, SOLUTION INTRAMUSCULAR; INTRAVENOUS at 17:26

## 2021-11-02 RX ADMIN — MAGNESIUM SULFATE HEPTAHYDRATE 4 G: 40 INJECTION, SOLUTION INTRAVENOUS at 16:13

## 2021-11-02 RX ADMIN — DOCUSATE SODIUM 100 MG: 50 LIQUID ORAL at 05:14

## 2021-11-02 RX ADMIN — LABETALOL HYDROCHLORIDE 10 MG: 5 INJECTION, SOLUTION INTRAVENOUS at 01:02

## 2021-11-02 RX ADMIN — DEXAMETHASONE SODIUM PHOSPHATE 10 MG: 4 INJECTION, SOLUTION INTRA-ARTICULAR; INTRALESIONAL; INTRAMUSCULAR; INTRAVENOUS; SOFT TISSUE at 16:10

## 2021-11-02 RX ADMIN — HYDRALAZINE HYDROCHLORIDE 20 MG: 20 INJECTION INTRAMUSCULAR; INTRAVENOUS at 04:33

## 2021-11-02 RX ADMIN — ONDANSETRON 8 MG: 2 INJECTION INTRAMUSCULAR; INTRAVENOUS at 16:42

## 2021-11-02 RX ADMIN — PROPOFOL 140 MG: 10 INJECTION, EMULSION INTRAVENOUS at 16:06

## 2021-11-02 RX ADMIN — LIDOCAINE 1 PATCH: 50 PATCH CUTANEOUS at 22:09

## 2021-11-02 RX ADMIN — FUROSEMIDE 20 MG: 10 INJECTION, SOLUTION INTRAMUSCULAR; INTRAVENOUS at 07:33

## 2021-11-02 RX ADMIN — HEPARIN SODIUM 5000 UNITS: 5000 INJECTION, SOLUTION INTRAVENOUS; SUBCUTANEOUS at 05:16

## 2021-11-02 RX ADMIN — GABAPENTIN 600 MG: 300 CAPSULE ORAL at 05:15

## 2021-11-02 RX ADMIN — IPRATROPIUM BROMIDE AND ALBUTEROL SULFATE 3 ML: .5; 2.5 SOLUTION RESPIRATORY (INHALATION) at 17:37

## 2021-11-02 RX ADMIN — FENTANYL CITRATE 25 MCG: 50 INJECTION, SOLUTION INTRAMUSCULAR; INTRAVENOUS at 17:07

## 2021-11-02 RX ADMIN — SODIUM CHLORIDE, POTASSIUM CHLORIDE, SODIUM LACTATE AND CALCIUM CHLORIDE: 600; 310; 30; 20 INJECTION, SOLUTION INTRAVENOUS at 15:58

## 2021-11-02 RX ADMIN — LABETALOL HYDROCHLORIDE 5 MG: 5 INJECTION, SOLUTION INTRAVENOUS at 17:55

## 2021-11-02 RX ADMIN — MORPHINE SULFATE 2 MG: 4 INJECTION INTRAVENOUS at 21:35

## 2021-11-02 RX ADMIN — LIDOCAINE HYDROCHLORIDE 100 MG: 20 INJECTION, SOLUTION EPIDURAL; INFILTRATION; INTRACAUDAL at 16:00

## 2021-11-02 RX ADMIN — SUGAMMADEX 200 MG: 100 INJECTION, SOLUTION INTRAVENOUS at 16:42

## 2021-11-02 RX ADMIN — LEVOTHYROXINE SODIUM 37.5 MCG: 0.03 TABLET ORAL at 05:15

## 2021-11-02 RX ADMIN — LABETALOL HYDROCHLORIDE 10 MG: 5 INJECTION, SOLUTION INTRAVENOUS at 09:05

## 2021-11-02 RX ADMIN — Medication 100 MCG: at 16:10

## 2021-11-02 RX ADMIN — POTASSIUM CHLORIDE: 2 INJECTION, SOLUTION, CONCENTRATE INTRAVENOUS at 02:08

## 2021-11-02 RX ADMIN — ROCURONIUM BROMIDE 20 MG: 10 INJECTION, SOLUTION INTRAVENOUS at 16:13

## 2021-11-02 RX ADMIN — FENTANYL CITRATE 25 MCG: 50 INJECTION, SOLUTION INTRAMUSCULAR; INTRAVENOUS at 17:11

## 2021-11-02 RX ADMIN — LIDOCAINE HYDROCHLORIDE 4 ML: 40 SOLUTION TOPICAL at 16:06

## 2021-11-02 RX ADMIN — FENTANYL CITRATE 25 MCG: 50 INJECTION, SOLUTION INTRAMUSCULAR; INTRAVENOUS at 17:18

## 2021-11-02 RX ADMIN — ALLOPURINOL 300 MG: 100 TABLET ORAL at 05:15

## 2021-11-02 RX ADMIN — PIPERACILLIN AND TAZOBACTAM 4.5 G: 4; .5 INJECTION, POWDER, LYOPHILIZED, FOR SOLUTION INTRAVENOUS; PARENTERAL at 21:42

## 2021-11-02 RX ADMIN — PIPERACILLIN AND TAZOBACTAM 4.5 G: 4; .5 INJECTION, POWDER, LYOPHILIZED, FOR SOLUTION INTRAVENOUS; PARENTERAL at 13:59

## 2021-11-02 ASSESSMENT — ENCOUNTER SYMPTOMS
BACK PAIN: 0
BLOOD IN STOOL: 0
WEAKNESS: 1
CONSTIPATION: 0
CHILLS: 0
SHORTNESS OF BREATH: 0
EYES NEGATIVE: 1
FALLS: 0
VOMITING: 0
COUGH: 1
NECK PAIN: 0
NERVOUS/ANXIOUS: 0
FLANK PAIN: 0
NAUSEA: 1
NAUSEA: 0
SORE THROAT: 0
FEVER: 0
HEADACHES: 0
BACK PAIN: 1
BLURRED VISION: 0
CONSTIPATION: 1
PALPITATIONS: 0
DIZZINESS: 0
WHEEZING: 0
ABDOMINAL PAIN: 1

## 2021-11-02 ASSESSMENT — COGNITIVE AND FUNCTIONAL STATUS - GENERAL
MOVING FROM LYING ON BACK TO SITTING ON SIDE OF FLAT BED: UNABLE
DRESSING REGULAR LOWER BODY CLOTHING: A LOT
SUGGESTED CMS G CODE MODIFIER DAILY ACTIVITY: CK
PERSONAL GROOMING: A LITTLE
EATING MEALS: A LITTLE
DAILY ACTIVITIY SCORE: 15
HELP NEEDED FOR BATHING: A LOT
SUGGESTED CMS G CODE MODIFIER MOBILITY: CM
DRESSING REGULAR UPPER BODY CLOTHING: A LITTLE
STANDING UP FROM CHAIR USING ARMS: A LOT
WALKING IN HOSPITAL ROOM: TOTAL
TURNING FROM BACK TO SIDE WHILE IN FLAT BAD: UNABLE
MOBILITY SCORE: 7
CLIMB 3 TO 5 STEPS WITH RAILING: TOTAL
MOVING TO AND FROM BED TO CHAIR: UNABLE
TOILETING: A LOT

## 2021-11-02 ASSESSMENT — PAIN DESCRIPTION - PAIN TYPE
TYPE: SURGICAL PAIN
TYPE: SURGICAL PAIN

## 2021-11-02 ASSESSMENT — GAIT ASSESSMENTS: GAIT LEVEL OF ASSIST: UNABLE TO PARTICIPATE

## 2021-11-02 ASSESSMENT — ACTIVITIES OF DAILY LIVING (ADL): TOILETING: INDEPENDENT

## 2021-11-02 ASSESSMENT — PAIN SCALES - GENERAL: PAIN_LEVEL: 3

## 2021-11-02 NOTE — ASSESSMENT & PLAN NOTE
Improving  S/p partial nephrectomy due to Renal Cell Carcinoma.  Monitor I/O's, labs, vitals.  Avoid nephrotoxins.

## 2021-11-02 NOTE — PROGRESS NOTES
"Urology Progress Note    Left renal mass s/p robotic assisted laparoscopic partial left nephrectomy 10/27/21    S: Pt seen and examined in bed on rounds. Mentation and energy improving today. Continued N/V. Up to bedside yesterday but still not ambulating. JARRETT output 60cc/24hrs. Urine output 915cc/24hrs. Cr trending back up now 1.98 (1.85).     O:   BP (!) 170/100   Pulse 94   Temp 36.3 °C (97.4 °F) (Temporal)   Resp (!) 21   Ht 1.651 m (5' 5\")   Wt 88.2 kg (194 lb 7.1 oz)   SpO2 97%   Recent Labs     11/01/21  0400 11/01/21  0858 11/02/21  0428   SODIUM 141 143 139   POTASSIUM 3.1* 3.3* 5.1   CHLORIDE 117* 117* 110   CO2 16* 18* 17*   GLUCOSE 140* 101* 106*   BUN 21 22 23*   CREATININE 1.70* 1.85* 1.98*   CALCIUM 6.3* 7.2* 8.7     Recent Labs     10/31/21  0341 10/31/21  0341 11/01/21  0400 11/01/21  0823 11/02/21  0428   WBC 13.4*  --  9.5  --  11.3*   RBC 3.91*  --  2.92*  --  3.74*   HEMOGLOBIN 12.2   < > 9.0* 11.1* 11.7*   HEMATOCRIT 37.0   < > 28.2* 33.5* 36.1*   MCV 94.6  --  96.6  --  96.5   MCH 31.2  --  30.8  --  31.3   MCHC 33.0*  --  31.9*  --  32.4*   RDW 48.8  --  50.6*  --  50.9*   PLATELETCT 253  --  196  --  293   MPV 9.9  --  9.3  --  9.7    < > = values in this interval not displayed.         Intake/Output Summary (Last 24 hours) at 10/29/2021 1616  Last data filed at 10/29/2021 1400  Gross per 24 hour   Intake 8279.42 ml   Output 780 ml   Net 7499.42 ml       Exam:  Gen: NAD, somnolent   Abd: Distended.  Some abdominal tenderness. JARRETT with serosanguinous output  Incisions clean, dry, intact. Area of erythema extending over anterior surface of abdomen and around to L hip, tender to palpation, marked and has not increased in size from yesterday. No purulent discharge from incisions.   Urine: Davenport with clear yellow output    A/P:    Active Hospital Problems    Diagnosis    • Renal cell carcinoma (HCC) [C64.9]    • Hypoglycemia [E16.2]    • Post-op pain [G89.18]    • Gout [M10.9]    • SHEBA (acute " kidney injury) (HCC) [N17.9]    • Rockland's syndrome [K59.81]      Left renal mass s/p robotic assisted laparoscopic partial left nephrectomy 10/27/21. Transferred to ICU due to hypotension refractory to fluid bolus. Antibiotics changed to Zosyn.     Plan:  -Continue cares per ICU team  - monitor output via patel; patel to remain until deemed no longer necessary for medical management.   - JARRETT fluid Cr and CT abd/pelvis w/out ordered to r/o urinoma d/t decreased UOP and increasing Cr.   - monitor pain control  - Incentive spirometry  - monitor H/H  - Continue zosyn  - appreciate hospitalist, GI, and gen surg recs   - Consult placed for PT to aid with ambulating  - Agree with rec for no narcotics  - Urology will continue to follow

## 2021-11-02 NOTE — CONSULTS
[facility]    Tino Banegas M.D.    Date & Time note created:    11/2/2021   3:45 PM     Referring MD / APRN:  CHASITY Rodriguez, Brennen Stroud D.O.    Patient ID:  Name:             Thais Munroe   YOB: 1955  Age:                 66 y.o.  female   MRN:               [id number]    Chief Complaint/Reason for Visit:     Acute SBO with incarcerated likely incisional hernia at extraction site from robotic  Partial nephrectomy     History of Present Illness:    Thais Munroe is a 66 y.o. female   HPI: as above  N/V abdominal distension colonoscopy yesterday for possible olgivies plays in as well    Review of Systems:  ROS: abdominal pain hunger thirst  Otherwise neg in 10 categories     Past Medical History:   Past Medical History:   Diagnosis Date   • Blood clotting disorder (HCC)     leg    • Cancer (HCC)     basil cell   • Cancer (HCC) 10/13/2021    renal   • Coughing blood 10/13/2021    pt states dry clear cough   • Dental disorder 10/13/2021    upper lower dentures   • Disorder of thyroid    • Hepatitis C    • Hypertension 10/13/2021    pt states well controlled on meds   • Pain 10/13/2021    spine   • Urinary incontinence        Past Surgical History:  Past Surgical History:   Procedure Laterality Date   • PB COLONOSCOPY,DIAGNOSTIC N/A 11/1/2021    Procedure: COLONOSCOPY;  Surgeon: Jesus Bland M.D.;  Location: SURGERY SAME DAY [facility];  Service: Gastroenterology   • PB LAP,PARTIAL NEPHRECTOMY Left 10/27/2021    Procedure: NEPHRECTOMY, PARTIAL, ROBOT-ASSISTED, USING DA NAT XI;  Surgeon: Peter Mccracken M.D.;  Location: SURGERY [facility];  Service: Uro Robotic   • PB ULTRASONIC GUIDANCE, INTRAOPERATIVE Left 10/27/2021    Procedure: ULTRASOUND GUIDANCE;  Surgeon: Peter Mccracken M.D.;  Location: SURGERY [facility];  Service: Uro Robotic   • OTHER  1960    tonsils   • OTHER      basal cell removal back       Current Outpatient Medications:  Current Facility-Administered  Medications   Medication Dose Route Frequency Provider Last Rate Last Admin   • hydrALAZINE (APRESOLINE) injection 20 mg  20 mg Intravenous Q6HRS PRN Joshua Glover M.D.   20 mg at 11/02/21 1034   • midodrine (PROAMATINE) tablet 2.5 mg  2.5 mg Oral BID Abi Kang, A.P.R.N.       • labetalol (NORMODYNE/TRANDATE) injection 10 mg  10 mg Intravenous Q4HRS PRN Amor Winn M.D.   10 mg at 11/02/21 0905   • calcium carbonate (TUMS) chewable tab 500 mg  500 mg Oral TID PRN Abi Kang, A.P.R.N.   500 mg at 10/31/21 1040   • glucose 4 g chewable tablet 16 g  16 g Oral Q15 MIN PRN Abi Kang, A.P.R.N.        And   • dextrose 50% (D50W) injection 50 mL  50 mL Intravenous Q15 MIN PRN Abi Kang, A.P.R.N.       • heparin injection 5,000 Units  5,000 Units Subcutaneous Q8HRS Abi Kang, A.P.R.N.   5,000 Units at 11/02/21 0516   • docusate sodium (Colace) oral solution 100 mg  100 mg Oral BID Jeremy M Gonda, M.D.   100 mg at 11/02/21 0514   • mag hydrox-al hydrox-simeth (MAALOX PLUS ES or MYLANTA DS) suspension 10 mL  10 mL Oral 4X/DAY PRN Nathan Castro M.D.   10 mL at 10/29/21 0147   • piperacillin-tazobactam (ZOSYN) 4.5 g in  mL IVPB  4.5 g Intravenous Q8HRS John Ireland M.D. 25 mL/hr at 11/02/21 1359 4.5 g at 11/02/21 1359   • lidocaine (LIDODERM) 5 % 1 Patch  1 Patch Transdermal Q24HR Nathan Castro M.D.   1 Patch at 11/01/21 2134   • guaiFENesin ER (MUCINEX) tablet 600 mg  600 mg Oral Q12HRS PRN Nathan Castro M.D.   600 mg at 10/28/21 2049   • lidocaine (LIDODERM) 5 % 1 Patch  1 Patch Transdermal Q24HR Nathan Castro M.D.   1 Patch at 11/01/21 2133   • allopurinol (ZYLOPRIM) tablet 300 mg  300 mg Oral DAILY Peter Mccracken M.D.   300 mg at 11/02/21 0515   • nicotine (NICODERM) 21 MG/24HR 21 mg  1 Patch Transdermal Q24HRS Peter Mccracken M.D.   21 mg at 11/02/21 0516   • gabapentin (NEURONTIN) capsule 600 mg  600 mg Oral BID Peter Mccracken M.D.   600 mg at 11/02/21 0515   • levothyroxine (SYNTHROID)  tablet 37.5 mcg  37.5 mcg Oral AM ES Peter Mccracken M.D.   37.5 mcg at 11/02/21 0515   • Pharmacy Consult Request ...Pain Management Review 1 Each  1 Each Other PHARMACY TO DOSE Peter Mccracken M.D.       • ondansetron (ZOFRAN) syringe/vial injection 4 mg  4 mg Intravenous Q4HRS PRN Peter Mccracken M.D.   4 mg at 11/02/21 0305   • diphenhydrAMINE (BENADRYL) injection 25 mg  25 mg Intravenous Q6HRS PRN Peter Mccracken M.D.   25 mg at 11/01/21 0755   • hydrOXYzine HCl (ATARAX) tablet 25 mg  25 mg Oral HS PRN Peter Mccracken M.D.       • simethicone (MYLICON) chewable tab 80 mg  80 mg Oral Q8HRS PRN Peter Mccracken M.D.       • senna-docusate (PERICOLACE or SENOKOT S) 8.6-50 MG per tablet 2 Tablet  2 Tablet Oral QPM PRN Peter Mccracken M.D.       • acetaminophen (TYLENOL) tablet 1,000 mg  1,000 mg Oral Q6HRS PRNILAM Mccracken M.D.           Allergies:  No Known Allergies    Family History:  History reviewed. No pertinent family history.    Social History:  Social History     Socioeconomic History   • Marital status: Single     Spouse name: Not on file   • Number of children: Not on file   • Years of education: Not on file   • Highest education level: Not on file   Occupational History   • Not on file   Tobacco Use   • Smoking status: Current Some Day Smoker     Packs/day: 1.50     Years: 50.00     Pack years: 75.00     Types: Cigarettes   • Smokeless tobacco: Never Used   Vaping Use   • Vaping Use: Never used   Substance and Sexual Activity   • Alcohol use: Yes     Comment: daily   • Drug use: Not Currently   • Sexual activity: Not on file   Other Topics Concern   • Not on file   Social History Narrative   • Not on file     Social Determinants of Health     Financial Resource Strain:    • Difficulty of Paying Living Expenses:    Food Insecurity:    • Worried About Running Out of Food in the Last Year:    • Ran Out of Food in the Last Year:    Transportation Needs:    • Lack of Transportation (Medical):    • Lack of  "Transportation (Non-Medical):    Physical Activity:    • Days of Exercise per Week:    • Minutes of Exercise per Session:    Stress:    • Feeling of Stress :    Social Connections:    • Frequency of Communication with Friends and Family:    • Frequency of Social Gatherings with Friends and Family:    • Attends [employer] Services:    • Active Member of Clubs or Organizations:    • Attends Club or Organization Meetings:    • Marital Status:    Intimate Partner Violence:    • Fear of Current or Ex-Partner:    • Emotionally Abused:    • Physically Abused:    • Sexually Abused:           Physical Exam:  Physical Exam   Obese alert GCS 15   NG to low suction   LLQ abdominal wall mass below drain site   Bandaged wound overlying   Otherwise unchanged  And WNL     Oriented x 3  Weight/BMI: Body mass index is 32.36 kg/m².  /90   Pulse (!) 104   Temp 36.8 °C (98.2 °F) (Temporal)   Resp 18   Ht 1.651 m (5' 5\")   Wt 88.2 kg (194 lb 7.1 oz)   SpO2 97%     Not recorded         Pertinent Lab/Test/Imaging Review:CT and labs reviewed       Assessment and Plan:   INcarcerated incisional hernia   Reduction and repair         Tino Banegas M.D.   Private practice referral       "

## 2021-11-02 NOTE — ASSESSMENT & PLAN NOTE
NG removed 11/3  NPO-->sips of clears 11/3-->GI soft 11/5 11/2 CT Abd:    1.  Partial small bowel obstruction involving the midportion of the small bowel secondary to new left-sided abdominal hernia.    2.  No evidence of abdominal or pelvic abscess or urinoma.    3.  Persistent dilatation of the cecum measuring 10 cm in diameter.    4.  Distended gallbladder with minimal gallbladder wall thickening. No calcified gallstone or biliary dilatation identified.  Surgery following  S/p reduction and repair of incarcerated incisional hernia on 11/2  Mobility as able.    Correct electrolyte deficits  Minimize narcotics as able.  Zosyn 10/29-11/5    More distended today 11/7, KUB showed increased dilation of cecum and evidence of ileus, called surgery (Wade) who recommended enema

## 2021-11-02 NOTE — PROGRESS NOTES
Report given to S5 RN, Stephanie. Patient off unit with transport personal. VSS. Belongs transported to room 527-2 by this RN. Patient verified everything was accounted for other than cell phone  which was last seen in room on CNU.

## 2021-11-02 NOTE — ANESTHESIA PREPROCEDURE EVALUATION
67yo F here with incarcerated hernia, to OR for emergent ex lap    Of note, had Left renal mass s/p robotic assisted laparoscopic partial left nephrectomy 10/27/21    Relevant Problems   CARDIAC   (positive) Hypertension         (positive) SHEBA (acute kidney injury) (HCC)   (positive) Hepatitis C   (positive) Renal cell carcinoma (HCC)      Other   (positive) Small bowel obstruction (HCC)   (positive) Tobacco abuse (1.5 ppd x 50 years)       Physical Exam    Airway   Mallampati: II  TM distance: >3 FB  Neck ROM: full       Cardiovascular - normal exam  Rhythm: regular  Rate: normal     Dental - normal exam           Pulmonary - normal exam  Breath sounds clear to auscultation  (+) wheezes     Abdominal    Neurological - normal exam                 Anesthesia Plan    ASA 3- EMERGENT   ASA physical status 3 criteria: respiratory insufficiency or compromiseASA physical status emergent criteria: acute peritonitis    Plan - general       Airway plan will be ETT          Induction: intravenous and rapid sequence    Postoperative Plan: Postoperative administration of opioids is intended.    Pertinent diagnostic labs and testing reviewed    Informed Consent:    Anesthetic plan and risks discussed with patient.    Use of blood products discussed with: patient whom consented to blood products.

## 2021-11-02 NOTE — PROGRESS NOTES
SBO with incarcerated incisional hernia   To OR for reduction and repair   Private referral from Dr Mccracken   Patient consents

## 2021-11-02 NOTE — ASSESSMENT & PLAN NOTE
Minimize narcotics, added gapentin  Lidocaine patch.  Heat/ice  Unable to give NSAIDS due to renal function.  Tums + PPI

## 2021-11-02 NOTE — PROGRESS NOTES
4 Eyes Skin Assessment Completed by PATRICK Brown and PATRICK Stock.    Head WDL  Ears Redness and Blanching  Nose: NG tube right nare  Mouth WDL  Neck WDL  Breast/Chest WDL  Shoulder Blades WDL  Spine WDL  (R) Arm/Elbow/Hand Blanching - 20g right forearm  (L) Arm/Elbow/Hand Blanching - 18g left forearm  Abdomen Redness, Scab and Incision, JARRETT drain to left anterior abdomen, 2 laparoscopic incision sites scabbed and healing.  Groin WDL  Scrotum/Coccyx/Buttocks WDL  (R) Leg WDL  (L) Leg WDL  (R) Heel/Foot/Toe WDL  (L) Heel/Foot/Toe WDL      Devices In Places Blood Pressure Cuff, Davenport and SCD's      Interventions In Place NC W/Ear Foams and Pillows    Possible Skin Injury No    Pictures Uploaded Into Epic No, needs to be completed  Wound Consult Placed N/A  RN Wound Prevention Protocol Ordered Yes

## 2021-11-02 NOTE — PROGRESS NOTES
Gastroenterology Progress Note     Author: Jesus Bland M.D.   Date & Time Created: 11/2/2021 7:37 AM    Chief Complaint:  Acute colonic pseudoobstruction in the setting of postop status and opiate pain management    Interval History:  From the October 31 HPI:  66-year-old female patient who was seen in consultation for abdominal distention.  The patient is postop day 4 status post robotic assisted laparoscopic partial left nephrectomy on 10/27/2021 for left renal mass.  Her postoperative period has been complicated by pain control issues requiring significant narcotics, lack of mobilization, and increasing abdominal distention.  She was initially seen by Dr. Kate with surgery on 10/29/2021 with findings of increased abdominal distention, and noncontrast CT which showed significant distention of the ascending and transverse colon with wall thickening, inflammation, edema, and possible pneumatosis.  She was hypotensive at the time.  NG tube was placed at that time with minimal output.  At that time she was suspected to have ileus versus colonic pseudoobstruction.  She has had persistent pain, distention, and radiographic imaging suggesting pseudoobstruction.  She has now received 2 doses of neostigmine in the ICU without improvement.     Currently the patient has diffuse abdominal pain, NG tube to suction with dark brown appearing contents    November 1: Patient is confused with waxing and waning level of consciousness.  She will mumble albeit appropriate timing in response to questions posed to her.  She initially states she does not have pain.  However, I discussed the case with nursing at bedside and she had pain overnight and continuously Dilaudid.  The MAR shows at least 3 doses given overnight.  Patient is able to convey that she is thirsty.    November 2: asks why she cannot stool. Gets dizzy when moving. Passed flatus and eructation overnight. Has significant abd pain.    Review of Systems:  SY PERERA  complete 12 point review of systems was performed.    Constitutional: Denies fevers, chills, night sweats; Denies weight changes  Eyes: Denies eye pain, denies eye discharge  Ears/Nose/Throat/Mouth: Denies nasal congestion or sore throat   Cardiovascular: Denies chest pain or palpitations.  Respiratory: Denies shortness of breath, cough, and wheezing.  Gastrointestinal/Hepatic: see HPI  Genitourinary: Denies dysuria, increased frequency, hematuria  Musculoskeletal/Rheum: Denies joint pain and swelling, denies edema  Skin: Denies rash, denies wounds  Neurological: Denies headache, confusion, memory loss or focal weakness/parasthesias  Psychiatric: denies mood disorder, denies hallucinations   Endocrine: Denies thyroid problems; denies polydipsia  Heme/Oncology/Lymph Nodes: Denies enlarged lymph nodes, denies bruising or known bleeding disorder      Physical Exam:  Physical Exam    General: Patient looks mildly distressed.   HEENT: NG tube in place with brown fluid.  Nonicteric sclera.  Abdomen: Markedly distended with hypoactive bowel sounds and diffuse tenderness to palpation  Lower extremities: No lower extremity edema    Labs:          Recent Labs     11/01/21  0400 11/01/21  0858 11/02/21  0428   SODIUM 141 143 139   POTASSIUM 3.1* 3.3* 5.1   CHLORIDE 117* 117* 110   CO2 16* 18* 17*   BUN 21 22 23*   CREATININE 1.70* 1.85* 1.98*   CALCIUM 6.3* 7.2* 8.7     Recent Labs     11/01/21  0400 11/01/21  0858 11/02/21  0428   GLUCOSE 140* 101* 106*     Recent Labs     10/31/21  0341 10/31/21  0341 11/01/21  0400 11/01/21  0823 11/02/21  0428   RBC 3.91*  --  2.92*  --  3.74*   HEMOGLOBIN 12.2   < > 9.0* 11.1* 11.7*   HEMATOCRIT 37.0   < > 28.2* 33.5* 36.1*   PLATELETCT 253  --  196  --  293    < > = values in this interval not displayed.     Recent Labs     10/31/21  0341 11/01/21  0400 11/02/21  0428   WBC 13.4* 9.5 11.3*   NEUTSPOLYS 82.70* 79.80* 80.60*   LYMPHOCYTES 7.50* 9.50* 9.50*   MONOCYTES 8.20 9.10 7.60    EOSINOPHILS 0.50 0.70 1.20   BASOPHILS 0.40 0.30 0.70     Hemodynamics:  Temp (24hrs), Av.2 °C (97.2 °F), Min:36.1 °C (97 °F), Max:36.4 °C (97.6 °F)  Temperature: 36.3 °C (97.4 °F)  Pulse  Av.8  Min: 53  Max: 116   Blood Pressure: (!) 170/100     Respiratory:    Respiration: (!) 21, Pulse Oximetry: 97 %     Work Of Breathing / Effort: Shallow  RUL Breath Sounds: Clear, RML Breath Sounds: Clear, RLL Breath Sounds: Diminished;Fine Crackles, SCOTTY Breath Sounds: Clear, LLL Breath Sounds: Diminished;Fine Crackles  Fluids:    Intake/Output Summary (Last 24 hours) at 2021 0953  Last data filed at 2021 0600  Gross per 24 hour   Intake 1294.47 ml   Output 765 ml   Net 529.47 ml        GI/Nutrition:  Orders Placed This Encounter   Procedures   • Diet Order Diet: Clear Liquid     Standing Status:   Standing     Number of Occurrences:   1     Order Specific Question:   Diet:     Answer:   Clear Liquid [10]     Medical Decision Making, by Problem:  Active Hospital Problems    Diagnosis    • *Hypotension [I95.9]    • Hypoglycemia [E16.2]    • Post-op pain [G89.18]    • Gout [M10.9]    • SHEBA (acute kidney injury) (HCC) [N17.9]    • [place]'s syndrome [K59.81]    • Renal mass [N28.89]    • Hyponatremia [E87.1]    • Leukocytosis [D72.829]       colonoscopy with decompression  Findings and Impressions: Thick brown semisolid stool was seen throughout the colon greatest in the ascending colon and cecum.  No mechanical obstruction was identified  Large nonbleeding, noninflamed diverticuli were seen in the sigmoid colon.  A subcentimeter sessile benign-appearing distal transverse colon polyp was seen left in place.  This was left in place as the lack of prep and presence of solid stool would interfere with removal and retrieval.  -A full bottle of water was used to irrigate the entire colon essentially functioning as a full colonic enema.  Patient had a gush of brown stool towards the end of the procedure.  Air was suctioned on withdrawal.      Plan:  -Continue nasogastric tube to low intermittent wall suction  -Diet per primary team  -Avoid opiates  -Patient will need a repeat colonoscopy within 6 to 12 months to remove the identified polyp and search for any additional polyps (already ordered in [facility] EHR by Dr. Solomon 10/11)  -Absolutely no opiates; may have Tylenol up to 4 g daily for postop pain  -Obtain urine sodium and creatinine to assess for SHEBA  -Wean midodrine as tolerated  -out of bed into chair QID  -appreciate surgical assistance  -f/u AXR to rule out small intestinal obstruction before consideration of neostigmine repeat dose    My total time spent caring for the patient on the day of the encounter was 30 minutes including critical care time.   This does not include time spent on separately billable procedures/tests.    Jesus Bland MD, [facility]  [facility]          Quality-Core Measures

## 2021-11-02 NOTE — THERAPY
"Occupational Therapy   Initial Evaluation     Patient Name: Thais Munroe  Age:  66 y.o., Sex:  female  Medical Record #: [id number]  Today's Date: 11/2/2021     Precautions  Precautions: (P) Fall Risk, Nasogastric Tube  Comments: (P) NG tube to suction, JARRETT drain    Assessment  Patient is 66 y.o. female admitted for abdominal distention s/p robotic assisted laparoscopic partial left nephrectomy for left renal mass. Pt states normally independent with all mobility and ADLs at baseline living in a H with adult children who can assist as needed. Pt required modA for bed mobility and transfer to bedside chair, maxA for lower body dressing due to poor dynamic seated balance. Will continue to see for skilled therapy while admitted as well as recommend post-acute placement when medically appropriate.    Plan    Recommend Occupational Therapy 3 times per week until therapy goals are met for the following treatments:  Adaptive Equipment, Self Care/Activities of Daily Living, Therapeutic Activities and Therapeutic Exercises.    DC Equipment Recommendations: (P) Unable to determine at this time  Discharge Recommendations: (P) Recommend post-acute placement for additional occupational therapy services prior to discharge home     Subjective    \"How long do I have to sit in this chair?\"     Objective       11/02/21 1018   Prior Living Situation   Prior Services Home-Independent   Housing / Facility 1 Story House   Steps Into Home 1   Steps In Home 0   Bathroom Set up Bathtub / Shower Combination   Equipment Owned Front-Wheel Walker   Lives with - Patient's Self Care Capacity Adult Children   Prior Level of ADL Function   Self Feeding Independent   Grooming / Hygiene Independent   Bathing Independent   Dressing Independent   Toileting Independent   Prior Level of IADL Function   Medication Management Independent   Laundry Independent   Kitchen Mobility Independent   Finances Independent   Home Management Independent "   Shopping Independent   Prior Level Of Mobility Independent Without Device in Community   Driving / Transportation Driving Independent   Occupation (Pre-Hospital Vocational) Not Employed   History of Falls   History of Falls No   Precautions   Precautions Fall Risk;Nasogastric Tube   Comments NG tube to suction, JARRETT drain   Pain 0 - 10 Group   Therapist Pain Assessment Post Activity Pain Same as Prior to Activity;Nurse Notified   Non Verbal Descriptors   Non Verbal Scale  Calm   Cognition    Cognition / Consciousness X   Speech/ Communication Delayed Responses   Level of Consciousness Alert   Attention Impaired   Sequencing Impaired   Comments lethargic but cooperative   Active ROM Upper Body   Active ROM Upper Body  WDL   Strength Upper Body   Upper Body Strength  X   Gross Strength Generalized Weakness, Equal Bilaterally.    Sensation Upper Body   Upper Extremity Sensation  WDL   Upper Body Muscle Tone   Upper Body Muscle Tone  WDL   Neurological Concerns   Neurological Concerns Yes   Standing Posture During ADL's Posterior Lean   Balance Assessment   Sitting Balance (Static) Fair -   Sitting Balance (Dynamic) Poor +   Standing Balance (Static) Poor -   Standing Balance (Dynamic) Trace +   Weight Shift Sitting Poor   Weight Shift Standing Poor   Comments w/ FWW   Bed Mobility    Supine to Sit Moderate Assist   Scooting Moderate Assist   ADL Assessment   Grooming Minimal Assist;Seated   Upper Body Dressing Minimal Assist   Lower Body Dressing Maximal Assist   Toileting   (NT-refused need)   How much help from another person does the patient currently need...   Putting on and taking off regular lower body clothing? 2   Bathing (including washing, rinsing, and drying)? 2   Toileting, which includes using a toilet, bedpan, or urinal? 2   Putting on and taking off regular upper body clothing? 3   Taking care of personal grooming such as brushing teeth? 3   Eating meals? 3   6 Clicks Daily Activity Score 15   Functional  Mobility   Sit to Stand Moderate Assist   Bed, Chair, Wheelchair Transfer Moderate Assist   Toilet Transfers Refused   Transfer Method Stand Step   Mobility bed mobility, up to chair   Comments w/ FWW   Activity Tolerance   Sitting in Chair left seated in chair   Sitting Edge of Bed 5 min   Standing 2 min   Patient / Family Goals   Patient / Family Goal #1 To go home   Short Term Goals   Short Term Goal # 1 Pt will complete ADL transfers with supervision   Short Term Goal # 2 Pt will complete LB dressing with supervision AE PRN   Short Term Goal # 3 Pt will complete toileting with supervision   Education Group   Education Provided Role of Occupational Therapist   Role of Occupational Therapist Patient Response Patient;Acceptance;Explanation   Problem List   Problem List Decreased Active Daily Living Skills;Decreased Homemaking Skills;Decreased Upper Extremity Strength Right;Decreased Upper Extremity Strength Left;Decreased Functional Mobility;Decreased Activity Tolerance;Impaired Postural Control / Balance   Interdisciplinary Plan of Care Collaboration   IDT Collaboration with  Nursing;Physical Therapist   Patient Position at End of Therapy Seated;Chair Alarm On;Call Light within Reach;Tray Table within Reach;Phone within Reach   Collaboration Comments RN updated

## 2021-11-02 NOTE — PROGRESS NOTES
Received patient and personal belongings to unit. 2 RN skin assessment completed. Patient's NG tube hooked to intermittentent suction.   1255 - Dr. Gisselle Rai phone requesting surgery consult due to abdominal CT results.   1308 - Dr. Peter Holland notified of surgery consult request.  1400 - Dr. Stroud contacted about holding Heparin for potential surgery, medication held.  1410 - Dr. Banegas arrived bedside to explain situation and surgery to patient.  1426 -  Dr. Gisselle Rai phoned to inquire about the surgery consult, she was updated to pending surgery with Dr. Banegas.  1430 -  Report given to surgery, Covid swab and CHG bath completed.  1454 - Daughter: Yarelis spoke with this nurse regarding mother's pending surgery. Yarelis stated she would wait at home for post-op call from surgeon.    1525 - Patient transported off unit to OR.

## 2021-11-02 NOTE — ANESTHESIA PROCEDURE NOTES
Airway    Date/Time: 11/2/2021 4:06 PM  Performed by: Sandra Marti M.D.  Authorized by: Sandra Marti M.D.     Location:  OR  Urgency:  Elective  Indications for Airway Management:  Anesthesia      Spontaneous Ventilation: absent    Sedation Level:  Deep  Preoxygenated: Yes    Patient Position:  Sniffing  Mask Difficulty Assessment:  0 - not attempted  Final Airway Type:  Endotracheal airway  Final Endotracheal Airway:  ETT  Cuffed: Yes    Technique Used for Successful ETT Placement:  Direct laryngoscopy  Devices/Methods Used in Placement:  Cricoid pressure    Insertion Site:  Oral  Blade Type:  Malgorzata  Laryngoscope Blade/Videolaryngoscope Blade Size:  3  ETT Size (mm):  7.0  Measured from:  Gums  ETT to Gums (cm):  22  Placement Verified by: auscultation and capnometry    Cormack-Lehane Classification:  Grade I - full view of glottis  Number of Attempts at Approach:  1   Put NGT to suction prior to intubation; trace amounts came out prior to induction

## 2021-11-02 NOTE — PROGRESS NOTES
Ms. Munroe is a 66-year-old female with PMH significant for HTN, hep C, tobacco dependence, history of blood clots, hypothyroidism and renal mass who presented 10/27/2021 for resection of left adrenal mass with urology, Dr. Mccracken.  She underwent robotic assisted lap partial left nephrectomy 10/27/2021 without complications,  mL.  Postoperatively she had issues with poor pain control and hypotension.  Rapid response was called on 10/29 for hypotension and she received 5 L fluid resuscitation.  She also had increased abdominal distention.  CT abdomen pelvis identified 8 cm cecum dilatation.  General surgery was consulted and recommended n.p.o. with NG tube and fleets enema with no indications for surgery at this time.     10/29 - transfer to ICU for hypotension.   10/30 - 12cm cecum dilatation. Neostigmine. Hypotension improving. Has not required pressors.  11/1 - colonoscopy for decompression  11/2 - weaning Midodrine. Worsening SHEBA. I/O +9,200 since admit. Hyperkalemia. Stop IVF with KCL. One time dose IV Lasix. Monitor for response, consider additional dosing as clinically appropriate.    Seen and examined this morning. Her abdomen remains distended and diffusely tender. Still no BM. She had some nausea overnight with increased NGT output (600mL) over the shift. Keep NPO. BP's 140-160's, weaning Midodrine. Patient sleepy, but easily arousable. Complains of abdominal pain. Continuing to hold Narcotics, on scheduled Tylenol and Gabapentin.    No longer requires ICU care.  Transfer to Surgical floor.  Discussed with Hospitalist, Dr. Stroud, who will assume care.  Critical care service is available for any questions or concerns.    Review of Systems   Constitutional: Positive for malaise/fatigue. Negative for chills and fever.   HENT: Negative.    Eyes: Negative.    Respiratory: Positive for cough. Negative for shortness of breath and wheezing.    Cardiovascular: Negative for chest pain and palpitations.    Gastrointestinal: Positive for abdominal pain, constipation and nausea. Negative for vomiting.   Genitourinary: Negative for flank pain and hematuria.   Musculoskeletal: Positive for back pain and joint pain. Negative for falls.   Neurological: Positive for weakness. Negative for dizziness and headaches.   All other systems reviewed and are negative.    Physical Exam  Vitals and nursing note reviewed.   Constitutional:       General: She is sleeping. She is not in acute distress.     Appearance: She is obese. She is ill-appearing. She is not toxic-appearing.      Interventions: Nasal cannula in place.   HENT:      Head: Normocephalic.      Comments: NG tube right nare.     Right Ear: Hearing normal.      Left Ear: Hearing normal.      Nose: Nose normal.      Mouth/Throat:      Lips: Pink.      Mouth: Mucous membranes are moist.   Eyes:      Pupils: Pupils are equal, round, and reactive to light.   Cardiovascular:      Rate and Rhythm: Tachycardia present.      Pulses: Decreased pulses.           Dorsalis pedis pulses are 1+ on the right side and 1+ on the left side.      Heart sounds: Heart sounds are distant.   Pulmonary:      Effort: Pulmonary effort is normal. No respiratory distress.      Breath sounds: Decreased breath sounds present. No wheezing.   Abdominal:      General: Bowel sounds are decreased. There is distension.      Tenderness: There is generalized abdominal tenderness.   Musculoskeletal:      Comments: SERRANO 4-5/5   Skin:     General: Skin is warm and dry.   Neurological:      Mental Status: She is easily aroused.      Sensory: Sensation is intact.      Motor: Motor function is intact.   Psychiatric:         Mood and Affect: Mood normal.         Behavior: Behavior is cooperative.         Cognition and Memory: Cognition normal.         Judgment: Judgment normal.       FILEMON RevelesRJoannN.      Please note that this dictation was created using voice recognition software. I have made every  reasonable attempt to correct obvious errors, but there may be errors of grammar and possibly content that I did not discover before finalizing the note.

## 2021-11-02 NOTE — CARE PLAN
"The patient is Watcher - Medium risk of patient condition declining or worsening    Shift Goals  Clinical Goals: hemodynamic stability  Patient Goals: comfort  Family Goals: edu    Progress made toward(s) clinical / shift goals:  hemodynamic stability    Patient is not progressing towards the following goals:      Problem: Pain - Standard  Goal: Alleviation of pain or a reduction in pain to the patient’s comfort goal  Outcome: Not Progressing: pt states she is in pain, requesting pain medication, states \"Tylenol doesn't do anything\"      Problem: Hemodynamics  Goal: Patient's hemodynamics, fluid balance and neurologic status will be stable or improve  Outcome: Progressing     Problem: Skin Integrity  Goal: Skin integrity is maintained or improved  Outcome: Progressing     Problem: Fall Risk  Goal: Patient will remain free from falls  Outcome: Met        "

## 2021-11-02 NOTE — THERAPY
Physical Therapy   Initial Evaluation     Patient Name: Thais Munroe  Age:  66 y.o., Sex:  female  Medical Record #: [id number]  Today's Date: 11/2/2021     Precautions: Fall Risk; Nasogastric Tube  Comments: NG tube to suction, JARRETT drain    Assessment  Patient is 66 y.o. female POD #6 L partial nephrectomy, pt with post-op hypotension on 10/29 and required transfer to ICU level of care. Pt presenting with generalized weakness today. She required mod A to negotiate bed mob and transfer. Posterior LOB noted in standing and throughout transfer. Pt c/o nausea in upright position. Up to chair at end of assessment. PT to follow 4x/wk, anticipate need for post-acute placement prior to DC given pt far from her fxnl baseline.     Plan    Recommend Physical Therapy 4 times per week until therapy goals are met for the following treatments:  Bed Mobility, Gait Training, Neuro Re-Education / Balance, Therapeutic Activities and Therapeutic Exercises    DC Equipment Recommendations: Unable to determine at this time  Discharge Recommendations: Recommend post-acute placement for additional physical therapy services prior to discharge home     Objective     11/02/21 1015   Prior Living Situation   Prior Services Home-Independent   Housing / Facility 1 Story House   Steps Into Home 1   Steps In Home 0   Equipment Owned Front-Wheel Walker   Lives with - Patient's Self Care Capacity Adult Children   Comments Pt reports she lives with family, attempted to clarify specifics, she states she lives with 2 adult children (1 works during the day)   Prior Level of Functional Mobility   Bed Mobility Independent   Transfer Status Independent   Ambulation Independent   Distance Ambulation (Feet) (Community distances)   Assistive Devices Used None   Stairs Independent   Cognition    Speech/ Communication Delayed Responses   Level of Consciousness (Lethargic)   Attention Impaired   Sequencing Impaired   Strength Lower Body   Gross Strength  Generalized Weakness, Equal Bilaterally   Neurological Concerns   Standing Posture During ADL's Posterior Lean   Balance Assessment   Sitting Balance (Static) Fair -   Sitting Balance (Dynamic) Poor +   Standing Balance (Static) Poor -   Standing Balance (Dynamic) Trace +   Gait Analysis   Gait Level Of Assist Unable to Participate (2/2 fatigue and weakness)   Weight Bearing Status No restrictions   Bed Mobility    Supine to Sit Moderate Assist   Sit to Supine (Up in chair post session)   Functional Mobility   Sit to Stand Moderate Assist   Bed, Chair, Wheelchair Transfer Moderate Assist   Short Term Goals    Short Term Goal # 1 Pt will perform supine<>sit with HOB flat and SPV within 6 visits to improve ind with bed mob.   Short Term Goal # 2 Pt will perform all fxnl transfers with LRAD and SPV within 6 visits to increase OOB activity.   Short Term Goal # 3 Pt will amb >15ft with FWW and min A within 6 visits to work toward environmental negotiation.

## 2021-11-02 NOTE — OP REPORT
Preoperative diagnosis; incarcerated incisional hernia containing small bowel  Postoperative diagnosis: Same  Operation; reduction and repair of incarcerated incisional hernia  Surgeon; Dr. PARISH GIORDANO  Anesthesia General Dr. Marti  Operative note  This woman is 66 years of age.  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  She underwent colonoscopy yesterday for decompression.  Today she looked more distended.  She had a repeat CT scan without contrast which demonstrated an incarcerated small bowel obstruction in the left lower quadrant.  Patient was felt to be a candidate for urgent surgical intervention.  She consented.  She had a satisfactory preoperative evaluation and signed consents for surgery and anesthesia understanding the potential risks and possible complications.  She was taken to the operating room administered prophylactic antibiotics and sequential stockings applied as antagonism prophylaxis.  She was placed under general anesthesia.  Her abdomen was prepped with Betadine solutions and sterile drapes were applied.  A timeout was affected.  An incision was opened and the left lower quadrant over a palpable mass exposing incarcerated small bowel.  It did not appear to be strangulated.  The bowel was gradually reduced through the defect.  The abdominal wall musculature was exceedingly thin.  It was closed with interrupted Smead Dubon sutures of 0 Vicryl.  Care was taken not to incorporate the bowel or omentum in the closure.  The wound was irrigated then the skin was approximated using an automatic stapling device and a Prevena wound dressing was applied.  Patient was awakened extubated taken to recovery room in stable satisfactory condition the procedure was uncomplicated.  Wound classification was clean contaminated and estimated blood loss was truly minimal.  Sponge and needle counts reported as correct x3.  Patient may be  returned to the floor with nasogastric decompression for ongoing care

## 2021-11-02 NOTE — PROGRESS NOTES
"Hospital Medicine Daily Progress Note    Date of Service  11/2/2021    Chief Complaint  Hypotension s/p left partial nephrectomy for mass.    Hospital Course  Thais Munroe is a \"66 y.o. female with PMH of hypertension, hep C, smoker, history of blood clots, hypothyroidism, and renal mass.  Who presented 10/27/2021 for resection of left adrenal mass with Dr. Mccracken.  Patient underwent robotic assisted laparoscopic partial left nephrectomy on 10/27/2021.  Surgery went without complication with an EBL of 100 mL, and a JARRETT drain was placed.  Was reported to have 180 cc / 24 hours.  Patient initially with complaints of poor pain control, but now improved.  10/28 patient noted to have incident of hypotension with a low BP of 57/48.  Patient was ordered 1 L of NS in hospital medicine was consulted for further evaluation and assistance in management of hypotension.  On physician arrival, patient was reportedly to be very lethargic, dizzy, and lightheaded.  After completion of NS 1 L on physician arrival, patient now more alert and oriented, and reports minimal dizziness/lightheadedness.  Her blood pressure is also improved to 100/60, but appears to have decreased to 80s over 60s during initial physical examination that was confirmed with manual BP measurements.  On questioning, patient does admit that she has been having issues of low blood pressure for the past few weeks.  She reports that 10 days before surgery she was found to have a SBP into the 190s at which time she was started on amlodipine 10 mg.  Patient reports that she has been taking it as prescribed, but she has been having incidences of low blood pressure as well as syncope since initiation.  She reported that she had another fainting episode day prior to her arrival to the the hospital.  On review of MAR, patient is noted to be on amlodipine 10 mg and valsartan 320 mg.  However, patient has not received valsartan 320 mg since she has arrived to the " "hospital, and her last dose of amlodipine 10 mg was last night.  On questioning, patient endorses diffuse abdominal pain, reports it is mild, and appears to be unchanged from prior.  She continues to endorse lower back pain which is chronic and is otherwise denying any pain at this time.  Additionally, patient endorses a nonproductive cough and congestion that she reports has been ongoing.  She denies any fevers, chills, chest pain, shortness of breath, nausea, vomiting, diarrhea.  She is noted to be on 1 L nasal cannula which was transitioned down from 6 L previously.\"     Early morning 10/29, UA found to have few bacteria but also with Nitrates and Leukocyte esterase. She was also noted to have SHEBA which improved with IVF as well as increasing lactic acid level. Pt was started on the 3rd liter of NS with improvement of BP to 100/68. She was also initiated on Rocephin for possible sepsis from UTI. Around 5am, received notification from overnight nurse that pt had vomiting episode of brown/orange liquid. She additionally reported recurrence of Hypotension to 85/60 as well as new onset worsening abdominal pain as well as distention and firmness.  pt remains AAOx4 but is in acute distress and moaning in pain. Abdomen appears mildly increased in size, and pt reports severe abdominal pain on the left and right side. There may be some slight increase in firmness to palpation. BP remains 85/60, but able to maintain a MAP 68. Blood cultures were ordered and follow-up with urine culture, broaden antibiotic coverage to Vanco and Rocephin.  Holding opiates for now due to hypotension, stat CT abdomen without contrast secondary to SHEBA ordered.  Continuing fluids NS at 125, monitoring closing, possible transfer to ICU.        Interval Problem Update  11/2/21: Rn states 400cc out via NG tube overnight. On going abdominal pain.    I have personally seen and examined the patient at bedside. I discussed the plan of care with patient " and bedside RN.    Consultants/Specialty  critical care, GI and urology    Code Status  Full Code    Disposition  Patient is not medically cleared.   Anticipate discharge to based on PT/OT disposition.  I have placed the appropriate orders for post-discharge needs.    Review of Systems  Review of Systems   Constitutional: Positive for malaise/fatigue. Negative for chills and fever.   HENT: Negative for sore throat.    Eyes: Negative for blurred vision.   Respiratory: Negative for shortness of breath.    Cardiovascular: Positive for leg swelling. Negative for chest pain.   Gastrointestinal: Positive for abdominal pain. Negative for blood in stool, constipation and nausea.   Genitourinary: Negative for dysuria.   Musculoskeletal: Negative for back pain and neck pain.   Neurological: Negative for dizziness and headaches.   Psychiatric/Behavioral: The patient is not nervous/anxious.         Physical Exam  Temp:  [36.1 °C (97 °F)-36.8 °C (98.2 °F)] 36.8 °C (98.2 °F)  Pulse:  [] 104  Resp:  [11-28] 18  BP: (136-173)/() 139/90  SpO2:  [92 %-98 %] 97 %    Physical Exam  Vitals reviewed.   Constitutional:       Appearance: Normal appearance. She is obese. She is not diaphoretic.   HENT:      Head: Normocephalic and atraumatic.      Nose: Nose normal.      Mouth/Throat:      Mouth: Mucous membranes are moist.      Pharynx: No oropharyngeal exudate.   Eyes:      General: No scleral icterus.        Right eye: No discharge.         Left eye: No discharge.      Extraocular Movements: Extraocular movements intact.      Conjunctiva/sclera: Conjunctivae normal.   Cardiovascular:      Rate and Rhythm: Normal rate and regular rhythm.      Pulses:           Radial pulses are 2+ on the right side and 2+ on the left side.        Dorsalis pedis pulses are 2+ on the right side and 2+ on the left side.      Heart sounds: No murmur heard.     Pulmonary:      Effort: Pulmonary effort is normal. No respiratory distress.      Breath  sounds: Normal breath sounds. No wheezing or rales.   Abdominal:      General: Bowel sounds are normal. There is distension.      Palpations: Abdomen is soft.      Tenderness: There is no abdominal tenderness.      Comments: LLQ surgical site with dressing that appear to have drainage of clear/pink serous fluid.  Drain present   Musculoskeletal:         General: No swelling or tenderness.      Cervical back: Neck supple. No muscular tenderness.      Right lower leg: Edema present.      Left lower leg: Edema present.   Lymphadenopathy:      Cervical: No cervical adenopathy.   Skin:     Coloration: Skin is not jaundiced or pale.   Neurological:      General: No focal deficit present.      Mental Status: She is alert and oriented to person, place, and time. Mental status is at baseline.      Cranial Nerves: No cranial nerve deficit.   Psychiatric:         Mood and Affect: Mood normal.         Behavior: Behavior normal.         Fluids    Intake/Output Summary (Last 24 hours) at 11/2/2021 1411  Last data filed at 11/2/2021 1200  Gross per 24 hour   Intake 996.25 ml   Output 2935 ml   Net -1938.75 ml       Laboratory  Recent Labs     10/31/21  0341 10/31/21  0341 11/01/21  0400 11/01/21  0823 11/02/21  0428   WBC 13.4*  --  9.5  --  11.3*   RBC 3.91*  --  2.92*  --  3.74*   HEMOGLOBIN 12.2   < > 9.0* 11.1* 11.7*   HEMATOCRIT 37.0   < > 28.2* 33.5* 36.1*   MCV 94.6  --  96.6  --  96.5   MCH 31.2  --  30.8  --  31.3   MCHC 33.0*  --  31.9*  --  32.4*   RDW 48.8  --  50.6*  --  50.9*   PLATELETCT 253  --  196  --  293   MPV 9.9  --  9.3  --  9.7    < > = values in this interval not displayed.     Recent Labs     11/01/21  0400 11/01/21  0858 11/02/21  0428   SODIUM 141 143 139   POTASSIUM 3.1* 3.3* 5.1   CHLORIDE 117* 117* 110   CO2 16* 18* 17*   GLUCOSE 140* 101* 106*   BUN 21 22 23*   CREATININE 1.70* 1.85* 1.98*   CALCIUM 6.3* 7.2* 8.7                   Imaging  CT-ABDOMEN-PELVIS W/O   Final Result      1.  Partial small  bowel obstruction involving the midportion of the small bowel secondary to new left-sided abdominal hernia.      2.  No evidence of abdominal or pelvic abscess or urinoma.      3.  Persistent dilatation of the cecum measuring 10 cm in diameter.      4.  Distended gallbladder with minimal gallbladder wall thickening. No calcified gallstone or biliary dilatation identified.         Findings were discussed with ANH STERLING on 11/2/2021 12:53 PM.      [id number] VIEW   Final Result      1.  Cecal dilatation continues to be seen which can be seen in the setting of a cecal bascule.   2.  Enteric tube projects over the distal esophagus. Recommend advancement.      [id number] VIEWS   Final Result      1.  Overall stable predominantly cecal colonic dilatation with the cecum located in the midline and just to the left of midline possibly related to a cecal bascule with volvulus considered a more remote possibility due to the fact there has been no    interval progression.   2.  There is no pneumatosis or free intraperitoneal air.      [id number] VIEWS   Final Result      1.  There is a similar appearance to the mildly distended air-filled colon with relatively decompressed sigmoid and rectum.   2.  There is no free intraperitoneal air.      [id number] VIEWS   Final Result         1.  Air-filled distention of the cecum measuring 12.3 cm, appearance concerning for Jailene's.  Similar to prior study.      These findings were discussed with the patient's clinician, Angeles Wolf, on 10/30/2021 4:19 AM.      [id number] VIEWS   Final Result      1.  Mild colonic dilation      2.  Enteric catheter appears appropriately located      3.  Mild right basilar atelectasis and/or pleural effusion      DX-ABDOMEN FOR TUBE PLACEMENT   Final Result      Enteric tube has been placed and the tip projects over the stomach.      CT-ABDOMEN-PELVIS W/O   Final Result         1.  Air-filled distention of the cecum, follow-up  recommended to exclude progression to Jailene's   2.  Fluid and air-filled distended loops of small bowel in the left abdomen, consider component of ileus.   3.  Intra-abdominal foci of free air, a nonspecific finding for recent postop changes with surgical drain in place   4.  Scattered mild abdominal ascites and hazy mesenteric inflammatory changes.   5.  Small right and trace left pleural effusion.   6.  Linear densities in the bilateral lung bases favors changes of atelectasis.   7.  Left nephrolithiasis without visualized obstructive changes.   8.  Hepatomegaly and changes of cirrhosis   9.  Diverticulosis   10.  Atherosclerosis      [id number] VIEW   Final Result         1.  Air-filled distended bowel loops, appearance compatible with evolving obstruction versus ileus. Recommend radiographic follow-up to resolution.      DX-CHEST-PORTABLE (1 VIEW)   Final Result         1.  No acute cardiopulmonary disease.   2.  Cardiomegaly           Assessment/Plan  * Small bowel obstruction (HCC)  Assessment & Plan  NG tube to suction with marked output of 400cc since this am of green fluid.  NPO for now  Surgery, Dr Holland to see patient per RN 11/2/21.  11/2 CT Abd:    1.  Partial small bowel obstruction involving the midportion of the small bowel secondary to new left-sided abdominal hernia.    2.  No evidence of abdominal or pelvic abscess or urinoma.    3.  Persistent dilatation of the cecum measuring 10 cm in diameter.    4.  Distended gallbladder with minimal gallbladder wall thickening. No calcified gallstone or biliary dilatation identified.  Mobility as able.  Ice chips prn.  Correct electrolyte deficits  Minimize narcotics as able.    Renal cell carcinoma (HCC)  Assessment & Plan  S/p surgical excision of mass.  Pathology confirmed Renal Cell CA.  Urology to monitor.    SHEBA (acute kidney injury) (HCC)  Assessment & Plan  S/p partial nephrectomy due to Renal Cell Carcinoma.  Monitor I/O's, labs, vitals.  Avoid  nephrotoxins.  11/2 BUN:23, Cr:1.98    Post-op pain  Assessment & Plan  NPO due to recurrent vomiting.  Minimize narcotics  Lidocaine patch.  Unable to give NSAIDS due to renal function.    Hypotension- (present on admission)  Assessment & Plan  On midodrine and weaning as vitals allow  Check TSH w/ reflex to free T4 if not done.       VTE prophylaxis: heparin ppx    I have performed a physical exam and reviewed and updated ROS and Plan today (11/2/2021). In review of yesterday's note (11/1/2021), there are no changes except as documented above.

## 2021-11-03 LAB
ANION GAP SERPL CALC-SCNC: 11 MMOL/L (ref 7–16)
BACTERIA BLD CULT: NORMAL
BACTERIA BLD CULT: NORMAL
BASOPHILS # BLD AUTO: 0.1 % (ref 0–1.8)
BASOPHILS # BLD: 0.01 K/UL (ref 0–0.12)
BUN SERPL-MCNC: 30 MG/DL (ref 8–22)
CALCIUM SERPL-MCNC: 9 MG/DL (ref 8.5–10.5)
CHLORIDE SERPL-SCNC: 106 MMOL/L (ref 96–112)
CO2 SERPL-SCNC: 22 MMOL/L (ref 20–33)
CREAT SERPL-MCNC: 2.1 MG/DL (ref 0.5–1.4)
EOSINOPHIL # BLD AUTO: 0 K/UL (ref 0–0.51)
EOSINOPHIL NFR BLD: 0 % (ref 0–6.9)
ERYTHROCYTE [DISTWIDTH] IN BLOOD BY AUTOMATED COUNT: 48.8 FL (ref 35.9–50)
GLUCOSE SERPL-MCNC: 141 MG/DL (ref 65–99)
HCT VFR BLD AUTO: 36.1 % (ref 37–47)
HGB BLD-MCNC: 12.2 G/DL (ref 12–16)
IMM GRANULOCYTES # BLD AUTO: 0.07 K/UL (ref 0–0.11)
IMM GRANULOCYTES NFR BLD AUTO: 0.8 % (ref 0–0.9)
LYMPHOCYTES # BLD AUTO: 0.78 K/UL (ref 1–4.8)
LYMPHOCYTES NFR BLD: 8.4 % (ref 22–41)
MCH RBC QN AUTO: 31.5 PG (ref 27–33)
MCHC RBC AUTO-ENTMCNC: 33.8 G/DL (ref 33.6–35)
MCV RBC AUTO: 93.3 FL (ref 81.4–97.8)
MONOCYTES # BLD AUTO: 0.22 K/UL (ref 0–0.85)
MONOCYTES NFR BLD AUTO: 2.4 % (ref 0–13.4)
NEUTROPHILS # BLD AUTO: 8.25 K/UL (ref 2–7.15)
NEUTROPHILS NFR BLD: 88.3 % (ref 44–72)
NRBC # BLD AUTO: 0 K/UL
NRBC BLD-RTO: 0 /100 WBC
PLATELET # BLD AUTO: 305 K/UL (ref 164–446)
PMV BLD AUTO: 9.5 FL (ref 9–12.9)
POTASSIUM SERPL-SCNC: 4.4 MMOL/L (ref 3.6–5.5)
RBC # BLD AUTO: 3.87 M/UL (ref 4.2–5.4)
SIGNIFICANT IND 70042: NORMAL
SIGNIFICANT IND 70042: NORMAL
SITE SITE: NORMAL
SITE SITE: NORMAL
SODIUM SERPL-SCNC: 139 MMOL/L (ref 135–145)
SOURCE SOURCE: NORMAL
SOURCE SOURCE: NORMAL
WBC # BLD AUTO: 9.3 K/UL (ref 4.8–10.8)

## 2021-11-03 PROCEDURE — 700111 HCHG RX REV CODE 636 W/ 250 OVERRIDE (IP): Performed by: STUDENT IN AN ORGANIZED HEALTH CARE EDUCATION/TRAINING PROGRAM

## 2021-11-03 PROCEDURE — 700102 HCHG RX REV CODE 250 W/ 637 OVERRIDE(OP): Performed by: PHYSICAL MEDICINE & REHABILITATION

## 2021-11-03 PROCEDURE — 700102 HCHG RX REV CODE 250 W/ 637 OVERRIDE(OP): Performed by: UROLOGY

## 2021-11-03 PROCEDURE — 80048 BASIC METABOLIC PNL TOTAL CA: CPT

## 2021-11-03 PROCEDURE — 700102 HCHG RX REV CODE 250 W/ 637 OVERRIDE(OP): Performed by: INTERNAL MEDICINE

## 2021-11-03 PROCEDURE — A9270 NON-COVERED ITEM OR SERVICE: HCPCS | Performed by: PHYSICAL MEDICINE & REHABILITATION

## 2021-11-03 PROCEDURE — 700105 HCHG RX REV CODE 258: Performed by: INTERNAL MEDICINE

## 2021-11-03 PROCEDURE — 36415 COLL VENOUS BLD VENIPUNCTURE: CPT

## 2021-11-03 PROCEDURE — A9270 NON-COVERED ITEM OR SERVICE: HCPCS | Performed by: UROLOGY

## 2021-11-03 PROCEDURE — A9270 NON-COVERED ITEM OR SERVICE: HCPCS | Performed by: NURSE PRACTITIONER

## 2021-11-03 PROCEDURE — 700111 HCHG RX REV CODE 636 W/ 250 OVERRIDE (IP): Performed by: INTERNAL MEDICINE

## 2021-11-03 PROCEDURE — 770001 HCHG ROOM/CARE - MED/SURG/GYN PRIV*

## 2021-11-03 PROCEDURE — A9270 NON-COVERED ITEM OR SERVICE: HCPCS | Performed by: INTERNAL MEDICINE

## 2021-11-03 PROCEDURE — 99406 BEHAV CHNG SMOKING 3-10 MIN: CPT | Performed by: INTERNAL MEDICINE

## 2021-11-03 PROCEDURE — 700111 HCHG RX REV CODE 636 W/ 250 OVERRIDE (IP): Performed by: NURSE PRACTITIONER

## 2021-11-03 PROCEDURE — 85025 COMPLETE CBC W/AUTO DIFF WBC: CPT

## 2021-11-03 PROCEDURE — 700101 HCHG RX REV CODE 250: Performed by: STUDENT IN AN ORGANIZED HEALTH CARE EDUCATION/TRAINING PROGRAM

## 2021-11-03 PROCEDURE — 700101 HCHG RX REV CODE 250: Performed by: SURGERY

## 2021-11-03 PROCEDURE — 99233 SBSQ HOSP IP/OBS HIGH 50: CPT | Mod: 25 | Performed by: INTERNAL MEDICINE

## 2021-11-03 PROCEDURE — 700102 HCHG RX REV CODE 250 W/ 637 OVERRIDE(OP): Performed by: NURSE PRACTITIONER

## 2021-11-03 RX ORDER — GABAPENTIN 100 MG/1
200 CAPSULE ORAL 3 TIMES DAILY
Status: DISCONTINUED | OUTPATIENT
Start: 2021-11-03 | End: 2021-11-09 | Stop reason: HOSPADM

## 2021-11-03 RX ORDER — SODIUM CHLORIDE, SODIUM LACTATE, POTASSIUM CHLORIDE, CALCIUM CHLORIDE 600; 310; 30; 20 MG/100ML; MG/100ML; MG/100ML; MG/100ML
INJECTION, SOLUTION INTRAVENOUS CONTINUOUS
Status: DISCONTINUED | OUTPATIENT
Start: 2021-11-03 | End: 2021-11-06

## 2021-11-03 RX ORDER — ACETAMINOPHEN 325 MG/1
650 TABLET ORAL EVERY 6 HOURS
Status: DISCONTINUED | OUTPATIENT
Start: 2021-11-03 | End: 2021-11-04

## 2021-11-03 RX ORDER — MORPHINE SULFATE 4 MG/ML
2 INJECTION, SOLUTION INTRAMUSCULAR; INTRAVENOUS EVERY 6 HOURS PRN
Status: DISCONTINUED | OUTPATIENT
Start: 2021-11-03 | End: 2021-11-04

## 2021-11-03 RX ORDER — MORPHINE SULFATE 4 MG/ML
2 INJECTION, SOLUTION INTRAMUSCULAR; INTRAVENOUS EVERY 4 HOURS PRN
Status: DISCONTINUED | OUTPATIENT
Start: 2021-11-03 | End: 2021-11-03

## 2021-11-03 RX ADMIN — MORPHINE SULFATE 2 MG: 4 INJECTION INTRAVENOUS at 02:20

## 2021-11-03 RX ADMIN — ACETAMINOPHEN 650 MG: 325 TABLET ORAL at 23:39

## 2021-11-03 RX ADMIN — LIDOCAINE 1 PATCH: 50 PATCH CUTANEOUS at 20:42

## 2021-11-03 RX ADMIN — HYDRALAZINE HYDROCHLORIDE 20 MG: 20 INJECTION INTRAMUSCULAR; INTRAVENOUS at 23:49

## 2021-11-03 RX ADMIN — MORPHINE SULFATE 2 MG: 4 INJECTION INTRAVENOUS at 12:34

## 2021-11-03 RX ADMIN — PIPERACILLIN AND TAZOBACTAM 4.5 G: 4; .5 INJECTION, POWDER, LYOPHILIZED, FOR SOLUTION INTRAVENOUS; PARENTERAL at 20:38

## 2021-11-03 RX ADMIN — HYDROXYZINE HYDROCHLORIDE 25 MG: 50 TABLET, FILM COATED ORAL at 20:34

## 2021-11-03 RX ADMIN — ACETAMINOPHEN 650 MG: 325 TABLET ORAL at 17:36

## 2021-11-03 RX ADMIN — MORPHINE SULFATE 2 MG: 4 INJECTION INTRAVENOUS at 20:09

## 2021-11-03 RX ADMIN — HEPARIN SODIUM 5000 UNITS: 5000 INJECTION, SOLUTION INTRAVENOUS; SUBCUTANEOUS at 05:31

## 2021-11-03 RX ADMIN — PIPERACILLIN AND TAZOBACTAM 4.5 G: 4; .5 INJECTION, POWDER, LYOPHILIZED, FOR SOLUTION INTRAVENOUS; PARENTERAL at 12:41

## 2021-11-03 RX ADMIN — LIDOCAINE 1 PATCH: 50 PATCH CUTANEOUS at 20:38

## 2021-11-03 RX ADMIN — NICOTINE TRANSDERMAL SYSTEM 21 MG: 21 PATCH, EXTENDED RELEASE TRANSDERMAL at 05:29

## 2021-11-03 RX ADMIN — GABAPENTIN 200 MG: 100 CAPSULE ORAL at 17:36

## 2021-11-03 RX ADMIN — PIPERACILLIN AND TAZOBACTAM 4.5 G: 4; .5 INJECTION, POWDER, LYOPHILIZED, FOR SOLUTION INTRAVENOUS; PARENTERAL at 05:28

## 2021-11-03 RX ADMIN — DOCUSATE SODIUM 100 MG: 50 LIQUID ORAL at 17:35

## 2021-11-03 RX ADMIN — MORPHINE SULFATE 2 MG: 4 INJECTION INTRAVENOUS at 06:43

## 2021-11-03 RX ADMIN — SODIUM CHLORIDE, POTASSIUM CHLORIDE, SODIUM LACTATE AND CALCIUM CHLORIDE: 600; 310; 30; 20 INJECTION, SOLUTION INTRAVENOUS at 08:53

## 2021-11-03 RX ADMIN — CALCIUM CARBONATE 500 MG: 500 TABLET, CHEWABLE ORAL at 23:39

## 2021-11-03 RX ADMIN — SILVER NITRATE APPLICATORS 1 APPLICATOR: 25; 75 STICK TOPICAL at 16:00

## 2021-11-03 RX ADMIN — CALCIUM CARBONATE 500 MG: 500 TABLET, CHEWABLE ORAL at 20:35

## 2021-11-03 ASSESSMENT — ENCOUNTER SYMPTOMS
NAUSEA: 0
NECK PAIN: 0
DIZZINESS: 0
ABDOMINAL PAIN: 1
SHORTNESS OF BREATH: 0
BACK PAIN: 0
BLOOD IN STOOL: 0
HEADACHES: 0
BLURRED VISION: 0
FEVER: 0
CONSTIPATION: 0
NERVOUS/ANXIOUS: 0
SORE THROAT: 0
CHILLS: 0

## 2021-11-03 ASSESSMENT — PAIN DESCRIPTION - PAIN TYPE
TYPE: SURGICAL PAIN

## 2021-11-03 NOTE — ASSESSMENT & PLAN NOTE
Nicotine patch  Discussed smoking cessation with patient including benefits, patch, total time = 8 minutes

## 2021-11-03 NOTE — ASSESSMENT & PLAN NOTE
Uncontrolled  Home regimen: Amlodipine 10 mg daily, valsartan 325 mg nightly  Inpatient regimen: start amlodipine 10mg daily + PRNs, if Cr stablizes can restart valsartan

## 2021-11-03 NOTE — DIETARY
Nutrition Services: Day 7 of admit.  67 yo female admitted for resection of left adrenal mass.   Follow up for adequacy of nutritional intake.    Evaluation:  1. laparoscopic partial left nephrectomy on 10/27, colonoscopy 11/1, repair incarcerated hernia, incisional.  2. Pt has been NPO/Clear liquids for entire admit (7 days) with the exception of regular diet for dinner 10/28 and pt NPO again 10/29 early morning. No documentation of intake for that meal.   3. Increased nutritional needs for surgical healing.  4. Pt is asking for something to eat and drink.   5. Discussed pt with surgeon Dr. Banegas who will see pt today to determine if starting po diet is appropriate. He also mentioned consideration of PVN.     Malnutrition risk: at risk for malnutrition with continued NPO/Clear liquid status.     Recommendations/Plan:  1. If unable to start diet consider short term PVN or TPN.   2. Request current weight - stand up scale if possible or remove sequential machine from bed (last weight notation) before weighing.    Discussed with Dr. Waters and Dr. Banegas

## 2021-11-03 NOTE — PROGRESS NOTES
[facility] Medicine Daily Progress Note    Date of Service  11/3/2021    Chief Complaint  Hypotension s/p left partial nephrectomy for mass.    Hospital Course  Thais Munroe is a 66 y.o. female with PMH of hypertension, hep C, smoker, history of blood clots, hypothyroidism, and renal mass.  Who presented 10/27/2021 for resection of left adrenal mass with Dr. Mccracken.  Patient underwent robotic assisted laparoscopic partial left nephrectomy on 10/27/2021.  Surgery went without complication with an EBL of 100 mL, and a JARRETT drain was placed.  Was reported to have 180 cc / 24 hours.  Patient initially with complaints of poor pain control, but now improved.  Her postop course was complicated by pain control issues requiring significant narcotics.  She developed worsening abdominal pain and nausea/vomiting.  She was ultimately transferred to the ICU for hypotension refractory to fluid boluses.  Her antibiotics were changed to Zosyn.  General surgery was consulted CT scan ordered which showed significant distention of the ascending and transverse colon with wall thickening, inflammation, edema and possible pneumatosis.  NG tube was placed with minimal output at that time.  There was concern that she had either ileus versus colonic pseudoobstruction.  GI was consulted and she underwent colonoscopy with decompression however no mechanical obstruction was identified.  Repeat imaging showed SBO with incarcerated incisional hernia.  Patient went to the OR for reduction and repair on 11/2.     Interval Problem Update  - POD#1 s/p reduction and repair of incarcerated incisional hernia  -Patient in severe pain, Tylenol not sufficient, SHEBA so unable to use NSAIDs, starting morphine back every 6 hours as needed  -good UOP 2.4L, patel bothering patient, will dc today  -patient asking for water, no passing of gas or stool, NG with 590cc  -drain output 150cc    I have personally seen and examined the patient at bedside. I discussed the  plan of care with patient, bedside RN, charge RN,  and pharmacy.    Consultants/Specialty  critical care, GI and urology    Code Status  Full Code    Disposition  Patient is not medically cleared.   Anticipate discharge to to skilled nursing facility vs rehab.  I have placed the appropriate orders for post-discharge needs.    Review of Systems  Review of Systems   Constitutional: Positive for malaise/fatigue. Negative for chills and fever.   HENT: Negative for sore throat.    Eyes: Negative for blurred vision.   Respiratory: Negative for shortness of breath.    Cardiovascular: Positive for leg swelling. Negative for chest pain.   Gastrointestinal: Positive for abdominal pain. Negative for blood in stool, constipation and nausea.   Genitourinary: Negative for dysuria.   Musculoskeletal: Negative for back pain and neck pain.   Neurological: Negative for dizziness and headaches.   Psychiatric/Behavioral: The patient is not nervous/anxious.         Physical Exam  Temp:  [36.2 °C (97.1 °F)-37.6 °C (99.7 °F)] 36.5 °C (97.7 °F)  Pulse:  [] 105  Resp:  [11-29] 18  BP: (129-176)/() 147/105  SpO2:  [90 %-97 %] 91 %    Physical Exam  Vitals reviewed.   Constitutional:       General: She is not in acute distress.     Appearance: Normal appearance. She is obese. She is not toxic-appearing or diaphoretic.   HENT:      Head: Normocephalic and atraumatic.      Nose: Nose normal.      Mouth/Throat:      Mouth: Mucous membranes are dry.      Pharynx: No oropharyngeal exudate.   Eyes:      General: No scleral icterus.        Right eye: No discharge.         Left eye: No discharge.      Conjunctiva/sclera: Conjunctivae normal.   Cardiovascular:      Rate and Rhythm: Regular rhythm. Tachycardia present.      Pulses:           Dorsalis pedis pulses are 2+ on the right side and 2+ on the left side.      Heart sounds: No murmur heard.   No friction rub. No gallop.    Pulmonary:      Effort: Pulmonary effort is  normal. No respiratory distress.      Breath sounds: Normal breath sounds. No wheezing or rales.   Abdominal:      General: Bowel sounds are normal. There is distension.      Palpations: Abdomen is soft.      Tenderness: There is no abdominal tenderness.      Comments: LLQ surgical site with dressing that appear to have drainage of clear/pink serous fluid.  Drain present   Musculoskeletal:         General: No swelling or tenderness.      Cervical back: Normal range of motion. No muscular tenderness.      Right lower leg: Edema present.      Left lower leg: Edema present.   Skin:     Coloration: Skin is not jaundiced or pale.   Neurological:      General: No focal deficit present.      Mental Status: She is alert and oriented to person, place, and time. Mental status is at baseline.      Cranial Nerves: No cranial nerve deficit.   Psychiatric:         Mood and Affect: Mood normal.         Behavior: Behavior normal.         Fluids    Intake/Output Summary (Last 24 hours) at 11/3/2021 1042  Last data filed at 11/3/2021 0541  Gross per 24 hour   Intake 700 ml   Output 2295 ml   Net -1595 ml       Laboratory  Recent Labs     11/01/21  0400 11/01/21  0400 11/01/21  0823 11/02/21 0428 11/03/21 0416   WBC 9.5  --   --  11.3* 9.3   RBC 2.92*  --   --  3.74* 3.87*   HEMOGLOBIN 9.0*   < > 11.1* 11.7* 12.2   HEMATOCRIT 28.2*   < > 33.5* 36.1* 36.1*   MCV 96.6  --   --  96.5 93.3   MCH 30.8  --   --  31.3 31.5   MCHC 31.9*  --   --  32.4* 33.8   RDW 50.6*  --   --  50.9* 48.8   PLATELETCT 196  --   --  293 305   MPV 9.3  --   --  9.7 9.5    < > = values in this interval not displayed.     Recent Labs     11/01/21  0858 11/02/21 0428 11/03/21 0416   SODIUM 143 139 139   POTASSIUM 3.3* 5.1 4.4   CHLORIDE 117* 110 106   CO2 18* 17* 22   GLUCOSE 101* 106* 141*   BUN 22 23* 30*   CREATININE 1.85* 1.98* 2.10*   CALCIUM 7.2* 8.7 9.0                   Imaging  CT-ABDOMEN-PELVIS W/O   Final Result      1.  Partial small bowel  obstruction involving the midportion of the small bowel secondary to new left-sided abdominal hernia.      2.  No evidence of abdominal or pelvic abscess or urinoma.      3.  Persistent dilatation of the cecum measuring 10 cm in diameter.      4.  Distended gallbladder with minimal gallbladder wall thickening. No calcified gallstone or biliary dilatation identified.         Findings were discussed with ANH STERLING on 11/2/2021 12:53 PM.      [id number] VIEW   Final Result      1.  Cecal dilatation continues to be seen which can be seen in the setting of a cecal bascule.   2.  Enteric tube projects over the distal esophagus. Recommend advancement.      [id number]-2 VIEWS   Final Result      1.  Overall stable predominantly cecal colonic dilatation with the cecum located in the midline and just to the left of midline possibly related to a cecal bascule with volvulus considered a more remote possibility due to the fact there has been no    interval progression.   2.  There is no pneumatosis or free intraperitoneal air.      [id number] VIEWS   Final Result      1.  There is a similar appearance to the mildly distended air-filled colon with relatively decompressed sigmoid and rectum.   2.  There is no free intraperitoneal air.      [id number] VIEWS   Final Result         1.  Air-filled distention of the cecum measuring 12.3 cm, appearance concerning for Jailene's.  Similar to prior study.      These findings were discussed with the patient's clinician, Angeles Wolf, on 10/30/2021 4:19 AM.      [id number] VIEWS   Final Result      1.  Mild colonic dilation      2.  Enteric catheter appears appropriately located      3.  Mild right basilar atelectasis and/or pleural effusion      DX-ABDOMEN FOR TUBE PLACEMENT   Final Result      Enteric tube has been placed and the tip projects over the stomach.      CT-ABDOMEN-PELVIS W/O   Final Result         1.  Air-filled distention of the cecum, follow-up recommended to  exclude progression to Marcellus's   2.  Fluid and air-filled distended loops of small bowel in the left abdomen, consider component of ileus.   3.  Intra-abdominal foci of free air, a nonspecific finding for recent postop changes with surgical drain in place   4.  Scattered mild abdominal ascites and hazy mesenteric inflammatory changes.   5.  Small right and trace left pleural effusion.   6.  Linear densities in the bilateral lung bases favors changes of atelectasis.   7.  Left nephrolithiasis without visualized obstructive changes.   8.  Hepatomegaly and changes of cirrhosis   9.  Diverticulosis   10.  Atherosclerosis      [id number]-0 VIEW   Final Result         1.  Air-filled distended bowel loops, appearance compatible with evolving obstruction versus ileus. Recommend radiographic follow-up to resolution.      DX-CHEST-PORTABLE (1 VIEW)   Final Result         1.  No acute cardiopulmonary disease.   2.  Cardiomegaly           Assessment/Plan  * Small bowel obstruction (HCC)  Assessment & Plan  NG tube to suction with marked output of 400cc since this am of green fluid.  NPO for now  11/2 CT Abd:    1.  Partial small bowel obstruction involving the midportion of the small bowel secondary to new left-sided abdominal hernia.    2.  No evidence of abdominal or pelvic abscess or urinoma.    3.  Persistent dilatation of the cecum measuring 10 cm in diameter.    4.  Distended gallbladder with minimal gallbladder wall thickening. No calcified gallstone or biliary dilatation identified.  Surgery following  S/p reduction and repair of incarcerated incisional hernia on 11/2  Mobility as able.  Ice chips prn.  Correct electrolyte deficits  Minimize narcotics as able.  Zosyn 10/29-**    Tobacco abuse  Assessment & Plan  Nicotine patch  Discussed smoking cessation with patient including benefits, patch, total time = 8 minutes    Hypertension  Assessment & Plan  Home regimen: Amlodipine 10 mg daily, valsartan 325 mg  nightly  Inpatient regimen: As needed's given recent hypotension    Renal cell carcinoma (HCC)  Assessment & Plan  S/p surgical excision of mass.  Pathology confirmed Renal Cell CA.  Urology to monitor.    SHEBA (acute kidney injury) (HCC)  Assessment & Plan  S/p partial nephrectomy due to Renal Cell Carcinoma.  Monitor I/O's, labs, vitals.  Avoid nephrotoxins.  11/2 BUN:23, Cr:1.98    Post-op pain  Assessment & Plan  NPO due to recurrent vomiting.  Minimize narcotics  Lidocaine patch.  Heat/ice  Unable to give NSAIDS due to renal function.    Hypotension- (present on admission)  Assessment & Plan  Improved  S/p midodrine  Check TSH w/ reflex to free T4 if not done.       VTE prophylaxis: heparin ppx    I have performed a physical exam and reviewed and updated ROS and Plan today (11/3/2021). In review of yesterday's note (11/2/2021), there are no changes except as documented above.

## 2021-11-03 NOTE — PROGRESS NOTES
Assumed care at 0700. Received report from NOC shift RN. Pt is awake and alert in bed, A&Ox 3, disoriented to time, on 2.5L NC, reports a pain level of 5/10. Fall precautions and appropriate signs in place. Call light and belongings within reach. Bed alarm and hourly rounding in place. Communication board updated. Pt requesting a diet and pain medication.

## 2021-11-03 NOTE — DISCHARGE PLANNING
[facility] Rehabilitation Transitional Care Coordination    Referral from:  Dr Waters  Insurance Provider on Facesheet: Kelley  Potential Rehab Diagnosis: Debility    Chart review indicates patient may need on going medical management and may have therapy needs to possibly meet inpatient rehab facility criteria with the goal of returning to community.    D/C support: Adult children     Physiatry consultation forwarded per protocol.     Last Covid test:  11/2 negative    S/p adrenal mass resection - forwarded to PMR to consult. TCC will continue to follow.     Thank you for the referral.

## 2021-11-03 NOTE — PROGRESS NOTES
Looks good   Wound bleeding   Silver nitrated skin bleeder   Watch  Closely   Stop heparin until hemostasis assured   Removed provena   Abdomen much softer   Try NG out   Sips of clears no tray

## 2021-11-03 NOTE — PROGRESS NOTES
Prevena wound vac cannister full, wound vac site noticeably saturated with red drainage. Dr. Banegas notified, 10 silver nitrate applicators requested and ordered per MD with telephone read back.

## 2021-11-03 NOTE — CARE PLAN
Problem: Pain - Standard  Goal: Alleviation of pain or a reduction in pain to the patient’s comfort goal  Outcome: Progressing     Problem: Knowledge Deficit - Standard  Goal: Patient and family/care givers will demonstrate understanding of plan of care, disease process/condition, diagnostic tests and medications  Outcome: Progressing   The patient is Stable - Low risk of patient condition declining or worsening    Shift Goals  Clinical Goals: D/C patel   Patient Goals: Pain management, advance diet   Family Goals: N/A    Progress made toward(s) clinical / shift goals:  Pt medicated for pain per MAR. Upon reassessment, pt reports decreased pain, will continue to monitor. Pt updated on POC and new clear liquid diet. Pt agreeable.    Patient is not progressing towards the following goals:

## 2021-11-03 NOTE — PROGRESS NOTES
"Urology Progress Note    Left renal mass s/p robotic assisted laparoscopic partial left nephrectomy 10/27/21    S: Pt seen and examined in bed on rounds. Mentation and energy improving and back at baseline.Cr trending back up now 2.1 (1.98). Still with NGT. Pain much improved. S/p reduction and repair of incarcerated incisional hernia on 11/2 with Dr. Banegas.     O:   /105   Pulse (!) 105   Temp 36.5 °C (97.7 °F) (Temporal)   Resp 18   Ht 1.651 m (5' 5\")   Wt 88.2 kg (194 lb 7.1 oz)   SpO2 91%   Recent Labs     11/01/21  0858 11/02/21 0428 11/03/21  0416   SODIUM 143 139 139   POTASSIUM 3.3* 5.1 4.4   CHLORIDE 117* 110 106   CO2 18* 17* 22   GLUCOSE 101* 106* 141*   BUN 22 23* 30*   CREATININE 1.85* 1.98* 2.10*   CALCIUM 7.2* 8.7 9.0     Recent Labs     11/01/21  0400 11/01/21  0400 11/01/21  0823 11/02/21 0428 11/03/21 0416   WBC 9.5  --   --  11.3* 9.3   RBC 2.92*  --   --  3.74* 3.87*   HEMOGLOBIN 9.0*   < > 11.1* 11.7* 12.2   HEMATOCRIT 28.2*   < > 33.5* 36.1* 36.1*   MCV 96.6  --   --  96.5 93.3   MCH 30.8  --   --  31.3 31.5   MCHC 31.9*  --   --  32.4* 33.8   RDW 50.6*  --   --  50.9* 48.8   PLATELETCT 196  --   --  293 305   MPV 9.3  --   --  9.7 9.5    < > = values in this interval not displayed.         Intake/Output Summary (Last 24 hours) at 10/29/2021 1616  Last data filed at 10/29/2021 1400  Gross per 24 hour   Intake 8279.42 ml   Output 780 ml   Net 7499.42 ml       Exam:  Gen: NAD   Abd: Distended but improving. LLQ provena in place. Incisions C/D/I. JARRETT with serosang output.    Urine: [place] with clear yellow output    A/P:    Active Hospital Problems    Diagnosis    • Renal cell carcinoma (HCC) [C64.9]    • Hepatitis C [B19.20]    • Tobacco abuse [Z72.0]    • Hypoglycemia [E16.2]    • Post-op pain [G89.18]    • Gout [M10.9]    • SHEBA (acute kidney injury) (HCC) [N17.9]    • Small bowel obstruction (HCC) [K56.609]    • Hypotension [I95.9]    • Hypertension [I10]      pt states well " controlled on meds       Left renal mass s/p robotic assisted laparoscopic partial left nephrectomy 10/27/21.     Plan:  - Monitor UOP  - monitor pain control  - Incentive spirometry, encourage ambulation  - Appreciate hospitalist, GI, and gen surg recs   - JARRETT drain to be DCed  - Urology will continue to follow

## 2021-11-03 NOTE — CONSULTS
Physical Medicine and Rehabilitation Consultation          Chart Revuiew    Date of initial consultation: 11/3/2021  Consulting provider: Julia Waters MD  Reason for consultation: assess for acute inpatient rehab appropriateness  LOS: 7 Day(s)    Chief complaint: Hypotension    HPI: The patient is a 66 y.o. female with a past medical history of hypertension, hep C, tobacco abuse, history of blood clots, hypothyroidism, renal mass;  who presented on 10/27/2021 12:21 PM for resection of left adrenal mass with Dr. Mccracken.  Patient underwent robotic assisted partial left nephrectomy without complication.  Postop course was complicated by abdominal pain, nausea and vomiting.  She then developed hypotension requiring transfer to the ICU.  CT abdomen showed distention of the ascending and transverse colon with wall thickening, inflammation, edema and possible pneumatosis.  NG tube was placed, GI was consulted and she underwent colonoscopy with decompression on 11/1 with Jesus Bland MD.  No mechanical obstruction was identified.  Repeat imaging showed SBO with incarcerated incisional hernia, requiring return to the OR on 11/2 with Tino Banegas MD for reduction and repair.    Since that time, patient has been struggling with pain control, SHEBA and signs of continued obstruction.  Per notes patient has not been able to produce flatus or stool, NG with nearly 600 cc fluid, JARRETT drain with 150 cc.    ROS  Pertinent positives are mentioned in the HPI, all others reviewed and are negative.    Social Hx:  1 SH  1 JL  With: Adult children, one works during the day    THERAPY:  Restrictions: fall risk   PT: Functional mobility   11/2: Unable to participate in gait, mod assist sit to stand    OT: ADLs  11/2: Max assist lower body dressing, min assist grooming    SLP:   None    IMAGING:  CT abdomen pelvis 11/2/2021  1.  Partial small bowel obstruction involving the midportion of the small bowel secondary to new left-sided  abdominal hernia.     2.  No evidence of abdominal or pelvic abscess or urinoma.     3.  Persistent dilatation of the cecum measuring 10 cm in diameter.     4.  Distended gallbladder with minimal gallbladder wall thickening. No calcified gallstone or biliary dilatation identified.    PROCEDURES:  See HPI    PMH:  Past Medical History:   Diagnosis Date   • Blood clotting disorder (HCC)     leg    • Cancer (HCC)     basil cell   • Cancer (HCC) 10/13/2021    renal   • Coughing blood 10/13/2021    pt states dry clear cough   • Dental disorder 10/13/2021    upper lower dentures   • Disorder of thyroid    • Hepatitis C    • Hypertension 10/13/2021    pt states well controlled on meds   • Pain 10/13/2021    spine   • Urinary incontinence        PSH:  Past Surgical History:   Procedure Laterality Date   • UMBILICAL HERNIA REPAIR  11/2/2021    Procedure: REPAIR, HERNIA, INCISIONAL - INCARCERATED;  Surgeon: Tino Banegas M.D.;  Location: SURGERY [facility];  Service: General   • PB COLONOSCOPY,DIAGNOSTIC N/A 11/1/2021    Procedure: COLONOSCOPY;  Surgeon: Jesus Bland M.D.;  Location: SURGERY SAME DAY [facility];  Service: Gastroenterology   • PB LAP,PARTIAL NEPHRECTOMY Left 10/27/2021    Procedure: NEPHRECTOMY, PARTIAL, ROBOT-ASSISTED, USING DA NAT XI;  Surgeon: Peter Mccracken M.D.;  Location: SURGERY [facility];  Service: Uro Robotic   • PB ULTRASONIC GUIDANCE, INTRAOPERATIVE Left 10/27/2021    Procedure: ULTRASOUND GUIDANCE;  Surgeon: Peter Mccracken M.D.;  Location: SURGERY [facility];  Service: Uro Robotic   • OTHER  1960    tonsils   • OTHER      basal cell removal back       FHX:  History reviewed. No pertinent family history.    Medications:  Current Facility-Administered Medications   Medication Dose   • lactated ringers infusion     • acetaminophen (Tylenol) tablet 650 mg  650 mg   • silver nitrate (SILVER NITRATE APPLICATOR) 75-25 % sticks 10 Applicator  10 Applicator   • morphine (pf) 4 mg/mL injection 2 mg  " 2 mg   • hydrALAZINE (APRESOLINE) injection 20 mg  20 mg   • midodrine (PROAMATINE) tablet 2.5 mg  2.5 mg   • labetalol (NORMODYNE/TRANDATE) injection 10 mg  10 mg   • calcium carbonate (TUMS) chewable tab 500 mg  500 mg   • glucose 4 g chewable tablet 16 g  16 g    And   • dextrose 50% (D50W) injection 50 mL  50 mL   • heparin injection 5,000 Units  5,000 Units   • docusate sodium (Colace) oral solution 100 mg  100 mg   • mag hydrox-al hydrox-simeth (MAALOX PLUS ES or MYLANTA DS) suspension 10 mL  10 mL   • piperacillin-tazobactam (ZOSYN) 4.5 g in  mL IVPB  4.5 g   • lidocaine (LIDODERM) 5 % 1 Patch  1 Patch   • guaiFENesin ER (MUCINEX) tablet 600 mg  600 mg   • lidocaine (LIDODERM) 5 % 1 Patch  1 Patch   • allopurinol (ZYLOPRIM) tablet 300 mg  300 mg   • nicotine (NICODERM) 21 MG/24HR 21 mg  1 Patch   • gabapentin (NEURONTIN) capsule 600 mg  600 mg   • levothyroxine (SYNTHROID) tablet 37.5 mcg  37.5 mcg   • Pharmacy Consult Request ...Pain Management Review 1 Each  1 Each   • ondansetron (ZOFRAN) syringe/vial injection 4 mg  4 mg   • diphenhydrAMINE (BENADRYL) injection 25 mg  25 mg   • hydrOXYzine HCl (ATARAX) tablet 25 mg  25 mg   • simethicone (MYLICON) chewable tab 80 mg  80 mg   • senna-docusate (PERICOLACE or SENOKOT S) 8.6-50 MG per tablet 2 Tablet  2 Tablet       Allergies:  No Known Allergies      Physical Exam:  Vitals: /105   Pulse (!) 105   Temp 36.5 °C (97.7 °F) (Temporal)   Resp 18   Ht 1.651 m (5' 5\")   Wt 88.2 kg (194 lb 7.1 oz)   SpO2 91%     Labs: Reviewed and significant for   Recent Labs     11/01/21  0400 11/01/21  0400 11/01/21  0823 11/02/21  0428 11/03/21  0416   RBC 2.92*  --   --  3.74* 3.87*   HEMOGLOBIN 9.0*   < > 11.1* 11.7* 12.2   HEMATOCRIT 28.2*   < > 33.5* 36.1* 36.1*   PLATELETCT 196  --   --  293 305    < > = values in this interval not displayed.     Recent Labs     11/01/21  0858 11/02/21  0428 11/03/21  0416   SODIUM 143 139 139   POTASSIUM 3.3* 5.1 4.4 "   CHLORIDE 117* 110 106   CO2 18* 17* 22   GLUCOSE 101* 106* 141*   BUN 22 23* 30*   CREATININE 1.85* 1.98* 2.10*   CALCIUM 7.2* 8.7 9.0     Recent Results (from the past 24 hour(s))   CBC WITH DIFFERENTIAL    Collection Time: 11/03/21  4:16 AM   Result Value Ref Range    WBC 9.3 4.8 - 10.8 K/uL    RBC 3.87 (L) 4.20 - 5.40 M/uL    Hemoglobin 12.2 12.0 - 16.0 g/dL    Hematocrit 36.1 (L) 37.0 - 47.0 %    MCV 93.3 81.4 - 97.8 fL    MCH 31.5 27.0 - 33.0 pg    MCHC 33.8 33.6 - 35.0 g/dL    RDW 48.8 35.9 - 50.0 fL    Platelet Count 305 164 - 446 K/uL    MPV 9.5 9.0 - 12.9 fL    Neutrophils-Polys 88.30 (H) 44.00 - 72.00 %    Lymphocytes 8.40 (L) 22.00 - 41.00 %    Monocytes 2.40 0.00 - 13.40 %    Eosinophils 0.00 0.00 - 6.90 %    Basophils 0.10 0.00 - 1.80 %    Immature Granulocytes 0.80 0.00 - 0.90 %    Nucleated RBC 0.00 /100 WBC    Neutrophils (Absolute) 8.25 (H) 2.00 - 7.15 K/uL    Lymphs (Absolute) 0.78 (L) 1.00 - 4.80 K/uL    Monos (Absolute) 0.22 0.00 - 0.85 K/uL    Eos (Absolute) 0.00 0.00 - 0.51 K/uL    Baso (Absolute) 0.01 0.00 - 0.12 K/uL    Immature Granulocytes (abs) 0.07 0.00 - 0.11 K/uL    NRBC (Absolute) 0.00 K/uL   Basic Metabolic Panel    Collection Time: 11/03/21  4:16 AM   Result Value Ref Range    Sodium 139 135 - 145 mmol/L    Potassium 4.4 3.6 - 5.5 mmol/L    Chloride 106 96 - 112 mmol/L    Co2 22 20 - 33 mmol/L    Glucose 141 (H) 65 - 99 mg/dL    Bun 30 (H) 8 - 22 mg/dL    Creatinine 2.10 (H) 0.50 - 1.40 mg/dL    Calcium 9.0 8.5 - 10.5 mg/dL    Anion Gap 11.0 7.0 - 16.0   ESTIMATED GFR    Collection Time: 11/03/21  4:16 AM   Result Value Ref Range    GFR If  29 (A) >60 mL/min/1.73 m 2    GFR If Non  24 (A) >60 mL/min/1.73 m 2         ASSESSMENT:  Patient is a 66 y.o. female admitted with left adrenal mass now s/p partial left nephrectomy, complicated by incarcerated incisional hernia, now status post colonoscopy and return to the OR for reduction and repair.    C  Code / Diagnosis to Support: 0017.8 - Medically Complex: Medical/Surgical Complications    Rehabilitation: Impaired ADLs and mobility  Patient is a moderate candidate for inpatient rehab based on needs for PT, OT.  Patient will also benefit from family training.  Patient has a good discharge situation which will be home with adult children.     Barriers to transfer include: Insurance authorization, TCCs to verify disposition, medical clearance and bed availability     Additional Recommendations:  -Possible candidate for IPR.  Patient is not medically cleared, and there is still active ongoing concern for small bowel obstruction versus ischemia, versus ileus.  -Continue PT OT while in-house  -TCC to reach out to adult children to confirm support  -Pain control per primary team, currently on gabapentin 600 mg twice daily, lidocaine patch, and morphine injection every 6 hours as needed.  Please note patient will need to be off of IV pain medication for at least 24 hours prior to coming to IPR.  -Zosyn 4.5 g every 8 hours scheduled through 11/5/2021  -PMR to follow in the periphery for rehab appropriateness, please reach out with questions or request for medical management    *Decreasing gabapentin to 200 mg 3 times daily, see dosing recommendations below    Guidelines for Gabapentin dosing based on GFR   GFR >60 ml/min: Give usual dosage  [30-59]: 400-1400mg/day  [15-29]: Dosage range: 200-700mg/day.  [<15]: 100-300 mg/day      Medical Complexity:  Medical/surgical complications  SHEBA      DVT PPX: Heparin 5K 3 times daily      Thank you for allowing us to participate in the care of this patient.     Trenton Bruce, DO   Physical Medicine and Rehabilitation     Please note that this dictation was created using voice recognition software. I have made every reasonable attempt to correct obvious errors, but there may be errors of grammar and possibly content that I did not discover before finalizing the note.

## 2021-11-03 NOTE — OR NURSING
1740- The pt states she is having pain and has been medicated. Her respirations are shallow with a very poor cough. Breath sounds are hard to auscultate, she does not take in deep enough breaths. Duo neb treatment given. PO oral care given. NG to LIWS, no output. JARRETT to bulb suction draining serous. FC to gravity bag draining clear yellow.    Family updated by phone.

## 2021-11-03 NOTE — PROGRESS NOTES
Assume care of pt at 1900. Report received from day RN. Pt is A/O x4. Pain is 10/10. Pt is resting in bed. Pt asking for something to eat[facility]drink[facility] pt with ice chips for now. Pt agreed and tolerated ice chips. Pt is currently on low-intermittent wall suction, draining with greenish discharge. JARRETT drain with serosang drainage. Surgical site with prevena CDI. Encouraged pt to use incentive spirometer. All needs met. Bed in lowest and locked position, call light in reach, hourly rounding in place. Labs reviewed. Communication board updated.     COVID 19 surge in effect.

## 2021-11-03 NOTE — CARE PLAN
The patient is Watcher - Medium risk of patient condition declining or worsening    Shift Goals  Clinical Goals: monitor I&Os and surgical incision  Patient Goals: pain control, eat    Progress made toward(s) clinical / shift goals:  Changed pt's prevena cannister and had about 30mL of bloody discharge. Pt complaints of pain on her surgical incision, medicated per MAR and ice pack applied. Verbalizing wanting to eat, remains on strict NPO but ice chips given per oncall MD.    Patient is not progressing towards the following goals:

## 2021-11-03 NOTE — CARE PLAN
Problem: Nutritional:  Goal: Achieve adequate nutritional intake  Description: Advance diet as tolerated past clear liquids. Patient will consume >50% of meals.  Outcome: Not Progressing     Pt  remains NPO/Clear liquids x 7 days

## 2021-11-03 NOTE — ANESTHESIA POSTPROCEDURE EVALUATION
Patient: Thais Munroe    Procedure Summary     Date: 11/02/21 Room / Location: [facility] 09 / SURGERY [facility]    Anesthesia Start: 1558 Anesthesia Stop: 1656    Procedure: REPAIR, HERNIA, INCISIONAL - INCARCERATED (Abdomen) Diagnosis: (INCARCERATED INCISIONAL HERNIA)    Surgeons: Tino Banegas M.D. Responsible Provider: Sandra Marti M.D.    Anesthesia Type: general ASA Status: 3 - Emergent          Final Anesthesia Type: general  Last vitals  BP   Blood Pressure: 148/95, NIBP: 155/97    Temp   36.8 °C (98.3 °F)    Pulse   (!) 104   Resp   (!) 11    SpO2   93 %      Anesthesia Post Evaluation    Patient location during evaluation: PACU  Patient participation: complete - patient participated  Level of consciousness: awake and alert  Pain score: 3    Airway patency: patent  Anesthetic complications: no  Cardiovascular status: hemodynamically stable  Respiratory status: acceptable  Hydration status: euvolemic    PONV: none          No complications documented.     Nurse Pain Score: 4 (NPRS)

## 2021-11-03 NOTE — ANESTHESIA TIME REPORT
Anesthesia Start and Stop Event Times     Date Time Event    11/2/2021 1558 Anesthesia Start     1656 Anesthesia Stop        Responsible Staff  11/02/21    Name Role Begin End    Sandra Marti M.D. Anesth 1558 165        Preop Diagnosis (Free Text):  Pre-op Diagnosis     INCARCERATED INCISIONAL HERNIA        Preop Diagnosis (Codes):    Premium Reason  A. 3PM - 7AM    Comments:

## 2021-11-04 PROBLEM — E03.9 ACQUIRED HYPOTHYROIDISM: Status: ACTIVE | Noted: 2021-11-04

## 2021-11-04 LAB
ANION GAP SERPL CALC-SCNC: 12 MMOL/L (ref 7–16)
BASOPHILS # BLD AUTO: 0.6 % (ref 0–1.8)
BASOPHILS # BLD: 0.06 K/UL (ref 0–0.12)
BUN SERPL-MCNC: 33 MG/DL (ref 8–22)
CALCIUM SERPL-MCNC: 9.1 MG/DL (ref 8.5–10.5)
CHLORIDE SERPL-SCNC: 106 MMOL/L (ref 96–112)
CO2 SERPL-SCNC: 22 MMOL/L (ref 20–33)
CREAT SERPL-MCNC: 1.79 MG/DL (ref 0.5–1.4)
EOSINOPHIL # BLD AUTO: 0.11 K/UL (ref 0–0.51)
EOSINOPHIL NFR BLD: 1 % (ref 0–6.9)
ERYTHROCYTE [DISTWIDTH] IN BLOOD BY AUTOMATED COUNT: 47.4 FL (ref 35.9–50)
GLUCOSE SERPL-MCNC: 86 MG/DL (ref 65–99)
HCT VFR BLD AUTO: 33.1 % (ref 37–47)
HGB BLD-MCNC: 11.1 G/DL (ref 12–16)
IMM GRANULOCYTES # BLD AUTO: 0.07 K/UL (ref 0–0.11)
IMM GRANULOCYTES NFR BLD AUTO: 0.6 % (ref 0–0.9)
LYMPHOCYTES # BLD AUTO: 2.59 K/UL (ref 1–4.8)
LYMPHOCYTES NFR BLD: 23.8 % (ref 22–41)
MCH RBC QN AUTO: 30.7 PG (ref 27–33)
MCHC RBC AUTO-ENTMCNC: 33.5 G/DL (ref 33.6–35)
MCV RBC AUTO: 91.4 FL (ref 81.4–97.8)
MONOCYTES # BLD AUTO: 0.84 K/UL (ref 0–0.85)
MONOCYTES NFR BLD AUTO: 7.7 % (ref 0–13.4)
NEUTROPHILS # BLD AUTO: 7.21 K/UL (ref 2–7.15)
NEUTROPHILS NFR BLD: 66.3 % (ref 44–72)
NRBC # BLD AUTO: 0.02 K/UL
NRBC BLD-RTO: 0.2 /100 WBC
PLATELET # BLD AUTO: 291 K/UL (ref 164–446)
PMV BLD AUTO: 9.5 FL (ref 9–12.9)
POTASSIUM SERPL-SCNC: 3.7 MMOL/L (ref 3.6–5.5)
RBC # BLD AUTO: 3.62 M/UL (ref 4.2–5.4)
SODIUM SERPL-SCNC: 140 MMOL/L (ref 135–145)
T4 FREE SERPL-MCNC: 0.8 NG/DL (ref 0.93–1.7)
TSH SERPL DL<=0.005 MIU/L-ACNC: 18.2 UIU/ML (ref 0.38–5.33)
WBC # BLD AUTO: 10.9 K/UL (ref 4.8–10.8)

## 2021-11-04 PROCEDURE — A9270 NON-COVERED ITEM OR SERVICE: HCPCS | Performed by: PHYSICAL MEDICINE & REHABILITATION

## 2021-11-04 PROCEDURE — 700102 HCHG RX REV CODE 250 W/ 637 OVERRIDE(OP): Performed by: INTERNAL MEDICINE

## 2021-11-04 PROCEDURE — 700102 HCHG RX REV CODE 250 W/ 637 OVERRIDE(OP): Performed by: UROLOGY

## 2021-11-04 PROCEDURE — 700111 HCHG RX REV CODE 636 W/ 250 OVERRIDE (IP): Performed by: INTERNAL MEDICINE

## 2021-11-04 PROCEDURE — 97535 SELF CARE MNGMENT TRAINING: CPT

## 2021-11-04 PROCEDURE — 36415 COLL VENOUS BLD VENIPUNCTURE: CPT

## 2021-11-04 PROCEDURE — 84439 ASSAY OF FREE THYROXINE: CPT

## 2021-11-04 PROCEDURE — 700102 HCHG RX REV CODE 250 W/ 637 OVERRIDE(OP): Performed by: PHYSICAL MEDICINE & REHABILITATION

## 2021-11-04 PROCEDURE — 84443 ASSAY THYROID STIM HORMONE: CPT

## 2021-11-04 PROCEDURE — 770001 HCHG ROOM/CARE - MED/SURG/GYN PRIV*

## 2021-11-04 PROCEDURE — 85025 COMPLETE CBC W/AUTO DIFF WBC: CPT

## 2021-11-04 PROCEDURE — 700101 HCHG RX REV CODE 250: Performed by: STUDENT IN AN ORGANIZED HEALTH CARE EDUCATION/TRAINING PROGRAM

## 2021-11-04 PROCEDURE — A9270 NON-COVERED ITEM OR SERVICE: HCPCS | Performed by: INTERNAL MEDICINE

## 2021-11-04 PROCEDURE — 700105 HCHG RX REV CODE 258: Performed by: INTERNAL MEDICINE

## 2021-11-04 PROCEDURE — 80048 BASIC METABOLIC PNL TOTAL CA: CPT

## 2021-11-04 PROCEDURE — A9270 NON-COVERED ITEM OR SERVICE: HCPCS | Performed by: UROLOGY

## 2021-11-04 PROCEDURE — 99232 SBSQ HOSP IP/OBS MODERATE 35: CPT | Performed by: INTERNAL MEDICINE

## 2021-11-04 RX ORDER — LEVOTHYROXINE SODIUM 0.05 MG/1
50 TABLET ORAL
Status: DISCONTINUED | OUTPATIENT
Start: 2021-11-05 | End: 2021-11-09 | Stop reason: HOSPADM

## 2021-11-04 RX ORDER — OXYCODONE HYDROCHLORIDE 10 MG/1
10 TABLET ORAL
Status: DISCONTINUED | OUTPATIENT
Start: 2021-11-04 | End: 2021-11-07

## 2021-11-04 RX ORDER — OXYCODONE HYDROCHLORIDE 5 MG/1
5 TABLET ORAL
Status: DISCONTINUED | OUTPATIENT
Start: 2021-11-04 | End: 2021-11-07

## 2021-11-04 RX ORDER — ACETAMINOPHEN 500 MG
1000 TABLET ORAL EVERY 6 HOURS
Status: DISCONTINUED | OUTPATIENT
Start: 2021-11-04 | End: 2021-11-09 | Stop reason: HOSPADM

## 2021-11-04 RX ORDER — HYDROMORPHONE HYDROCHLORIDE 1 MG/ML
0.5 INJECTION, SOLUTION INTRAMUSCULAR; INTRAVENOUS; SUBCUTANEOUS
Status: DISCONTINUED | OUTPATIENT
Start: 2021-11-04 | End: 2021-11-07

## 2021-11-04 RX ORDER — ACETAMINOPHEN 500 MG
1000 TABLET ORAL EVERY 6 HOURS PRN
Status: DISCONTINUED | OUTPATIENT
Start: 2021-11-09 | End: 2021-11-09 | Stop reason: HOSPADM

## 2021-11-04 RX ADMIN — ALLOPURINOL 300 MG: 100 TABLET ORAL at 05:32

## 2021-11-04 RX ADMIN — DOCUSATE SODIUM 100 MG: 50 LIQUID ORAL at 05:47

## 2021-11-04 RX ADMIN — LEVOTHYROXINE SODIUM 37.5 MCG: 0.03 TABLET ORAL at 05:32

## 2021-11-04 RX ADMIN — GABAPENTIN 200 MG: 100 CAPSULE ORAL at 12:05

## 2021-11-04 RX ADMIN — GABAPENTIN 200 MG: 100 CAPSULE ORAL at 17:21

## 2021-11-04 RX ADMIN — GABAPENTIN 200 MG: 100 CAPSULE ORAL at 05:31

## 2021-11-04 RX ADMIN — PIPERACILLIN AND TAZOBACTAM 4.5 G: 4; .5 INJECTION, POWDER, LYOPHILIZED, FOR SOLUTION INTRAVENOUS; PARENTERAL at 20:33

## 2021-11-04 RX ADMIN — MORPHINE SULFATE 2 MG: 4 INJECTION INTRAVENOUS at 05:40

## 2021-11-04 RX ADMIN — OXYCODONE HYDROCHLORIDE 10 MG: 10 TABLET ORAL at 12:50

## 2021-11-04 RX ADMIN — LIDOCAINE 1 PATCH: 50 PATCH CUTANEOUS at 21:01

## 2021-11-04 RX ADMIN — PIPERACILLIN AND TAZOBACTAM 4.5 G: 4; .5 INJECTION, POWDER, LYOPHILIZED, FOR SOLUTION INTRAVENOUS; PARENTERAL at 13:49

## 2021-11-04 RX ADMIN — NICOTINE TRANSDERMAL SYSTEM 21 MG: 21 PATCH, EXTENDED RELEASE TRANSDERMAL at 05:34

## 2021-11-04 RX ADMIN — PIPERACILLIN AND TAZOBACTAM 4.5 G: 4; .5 INJECTION, POWDER, LYOPHILIZED, FOR SOLUTION INTRAVENOUS; PARENTERAL at 05:31

## 2021-11-04 RX ADMIN — OXYCODONE HYDROCHLORIDE 10 MG: 10 TABLET ORAL at 21:00

## 2021-11-04 RX ADMIN — LIDOCAINE 1 PATCH: 50 PATCH CUTANEOUS at 20:31

## 2021-11-04 RX ADMIN — ACETAMINOPHEN 650 MG: 325 TABLET ORAL at 05:32

## 2021-11-04 RX ADMIN — ACETAMINOPHEN 1000 MG: 500 TABLET ORAL at 12:04

## 2021-11-04 RX ADMIN — OXYCODONE HYDROCHLORIDE 10 MG: 10 TABLET ORAL at 18:08

## 2021-11-04 RX ADMIN — ACETAMINOPHEN 1000 MG: 500 TABLET ORAL at 17:21

## 2021-11-04 ASSESSMENT — ENCOUNTER SYMPTOMS
SORE THROAT: 0
NAUSEA: 0
SHORTNESS OF BREATH: 0
NECK PAIN: 0
ABDOMINAL PAIN: 1
HEADACHES: 0
DIZZINESS: 0
VOMITING: 0
BACK PAIN: 0
CONSTIPATION: 0
BLOOD IN STOOL: 0
NERVOUS/ANXIOUS: 0
FEVER: 0
BLURRED VISION: 0
CHILLS: 0

## 2021-11-04 ASSESSMENT — PAIN DESCRIPTION - PAIN TYPE
TYPE: SURGICAL PAIN
TYPE: SURGICAL PAIN
TYPE: ACUTE PAIN
TYPE: ACUTE PAIN

## 2021-11-04 ASSESSMENT — COGNITIVE AND FUNCTIONAL STATUS - GENERAL
TOILETING: A LOT
PERSONAL GROOMING: A LITTLE
DAILY ACTIVITIY SCORE: 15
EATING MEALS: A LITTLE
HELP NEEDED FOR BATHING: A LOT
DRESSING REGULAR UPPER BODY CLOTHING: A LITTLE
SUGGESTED CMS G CODE MODIFIER DAILY ACTIVITY: CK
DRESSING REGULAR LOWER BODY CLOTHING: A LOT

## 2021-11-04 NOTE — CARE PLAN
The patient is Watcher - Medium risk of patient condition declining or worsening    Shift Goals  Clinical Goals: Monitor for signs of bleeding  Patient Goals: Pain management  Family Goals: N/A    Progress made toward(s) clinical / shift goals:  Progressing    Problem: Knowledge Deficit - Standard  Goal: Patient and family/care givers will demonstrate understanding of plan of care, disease process/condition, diagnostic tests and medications  Outcome: Progressing  Pt is progressing towards goal of understanding care plan and disease process. Pt understands current interventions along with diagnostic tests and medications.    Problem: Respiratory  Goal: Patient will achieve/maintain optimum respiratory ventilation and gas exchange  Outcome: Progressing   Pt is progressing towards goal of maintaining respiratory ventilation and gas exchange. Pt educated on importance of achieving optimum gas exchange with the current plan of care.

## 2021-11-04 NOTE — THERAPY
"Occupational Therapy  Daily Treatment     Patient Name: Thais Munroe  Age:  66 y.o., Sex:  female  Medical Record #: [id number]  Today's Date: 11/4/2021     Precautions  Precautions: (P) Fall Risk  Comments: (P) abd wound    Assessment    Pt seen for OT tx session. Pt required mod A for supine>sit utilizing log roll, Max A LB dressing, Min A UB dressing, SPT from EOB <>BSC w/ min A. Pt required total A for toileting. Pt primarily impaired by pain, demo'd impaired balance, functional mobility, and activity tolerance impacting functional independence. WIll continue to follow.     Plan    Continue current treatment plan.    DC Equipment Recommendations: (P) Unable to determine at this time  Discharge Recommendations: (P) Recommend post-acute placement for additional occupational therapy services prior to discharge home    Subjective    \"All I need to poop is some chocolate milk\"     Objective       11/04/21 1043   Total Time Spent   Total Time Spent (Mins) 43   Treatment Charges   Charges Yes   OT Self Care / ADL 3   Precautions   Precautions Fall Risk   Comments abd wound   Vitals   O2 Delivery Device Silicone Nasal Cannula   Pain 0 - 10 Group   Therapist Pain Assessment Post Activity Pain Same as Prior to Activity;Nurse Notified  (c/o abd pain, RN aware)   Cognition    Cognition / Consciousness X   Speech/ Communication Delayed Responses   Level of Consciousness Alert   Attention Impaired   Sequencing Impaired   Comments hyperverbose, pleasent, cooperative, motivated   Balance   Sitting Balance (Static) Fair -   Sitting Balance (Dynamic) Poor +   Standing Balance (Static) Fair -   Standing Balance (Dynamic) Poor +   Weight Shift Sitting Poor   Weight Shift Standing Poor   Skilled Intervention Tactile Cuing;Verbal Cuing;Facilitation   Comments w/ FWW   Bed Mobility    Supine to Sit Moderate Assist   Sit to Supine Moderate Assist   Scooting Minimal Assist   Skilled Intervention Tactile Cuing;Verbal " Cuing;Facilitation   Activities of Daily Living   Grooming Supervision;Seated   Upper Body Dressing Minimal Assist   Lower Body Dressing Maximal Assist   Toileting Maximal Assist  (post BM)   Skilled Intervention Tactile Cuing;Verbal Cuing;Facilitation   How much help from another person does the patient currently need...   Putting on and taking off regular lower body clothing? 2   Bathing (including washing, rinsing, and drying)? 2   Toileting, which includes using a toilet, bedpan, or urinal? 2   Putting on and taking off regular upper body clothing? 3   Taking care of personal grooming such as brushing teeth? 3   Eating meals? 3   6 Clicks Daily Activity Score 15   Functional Mobility   Sit to Stand Minimal Assist   Bed, Chair, Wheelchair Transfer Minimal Assist   Toilet Transfers Minimal Assist  (BSC)   Transfer Method Stand Step   Mobility SPT from EOB<>BSC w/ FWW   Skilled Intervention Tactile Cuing;Verbal Cuing;Facilitation   Activity Tolerance   Sitting in Chair 20 min on BSC   Sitting Edge of Bed 10 min   Standing 3-4 min total   Patient / Family Goals   Patient / Family Goal #1 To go home   Goal #1 Outcome Progressing as expected   Short Term Goals   Short Term Goal # 1 Pt will complete ADL transfers with supervision   Goal Outcome # 1 Progressing as expected   Short Term Goal # 2 Pt will complete LB dressing with supervision AE PRN   Goal Outcome # 2 Progressing as expected   Short Term Goal # 3 Pt will complete toileting with supervision   Goal Outcome # 3 Progressing as expected   Education Group   Role of Occupational Therapist Patient Response Patient;Acceptance;Explanation   Anticipated Discharge Equipment and Recommendations   DC Equipment Recommendations Unable to determine at this time   Discharge Recommendations Recommend post-acute placement for additional occupational therapy services prior to discharge home   Interdisciplinary Plan of Care Collaboration   IDT Collaboration with  Nursing    Patient Position at End of Therapy In Bed;Call Light within Reach;Tray Table within Reach;Phone within Reach   Collaboration Comments report given   Session Information   Date / Session Number  11/4, 3 (2/3, 11/8)   Priority 3

## 2021-11-04 NOTE — CARE PLAN
The patient is Stable - Low risk of patient condition declining or worsening    Shift Goals  Clinical Goals: monitor bleeding on surgical site, bowel movement  Patient Goals: pain management    Progress made toward(s) clinical / shift goals:  Bleeding persists on her surgical site, changed dressing x1. No bowel movements, denies flatus. Medicated per MAR for pain.    Patient is not progressing towards the following goals:

## 2021-11-04 NOTE — PREADMISSION SCREENING NOTE
Updated Pre-Screen Assessment     Name: Thais Munroe  MRN: [id number]  : 1955    Medical Status/ Changes:        Julia Waters M.D.   Physician   [facility] Medicine   Progress Notes       Addendum   Date of Service:  2021 10:26 AM                 Addendum                []Hide copied text    []Matilda for details    [facility] Medicine Daily Progress Note     Date of Service  2021     Chief Complaint  Hypotension s/p left partial nephrectomy for mass.     Hospital Course  Thais Munroe is a 66 y.o. female with PMH of hypertension, hep C, smoker, history of blood clots, hypothyroidism, and renal mass.  Who presented 10/27/2021 for resection of left adrenal mass with Dr. Mccracken.  Patient underwent robotic assisted laparoscopic partial left nephrectomy on 10/27/2021.  Surgery went without complication with an EBL of 100 mL, and a JARRETT drain was placed.  Was reported to have 180 cc / 24 hours.  Patient initially with complaints of poor pain control, but now improved.  Her postop course was complicated by pain control issues requiring significant narcotics.  She developed worsening abdominal pain and nausea/vomiting.  She was ultimately transferred to the ICU for hypotension refractory to fluid boluses.  Her antibiotics were changed to Zosyn.  General surgery was consulted CT scan ordered which showed significant distention of the ascending and transverse colon with wall thickening, inflammation, edema and possible pneumatosis.  NG tube was placed with minimal output at that time.  There was concern that she had either ileus versus colonic pseudoobstruction.  GI was consulted and she underwent colonoscopy with decompression however no mechanical obstruction was identified.  Repeat imaging showed SBO with incarcerated incisional hernia.  Patient went to the OR for reduction and repair on .      Interval Problem Update  - POD#5 s/p reduction and repair of incarcerated incisional hernia  - having  BMs, passing gas, looks more distended today, will get xray  - pain controlled, will try to start weaning narcotic pain meds  - BP more controlled today, added amlodipine yesterday  - Cr improving     I have personally seen and examined the patient at bedside. I discussed the plan of care with patient, bedside RN, charge RN,  and pharmacy.     Consultants/Specialty  critical care, general surgery, GI and urology     Code Status  Full Code     Disposition  Patient is medically cleared.   Anticipate discharge to to skilled nursing facility vs rehab.  I have placed the appropriate orders for post-discharge needs.     Review of Systems  Review of Systems   Constitutional: Negative for chills and fever.   HENT: Negative for nosebleeds and sore throat.    Eyes: Negative for blurred vision.   Respiratory: Negative for shortness of breath.    Cardiovascular: Positive for leg swelling. Negative for chest pain.   Gastrointestinal: Positive for abdominal pain, diarrhea and heartburn. Negative for blood in stool, constipation, nausea and vomiting.   Genitourinary: Negative for dysuria.   Musculoskeletal: Negative for back pain and neck pain.   Skin: Negative for rash.   Neurological: Negative for dizziness and headaches.   Psychiatric/Behavioral: The patient is not nervous/anxious.          Physical Exam  Temp:  [36.4 °C (97.5 °F)-37.3 °C (99.1 °F)] 37.3 °C (99.1 °F)  Pulse:  [] 104  Resp:  [17-19] 19  BP: (119-162)/() 125/87  SpO2:  [95 %-98 %] 95 %     Physical Exam  Vitals reviewed.   Constitutional:       General: She is not in acute distress.     Appearance: Normal appearance. She is obese. She is not toxic-appearing or diaphoretic.   HENT:      Head: Normocephalic and atraumatic.      Nose: Nose normal.      Mouth/Throat:      Mouth: Mucous membranes are moist.      Pharynx: No oropharyngeal exudate.   Eyes:      General: No scleral icterus.        Right eye: No discharge.         Left eye: No  discharge.      Conjunctiva/sclera: Conjunctivae normal.   Cardiovascular:      Rate and Rhythm: Normal rate and regular rhythm.      Heart sounds: No murmur heard.  No friction rub. No gallop.    Pulmonary:      Effort: Pulmonary effort is normal. No respiratory distress.      Breath sounds: Rales present. No wheezing.   Abdominal:      General: There is distension.      Palpations: Abdomen is soft.      Tenderness: There is abdominal tenderness. There is no guarding or rebound.      Comments: High pitched tinkering bowel sounds,  LLQ surgical site with dressing c/d/i   Musculoskeletal:         General: No swelling or tenderness.      Cervical back: Normal range of motion. No muscular tenderness.      Right lower leg: Edema present.      Left lower leg: Edema present.   Skin:     Coloration: Skin is not jaundiced or pale.   Neurological:      General: No focal deficit present.      Mental Status: She is alert and oriented to person, place, and time. Mental status is at baseline.      Cranial Nerves: No cranial nerve deficit.   Psychiatric:         Mood and Affect: Mood normal.         Behavior: Behavior normal.            Fluids  No intake or output data in the 24 hours ending 11/07/21 1026     Laboratory      Recent Labs     11/05/21  0510   WBC 10.3   RBC 3.51*   HEMOGLOBIN 10.9*   HEMATOCRIT 33.2*   MCV 94.6   MCH 31.1   MCHC 32.8*   RDW 49.1   PLATELETCT 271   MPV 9.5            Recent Labs     11/05/21  0510 11/06/21  0306 11/07/21  0132   SODIUM 138 135 133*   POTASSIUM 3.6 3.2* 3.7   CHLORIDE 105 102 102   CO2 24 22 19*   GLUCOSE 96 109* 125*   BUN 30* 23* 19   CREATININE 1.86* 1.56* 1.29   CALCIUM 9.0 8.7 9.1                     Imaging  CT-ABDOMEN-PELVIS W/O   Final Result       1.  Partial small bowel obstruction involving the midportion of the small bowel secondary to new left-sided abdominal hernia.       2.  No evidence of abdominal or pelvic abscess or urinoma.       3.  Persistent dilatation of the  cecum measuring 10 cm in diameter.       4.  Distended gallbladder with minimal gallbladder wall thickening. No calcified gallstone or biliary dilatation identified.           Findings were discussed with ANH STERLING on 11/2/2021 12:53 PM.       LR-VYBCARV-6 VIEW   Final Result       1.  Cecal dilatation continues to be seen which can be seen in the setting of a cecal bascule.   2.  Enteric tube projects over the distal esophagus. Recommend advancement.       OV-IYPUDRQ-6 VIEWS   Final Result       1.  Overall stable predominantly cecal colonic dilatation with the cecum located in the midline and just to the left of midline possibly related to a cecal bascule with volvulus considered a more remote possibility due to the fact there has been no    interval progression.   2.  There is no pneumatosis or free intraperitoneal air.       YX-UPABJZM-6 VIEWS   Final Result       1.  There is a similar appearance to the mildly distended air-filled colon with relatively decompressed sigmoid and rectum.   2.  There is no free intraperitoneal air.       NC-YBZVMDI-5 VIEWS   Final Result           1.  Air-filled distention of the cecum measuring 12.3 cm, appearance concerning for [place]'s.  Similar to prior study.       These findings were discussed with the patient's clinician, Angeles Wolf, on 10/30/2021 4:19 AM.       OQ-ODMTVLI-1 VIEWS   Final Result       1.  Mild colonic dilation       2.  Enteric catheter appears appropriately located       3.  Mild right basilar atelectasis and/or pleural effusion       DX-ABDOMEN FOR TUBE PLACEMENT   Final Result       Enteric tube has been placed and the tip projects over the stomach.       CT-ABDOMEN-PELVIS W/O   Final Result           1.  Air-filled distention of the cecum, follow-up recommended to exclude progression to [place]'s   2.  Fluid and air-filled distended loops of small bowel in the left abdomen, consider component of ileus.   3.  Intra-abdominal foci of free air,  a nonspecific finding for recent postop changes with surgical drain in place   4.  Scattered mild abdominal ascites and hazy mesenteric inflammatory changes.   5.  Small right and trace left pleural effusion.   6.  Linear densities in the bilateral lung bases favors changes of atelectasis.   7.  Left nephrolithiasis without visualized obstructive changes.   8.  Hepatomegaly and changes of cirrhosis   9.  Diverticulosis   10.  Atherosclerosis       LT-ATPKJTL-0 VIEW   Final Result           1.  Air-filled distended bowel loops, appearance compatible with evolving obstruction versus ileus. Recommend radiographic follow-up to resolution.       DX-CHEST-PORTABLE (1 VIEW)   Final Result           1.  No acute cardiopulmonary disease.   2.  Cardiomegaly             Assessment/Plan  * Small bowel obstruction (HCC)  Assessment & Plan  NG removed 11/3  NPO-->sips of clears 11/3-->GI soft 11/5 11/2 CT Abd:    1.  Partial small bowel obstruction involving the midportion of the small bowel secondary to new left-sided abdominal hernia.    2.  No evidence of abdominal or pelvic abscess or urinoma.    3.  Persistent dilatation of the cecum measuring 10 cm in diameter.    4.  Distended gallbladder with minimal gallbladder wall thickening. No calcified gallstone or biliary dilatation identified.  Surgery following  S/p reduction and repair of incarcerated incisional hernia on 11/2  Mobility as able.     Correct electrolyte deficits  Minimize narcotics as able.  Zosyn 10/29-11/5     More distended today 11/7, getting xray and weaning narcotics  Bicarb 19, having loose stools so may be from that, will start gentle IVF, if worsens will check lactic     Normocytic anemia  Assessment & Plan  Likely in setting of surgery and hospitalization  H/H stable  CTM     Acquired hypothyroidism  Assessment & Plan  TSH 18  FT4 low  Synthroid 37.5mcg--> 50mcg daily  Recheck TSH in ~6 weeks     Tobacco abuse  Assessment & Plan  Nicotine  patch  Discussed smoking cessation with patient including benefits, patch, total time = 8 minutes     Hypertension  Assessment & Plan  Uncontrolled  Home regimen: Amlodipine 10 mg daily, valsartan 325 mg nightly  Inpatient regimen: start amlodipine 10mg daily + PRNs, if Cr stablizes can restart valsartan     Renal cell carcinoma (HCC)  Assessment & Plan  S/p surgical excision of mass.  Pathology confirmed Renal Cell CA.  Urology to monitor.     Hypokalemia- (present on admission)  Assessment & Plan  K still low, s/p po supplementation  Check Mg  CTM     Hypoglycemia  Assessment & Plan  Resolved     SHEBA (acute kidney injury) (HCC)  Assessment & Plan  Improving  S/p partial nephrectomy due to Renal Cell Carcinoma.  Monitor I/O's, labs, vitals.  Avoid nephrotoxins.        Gout- (present on admission)  Assessment & Plan  Continue home allopurinol  Monitor Cr     Post-op pain  Assessment & Plan     Minimize narcotics, added gapentin  Lidocaine patch.  Heat/ice  Unable to give NSAIDS due to renal function.  Tums + PPI     Hypotension- (present on admission)  Assessment & Plan  Resolved  S/p midodrine  Check TSH w/ reflex to free T4 if not done.        VTE prophylaxis: SCDs/TEDs and heparin ppx     I have performed a physical exam and reviewed and updated ROS and Plan today (11/7/2021). In review of yesterday's note (11/6/2021), there are no changes except as documented above.                                Functional Status/ Changes:      Barbara Dubon PTA   Physical Therapy Assistant      Therapy       Signed   Date of Service:  11/8/2021 10:09 AM                         []Hide copied text    []Matilda for details    Physical Therapy   Daily Treatment     Patient Name: Thais Munroe  Age:  66 y.o., Sex:  female  Medical Record #: [id number]  Today's Date: 11/8/2021     Precautions  Precautions: (P) Fall Risk  Comments: (P) abdominal incision     Assessment     Pt feeling better, commenting on how much easier it is to  move 2* improvement w/pain. Pt still needing HOB elevated and use of railing to get seated EOB w/no assist, functional transfers and her amb efforts still w/min assist. Improvement w/distance tolerated w/her amb efforts from last PT session.      Plan     Continue current treatment plan.     DC Equipment Recommendations: Unable to determine at this time  Discharge Recommendations: Recommend post-acute placement for additional physical therapy services prior to discharge home        Objective          11/08/21 1009   Other Treatments   Other Treatments Provided Assisted pt w/donning of hospital panties/brief 2* urinary incont to keep from using the Pur-wick. Pt did state she was incont PTA and wore pads.    Balance   Sitting Balance (Static) Fair +   Sitting Balance (Dynamic) Fair   Standing Balance (Static) Fair   Standing Balance (Dynamic) Fair -   Weight Shift Sitting Fair   Weight Shift Standing Fair   Comments w/FWW   Gait Analysis   Gait Level Of Assist Minimal Assist   Assistive Device Front Wheel Walker   Distance (Feet) 75   # of Times Distance was Traveled 1   Skilled Intervention Verbal Cuing;Postural Facilitation;Compensatory Strategies   Comments Improvement w/pts amb efforts from last PT session, conts to require 2L O2. Pt instructed to push the walker vs picking it up and then taking steps. Improvement w/pts activity tolerance.    Bed Mobility    Supine to Sit Supervised  (HOB partially elevated and use of railing)   Sit to Supine    (pt left seated in chair)   Scooting Supervised  (seated)   Rolling Supervised  (w/railing and HOB elevated)   Skilled Intervention Verbal Cuing   Comments Improvement w/pts bed mobility tasks 2* improvement w/her pain.    Functional Mobility   Sit to Stand Supervised  (from EOB/toilet height)   Bed, Chair, Wheelchair Transfer Minimal Assist  (w/FWW)   Toilet Transfers Minimal Assist  (BSC over the toilet)   Skilled Intervention Verbal Cuing   Comments Pt enouraged to  start amb to the BR.    How much difficulty does the patient currently have...   Turning over in bed (including adjusting bedclothes, sheets and blankets)? 2   Sitting down on and standing up from a chair with arms (e.g., wheelchair, bedside commode, etc.) 3   Moving from lying on back to sitting on the side of the bed? 3   How much help from another person does the patient currently need...   Moving to and from a bed to a chair (including a wheelchair)? 3   Need to walk in a hospital room? 3   Climbing 3-5 steps with a railing? 3   6 clicks Mobility Score 17   Short Term Goals    Short Term Goal # 1 Pt will perform supine<>sit with HOB flat and SPV within 6 visits to improve ind with bed mob.   Goal Outcome # 1 goal not met   Short Term Goal # 2 Pt will perform all fxnl transfers with LRAD and SPV within 6 visits to increase OOB activity.   Goal Outcome # 2 Goal not met   Short Term Goal # 3 Pt to ambulate 150 ft w/ fww and spv in 6 visits to improve fxl indep   Goal Outcome # 3 Goal not met                              Reviewer: Yosvany Rock  Date: 2021  Time: 1:29 PM  Pre-Admission Screening Form    Patient Information:   Name: Thais Munroe     MRN: [id number]       : 1955      Age: 66 y.o.   Gender: female      Race: White [7]       Marital Status: Single [1]  Family Contact: Yarelis Becerril        Relationship: Daughter [2]  Home Phone:            Cell Phone: [phone]  Advanced Directives: None  Code Status:  FULL  Current Attending Provider: Julia Waters M.D.  Referring Physician: Dr. Waters      Physiatrist Consult: Dr. Bruce       Referral Date: 11/3/21  Primary Payor Source:  [facility]  Secondary Payor Source:      Medical Information:   Date of Admission to Acute Care Setting:10/27/2021  Room Number: S527/02  Rehabilitation Diagnosis: 0017.8 - Medically Complex: Medical/Surgical Complications  Immunization History   Administered Date(s) Administered   • Pfizer SARS-CoV-2  Vaccine 05/20/2021, 06/12/2021     No Known Allergies  Past Medical History:   Diagnosis Date   • Blood clotting disorder (HCC)     leg    • Cancer (HCC)     basil cell   • Cancer (HCC) 10/13/2021    renal   • Coughing blood 10/13/2021    pt states dry clear cough   • Dental disorder 10/13/2021    upper lower dentures   • Disorder of thyroid    • Hepatitis C    • Hypertension 10/13/2021    pt states well controlled on meds   • Pain 10/13/2021    spine   • Urinary incontinence      Past Surgical History:   Procedure Laterality Date   • UMBILICAL HERNIA REPAIR  11/2/2021    Procedure: REPAIR, HERNIA, INCISIONAL - INCARCERATED;  Surgeon: Tino Banegas M.D.;  Location: SURGERY [facility];  Service: General   • PB COLONOSCOPY,DIAGNOSTIC N/A 11/1/2021    Procedure: COLONOSCOPY;  Surgeon: Jesus Bland M.D.;  Location: SURGERY SAME DAY [facility];  Service: Gastroenterology   • PB LAP,PARTIAL NEPHRECTOMY Left 10/27/2021    Procedure: NEPHRECTOMY, PARTIAL, ROBOT-ASSISTED, USING DA NAT XI;  Surgeon: Peter Mccracken M.D.;  Location: SURGERY [facility];  Service: Uro Robotic   • PB ULTRASONIC GUIDANCE, INTRAOPERATIVE Left 10/27/2021    Procedure: ULTRASOUND GUIDANCE;  Surgeon: Peter Mccracken M.D.;  Location: SURGERY [facility];  Service: Uro Robotic   • OTHER  1960    tonsils   • OTHER      basal cell removal back       History Leading to Admission, Conditions that Caused the Need for Rehab (CMS):     &[facility] Medicine 10/28 (Dr Castro):  Reason for Consultation  Hypotension     History of Presenting Illness  66 y.o. female with PMH of hypertension, hep C, smoker, history of blood clots, hypothyroidism, and renal mass.  Who presented 10/27/2021 for resection of left adrenal mass with Dr. Mccracken.  Patient underwent robotic assisted laparoscopic partial left nephrectomy on 10/27/2021.  Surgery went without complication with an EBL of 100 mL, and a JARRETT drain was placed.  Was reported to have 180 cc / 24 hours.  Patient  initially with complaints of poor pain control, but now improved.  Tonight, patient noted to have incident of hypotension with a low BP of 57/48.  Patient was ordered 1 L of NS in hospital medicine was consulted for further evaluation and assistance in management of hypotension.  On my arrival, patient was reportedly to be very lethargic, dizzy, and lightheaded.  After completion of NS 1 L on my arrival, patient now more alert and oriented, and reports minimal dizziness/lightheadedness.  Her blood pressure is also improved to 100/60, but appears to have decreased to 80s over 60s during my physical examination that was confirmed with manual BP measurements.  On questioning, patient does admit that she has been having issues of low blood pressure for the past few weeks.  She reports that 10 days before surgery she was found to have a SBP into the 190s at which time she was started on amlodipine 10 mg.  Patient reports that she has been taking it as prescribed, but she has been having incidences of low blood pressure as well as syncope since initiation.  She reported that she had another fainting episode day prior to her arrival to the the hospital.  On review of MAR, patient is noted to be on amlodipine 10 mg and valsartan 320 mg.  However, patient has not received valsartan 320 mg since she has arrived to the hospital, and her last dose of amlodipine 10 mg was last night.  On questioning, patient endorses diffuse abdominal pain, reports it is mild, and appears to be unchanged from prior.  She continues to endorse lower back pain which is chronic and is otherwise denying any pain at this time.  Additionally, patient endorses a nonproductive cough and congestion that she reports has been ongoing.  She denies any fevers, chills, chest pain, shortness of breath, nausea, vomiting, diarrhea.  She is noted to be on 1 L nasal cannula which was transitioned down from 6 L previously.    Assessment/Plan  * Hypotension due to  drugs- (present on admission)  Assessment & Plan  Incident of hypotension with lowest BP reading of 57/48  Transient provement of BP status post 1 L NS  Hb 13.7-> 12.7 currently  No obvious signs of bleeding during my physical examination  Possibly secondary to amlodipine use, however, noted to be normotensive earlier in the day  Patient currently reports significant improvement of symptoms  -We will order additional 1 L NS  -Increase NS infusion from 75 cc to 125 cc/h following  -We will administer midodrine 5 mg p.o. x1  -We will DC amlodipine and losartan for now  -Follow-up with repeat CBC and consider CT imaging of the abdomen if there is concern for bleeding  -Follow-up with procalcitonin and CXR for possible infectious etiology as cause of hypotension  -Continue to monitor throughout the night, and will reevaluate after patient has finished IV fluids and midodrine     Elevated lactic acid level- (present on admission)  Assessment & Plan  Lactic acid 2.2  -Continue with IV fluids as above  -Follow-up CXR procalcitonin to rule out infectious etiology        Leukocytosis- (present on admission)  Assessment & Plan  WBC 11.6-> 16.6  Possibly elevated secondary to recent surgery  -Follow-up with CXR and procalcitonin patient noted to have mild rhonchi and is currently requiring nasal cannula  -We will order UA, however, results may be unreliable as patient had recent renal surgery     Hyponatremia- (present on admission)  Assessment & Plan  Continue with NS  F/u with repeat bmp already drawn  Monitor  Urine Na, urine osm, Serum Osm     Renal mass- (present on admission)  Assessment & Plan  Holding opiate analgesics for now secondary to hypotension  Continue with Tylenol  Have ordered lidocaine patches for back pain  Follow-up with repeat CBC and consider CT imaging if there is concern for bleeding  Further management per urology      Physiatry Recommendations 11/3 (Dr Bruce):  Date of initial consultation:  11/3/2021  Consulting provider: Julia Waters MD  Reason for consultation: assess for acute inpatient rehab appropriateness  LOS: 7 Day(s)     Chief complaint: Hypotension     HPI: The patient is a 66 y.o. female with a past medical history of hypertension, hep C, tobacco abuse, history of blood clots, hypothyroidism, renal mass;  who presented on 10/27/2021 12:21 PM for resection of left adrenal mass with Dr. Mccracken.  Patient underwent robotic assisted partial left nephrectomy without complication.  Postop course was complicated by abdominal pain, nausea and vomiting.  She then developed hypotension requiring transfer to the ICU.  CT abdomen showed distention of the ascending and transverse colon with wall thickening, inflammation, edema and possible pneumatosis.  NG tube was placed, GI was consulted and she underwent colonoscopy with decompression on 11/1 with Jesus Bland MD.  No mechanical obstruction was identified.  Repeat imaging showed SBO with incarcerated incisional hernia, requiring return to the OR on 11/2 with Tino Banegas MD for reduction and repair.     Since that time, patient has been struggling with pain control, SHEBA and signs of continued obstruction.  Per notes patient has not been able to produce flatus or stool, NG with nearly 600 cc fluid, JARRETT drain with 150 cc.     ROS  Pertinent positives are mentioned in the HPI, all others reviewed and are negative.     Social Hx:  1 SH  1 JL  With: Adult children, one works during the day     THERAPY:  Restrictions: fall risk   PT: Functional mobility   11/2: Unable to participate in gait, mod assist sit to stand     OT: ADLs  11/2: Max assist lower body dressing, min assist grooming     SLP:   None    ASSESSMENT:  Patient is a 66 y.o. female admitted with left adrenal mass now s/p partial left nephrectomy, complicated by incarcerated incisional hernia, now status post colonoscopy and return to the OR for reduction and repair.     C Code /  Diagnosis to Support: 0017.8 - Medically Complex: Medical/Surgical Complications     Rehabilitation: Impaired ADLs and mobility  Patient is a moderate candidate for inpatient rehab based on needs for PT, OT.  Patient will also benefit from family training.  Patient has a good discharge situation which will be home with adult children.      Barriers to transfer include: Insurance authorization, TCCs to verify disposition, medical clearance and bed availability      Additional Recommendations:  -Possible candidate for IPR.  Patient is not medically cleared, and there is still active ongoing concern for small bowel obstruction versus ischemia, versus ileus.  -Continue PT OT while in-house  -TCC to reach out to adult children to confirm support  -Pain control per primary team, currently on gabapentin 600 mg twice daily, lidocaine patch, and morphine injection every 6 hours as needed.  Please note patient will need to be off of IV pain medication for at least 24 hours prior to coming to [facility].  -Zosyn 4.5 g every 8 hours scheduled through 11/5/2021  -PMR to follow in the periphery for rehab appropriateness, please reach out with questions or request for medical management     *Decreasing gabapentin to 200 mg 3 times daily, see dosing recommendations below     Guidelines for Gabapentin dosing based on GFR   GFR >60 ml/min: Give usual dosage  [30-59]: 400-1400mg/day  [15-29]: Dosage range: 200-700mg/day.  [<15]: 100-300 mg/day        Medical Complexity:  Medical/surgical complications  SHEBA        DVT PPX: Heparin 5K 3 times daily      General Surgery 10/29 (SEBLE Kate):  Assessment/Plan:   1) Post-operative Colonic Ileus:     Likely secondary to age, recent intra-abdominal surgery, post-op narcotic use, and modest mobility/ambulation in the post-op period.       -NPO with NG  -Minimize narcotics, though I have ordered some prn IV morphine as she is only POD2  -Mobilize as tolerated  -IS and RT work  -Trend serial  lactates  -Try Fleet enema to stimulate the colon from below  -No indication for surgery at this time     I independently reviewed pertinent clinical lab tests from the last 48 hours and ordered additional follow up clinical lab tests.  I independently reviewed pertinent radiographic images and the radiologist's reports from the last 48 hours and ordered additional follow up radiographic studies.  The details of the available patient records in [facility] (including laboratory tests, culture data, medications, imaging, and other pertinent diagnostic tests) and that information was utilized as warranted in today's medical decision making for this patient.    GI Consult 10/31 (Dr Magana):  Presenting Chief Complaint:  Abdominal distension        History of Present Illness:    She is a 66-year-old female patient who was seen in consultation for abdominal distention.  The patient is postop day 4 status post robotic assisted laparoscopic partial left nephrectomy on 10/27/2021 for left renal mass.  Her postoperative period has been complicated by pain control issues requiring significant narcotics, lack of mobilization, and increasing abdominal distention.  She was initially seen by Dr. Kate with surgery on 10/29/2021 with findings of increased abdominal distention, and noncontrast CT which showed significant distention of the ascending and transverse colon with wall thickening, inflammation, edema, and possible pneumatosis.  She was hypotensive at the time.  NG tube was placed at that time with minimal output.  At that time she was suspected to have ileus versus colonic pseudoobstruction.  She has had persistent pain, distention, and radiographic imaging suggesting pseudoobstruction.  She has now received 2 doses of neostigmine in the ICU without improvement.     Currently the patient has diffuse abdominal pain, NG tube to suction with dark brown appearing contents    Assessment/Recommendations:  Assessment:  1.   Abdominal pain and distention  2.  Colonic pseudoobstruction-status post NG tube decompression, electrolyte correction, and neostigmine without improvement  3.  Renal mass status post nephrectomy on 10/27/2021  4.  Leukocytosis  5.  Hyponatremia, improving  6.  Acute kidney injury     Plan:  1.  Unprepped colonoscopy with colonic decompression with Dr. Jesus Bland at 11:30 AM on 11/1/2021. Patient seen and examined before proceeding.     Risks, benefits, and alternatives of aforementioned procedures were discussed with patient and daughter Yarelis.  Consenting person(s) were given opportunities to ask questions and discuss other options.  Risks including but not limited to perforation approximately 3%, infection, bleeding, missed lesion(s), possible need for surgery(ies) and/or interventional radiology, possible need for repeat procedure(s) and/or additional testing, hospitalization possibly prolonged, cardiac and/or pulmonary event, aspiration, hypoxia, stroke, medication and/or anesthesia reaction, indefinite diagnosis, discomfort, unsuccessful and/or incomplete procedure, ineffective therapy and/or persistent symptoms, damage to adjacent organs and/or vascular structures, and other adverse events possibly life-threatening.  Interactive discussion was undertaken with Layman's terms. I answered questions in full and to satisfaction.  Consenting person(s) stated understanding and acceptance of these risks, and wished to proceed.  Informed consent was given in clear state of mind.     2.  Continue n.p.o. and NG tube decompression     3.  Continued electrolyte correction     4.  Minimize narcotics     5.  Appreciate urology and surgical recommendations     6.  Will trial single dose of methylnaltrexone (Relistor)    OP Report - surgery urology 10/27 (Dr Mccracken):  DATE OF SERVICE:  10/27/2021      PREOPERATIVE DIAGNOSIS:  Left renal mass.     POSTOPERATIVE DIAGNOSIS:  Left renal mass.     PROCEDURES:  1.   Robotic-assisted laparoscopic left partial nephrectomy.  2.  Intraoperative renal ultrasonography for guidance.     SURGEON:  Peter Mccracken MD     ASSISTANT:  Los Castillo MD     ANESTHESIOLOGIST:  Gautam Garcia MD     TYPE OF ANESTHESIA:  General endotracheal tube.     INDICATIONS:  A 66-year-old female was noted to have a left renal mass on   abdominal imaging for pain.  Evaluation suggested a renal cell carcinoma.  She   is taken for partial nephrectomy.     FINDINGS:  There was an exophytic and endophytic renal mass.  Margins were   defined with ultrasound.  It was excised.  Renorrhaphy accomplished.  Under 24   minutes of warm ischemia and she tolerated the procedure well with minimal   blood loss.    Colonoscopy 10/1 (Dr Bland):  Colonoscopy Procedure Note  Date of Procedure: 11/1/2021  Attending Physician: Jesus Bland M.D.        Acute colonic pseudoobstruction with cecal diameter temporarily greater than 12 cm  Indications: See above  Previous colonoscopy: Patient unable to state  Sedation: General     Procedure Details:    Colonoscopy  Pre-Anesthesia Assessment:  Prior to the procedure, a History and Physical was performed, and patient medications and allergies were reviewed. The patient’s tolerance of previous anesthesia was also reviewed. The risks and benefits of the procedure and the sedation options and risks were discussed with the patient including but not limited to infection, bleeding, aspiration, perforation, adverse medication reaction, missed diagnosis, and missed lesions. The patient verbalized understanding. All questions were answered, and informed consent was obtained.      Prior Anticoagulants: Patient has taken none  ASA Grade Assessment: 4     After I obtained informed consent from the patient, the patient was placed in the left lateral position. Appropriate time-out protocol was followed: the correct patient, the correct procedure, and the correct equipment in the room were  confirmed. Throughout the procedure, the patient’s blood pressure, pulse, and oxygen saturations were monitored continuously.The Olympus variable stiffness colonoscope was introduced through the anus and passed under direct vision with significant water irrigation and minimal insufflation to reduce the risk of perforation.      The scope was advanced to the cecum, identified by the appendiceal orifice and ileocecal valve. The colonscopy was performed with  significant difficulty due to the lack of bowel prep. The patient tolerated the procedure well. The quality of the bowel preparation was poor since none was given.  Findings and interventions were performed and documented below. The endoscope was then removed and the procedure was terminated. There were no immediate postoperative complications.         Findings and Impressions: Thick brown semisolid stool was seen throughout the colon greatest in the ascending colon and cecum.  No mechanical obstruction was identified  Large nonbleeding, noninflamed diverticuli were seen in the sigmoid colon.  A subcentimeter sessile benign-appearing distal transverse colon polyp was seen left in place.  This was left in place as the lack of prep and presence of solid stool would interfere with removal and retrieval.  -A full bottle of water was used to irrigate the entire colon essentially functioning as a full colonic enema.  Patient had a gush of brown stool towards the end of the procedure. Air was suctioned on withdrawal.       Recommendations: -Continue nasogastric tube to low intermittent wall suction  -Diet per primary team  -Avoid opiates  -Patient will need a repeat colonoscopy within 6 to 12 months to remove the identified polyp and search for any additional polyps    OP Report - General Surgery 10/1 (Dr Banegas):  Preoperative diagnosis; incarcerated incisional hernia containing small bowel  Postoperative diagnosis: Same  Operation; reduction and repair of incarcerated  incisional hernia  Surgeon; Dr. PARISH GIORDANO  Anesthesia General Dr. Marti  Operative note  This woman is 66 years of age.  She is status post robotic right partial nephrectomy; she had early complications with possible [place]'s syndrome.  She underwent colonoscopy yesterday for decompression.  Today she looked more distended.  She had a repeat CT scan without contrast which demonstrated an incarcerated small bowel obstruction in the left lower quadrant.  Patient was felt to be a candidate for urgent surgical intervention.  She consented.  She had a satisfactory preoperative evaluation and signed consents for surgery and anesthesia understanding the potential risks and possible complications.  She was taken to the operating room administered prophylactic antibiotics and sequential stockings applied as antagonism prophylaxis.  She was placed under general anesthesia.  Her abdomen was prepped with Betadine solutions and sterile drapes were applied.  A timeout was affected.  An incision was opened and the left lower quadrant over a palpable mass exposing incarcerated small bowel.  It did not appear to be strangulated.  The bowel was gradually reduced through the defect.  The abdominal wall musculature was exceedingly thin.  It was closed with interrupted Smead Dubon sutures of 0 Vicryl.  Care was taken not to incorporate the bowel or omentum in the closure.  The wound was irrigated then the skin was approximated using an automatic stapling device and a Prevena wound dressing was applied.  Patient was awakened extubated taken to recovery room in stable satisfactory condition the procedure was uncomplicated.  Wound classification was clean contaminated and estimated blood loss was truly minimal.  Sponge and needle counts reported as correct x3.  Patient may be returned to the floor with nasogastric decompression for ongoing care    CT abdomen 11/2:  IMPRESSION:     1.  Partial small bowel obstruction involving the  "midportion of the small bowel secondary to new left-sided abdominal hernia.     2.  No evidence of abdominal or pelvic abscess or urinoma.     3.  Persistent dilatation of the cecum measuring 10 cm in diameter.     4.  Distended gallbladder with minimal gallbladder wall thickening. No calcified gallstone or biliary dilatation identified.    Co-morbidities: See PMH  Potential Risk - Complications: Deep Vein Thrombosis, Incontinence, Malnutrition, Pain, Pneumonia, Pressure Ulcer and Urinary Tract Infection  Level of Risk: High    Ongoing Medical Management Needed (Medical/Nursing Needs):   Patient Active Problem List    Diagnosis Date Noted   • Renal cell carcinoma (HCC) 11/02/2021   • Hepatitis C    • Tobacco abuse    • Hypoglycemia 10/31/2021   • Post-op pain 10/30/2021   • Gout 10/30/2021   • SHEBA (acute kidney injury) (HCC) 10/30/2021   • Small bowel obstruction (HCC) 10/29/2021   • Hypotension 10/28/2021   • Hypertension 10/13/2021     Alert  Current Vital Signs:   Temperature: 36.1 °C (96.9 °F) Pulse: 85 Respiration: 17 Blood Pressure: 147/81  Weight: 88.2 kg (194 lb 7.1 oz) Height: 165.1 cm (5' 5\")  Pulse Oximetry: 95 % O2 (LPM): 2.5      Completed Laboratory Reports:  Recent Labs     11/01/21  0823 11/01/21  0858 11/02/21  0428 11/03/21  0416 11/04/21  0544   WBC  --   --  11.3* 9.3 10.9*   HEMOGLOBIN 11.1*  --  11.7* 12.2 11.1*   HEMATOCRIT 33.5*  --  36.1* 36.1* 33.1*   PLATELETCT  --   --  293 305 291   SODIUM  --  143 139 139 140   POTASSIUM  --  3.3* 5.1 4.4 3.7   BUN  --  22 23* 30* 33*   CREATININE  --  1.85* 1.98* 2.10* 1.79*   GLUCOSE  --  101* 106* 141* 86     Additional Labs: Not Applicable    Prior Living Situation:   Housing / Facility: 1 Story House  Steps Into Home: 1  Steps In Home: 0  Lives with - Patient's Self Care Capacity: Adult Children  Equipment Owned: Front-Wheel Walker    Prior Level of Function / Living Situation:   Physical Therapy: Prior Services: Home-Independent  Housing / " Facility: [facility]  Steps Into Home: 1  Steps In Home: 0  Bathroom Set up: Bathtub / Shower Combination  Equipment Owned: Front-Wheel Walker  Lives with - Patient's Self Care Capacity: Adult Children  Bed Mobility: Independent  Transfer Status: Independent  Ambulation: Independent  Distance Ambulation (Feet):  (Community distances)  Assistive Devices Used: None  Stairs: Independent  Current Level of Function:   Gait Level Of Assist: Unable to Participate (2/2 fatigue and weakness)  Weight Bearing Status: No restrictions  Supine to Sit: Moderate Assist  Sit to Supine:  (Up in chair post session)  Scooting: Moderate Assist  Sit to Stand: Moderate Assist  Bed, Chair, Wheelchair Transfer: Moderate Assist  Toilet Transfers: Refused  Transfer Method: Stand Step  Sitting in Chair: left seated in chair  Sitting Edge of Bed: 5 min  Standin min  Occupational Therapy:   Self Feeding: Independent  Grooming / Hygiene: Independent  Bathing: Independent  Dressing: Independent  Toileting: Independent  Medication Management: Independent  Laundry: Independent  Kitchen Mobility: Independent  Finances: Independent  Home Management: Independent  Shopping: Independent  Prior Level Of Mobility: Independent Without Device in Community  Driving / Transportation: Driving Independent  Prior Services: Home-Independent  Housing / Facility: [facility]  Occupation (Pre-Hospital Vocational): Not Employed  Current Level of Function:   Upper Body Dressing: Minimal Assist  Lower Body Dressing: Maximal Assist  Toileting:  (NT-refused need)  Speech Language Pathology:      Rehabilitation Prognosis/Potential: Good  Estimated Length of Stay: 10-14 days    Nursing:      [place] in Place    Scope/Intensity of Services Recommended:  Physical Therapy: 1.5 hr / day  5 days / week. Therapeutic Interventions Required: Maximize Endurance, Mobility, Strength and Safety  Occupational Therapy: 1.5 hr / day 5 days / week. Therapeutic Interventions  Required: Maximize Self Care, ADLs, IADLs and Energy Conservation  Rehabilitation Nursin/7. Therapeutic Interventions Required: Monitor Pain, Skin, Vital Signs, Intake and Output, Labs, Safety and Family Training  Rehabilitation Physician: 3 - 5 days / week. Therapeutic Interventions Required: Medical Management    She requires 24-hour rehabilitation nursing to manage bowel and bladder function, skin care, surgical incision, nutrition and fluid intake, pulmonary hygiene, pain control, safety, medication management and patient/family goals. In addition, rehabilitation nursing will reiterate and reinforce therapy skills and equipment use, including ADLs, as well as provide education to the patient and family. Thais Munroe is willing to participate in and is able to tolerate the proposed plan of care.    Rehabilitation Goals and Plan (Expected frequency & duration of treatment in the IRF):   Return to the Community, Modified Independent Level of Care, Minimal Assist Level of Care and Family Able to Provide  Assistance  Anticipated Date of Rehabilitation Admission: 21  Patient/Family oriented IRF level of care/facility/plan: Yes  Patient/Family willing to participate in IRF care/facility/plan: Yes  Patient able to tolerate IRF level of care proposed: Yes  Patient has potential to benefit IRF level of care proposed: Yes  Comments: Not Applicable    Special Needs or Precautions - Medical Necessity:  Safety Concerns/Precautions:  Fall Risk / High Risk for Falls, Balance and Bed / Chair Alarm  Pain Management  IV Site: Peripheral  Requires Oxygen  Current Medications:    Current Facility-Administered Medications Ordered in Epic   Medication Dose Route Frequency Provider Last Rate Last Admin   • lactated ringers infusion   Intravenous Continuous Julia Waters M.D. 125 mL/hr at 21 0853 New Bag at 21 0853   • acetaminophen (Tylenol) tablet 650 mg  650 mg Oral Q6HRS Julia Waters M.D.    650 mg at 11/04/21 0532   • morphine (pf) 4 mg/mL injection 2 mg  2 mg Intravenous Q6HRS PRN Julia Waters M.D.   2 mg at 11/04/21 0540   • gabapentin (NEURONTIN) capsule 200 mg  200 mg Oral TID Trenton Bruce D.OJoann   200 mg at 11/04/21 0531   • hydrALAZINE (APRESOLINE) injection 20 mg  20 mg Intravenous Q6HRS PRN Joshua Glover M.D.   20 mg at 11/03/21 2349   • labetalol (NORMODYNE/TRANDATE) injection 10 mg  10 mg Intravenous Q4HRS PRN Amor Winn M.D.   10 mg at 11/02/21 0905   • calcium carbonate (TUMS) chewable tab 500 mg  500 mg Oral TID PRN Abi Kang, A.P.R.N.   500 mg at 11/03/21 2339   • glucose 4 g chewable tablet 16 g  16 g Oral Q15 MIN PRN Abi Kang, A.P.R.N.        And   • dextrose 50% (D50W) injection 50 mL  50 mL Intravenous Q15 MIN PRN Abi Kang, A.P.R.N.       • docusate sodium (Colace) oral solution 100 mg  100 mg Oral BID Jeremy M Gonda, M.D.   100 mg at 11/04/21 0547   • mag hydrox-al hydrox-simeth (MAALOX PLUS ES or MYLANTA DS) suspension 10 mL  10 mL Oral 4X/DAY PRN Nathan Castro M.D.   10 mL at 10/29/21 0147   • piperacillin-tazobactam (ZOSYN) 4.5 g in  mL IVPB  4.5 g Intravenous Q8HRS John Ireland M.D. 25 mL/hr at 11/04/21 0531 4.5 g at 11/04/21 0531   • lidocaine (LIDODERM) 5 % 1 Patch  1 Patch Transdermal Q24HR Nathan Castro M.D.   1 Patch at 11/03/21 2038   • guaiFENesin ER (MUCINEX) tablet 600 mg  600 mg Oral Q12HRS PRN Nathan Castro M.D.   600 mg at 10/28/21 2049   • lidocaine (LIDODERM) 5 % 1 Patch  1 Patch Transdermal Q24HR Nathan Castro M.D.   1 Patch at 11/03/21 2042   • allopurinol (ZYLOPRIM) tablet 300 mg  300 mg Oral DAILY Peter Mccracken M.D.   300 mg at 11/04/21 0532   • nicotine (NICODERM) 21 MG/24HR 21 mg  1 Patch Transdermal Q24HRS Peter Mccracken M.D.   21 mg at 11/04/21 0534   • levothyroxine (SYNTHROID) tablet 37.5 mcg  37.5 mcg Oral AM ES Peter Mccracken M.D.   37.5 mcg at 11/04/21 0532   • Pharmacy Consult Request ...Pain Management Review 1 Each   1 Each Other PHARMACY TO DOSE Peter Mccracken M.D.       • ondansetron (ZOFRAN) syringe/vial injection 4 mg  4 mg Intravenous Q4HRS PRN Peter Mccracken M.D.   4 mg at 11/02/21 0305   • diphenhydrAMINE (BENADRYL) injection 25 mg  25 mg Intravenous Q6HRS PRN Peter Mccracken M.D.   25 mg at 11/01/21 0755   • hydrOXYzine HCl (ATARAX) tablet 25 mg  25 mg Oral HS PRN Peter Mccracken M.D.   25 mg at 11/03/21 2034   • simethicone (MYLICON) chewable tab 80 mg  80 mg Oral Q8HRS PRN Peter Mccracken M.D.       • senna-docusate (PERICOLACE or SENOKOT S) 8.6-50 MG per tablet 2 Tablet  2 Tablet Oral QPM PRN Peter Mccracken M.D.         No current Epic-ordered outpatient medications on file.     Diet:   DIET ORDERS (From admission to next 24h)     Start     Ordered    11/03/21 1738  Diet NPO Restrict to: Sips with Medications  ALL MEALS        Question:  Diet NPO Restrict to:  Answer:  Sips with Medications    11/03/21 1737                Anticipated Discharge Destination / Patient/Family Goal:  Destination: Home with Assistance Support System: Family   Anticipated home health services: OT, PT, Nursing, Social Work and Aide  Previously used HH service/ provider: Not Applicable  Anticipated DME Needs: Oxygen, Walker, Wheelchair and Life Line  Outpatient Services: OT and PT  Alternative resources to address additional identified needs:   None  Pre-Screen Completed: 11/4/2021 7:42 AM Ingrid Powers

## 2021-11-04 NOTE — PROGRESS NOTES
"Urology Progress Note    Left renal mass s/p robotic assisted laparoscopic partial left nephrectomy 10/27/21    S: Pt seen and examined in bed on rounds. Cr now 1.79 (2.1)(1.98). NGT and patel cath removed, now using purewick with good UOP per nurse (although I cannot find it documented in her chart) Pain much improved.   O:   /81   Pulse 85   Temp 36.1 °C (96.9 °F) (Temporal)   Resp 17   Ht 1.651 m (5' 5\")   Wt 88.2 kg (194 lb 7.1 oz)   SpO2 95%   Recent Labs     11/02/21 0428 11/03/21 0416 11/04/21  0544   SODIUM 139 139 140   POTASSIUM 5.1 4.4 3.7   CHLORIDE 110 106 106   CO2 17* 22 22   GLUCOSE 106* 141* 86   BUN 23* 30* 33*   CREATININE 1.98* 2.10* 1.79*   CALCIUM 8.7 9.0 9.1     Recent Labs     11/02/21 0428 11/03/21 0416 11/04/21  0544   WBC 11.3* 9.3 10.9*   RBC 3.74* 3.87* 3.62*   HEMOGLOBIN 11.7* 12.2 11.1*   HEMATOCRIT 36.1* 36.1* 33.1*   MCV 96.5 93.3 91.4   MCH 31.3 31.5 30.7   MCHC 32.4* 33.8 33.5*   RDW 50.9* 48.8 47.4   PLATELETCT 293 305 291   MPV 9.7 9.5 9.5         Intake/Output Summary (Last 24 hours) at 10/29/2021 1616  Last data filed at 10/29/2021 1400  Gross per 24 hour   Intake 8279.42 ml   Output 780 ml   Net 7499.42 ml       Exam:  Gen: NAD   Abd: Distended. Incisions C/D/I.    Urine: Purewiclk with clear yellow output    A/P:    Active Hospital Problems    Diagnosis    • Renal cell carcinoma (HCC) [C64.9]    • Hepatitis C [B19.20]    • Tobacco abuse [Z72.0]    • Hypoglycemia [E16.2]    • Post-op pain [G89.18]    • Gout [M10.9]    • SHEBA (acute kidney injury) (HCC) [N17.9]    • Small bowel obstruction (HCC) [K56.609]    • Hypotension [I95.9]    • Hypertension [I10]      pt states well controlled on meds       Left renal mass s/p robotic assisted laparoscopic partial left nephrectomy 10/27/21. S/p reduction and repair of incarcerated incisional hernia on 11/2 with Dr. Banegas.     Plan:  - Monitor UOP  - monitor pain control  - Incentive spirometry, encourage ambulation  - " Appreciate hospitalist, GI, and gen surg recs   - Urology will continue to follow

## 2021-11-04 NOTE — PROGRESS NOTES
Assume care of pt at 1900. Report received from day RN. Pt is A/O x4. Pain is 10/10, medication given and ice pack applied. Pt is resting in bed. NGT removed per order. Voiding well through purewick. Bed in lowest and locked position, call light in reach, hourly rounding in place. Labs reviewed. Communication board updated. Will continue to monitor.     JARRETT drain removed per MD order.

## 2021-11-04 NOTE — PROGRESS NOTES
Wound bleeding recurred  Removed some staples and reapplied silver nitrate with apparent success   Hold heparin today   Ileus resolved   Diet advancement and PO meds

## 2021-11-04 NOTE — PROGRESS NOTES
Assume care of pt at 0700. Report received from NOC RN. Pt is A/O x4. Pt denies pain at this time. Pt is resting in bed. Bed in lowest and locked position, call light within reach, hourly rounding in place. Labs reviewed. Communication board updated. Will continue to implement care. Crisis charting in place due to COVID-19 surge.

## 2021-11-04 NOTE — PROGRESS NOTES
[facility] Medicine Daily Progress Note    Date of Service  11/4/2021    Chief Complaint  Hypotension s/p left partial nephrectomy for mass.    Hospital Course  Thais Munroe is a 66 y.o. female with PMH of hypertension, hep C, smoker, history of blood clots, hypothyroidism, and renal mass.  Who presented 10/27/2021 for resection of left adrenal mass with Dr. Mccracken.  Patient underwent robotic assisted laparoscopic partial left nephrectomy on 10/27/2021.  Surgery went without complication with an EBL of 100 mL, and a JARRETT drain was placed.  Was reported to have 180 cc / 24 hours.  Patient initially with complaints of poor pain control, but now improved.  Her postop course was complicated by pain control issues requiring significant narcotics.  She developed worsening abdominal pain and nausea/vomiting.  She was ultimately transferred to the ICU for hypotension refractory to fluid boluses.  Her antibiotics were changed to Zosyn.  General surgery was consulted CT scan ordered which showed significant distention of the ascending and transverse colon with wall thickening, inflammation, edema and possible pneumatosis.  NG tube was placed with minimal output at that time.  There was concern that she had either ileus versus colonic pseudoobstruction.  GI was consulted and she underwent colonoscopy with decompression however no mechanical obstruction was identified.  Repeat imaging showed SBO with incarcerated incisional hernia.  Patient went to the OR for reduction and repair on 11/2.     Interval Problem Update  - POD#2 s/p reduction and repair of incarcerated incisional hernia  - yesterday surgery applied some nitrate sticks to bleeding wound, now improved, holding heparin SC  - NG removed, tolerated sips of clears, no n/v, no passing gas or BM yet  - patel removed yesterday, good UOP > 1L  - Cr improving 2-->1.7  - TSH 18, f/u FT4  - physiatry consulted, added gabapentin    I have personally seen and examined the patient  at bedside. I discussed the plan of care with patient, bedside RN, charge RN,  and pharmacy.    Consultants/Specialty  critical care, general surgery, GI and urology    Code Status  Full Code    Disposition  Patient is not medically cleared.   Anticipate discharge to to skilled nursing facility vs rehab.  I have placed the appropriate orders for post-discharge needs.    Review of Systems  Review of Systems   Constitutional: Positive for malaise/fatigue. Negative for chills and fever.   HENT: Negative for sore throat.    Eyes: Negative for blurred vision.   Respiratory: Negative for shortness of breath.    Cardiovascular: Positive for leg swelling. Negative for chest pain.   Gastrointestinal: Positive for abdominal pain. Negative for blood in stool, constipation, nausea and vomiting.   Genitourinary: Negative for dysuria.   Musculoskeletal: Negative for back pain and neck pain.   Neurological: Negative for dizziness and headaches.   Psychiatric/Behavioral: The patient is not nervous/anxious.         Physical Exam  Temp:  [36.1 °C (96.9 °F)-36.7 °C (98 °F)] 36.6 °C (97.9 °F)  Pulse:  [85-98] 94  Resp:  [17-18] 18  BP: (125-177)/() 147/99  SpO2:  [93 %-95 %] 95 %    Physical Exam  Vitals reviewed.   Constitutional:       General: She is not in acute distress.     Appearance: Normal appearance. She is obese. She is not toxic-appearing or diaphoretic.   HENT:      Head: Normocephalic and atraumatic.      Nose: Nose normal.      Mouth/Throat:      Mouth: Mucous membranes are dry.      Pharynx: No oropharyngeal exudate.   Eyes:      General: No scleral icterus.        Right eye: No discharge.         Left eye: No discharge.      Conjunctiva/sclera: Conjunctivae normal.   Cardiovascular:      Rate and Rhythm: Normal rate and regular rhythm.      Heart sounds: No murmur heard.   No friction rub. No gallop.    Pulmonary:      Effort: Pulmonary effort is normal. No respiratory distress.      Breath sounds:  Normal breath sounds. No wheezing or rales.   Abdominal:      General: Bowel sounds are normal. There is distension.      Palpations: Abdomen is soft.      Tenderness: There is abdominal tenderness. There is no guarding or rebound.      Comments: LLQ surgical site with dressing c/d/i   Musculoskeletal:         General: No swelling or tenderness.      Cervical back: Normal range of motion. No muscular tenderness.      Right lower leg: Edema present.      Left lower leg: Edema present.   Skin:     Coloration: Skin is not jaundiced or pale.   Neurological:      General: No focal deficit present.      Mental Status: She is alert and oriented to person, place, and time. Mental status is at baseline.      Cranial Nerves: No cranial nerve deficit.   Psychiatric:         Mood and Affect: Mood normal.         Behavior: Behavior normal.         Fluids    Intake/Output Summary (Last 24 hours) at 11/4/2021 1017  Last data filed at 11/4/2021 0645  Gross per 24 hour   Intake --   Output 590 ml   Net -590 ml       Laboratory  Recent Labs     11/02/21 0428 11/03/21 0416 11/04/21  0544   WBC 11.3* 9.3 10.9*   RBC 3.74* 3.87* 3.62*   HEMOGLOBIN 11.7* 12.2 11.1*   HEMATOCRIT 36.1* 36.1* 33.1*   MCV 96.5 93.3 91.4   MCH 31.3 31.5 30.7   MCHC 32.4* 33.8 33.5*   RDW 50.9* 48.8 47.4   PLATELETCT 293 305 291   MPV 9.7 9.5 9.5     Recent Labs     11/02/21 0428 11/03/21 0416 11/04/21  0544   SODIUM 139 139 140   POTASSIUM 5.1 4.4 3.7   CHLORIDE 110 106 106   CO2 17* 22 22   GLUCOSE 106* 141* 86   BUN 23* 30* 33*   CREATININE 1.98* 2.10* 1.79*   CALCIUM 8.7 9.0 9.1                   Imaging  CT-ABDOMEN-PELVIS W/O   Final Result      1.  Partial small bowel obstruction involving the midportion of the small bowel secondary to new left-sided abdominal hernia.      2.  No evidence of abdominal or pelvic abscess or urinoma.      3.  Persistent dilatation of the cecum measuring 10 cm in diameter.      4.  Distended gallbladder with minimal  gallbladder wall thickening. No calcified gallstone or biliary dilatation identified.         Findings were discussed with ANH STERLING on 11/2/2021 12:53 PM.      [id number] VIEW   Final Result      1.  Cecal dilatation continues to be seen which can be seen in the setting of a cecal bascule.   2.  Enteric tube projects over the distal esophagus. Recommend advancement.      [id number]-2 VIEWS   Final Result      1.  Overall stable predominantly cecal colonic dilatation with the cecum located in the midline and just to the left of midline possibly related to a cecal bascule with volvulus considered a more remote possibility due to the fact there has been no    interval progression.   2.  There is no pneumatosis or free intraperitoneal air.      NT-[id number]-1 VIEWS   Final Result      1.  There is a similar appearance to the mildly distended air-filled colon with relatively decompressed sigmoid and rectum.   2.  There is no free intraperitoneal air.      [id number]-0 VIEWS   Final Result         1.  Air-filled distention of the cecum measuring 12.3 cm, appearance concerning for Jailene's.  Similar to prior study.      These findings were discussed with the patient's clinician, Angeles Wolf, on 10/30/2021 4:19 AM.      [id number]-0 VIEWS   Final Result      1.  Mild colonic dilation      2.  Enteric catheter appears appropriately located      3.  Mild right basilar atelectasis and/or pleural effusion      DX-ABDOMEN FOR TUBE PLACEMENT   Final Result      Enteric tube has been placed and the tip projects over the stomach.      CT-ABDOMEN-PELVIS W/O   Final Result         1.  Air-filled distention of the cecum, follow-up recommended to exclude progression to Pedro's   2.  Fluid and air-filled distended loops of small bowel in the left abdomen, consider component of ileus.   3.  Intra-abdominal foci of free air, a nonspecific finding for recent postop changes with surgical drain in place   4.  Scattered mild  abdominal ascites and hazy mesenteric inflammatory changes.   5.  Small right and trace left pleural effusion.   6.  Linear densities in the bilateral lung bases favors changes of atelectasis.   7.  Left nephrolithiasis without visualized obstructive changes.   8.  Hepatomegaly and changes of cirrhosis   9.  Diverticulosis   10.  Atherosclerosis      EZ-[id number]-1 VIEW   Final Result         1.  Air-filled distended bowel loops, appearance compatible with evolving obstruction versus ileus. Recommend radiographic follow-up to resolution.      DX-CHEST-PORTABLE (1 VIEW)   Final Result         1.  No acute cardiopulmonary disease.   2.  Cardiomegaly           Assessment/Plan  * Small bowel obstruction (HCC)  Assessment & Plan  NG removed 11/3  NPO-->sips of clears 11/3    11/2 CT Abd:    1.  Partial small bowel obstruction involving the midportion of the small bowel secondary to new left-sided abdominal hernia.    2.  No evidence of abdominal or pelvic abscess or urinoma.    3.  Persistent dilatation of the cecum measuring 10 cm in diameter.    4.  Distended gallbladder with minimal gallbladder wall thickening. No calcified gallstone or biliary dilatation identified.  Surgery following  S/p reduction and repair of incarcerated incisional hernia on 11/2  Mobility as able.    Correct electrolyte deficits  Minimize narcotics as able.  Zosyn 10/29-**    Acquired hypothyroidism  Assessment & Plan  TSH 18  FT4 pending  Synthroid 37.5mcg daily    Tobacco abuse  Assessment & Plan  Nicotine patch  Discussed smoking cessation with patient including benefits, patch, total time = 8 minutes    Hypertension  Assessment & Plan  Home regimen: Amlodipine 10 mg daily, valsartan 325 mg nightly  Inpatient regimen: As needed's given recent hypotension    Renal cell carcinoma (HCC)  Assessment & Plan  S/p surgical excision of mass.  Pathology confirmed Renal Cell CA.  Urology to monitor.    SHEBA (acute kidney injury) (HCC)  Assessment &  Plan  Improving  S/p partial nephrectomy due to Renal Cell Carcinoma.  Monitor I/O's, labs, vitals.  Avoid nephrotoxins.  11/2 BUN:23, Cr:1.98    Post-op pain  Assessment & Plan    Minimize narcotics, added gapentin  Lidocaine patch.  Heat/ice  Unable to give NSAIDS due to renal function.    Hypotension- (present on admission)  Assessment & Plan  Improved  S/p midodrine  Check TSH w/ reflex to free T4 if not done.       VTE prophylaxis: SCDs/TEDs    I have performed a physical exam and reviewed and updated ROS and Plan today (11/4/2021). In review of yesterday's note (11/3/2021), there are no changes except as documented above.

## 2021-11-04 NOTE — THERAPY
11/04/21 1122   Other Treatments   Other Treatments Provided Pt declined PT @ this time, just finished w/OT and is c/o pain. Nrsg notified that pt is draining blood from incision. PT will check back this afternoon if time permits. If not, will plan to see in a.m.

## 2021-11-05 PROBLEM — D64.9 NORMOCYTIC ANEMIA: Status: ACTIVE | Noted: 2021-11-05

## 2021-11-05 LAB
ANION GAP SERPL CALC-SCNC: 9 MMOL/L (ref 7–16)
BASOPHILS # BLD AUTO: 0.6 % (ref 0–1.8)
BASOPHILS # BLD: 0.06 K/UL (ref 0–0.12)
BUN SERPL-MCNC: 30 MG/DL (ref 8–22)
CALCIUM SERPL-MCNC: 9 MG/DL (ref 8.5–10.5)
CHLORIDE SERPL-SCNC: 105 MMOL/L (ref 96–112)
CO2 SERPL-SCNC: 24 MMOL/L (ref 20–33)
CREAT SERPL-MCNC: 1.86 MG/DL (ref 0.5–1.4)
EOSINOPHIL # BLD AUTO: 0.3 K/UL (ref 0–0.51)
EOSINOPHIL NFR BLD: 2.9 % (ref 0–6.9)
ERYTHROCYTE [DISTWIDTH] IN BLOOD BY AUTOMATED COUNT: 49.1 FL (ref 35.9–50)
GLUCOSE SERPL-MCNC: 96 MG/DL (ref 65–99)
HCT VFR BLD AUTO: 33.2 % (ref 37–47)
HGB BLD-MCNC: 10.9 G/DL (ref 12–16)
IMM GRANULOCYTES # BLD AUTO: 0.08 K/UL (ref 0–0.11)
IMM GRANULOCYTES NFR BLD AUTO: 0.8 % (ref 0–0.9)
LYMPHOCYTES # BLD AUTO: 2.32 K/UL (ref 1–4.8)
LYMPHOCYTES NFR BLD: 22.6 % (ref 22–41)
MCH RBC QN AUTO: 31.1 PG (ref 27–33)
MCHC RBC AUTO-ENTMCNC: 32.8 G/DL (ref 33.6–35)
MCV RBC AUTO: 94.6 FL (ref 81.4–97.8)
MONOCYTES # BLD AUTO: 0.73 K/UL (ref 0–0.85)
MONOCYTES NFR BLD AUTO: 7.1 % (ref 0–13.4)
NEUTROPHILS # BLD AUTO: 6.79 K/UL (ref 2–7.15)
NEUTROPHILS NFR BLD: 66 % (ref 44–72)
NRBC # BLD AUTO: 0 K/UL
NRBC BLD-RTO: 0 /100 WBC
PLATELET # BLD AUTO: 271 K/UL (ref 164–446)
PMV BLD AUTO: 9.5 FL (ref 9–12.9)
POTASSIUM SERPL-SCNC: 3.6 MMOL/L (ref 3.6–5.5)
RBC # BLD AUTO: 3.51 M/UL (ref 4.2–5.4)
SODIUM SERPL-SCNC: 138 MMOL/L (ref 135–145)
WBC # BLD AUTO: 10.3 K/UL (ref 4.8–10.8)

## 2021-11-05 PROCEDURE — A9270 NON-COVERED ITEM OR SERVICE: HCPCS | Performed by: NURSE PRACTITIONER

## 2021-11-05 PROCEDURE — 97535 SELF CARE MNGMENT TRAINING: CPT

## 2021-11-05 PROCEDURE — 97116 GAIT TRAINING THERAPY: CPT | Mod: CQ

## 2021-11-05 PROCEDURE — 700111 HCHG RX REV CODE 636 W/ 250 OVERRIDE (IP): Performed by: INTERNAL MEDICINE

## 2021-11-05 PROCEDURE — 700105 HCHG RX REV CODE 258: Performed by: INTERNAL MEDICINE

## 2021-11-05 PROCEDURE — A9270 NON-COVERED ITEM OR SERVICE: HCPCS | Performed by: PHYSICAL MEDICINE & REHABILITATION

## 2021-11-05 PROCEDURE — 36415 COLL VENOUS BLD VENIPUNCTURE: CPT

## 2021-11-05 PROCEDURE — A9270 NON-COVERED ITEM OR SERVICE: HCPCS | Performed by: INTERNAL MEDICINE

## 2021-11-05 PROCEDURE — 700101 HCHG RX REV CODE 250: Performed by: STUDENT IN AN ORGANIZED HEALTH CARE EDUCATION/TRAINING PROGRAM

## 2021-11-05 PROCEDURE — 99232 SBSQ HOSP IP/OBS MODERATE 35: CPT | Performed by: INTERNAL MEDICINE

## 2021-11-05 PROCEDURE — A9270 NON-COVERED ITEM OR SERVICE: HCPCS | Performed by: UROLOGY

## 2021-11-05 PROCEDURE — 80048 BASIC METABOLIC PNL TOTAL CA: CPT

## 2021-11-05 PROCEDURE — 770001 HCHG ROOM/CARE - MED/SURG/GYN PRIV*

## 2021-11-05 PROCEDURE — 85025 COMPLETE CBC W/AUTO DIFF WBC: CPT

## 2021-11-05 PROCEDURE — 700102 HCHG RX REV CODE 250 W/ 637 OVERRIDE(OP): Performed by: INTERNAL MEDICINE

## 2021-11-05 PROCEDURE — 700102 HCHG RX REV CODE 250 W/ 637 OVERRIDE(OP): Performed by: NURSE PRACTITIONER

## 2021-11-05 PROCEDURE — 700102 HCHG RX REV CODE 250 W/ 637 OVERRIDE(OP): Performed by: PHYSICAL MEDICINE & REHABILITATION

## 2021-11-05 PROCEDURE — 97530 THERAPEUTIC ACTIVITIES: CPT | Mod: CQ

## 2021-11-05 PROCEDURE — 700102 HCHG RX REV CODE 250 W/ 637 OVERRIDE(OP): Performed by: UROLOGY

## 2021-11-05 RX ORDER — AMOXICILLIN 250 MG
2 CAPSULE ORAL 2 TIMES DAILY
Status: DISCONTINUED | OUTPATIENT
Start: 2021-11-05 | End: 2021-11-09 | Stop reason: HOSPADM

## 2021-11-05 RX ORDER — POLYETHYLENE GLYCOL 3350 17 G/17G
1 POWDER, FOR SOLUTION ORAL DAILY
Status: DISCONTINUED | OUTPATIENT
Start: 2021-11-05 | End: 2021-11-09 | Stop reason: HOSPADM

## 2021-11-05 RX ORDER — BISACODYL 10 MG
10 SUPPOSITORY, RECTAL RECTAL
Status: DISCONTINUED | OUTPATIENT
Start: 2021-11-05 | End: 2021-11-09 | Stop reason: HOSPADM

## 2021-11-05 RX ADMIN — OXYCODONE HYDROCHLORIDE 10 MG: 10 TABLET ORAL at 15:24

## 2021-11-05 RX ADMIN — ACETAMINOPHEN 1000 MG: 500 TABLET ORAL at 12:32

## 2021-11-05 RX ADMIN — ALLOPURINOL 300 MG: 100 TABLET ORAL at 05:11

## 2021-11-05 RX ADMIN — NICOTINE TRANSDERMAL SYSTEM 21 MG: 21 PATCH, EXTENDED RELEASE TRANSDERMAL at 05:11

## 2021-11-05 RX ADMIN — CALCIUM CARBONATE 500 MG: 500 TABLET, CHEWABLE ORAL at 15:24

## 2021-11-05 RX ADMIN — OXYCODONE HYDROCHLORIDE 10 MG: 10 TABLET ORAL at 01:32

## 2021-11-05 RX ADMIN — PIPERACILLIN AND TAZOBACTAM 4.5 G: 4; .5 INJECTION, POWDER, LYOPHILIZED, FOR SOLUTION INTRAVENOUS; PARENTERAL at 05:10

## 2021-11-05 RX ADMIN — GABAPENTIN 200 MG: 100 CAPSULE ORAL at 18:10

## 2021-11-05 RX ADMIN — OXYCODONE HYDROCHLORIDE 10 MG: 10 TABLET ORAL at 21:02

## 2021-11-05 RX ADMIN — GABAPENTIN 200 MG: 100 CAPSULE ORAL at 05:11

## 2021-11-05 RX ADMIN — OXYCODONE HYDROCHLORIDE 10 MG: 10 TABLET ORAL at 09:26

## 2021-11-05 RX ADMIN — ACETAMINOPHEN 1000 MG: 500 TABLET ORAL at 18:10

## 2021-11-05 RX ADMIN — LEVOTHYROXINE SODIUM 50 MCG: 0.05 TABLET ORAL at 05:11

## 2021-11-05 RX ADMIN — LIDOCAINE 1 PATCH: 50 PATCH CUTANEOUS at 21:05

## 2021-11-05 RX ADMIN — CALCIUM CARBONATE 500 MG: 500 TABLET, CHEWABLE ORAL at 21:03

## 2021-11-05 RX ADMIN — ACETAMINOPHEN 1000 MG: 500 TABLET ORAL at 05:11

## 2021-11-05 RX ADMIN — GABAPENTIN 200 MG: 100 CAPSULE ORAL at 12:33

## 2021-11-05 ASSESSMENT — COGNITIVE AND FUNCTIONAL STATUS - GENERAL
TOILETING: A LOT
MOVING FROM LYING ON BACK TO SITTING ON SIDE OF FLAT BED: A LITTLE
SUGGESTED CMS G CODE MODIFIER DAILY ACTIVITY: CK
HELP NEEDED FOR BATHING: A LOT
WALKING IN HOSPITAL ROOM: A LITTLE
MOBILITY SCORE: 14
TURNING FROM BACK TO SIDE WHILE IN FLAT BAD: UNABLE
DAILY ACTIVITIY SCORE: 16
CLIMB 3 TO 5 STEPS WITH RAILING: A LITTLE
DRESSING REGULAR UPPER BODY CLOTHING: A LITTLE
DRESSING REGULAR LOWER BODY CLOTHING: A LOT
PERSONAL GROOMING: A LITTLE
MOVING TO AND FROM BED TO CHAIR: UNABLE
STANDING UP FROM CHAIR USING ARMS: A LITTLE
SUGGESTED CMS G CODE MODIFIER MOBILITY: CL

## 2021-11-05 ASSESSMENT — PAIN DESCRIPTION - PAIN TYPE
TYPE: ACUTE PAIN

## 2021-11-05 ASSESSMENT — GAIT ASSESSMENTS
GAIT LEVEL OF ASSIST: MINIMAL ASSIST
ASSISTIVE DEVICE: FRONT WHEEL WALKER
DISTANCE (FEET): 25

## 2021-11-05 ASSESSMENT — ENCOUNTER SYMPTOMS
HEADACHES: 0
VOMITING: 0
BLURRED VISION: 0
BLOOD IN STOOL: 0
SORE THROAT: 0
CONSTIPATION: 0
NERVOUS/ANXIOUS: 0
ABDOMINAL PAIN: 1
NECK PAIN: 0
NAUSEA: 0
SHORTNESS OF BREATH: 0
BACK PAIN: 0
CHILLS: 0
FEVER: 0
DIZZINESS: 0

## 2021-11-05 NOTE — THERAPY
Physical Therapy   Daily Treatment     Patient Name: Thais Munroe  Age:  66 y.o., Sex:  female  Medical Record #: [id number]  Today's Date: 11/5/2021     Precautions  Precautions: Fall Risk  Comments: abdominal incision    Assessment    Pt talking and c/o pain, nrsg medicated before mobilizing pt. Pt requiring mod assist to get seated EOB 2* incisional pain, min assist w/her functional transfers and her amb efforts w/FWW. Pt managed 25' today, distance limited by c/o fatigue. Pt instructed to make sure she is getting OOB for meals, using her IS, and work on amb to the BR vs using the BSC. Pt does c/o incisional pain throughout her tx efforts. Pt is hyperverbose, needing cueing to stay on task throughout her tx efforts.     Plan    Continue current treatment plan.    DC Equipment Recommendations: (P) Unable to determine at this time  Discharge Recommendations: (P) Recommend post-acute placement for additional physical therapy services prior to discharge home     Objective       11/05/21 0957   Other Treatments   Other Treatments Provided Extended time needed for tx efforts,pt hyperverbose. Needing cueing to stay on task throughout her tx session. Pt instructed on importance of getting OOB, amb w/FWW, and use of IS.    Balance   Sitting Balance (Static) Fair   Sitting Balance (Dynamic) Fair -   Standing Balance (Static) Fair -   Standing Balance (Dynamic) Poor +   Weight Shift Sitting Fair   Weight Shift Standing Fair   Comments standing w/FWW   Gait Analysis   Gait Level Of Assist Minimal Assist   Assistive Device Front Wheel Walker   Distance (Feet) 25   # of Times Distance was Traveled 1   Skilled Intervention Verbal Cuing   Comments Initiated amb efforts today w/FWW. Pt managed 25' on 2L O2, distance limited by fatigue. No LOB or unsteadiness w/her efforts.    Bed Mobility    Supine to Sit Moderate Assist  (HOB elevated, use of railing)   Sit to Supine   (pt left seated in the chair)   Scooting  Supervised  (seated)   Rolling Moderate Assist to Rt.   Skilled Intervention Verbal Cuing;Postural Facilitation;Compensatory Strategies   Comments Pt encouraged to log roll and squeeze pillow to get seated EOB, pt needing mod assist to complete the task. Pain limiting her ability to do task wo/assist.    Functional Mobility   Sit to Stand Minimal Assist  (EOB->FWW)   Bed, Chair, Wheelchair Transfer Minimal Assist  (w/FWW)   Comments Pt cued on hand placement to/from the FWW.    How much difficulty does the patient currently have...   Turning over in bed (including adjusting bedclothes, sheets and blankets)? 1   Sitting down on and standing up from a chair with arms (e.g., wheelchair, bedside commode, etc.) 3   Moving from lying on back to sitting on the side of the bed? 1   How much help from another person does the patient currently need...   Moving to and from a bed to a chair (including a wheelchair)? 3   Need to walk in a hospital room? 3   Climbing 3-5 steps with a railing? 3   6 clicks Mobility Score 14   Short Term Goals    Short Term Goal # 1 Pt will perform supine<>sit with HOB flat and SPV within 6 visits to improve ind with bed mob.   Goal Outcome # 1 goal not met   Short Term Goal # 2 Pt will perform all fxnl transfers with LRAD and SPV within 6 visits to increase OOB activity.   Goal Outcome # 2 Goal not met   Short Term Goal # 3 Pt will amb >15ft with FWW and min A within 6 visits to work toward environmental negotiation.   Goal Outcome # 3 Goal met

## 2021-11-05 NOTE — PROGRESS NOTES
[facility] Medicine Daily Progress Note    Date of Service  11/5/2021    Chief Complaint  Hypotension s/p left partial nephrectomy for mass.    Hospital Course  Thais Munroe is a 66 y.o. female with PMH of hypertension, hep C, smoker, history of blood clots, hypothyroidism, and renal mass.  Who presented 10/27/2021 for resection of left adrenal mass with Dr. Mccracken.  Patient underwent robotic assisted laparoscopic partial left nephrectomy on 10/27/2021.  Surgery went without complication with an EBL of 100 mL, and a JARRETT drain was placed.  Was reported to have 180 cc / 24 hours.  Patient initially with complaints of poor pain control, but now improved.  Her postop course was complicated by pain control issues requiring significant narcotics.  She developed worsening abdominal pain and nausea/vomiting.  She was ultimately transferred to the ICU for hypotension refractory to fluid boluses.  Her antibiotics were changed to Zosyn.  General surgery was consulted CT scan ordered which showed significant distention of the ascending and transverse colon with wall thickening, inflammation, edema and possible pneumatosis.  NG tube was placed with minimal output at that time.  There was concern that she had either ileus versus colonic pseudoobstruction.  GI was consulted and she underwent colonoscopy with decompression however no mechanical obstruction was identified.  Repeat imaging showed SBO with incarcerated incisional hernia.  Patient went to the OR for reduction and repair on 11/2.     Interval Problem Update  - POD#3 s/p reduction and repair of incarcerated incisional hernia  - Still had some bleeding yesterday from incision, improved with additional nitrate stick application  - tolerating GI soft diet  - no UOP documented, Cr increased slightly 1.7-->1.86  - TSH 18, FT4 low, increased synthroid  - working on dc planning to rehab, awaiting insurance auth    I have personally seen and examined the patient at bedside. I  discussed the plan of care with patient, bedside RN, charge RN,  and pharmacy.    Consultants/Specialty  critical care, general surgery, GI and urology    Code Status  Full Code    Disposition  Patient is medically cleared.   Anticipate discharge to to skilled nursing facility vs rehab.  I have placed the appropriate orders for post-discharge needs.    Review of Systems  Review of Systems   Constitutional: Positive for malaise/fatigue. Negative for chills and fever.   HENT: Negative for sore throat.    Eyes: Negative for blurred vision.   Respiratory: Negative for shortness of breath.    Cardiovascular: Positive for leg swelling. Negative for chest pain.   Gastrointestinal: Positive for abdominal pain. Negative for blood in stool, constipation, nausea and vomiting.   Genitourinary: Negative for dysuria.   Musculoskeletal: Negative for back pain and neck pain.   Neurological: Negative for dizziness and headaches.   Psychiatric/Behavioral: The patient is not nervous/anxious.         Physical Exam  Temp:  [36.1 °C (96.9 °F)-36.3 °C (97.4 °F)] 36.2 °C (97.1 °F)  Pulse:  [] 81  Resp:  [16-18] 18  BP: (131-146)/(87-95) 131/93  SpO2:  [92 %-96 %] 96 %    Physical Exam  Vitals reviewed.   Constitutional:       General: She is not in acute distress.     Appearance: Normal appearance. She is obese. She is not toxic-appearing or diaphoretic.   HENT:      Head: Normocephalic and atraumatic.      Nose: Nose normal.      Mouth/Throat:      Mouth: Mucous membranes are moist.      Pharynx: No oropharyngeal exudate.   Eyes:      General: No scleral icterus.        Right eye: No discharge.         Left eye: No discharge.      Conjunctiva/sclera: Conjunctivae normal.   Cardiovascular:      Rate and Rhythm: Normal rate and regular rhythm.      Heart sounds: No murmur heard.   No friction rub. No gallop.    Pulmonary:      Effort: Pulmonary effort is normal. No respiratory distress.      Breath sounds: Rales present.  No wheezing.   Abdominal:      General: Bowel sounds are normal. There is distension.      Palpations: Abdomen is soft.      Tenderness: There is abdominal tenderness. There is no guarding or rebound.      Comments: LLQ surgical site with dressing c/d/i   Musculoskeletal:         General: No swelling or tenderness.      Cervical back: Normal range of motion. No muscular tenderness.      Right lower leg: Edema present.      Left lower leg: Edema present.   Skin:     Coloration: Skin is not jaundiced or pale.   Neurological:      General: No focal deficit present.      Mental Status: She is alert and oriented to person, place, and time. Mental status is at baseline.      Cranial Nerves: No cranial nerve deficit.   Psychiatric:         Mood and Affect: Mood normal.         Behavior: Behavior normal.         Fluids    Intake/Output Summary (Last 24 hours) at 11/5/2021 1318  Last data filed at 11/5/2021 1000  Gross per 24 hour   Intake 360 ml   Output 700 ml   Net -340 ml       Laboratory  Recent Labs     11/03/21  0416 11/04/21  0544 11/05/21  0510   WBC 9.3 10.9* 10.3   RBC 3.87* 3.62* 3.51*   HEMOGLOBIN 12.2 11.1* 10.9*   HEMATOCRIT 36.1* 33.1* 33.2*   MCV 93.3 91.4 94.6   MCH 31.5 30.7 31.1   MCHC 33.8 33.5* 32.8*   RDW 48.8 47.4 49.1   PLATELETCT 305 291 271   MPV 9.5 9.5 9.5     Recent Labs     11/03/21  0416 11/04/21  0544 11/05/21  0510   SODIUM 139 140 138   POTASSIUM 4.4 3.7 3.6   CHLORIDE 106 106 105   CO2 22 22 24   GLUCOSE 141* 86 96   BUN 30* 33* 30*   CREATININE 2.10* 1.79* 1.86*   CALCIUM 9.0 9.1 9.0                   Imaging  CT-ABDOMEN-PELVIS W/O   Final Result      1.  Partial small bowel obstruction involving the midportion of the small bowel secondary to new left-sided abdominal hernia.      2.  No evidence of abdominal or pelvic abscess or urinoma.      3.  Persistent dilatation of the cecum measuring 10 cm in diameter.      4.  Distended gallbladder with minimal gallbladder wall thickening. No  calcified gallstone or biliary dilatation identified.         Findings were discussed with ANH STERLING on 11/2/2021 12:53 PM.      BT-[id number]-2 VIEW   Final Result      1.  Cecal dilatation continues to be seen which can be seen in the setting of a cecal bascule.   2.  Enteric tube projects over the distal esophagus. Recommend advancement.      [id number]-6 VIEWS   Final Result      1.  Overall stable predominantly cecal colonic dilatation with the cecum located in the midline and just to the left of midline possibly related to a cecal bascule with volvulus considered a more remote possibility due to the fact there has been no    interval progression.   2.  There is no pneumatosis or free intraperitoneal air.      OM-[id number] VIEWS   Final Result      1.  There is a similar appearance to the mildly distended air-filled colon with relatively decompressed sigmoid and rectum.   2.  There is no free intraperitoneal air.      XX-[id number] VIEWS   Final Result         1.  Air-filled distention of the cecum measuring 12.3 cm, appearance concerning for Jailene's.  Similar to prior study.      These findings were discussed with the patient's clinician, Angeles Wolf, on 10/30/2021 4:19 AM.      GC-[id number]-9 VIEWS   Final Result      1.  Mild colonic dilation      2.  Enteric catheter appears appropriately located      3.  Mild right basilar atelectasis and/or pleural effusion      DX-ABDOMEN FOR TUBE PLACEMENT   Final Result      Enteric tube has been placed and the tip projects over the stomach.      CT-ABDOMEN-PELVIS W/O   Final Result         1.  Air-filled distention of the cecum, follow-up recommended to exclude progression to Jailene's   2.  Fluid and air-filled distended loops of small bowel in the left abdomen, consider component of ileus.   3.  Intra-abdominal foci of free air, a nonspecific finding for recent postop changes with surgical drain in place   4.  Scattered mild abdominal ascites and hazy  mesenteric inflammatory changes.   5.  Small right and trace left pleural effusion.   6.  Linear densities in the bilateral lung bases favors changes of atelectasis.   7.  Left nephrolithiasis without visualized obstructive changes.   8.  Hepatomegaly and changes of cirrhosis   9.  Diverticulosis   10.  Atherosclerosis      KR-[id number]-3 VIEW   Final Result         1.  Air-filled distended bowel loops, appearance compatible with evolving obstruction versus ileus. Recommend radiographic follow-up to resolution.      DX-CHEST-PORTABLE (1 VIEW)   Final Result         1.  No acute cardiopulmonary disease.   2.  Cardiomegaly           Assessment/Plan  * Small bowel obstruction (HCC)  Assessment & Plan  NG removed 11/3  NPO-->sips of clears 11/3-->GI soft 11/5 11/2 CT Abd:    1.  Partial small bowel obstruction involving the midportion of the small bowel secondary to new left-sided abdominal hernia.    2.  No evidence of abdominal or pelvic abscess or urinoma.    3.  Persistent dilatation of the cecum measuring 10 cm in diameter.    4.  Distended gallbladder with minimal gallbladder wall thickening. No calcified gallstone or biliary dilatation identified.  Surgery following  S/p reduction and repair of incarcerated incisional hernia on 11/2  Mobility as able.    Correct electrolyte deficits  Minimize narcotics as able.  Zosyn 10/29-11/5    Normocytic anemia  Assessment & Plan  Likely in setting of surgery and hospitalization  H/H stable  CTM    Acquired hypothyroidism  Assessment & Plan  TSH 18  FT4 low  Synthroid 37.5mcg--> 50mcg daily  Recheck TSH in ~6 weeks    Tobacco abuse  Assessment & Plan  Nicotine patch  Discussed smoking cessation with patient including benefits, patch, total time = 8 minutes    Hypertension  Assessment & Plan  Home regimen: Amlodipine 10 mg daily, valsartan 325 mg nightly  Inpatient regimen: As needed's given recent hypotension    Renal cell carcinoma (HCC)  Assessment & Plan  S/p surgical  excision of mass.  Pathology confirmed Renal Cell CA.  Urology to monitor.    Hypoglycemia  Assessment & Plan  Resolved    SHEBA (acute kidney injury) (HCC)  Assessment & Plan  Stable  S/p partial nephrectomy due to Renal Cell Carcinoma.  Monitor I/O's, labs, vitals.  Avoid nephrotoxins.      Gout- (present on admission)  Assessment & Plan  Continue home allopurinol  Monitor Cr    Post-op pain  Assessment & Plan    Minimize narcotics, added gapentin  Lidocaine patch.  Heat/ice  Unable to give NSAIDS due to renal function.    Hypotension- (present on admission)  Assessment & Plan  Resolved  S/p midodrine  Check TSH w/ reflex to free T4 if not done.       VTE prophylaxis: SCDs/TEDs    I have performed a physical exam and reviewed and updated ROS and Plan today (11/5/2021). In review of yesterday's note (11/4/2021), there are no changes except as documented above.

## 2021-11-05 NOTE — DISCHARGE PLANNING
Per Dr Waters patient is medically cleared. Contacted [facility] [phone]- case [phone] still pending decision. TCC to remain available.

## 2021-11-05 NOTE — CARE PLAN
The patient is Stable - Low risk of patient condition declining or worsening    Shift Goals  Clinical Goals: monitor vitals/ pain control   Patient Goals: manage pain and sleep   Family Goals: NA    Progress made toward(s) clinical / shift goals:  Pt's HR increases with movement and pain. Pt is maintaining oxygen sat at 90-93%. Pt has been given pain med per MAR. Pt is resting and sleeping. Pt verbalizes understanding poc.     Problem: Pain - Standard  Goal: Alleviation of pain or a reduction in pain to the patient’s comfort goal  Outcome: Progressing     Problem: Knowledge Deficit - Standard  Goal: Patient and family/care givers will demonstrate understanding of plan of care, disease process/condition, diagnostic tests and medications  Outcome: Progressing     Problem: Respiratory  Goal: Patient will achieve/maintain optimum respiratory ventilation and gas exchange  Outcome: Progressing

## 2021-11-05 NOTE — CARE PLAN
Problem: Knowledge Deficit - Standard  Goal: Patient and family/care givers will demonstrate understanding of plan of care, disease process/condition, diagnostic tests and medications  Outcome: Progressing     Problem: Skin Integrity  Goal: Skin integrity is maintained or improved  Outcome: Progressing   The patient is Stable - Low risk of patient condition declining or worsening    Shift Goals  Clinical Goals: Pain management  Patient Goals: Pain management, rest   Family Goals: N/A    Progress made toward(s) clinical / shift goals:  Pt updated on POC, pt agreeable to rehab and understands indication. Preventative mepilex and frequent pt turns in place to prevent skin breakdown.    Patient is not progressing towards the following goals:

## 2021-11-05 NOTE — PROGRESS NOTES
Report received from previous shift. Assumed care of patient at 1850, patient is A&O x4; pt alert to self, place, time, and verbalizes understanding in her own words poc. Patient is a high Fall risk, bed locked and in lowest position, bed alarm on. Patient reports pain 7/10. Plan of care reviewed with patient, will implement and continue to monitor. Hourly rounding is in place and call light/belongings within reach.

## 2021-11-05 NOTE — PROGRESS NOTES
Assumed care at 0700. Received report from NOC shift RN. Pt is awake and alert in bed, A&Ox 4, on 2L NC, reports a pain level of 3/10 in abdomen. Fall precautions and appropriate signs in place. Call light and belongings within reach. Bed alarm and hourly rounding in place. Communication board updated. Pt assisted to commode.

## 2021-11-05 NOTE — PROGRESS NOTES
"Urology Progress Note    Left renal mass s/p robotic assisted laparoscopic partial left nephrectomy 10/27/21    S: Pt seen and examined in bed on rounds. Cr now 1.86(1.79)(2.1). Pain improving with regimen. Plan to work with PT today as she is feeling quite weak. Sounds like plan is maybe rehab facility.   O:   /93   Pulse 81   Temp 36.2 °C (97.1 °F) (Temporal)   Resp 18   Ht 1.651 m (5' 5\")   Wt 88.2 kg (194 lb 7.1 oz)   SpO2 96%   Recent Labs     11/03/21  0416 11/04/21  0544 11/05/21  0510   SODIUM 139 140 138   POTASSIUM 4.4 3.7 3.6   CHLORIDE 106 106 105   CO2 22 22 24   GLUCOSE 141* 86 96   BUN 30* 33* 30*   CREATININE 2.10* 1.79* 1.86*   CALCIUM 9.0 9.1 9.0     Recent Labs     11/03/21 0416 11/04/21  0544 11/05/21  0510   WBC 9.3 10.9* 10.3   RBC 3.87* 3.62* 3.51*   HEMOGLOBIN 12.2 11.1* 10.9*   HEMATOCRIT 36.1* 33.1* 33.2*   MCV 93.3 91.4 94.6   MCH 31.5 30.7 31.1   MCHC 33.8 33.5* 32.8*   RDW 48.8 47.4 49.1   PLATELETCT 305 291 271   MPV 9.5 9.5 9.5         Intake/Output Summary (Last 24 hours) at 10/29/2021 1616  Last data filed at 10/29/2021 1400  Gross per 24 hour   Intake 8279.42 ml   Output 780 ml   Net 7499.42 ml       Exam:  Gen: NAD   Abd: mild. Incisions C/D/I.    Urine: Purewick with clear yellow output    A/P:    Active Hospital Problems    Diagnosis    • Acquired hypothyroidism [E03.9]    • Renal cell carcinoma (HCC) [C64.9]    • Hepatitis C [B19.20]    • Tobacco abuse [Z72.0]    • Hypoglycemia [E16.2]    • Post-op pain [G89.18]    • Gout [M10.9]    • SHEBA (acute kidney injury) (HCC) [N17.9]    • Small bowel obstruction (HCC) [K56.609]    • Hypotension [I95.9]    • Hypertension [I10]      pt states well controlled on meds       Left renal mass s/p robotic assisted laparoscopic partial left nephrectomy 10/27/21. S/p reduction and repair of incarcerated incisional hernia on 11/2 with Dr. Banegas.     Plan:  - Monitor UOP  - monitor pain control  - Incentive spirometry, encourage " ambulation- working with PT.  - Appreciate hospitalist, GI, and gen surg recs   - Urology will continue to follow

## 2021-11-05 NOTE — CARE PLAN
Problem: Nutritional:  Goal: Achieve adequate nutritional intake  Description: Advance diet as tolerated past clear liquids. Patient will consume >50% of meals.  Outcome: Progressing     Diet advanced to GI Soft yesterday

## 2021-11-05 NOTE — THERAPY
"Occupational Therapy  Daily Treatment     Patient Name: Thais Munroe  Age:  66 y.o., Sex:  female  Medical Record #: [id number]  Today's Date: 11/5/2021     Precautions  Precautions: (P) Fall Risk  Comments: (P) abdominal incision    Assessment    Pt seen for OT tx session, pt performed functional mobility, toileting, toilet txf, and seated grooming w/ min A. Pt continues to be primarily limited by pain, appears to be internalizing ed. Will continue to follow while in house.     Plan    Continue current treatment plan.    DC Equipment Recommendations: (P) Unable to determine at this time  Discharge Recommendations: (P) Recommend post-acute placement for additional occupational therapy services prior to discharge home    Subjective    \"I think its just a fart\"     Objective       11/05/21 1235   Precautions   Precautions Fall Risk   Comments abdominal incision   Vitals   O2 (LPM) 1   O2 Delivery Device Nasal Cannula   Pain 0 - 10 Group   Therapist Pain Assessment Post Activity Pain Same as Prior to Activity;Nurse Notified  (c/o abd pain)   Cognition    Cognition / Consciousness X   Speech/ Communication Delayed Responses   Level of Consciousness Alert   Attention Impaired   Sequencing Impaired   Comments hyperverbose, pleasent, cooperative, motivated   Balance   Sitting Balance (Static) Fair -   Sitting Balance (Dynamic) Poor +   Standing Balance (Static) Fair -   Standing Balance (Dynamic) Poor +   Weight Shift Sitting Fair   Weight Shift Standing Poor   Skilled Intervention Tactile Cuing;Verbal Cuing;Facilitation   Comments w/ FWW   Bed Mobility    Supine to Sit   (NT in chair post)   Sit to Supine Moderate Assist   Scooting Minimal Assist   Skilled Intervention Tactile Cuing;Verbal Cuing;Facilitation   Activities of Daily Living   Grooming Supervision;Seated   Upper Body Dressing Minimal Assist   Toileting Minimal Assist  (BM in toilet )   Skilled Intervention Tactile Cuing;Verbal Cuing;Facilitation   How much " help from another person does the patient currently need...   Putting on and taking off regular lower body clothing? 2   Bathing (including washing, rinsing, and drying)? 2   Toileting, which includes using a toilet, bedpan, or urinal? 2   Putting on and taking off regular upper body clothing? 3   Taking care of personal grooming such as brushing teeth? 3   Eating meals? 4   6 Clicks Daily Activity Score 16   Functional Mobility   Sit to Stand Minimal Assist   Bed, Chair, Wheelchair Transfer Minimal Assist   Toilet Transfers Minimal Assist  (BSC over toilet)   Transfer Method Stand Step   Mobility within room and bathroom w/ FWW   Skilled Intervention Tactile Cuing;Verbal Cuing   Activity Tolerance   Sitting in Chair up pre, 10 min on toilet   Sitting Edge of Bed 5 min   Standing 8 min total   Patient / Family Goals   Patient / Family Goal #1 To go home   Goal #1 Outcome Progressing as expected   Short Term Goals   Short Term Goal # 1 Pt will complete ADL transfers with supervision   Goal Outcome # 1 Progressing as expected   Short Term Goal # 2 Pt will complete LB dressing with supervision AE PRN   Goal Outcome # 2 Progressing as expected   Short Term Goal # 3 Pt will complete toileting with supervision   Goal Outcome # 3 Progressing as expected   Education Group   Role of Occupational Therapist Patient Response Patient;Acceptance;Explanation   Anticipated Discharge Equipment and Recommendations   DC Equipment Recommendations Unable to determine at this time   Discharge Recommendations Recommend post-acute placement for additional occupational therapy services prior to discharge home   Interdisciplinary Plan of Care Collaboration   IDT Collaboration with  Nursing   Patient Position at End of Therapy In Bed;Call Light within Reach;Phone within Reach;Tray Table within Reach;Bed Alarm On   Collaboration Comments report given   Session Information   Date / Session Number  11/5, 4 (3/3, 11/8)   Priority 3

## 2021-11-05 NOTE — DISCHARGE PLANNING
Anticipated Discharge Disposition: [facility]     Action/Information: SW sent message to [facility] rehab liaison to follow up on insurance auth. Awaiting response. Pt is medically clear.      Barriers: Insurance authorization for inpatient rehab     Plan: HCM will continue to collaborate with pt/family, medical care team, and outpatient providers for ongoing discharge planning support and collaboration.

## 2021-11-05 NOTE — PROGRESS NOTES
Tolerating PO   Wound ok   Slightly open medially  Sutures out in 8-10 days   Re consult PRN   Will sign off

## 2021-11-06 LAB
ANION GAP SERPL CALC-SCNC: 11 MMOL/L (ref 7–16)
BUN SERPL-MCNC: 23 MG/DL (ref 8–22)
CALCIUM SERPL-MCNC: 8.7 MG/DL (ref 8.5–10.5)
CHLORIDE SERPL-SCNC: 102 MMOL/L (ref 96–112)
CO2 SERPL-SCNC: 22 MMOL/L (ref 20–33)
CREAT SERPL-MCNC: 1.56 MG/DL (ref 0.5–1.4)
GLUCOSE SERPL-MCNC: 109 MG/DL (ref 65–99)
MAGNESIUM SERPL-MCNC: 1.7 MG/DL (ref 1.5–2.5)
POTASSIUM SERPL-SCNC: 3.2 MMOL/L (ref 3.6–5.5)
SODIUM SERPL-SCNC: 135 MMOL/L (ref 135–145)

## 2021-11-06 PROCEDURE — 700111 HCHG RX REV CODE 636 W/ 250 OVERRIDE (IP): Performed by: STUDENT IN AN ORGANIZED HEALTH CARE EDUCATION/TRAINING PROGRAM

## 2021-11-06 PROCEDURE — A9270 NON-COVERED ITEM OR SERVICE: HCPCS | Performed by: UROLOGY

## 2021-11-06 PROCEDURE — 36415 COLL VENOUS BLD VENIPUNCTURE: CPT

## 2021-11-06 PROCEDURE — 700102 HCHG RX REV CODE 250 W/ 637 OVERRIDE(OP): Performed by: UROLOGY

## 2021-11-06 PROCEDURE — 83735 ASSAY OF MAGNESIUM: CPT

## 2021-11-06 PROCEDURE — 80048 BASIC METABOLIC PNL TOTAL CA: CPT

## 2021-11-06 PROCEDURE — 700102 HCHG RX REV CODE 250 W/ 637 OVERRIDE(OP): Performed by: INTERNAL MEDICINE

## 2021-11-06 PROCEDURE — A9270 NON-COVERED ITEM OR SERVICE: HCPCS | Performed by: PHYSICAL MEDICINE & REHABILITATION

## 2021-11-06 PROCEDURE — 770001 HCHG ROOM/CARE - MED/SURG/GYN PRIV*

## 2021-11-06 PROCEDURE — A9270 NON-COVERED ITEM OR SERVICE: HCPCS | Performed by: INTERNAL MEDICINE

## 2021-11-06 PROCEDURE — 99233 SBSQ HOSP IP/OBS HIGH 50: CPT | Performed by: INTERNAL MEDICINE

## 2021-11-06 PROCEDURE — 700102 HCHG RX REV CODE 250 W/ 637 OVERRIDE(OP): Performed by: PHYSICAL MEDICINE & REHABILITATION

## 2021-11-06 PROCEDURE — 700101 HCHG RX REV CODE 250: Performed by: INTERNAL MEDICINE

## 2021-11-06 RX ORDER — LIDOCAINE 50 MG/G
2 PATCH TOPICAL EVERY 24 HOURS
Status: DISCONTINUED | OUTPATIENT
Start: 2021-11-06 | End: 2021-11-09 | Stop reason: HOSPADM

## 2021-11-06 RX ORDER — CALCIUM CARBONATE 500 MG/1
1000 TABLET, CHEWABLE ORAL 3 TIMES DAILY PRN
Status: DISCONTINUED | OUTPATIENT
Start: 2021-11-06 | End: 2021-11-09 | Stop reason: HOSPADM

## 2021-11-06 RX ORDER — OMEPRAZOLE 20 MG/1
20 CAPSULE, DELAYED RELEASE ORAL DAILY
Status: DISCONTINUED | OUTPATIENT
Start: 2021-11-06 | End: 2021-11-09 | Stop reason: HOSPADM

## 2021-11-06 RX ORDER — HEPARIN SODIUM 5000 [USP'U]/ML
5000 INJECTION, SOLUTION INTRAVENOUS; SUBCUTANEOUS EVERY 8 HOURS
Status: DISCONTINUED | OUTPATIENT
Start: 2021-11-06 | End: 2021-11-09 | Stop reason: HOSPADM

## 2021-11-06 RX ORDER — POTASSIUM CHLORIDE 20 MEQ/1
40 TABLET, EXTENDED RELEASE ORAL 2 TIMES DAILY
Status: COMPLETED | OUTPATIENT
Start: 2021-11-06 | End: 2021-11-06

## 2021-11-06 RX ORDER — AMLODIPINE BESYLATE 10 MG/1
10 TABLET ORAL EVERY EVENING
Status: DISCONTINUED | OUTPATIENT
Start: 2021-11-06 | End: 2021-11-09 | Stop reason: HOSPADM

## 2021-11-06 RX ADMIN — ALLOPURINOL 300 MG: 100 TABLET ORAL at 05:01

## 2021-11-06 RX ADMIN — POTASSIUM CHLORIDE 40 MEQ: 1500 TABLET, EXTENDED RELEASE ORAL at 17:12

## 2021-11-06 RX ADMIN — GABAPENTIN 200 MG: 100 CAPSULE ORAL at 17:12

## 2021-11-06 RX ADMIN — SENNOSIDES-DOCUSATE SODIUM TAB 8.6-50 MG 2 TABLET: 8.6-5 TAB at 17:11

## 2021-11-06 RX ADMIN — POTASSIUM CHLORIDE 40 MEQ: 1500 TABLET, EXTENDED RELEASE ORAL at 09:07

## 2021-11-06 RX ADMIN — GABAPENTIN 200 MG: 100 CAPSULE ORAL at 12:41

## 2021-11-06 RX ADMIN — SIMETHICONE 80 MG: 80 TABLET, CHEWABLE ORAL at 05:14

## 2021-11-06 RX ADMIN — LIDOCAINE 2 PATCH: 50 PATCH TOPICAL at 20:30

## 2021-11-06 RX ADMIN — OXYCODONE HYDROCHLORIDE 10 MG: 10 TABLET ORAL at 17:23

## 2021-11-06 RX ADMIN — OXYCODONE HYDROCHLORIDE 10 MG: 10 TABLET ORAL at 03:22

## 2021-11-06 RX ADMIN — OXYCODONE HYDROCHLORIDE 10 MG: 10 TABLET ORAL at 08:08

## 2021-11-06 RX ADMIN — OXYCODONE HYDROCHLORIDE 10 MG: 10 TABLET ORAL at 12:40

## 2021-11-06 RX ADMIN — LEVOTHYROXINE SODIUM 50 MCG: 0.05 TABLET ORAL at 05:01

## 2021-11-06 RX ADMIN — OXYCODONE HYDROCHLORIDE 10 MG: 10 TABLET ORAL at 20:41

## 2021-11-06 RX ADMIN — AMLODIPINE BESYLATE 10 MG: 10 TABLET ORAL at 17:12

## 2021-11-06 RX ADMIN — HYDRALAZINE HYDROCHLORIDE 20 MG: 20 INJECTION INTRAMUSCULAR; INTRAVENOUS at 20:33

## 2021-11-06 RX ADMIN — ACETAMINOPHEN 1000 MG: 500 TABLET ORAL at 17:11

## 2021-11-06 RX ADMIN — NICOTINE TRANSDERMAL SYSTEM 21 MG: 21 PATCH, EXTENDED RELEASE TRANSDERMAL at 05:00

## 2021-11-06 RX ADMIN — ACETAMINOPHEN 1000 MG: 500 TABLET ORAL at 12:40

## 2021-11-06 RX ADMIN — OMEPRAZOLE 20 MG: 20 CAPSULE, DELAYED RELEASE ORAL at 10:43

## 2021-11-06 RX ADMIN — ACETAMINOPHEN 1000 MG: 500 TABLET ORAL at 05:01

## 2021-11-06 RX ADMIN — GABAPENTIN 200 MG: 100 CAPSULE ORAL at 05:00

## 2021-11-06 RX ADMIN — HYDRALAZINE HYDROCHLORIDE 20 MG: 20 INJECTION INTRAMUSCULAR; INTRAVENOUS at 04:12

## 2021-11-06 ASSESSMENT — ENCOUNTER SYMPTOMS
BLOOD IN STOOL: 0
HEARTBURN: 1
NECK PAIN: 0
NAUSEA: 0
DIARRHEA: 1
CHILLS: 0
SHORTNESS OF BREATH: 0
DIZZINESS: 0
HEADACHES: 0
BLURRED VISION: 0
BACK PAIN: 0
SORE THROAT: 0
CONSTIPATION: 0
VOMITING: 0
ABDOMINAL PAIN: 1
FEVER: 0
NERVOUS/ANXIOUS: 0

## 2021-11-06 ASSESSMENT — FIBROSIS 4 INDEX: FIB4 SCORE: 2.29

## 2021-11-06 ASSESSMENT — PAIN DESCRIPTION - PAIN TYPE
TYPE: ACUTE PAIN

## 2021-11-06 NOTE — PROGRESS NOTES
Assumed care of patient at 0700 from Zeb RN. Patient is A&Ox 4, states pain level is 6/10. Bed locked in lowest position with 2 rails up. Call light in place, belongings at bedside. Patient expresses zero needs at this time and hourly rounding is in place.     COVID 19 surge in effect.

## 2021-11-06 NOTE — PROGRESS NOTES
"Urology Progress Note    Left renal mass s/p robotic assisted laparoscopic partial left nephrectomy 10/27/21    S: Pt seen and examined in bed on rounds. Cr now 1.56 (1.86). Pain improving. No n/v/f/c.  Has been medically cleared by primary team for SNF vs rehab facility.     O:   /82   Pulse 89   Temp 37 °C (98.6 °F) (Temporal)   Resp 18   Ht 1.651 m (5' 5\")   Wt 87.4 kg (192 lb 10.9 oz)   SpO2 97%   Recent Labs     11/04/21  0544 11/05/21  0510 11/06/21  0306   SODIUM 140 138 135   POTASSIUM 3.7 3.6 3.2*   CHLORIDE 106 105 102   CO2 22 24 22   GLUCOSE 86 96 109*   BUN 33* 30* 23*   CREATININE 1.79* 1.86* 1.56*   CALCIUM 9.1 9.0 8.7     Recent Labs     11/04/21  0544 11/05/21  0510   WBC 10.9* 10.3   RBC 3.62* 3.51*   HEMOGLOBIN 11.1* 10.9*   HEMATOCRIT 33.1* 33.2*   MCV 91.4 94.6   MCH 30.7 31.1   MCHC 33.5* 32.8*   RDW 47.4 49.1   PLATELETCT 291 271   MPV 9.5 9.5         Intake/Output Summary (Last 24 hours) at 10/29/2021 1616  Last data filed at 10/29/2021 1400  Gross per 24 hour   Intake 8279.42 ml   Output 780 ml   Net 7499.42 ml       Exam:  Gen: NAD   Abd: soft, mild TTP. Incisions C/D/I.    Urine: Purewick with clear yellow output    A/P:    Active Hospital Problems    Diagnosis    • Normocytic anemia [D64.9]    • Acquired hypothyroidism [E03.9]    • Renal cell carcinoma (HCC) [C64.9]    • Hepatitis C [B19.20]    • Tobacco abuse [Z72.0]    • Hypokalemia [E87.6]    • Hypoglycemia [E16.2]    • Post-op pain [G89.18]    • Gout [M10.9]    • SHEBA (acute kidney injury) (HCC) [N17.9]    • Small bowel obstruction (HCC) [K56.609]    • Hypotension [I95.9]    • Hypertension [I10]      pt states well controlled on meds       Left renal mass s/p robotic assisted laparoscopic partial left nephrectomy 10/27/21. S/p reduction and repair of incarcerated incisional hernia on 11/2 with Dr. Banegas.     Plan:  - Monitor UOP  - continue pain control  - Incentive spirometry, encourage ambulation- working with PT.  - " Urology will sign off and plan f/u as scheduled with Dr Mccracken on 11/15.  Call with questions.

## 2021-11-06 NOTE — CARE PLAN
The patient is Stable - Low risk of patient condition declining or worsening    Shift Goals  Clinical Goals: pain management   Patient Goals: eat and sleep   Family Goals: NA    Progress made toward(s) clinical / shift goals:  Pt has been given appropriate pain meds. Pt's O2 sat has been above 90% when sitting and ambulating.       Problem: Pain - Standard  Goal: Alleviation of pain or a reduction in pain to the patient’s comfort goal  Outcome: Progressing     Problem: Knowledge Deficit - Standard  Goal: Patient and family/care givers will demonstrate understanding of plan of care, disease process/condition, diagnostic tests and medications  Outcome: Progressing     Problem: Respiratory  Goal: Patient will achieve/maintain optimum respiratory ventilation and gas exchange  Outcome: Progressing

## 2021-11-06 NOTE — PROGRESS NOTES
[facility] Medicine Daily Progress Note    Date of Service  11/6/2021    Chief Complaint  Hypotension s/p left partial nephrectomy for mass.    Hospital Course  Thais Munroe is a 66 y.o. female with PMH of hypertension, hep C, smoker, history of blood clots, hypothyroidism, and renal mass.  Who presented 10/27/2021 for resection of left adrenal mass with Dr. Mccracken.  Patient underwent robotic assisted laparoscopic partial left nephrectomy on 10/27/2021.  Surgery went without complication with an EBL of 100 mL, and a JARRETT drain was placed.  Was reported to have 180 cc / 24 hours.  Patient initially with complaints of poor pain control, but now improved.  Her postop course was complicated by pain control issues requiring significant narcotics.  She developed worsening abdominal pain and nausea/vomiting.  She was ultimately transferred to the ICU for hypotension refractory to fluid boluses.  Her antibiotics were changed to Zosyn.  General surgery was consulted CT scan ordered which showed significant distention of the ascending and transverse colon with wall thickening, inflammation, edema and possible pneumatosis.  NG tube was placed with minimal output at that time.  There was concern that she had either ileus versus colonic pseudoobstruction.  GI was consulted and she underwent colonoscopy with decompression however no mechanical obstruction was identified.  Repeat imaging showed SBO with incarcerated incisional hernia.  Patient went to the OR for reduction and repair on 11/2.     Interval Problem Update  - POD#4 s/p reduction and repair of incarcerated incisional hernia  - tolerating PO, having some heartburn, added PPI and increased tums  - having BMs, loose, having abd pain and cramping  - K still low, checking mg  - Cr improving   - BP high, added back amlodipine    I have personally seen and examined the patient at bedside. I discussed the plan of care with patient, bedside RN, charge RN,  and  pharmacy.    Consultants/Specialty  critical care, general surgery, GI and urology    Code Status  Full Code    Disposition  Patient is medically cleared.   Anticipate discharge to to skilled nursing facility vs rehab.  I have placed the appropriate orders for post-discharge needs.    Review of Systems  Review of Systems   Constitutional: Negative for chills and fever.   HENT: Positive for nosebleeds. Negative for sore throat.    Eyes: Negative for blurred vision.   Respiratory: Negative for shortness of breath.    Cardiovascular: Positive for leg swelling. Negative for chest pain.   Gastrointestinal: Positive for abdominal pain, diarrhea and heartburn. Negative for blood in stool, constipation, nausea and vomiting.   Genitourinary: Negative for dysuria.   Musculoskeletal: Negative for back pain and neck pain.   Skin: Negative for rash.   Neurological: Negative for dizziness and headaches.   Psychiatric/Behavioral: The patient is not nervous/anxious.         Physical Exam  Temp:  [36.4 °C (97.5 °F)-37 °C (98.6 °F)] 37 °C (98.6 °F)  Pulse:  [] 89  Resp:  [16-18] 18  BP: (137-179)/(80-99) 148/82  SpO2:  [94 %-97 %] 97 %    Physical Exam  Vitals reviewed.   Constitutional:       General: She is not in acute distress.     Appearance: Normal appearance. She is obese. She is not toxic-appearing or diaphoretic.   HENT:      Head: Normocephalic and atraumatic.      Nose: Nose normal.      Mouth/Throat:      Mouth: Mucous membranes are moist.      Pharynx: No oropharyngeal exudate.   Eyes:      General: No scleral icterus.        Right eye: No discharge.         Left eye: No discharge.      Conjunctiva/sclera: Conjunctivae normal.   Cardiovascular:      Rate and Rhythm: Normal rate and regular rhythm.      Heart sounds: No murmur heard.   No friction rub. No gallop.    Pulmonary:      Effort: Pulmonary effort is normal. No respiratory distress.      Breath sounds: Rales present. No wheezing.   Abdominal:      General:  Bowel sounds are normal. There is distension.      Palpations: Abdomen is soft.      Tenderness: There is abdominal tenderness. There is no guarding or rebound.      Comments: LLQ surgical site with dressing c/d/i   Musculoskeletal:         General: No swelling or tenderness.      Cervical back: Normal range of motion. No muscular tenderness.      Right lower leg: Edema present.      Left lower leg: Edema present.   Skin:     Coloration: Skin is not jaundiced or pale.   Neurological:      General: No focal deficit present.      Mental Status: She is alert and oriented to person, place, and time. Mental status is at baseline.      Cranial Nerves: No cranial nerve deficit.   Psychiatric:         Mood and Affect: Mood normal.         Behavior: Behavior normal.         Fluids    Intake/Output Summary (Last 24 hours) at 11/6/2021 1023  Last data filed at 11/6/2021 0900  Gross per 24 hour   Intake 440 ml   Output 990 ml   Net -550 ml       Laboratory  Recent Labs     11/04/21  0544 11/05/21  0510   WBC 10.9* 10.3   RBC 3.62* 3.51*   HEMOGLOBIN 11.1* 10.9*   HEMATOCRIT 33.1* 33.2*   MCV 91.4 94.6   MCH 30.7 31.1   MCHC 33.5* 32.8*   RDW 47.4 49.1   PLATELETCT 291 271   MPV 9.5 9.5     Recent Labs     11/04/21  0544 11/05/21  0510 11/06/21  0306   SODIUM 140 138 135   POTASSIUM 3.7 3.6 3.2*   CHLORIDE 106 105 102   CO2 22 24 22   GLUCOSE 86 96 109*   BUN 33* 30* 23*   CREATININE 1.79* 1.86* 1.56*   CALCIUM 9.1 9.0 8.7                   Imaging  CT-ABDOMEN-PELVIS W/O   Final Result      1.  Partial small bowel obstruction involving the midportion of the small bowel secondary to new left-sided abdominal hernia.      2.  No evidence of abdominal or pelvic abscess or urinoma.      3.  Persistent dilatation of the cecum measuring 10 cm in diameter.      4.  Distended gallbladder with minimal gallbladder wall thickening. No calcified gallstone or biliary dilatation identified.         Findings were discussed with ANH STERLING  on 11/2/2021 12:53 PM.      [id number]-NNZYGNL-4 VIEW   Final Result      1.  Cecal dilatation continues to be seen which can be seen in the setting of a cecal bascule.   2.  Enteric tube projects over the distal esophagus. Recommend advancement.      XX-BCXPRAF-6 VIEWS   Final Result      1.  Overall stable predominantly cecal colonic dilatation with the cecum located in the midline and just to the left of midline possibly related to a cecal bascule with volvulus considered a more remote possibility due to the fact there has been no    interval progression.   2.  There is no pneumatosis or free intraperitoneal air.      LT-MOCUCOW-4 VIEWS   Final Result      1.  There is a similar appearance to the mildly distended air-filled colon with relatively decompressed sigmoid and rectum.   2.  There is no free intraperitoneal air.      GX-[id number]-3 VIEWS   Final Result         1.  Air-filled distention of the cecum measuring 12.3 cm, appearance concerning for [place]'s.  Similar to prior study.      These findings were discussed with the patient's clinician, Angeles Wolf, on 10/30/2021 4:19 AM.      QT-KITGEIU-1 VIEWS   Final Result      1.  Mild colonic dilation      2.  Enteric catheter appears appropriately located      3.  Mild right basilar atelectasis and/or pleural effusion      DX-ABDOMEN FOR TUBE PLACEMENT   Final Result      Enteric tube has been placed and the tip projects over the stomach.      CT-ABDOMEN-PELVIS W/O   Final Result         1.  Air-filled distention of the cecum, follow-up recommended to exclude progression to Jailene's   2.  Fluid and air-filled distended loops of small bowel in the left abdomen, consider component of ileus.   3.  Intra-abdominal foci of free air, a nonspecific finding for recent postop changes with surgical drain in place   4.  Scattered mild abdominal ascites and hazy mesenteric inflammatory changes.   5.  Small right and trace left pleural effusion.   6.  Linear densities in the  bilateral lung bases favors changes of atelectasis.   7.  Left nephrolithiasis without visualized obstructive changes.   8.  Hepatomegaly and changes of cirrhosis   9.  Diverticulosis   10.  Atherosclerosis      LG-TWBPXUI-0 VIEW   Final Result         1.  Air-filled distended bowel loops, appearance compatible with evolving obstruction versus ileus. Recommend radiographic follow-up to resolution.      DX-CHEST-PORTABLE (1 VIEW)   Final Result         1.  No acute cardiopulmonary disease.   2.  Cardiomegaly           Assessment/Plan  * Small bowel obstruction (HCC)  Assessment & Plan  NG removed 11/3  NPO-->sips of clears 11/3-->GI soft 11/5 11/2 CT Abd:    1.  Partial small bowel obstruction involving the midportion of the small bowel secondary to new left-sided abdominal hernia.    2.  No evidence of abdominal or pelvic abscess or urinoma.    3.  Persistent dilatation of the cecum measuring 10 cm in diameter.    4.  Distended gallbladder with minimal gallbladder wall thickening. No calcified gallstone or biliary dilatation identified.  Surgery following  S/p reduction and repair of incarcerated incisional hernia on 11/2  Mobility as able.    Correct electrolyte deficits  Minimize narcotics as able.  Zosyn 10/29-11/5    Normocytic anemia  Assessment & Plan  Likely in setting of surgery and hospitalization  H/H stable  CTM    Acquired hypothyroidism  Assessment & Plan  TSH 18  FT4 low  Synthroid 37.5mcg--> 50mcg daily  Recheck TSH in ~6 weeks    Tobacco abuse  Assessment & Plan  Nicotine patch  Discussed smoking cessation with patient including benefits, patch, total time = 8 minutes    Hypertension  Assessment & Plan  Uncontrolled  Home regimen: Amlodipine 10 mg daily, valsartan 325 mg nightly  Inpatient regimen: start amlodipine 10mg daily + PRNs, if Cr stablizes can restart valsartan    Renal cell carcinoma (HCC)  Assessment & Plan  S/p surgical excision of mass.  Pathology confirmed Renal Cell CA.  Urology to  monitor.    Hypokalemia- (present on admission)  Assessment & Plan  K still low, s/p po supplementation  Check Mg  CTM    Hypoglycemia  Assessment & Plan  Resolved    SHEBA (acute kidney injury) (HCC)  Assessment & Plan  Stable  S/p partial nephrectomy due to Renal Cell Carcinoma.  Monitor I/O's, labs, vitals.  Avoid nephrotoxins.      Gout- (present on admission)  Assessment & Plan  Continue home allopurinol  Monitor Cr    Post-op pain  Assessment & Plan    Minimize narcotics, added gapentin  Lidocaine patch.  Heat/ice  Unable to give NSAIDS due to renal function.  Tums + PPI    Hypotension- (present on admission)  Assessment & Plan  Resolved  S/p midodrine  Check TSH w/ reflex to free T4 if not done.       VTE prophylaxis: SCDs/TEDs and heparin ppx    I have performed a physical exam and reviewed and updated ROS and Plan today (11/6/2021). In review of yesterday's note (11/5/2021), there are no changes except as documented above.

## 2021-11-06 NOTE — PROGRESS NOTES
Report received from previous shift. Assumed care of patient at 1650, patient is A&O x4; pt is alert to self, time, location, and situation. Patient is a high Fall risk, bed locked and in lowest position, bed alarm on. Patient reports pain 7/10; and has been medicated approprietly.  Plan of care reviewed with patient, will implement and continue to monitor. Hourly rounding is in place and call light/belongings within reach.

## 2021-11-06 NOTE — CARE PLAN
The patient is Stable - Low risk of patient condition declining or worsening    Shift Goals  Clinical Goals: Adequate pain management  Patient Goals: Rest  Family Goals: N/A    Progress made toward(s) clinical / shift goals:  Pt resting comfortably, oral intake adequate.     Patient is not progressing towards the following goals:      Problem: Pain - Standard  Goal: Alleviation of pain or a reduction in pain to the patient’s comfort goal  Outcome: Not Progressing     Problem: Hemodynamics  Goal: Patient's hemodynamics, fluid balance and neurologic status will be stable or improve  Outcome: Not Progressing

## 2021-11-06 NOTE — PROGRESS NOTES
Isolation Precautions:    Patient is on enhanced droplet isolation for RSV.    Patient educated on reason for isolation, how the infection may be transmitted, and how to help prevent transmission to others.     Patient educated that enhanced droplet precautions involves staff and visitors wearing PPE, follow  Standard Precautions and perform meticulous hand hygiene in order to prevent transmission of infection. (Contact Precautions: gown and gloves; Special Contact Precautions: gown and gloves, with the added requirement of soap and water for hand hygiene; Droplet Precautions: surgical mask worn by staff and visitors in the room, and worn by the patient when out of the room; Airborne Precautions: involves staff wearing PPE to include an N95 Respirator or Controlled Air Purifying Respirator (CAPR).  Visitors should be limited and may wear an N95 mask).         Patient transport and mobilization on unit. Patient educated that they may leave their room, but prior to exiting, the patient needs to have on a fresh patient gown, ensure the potentially infectious area is covered, perform appropriate hand hygiene immediately prior to exiting the room. (*For Airborne Precautions: Patient educated that time out of the room should be minimized and limited to transport to diagnostic procedures or other activities. Patient is to wear surgical mask when required to leave the patient room).

## 2021-11-06 NOTE — DISCHARGE PLANNING
Continue to follow for post acute services Insurance has authorized [facility] will follow along with administration for bed availability

## 2021-11-07 ENCOUNTER — APPOINTMENT (OUTPATIENT)
Dept: RADIOLOGY | Facility: MEDICAL CENTER | Age: 66
DRG: 657 | End: 2021-11-07
Attending: INTERNAL MEDICINE
Payer: COMMERCIAL

## 2021-11-07 LAB
ANION GAP SERPL CALC-SCNC: 12 MMOL/L (ref 7–16)
BUN SERPL-MCNC: 19 MG/DL (ref 8–22)
CALCIUM SERPL-MCNC: 9.1 MG/DL (ref 8.5–10.5)
CHLORIDE SERPL-SCNC: 102 MMOL/L (ref 96–112)
CO2 SERPL-SCNC: 19 MMOL/L (ref 20–33)
CREAT SERPL-MCNC: 1.29 MG/DL (ref 0.5–1.4)
GLUCOSE SERPL-MCNC: 125 MG/DL (ref 65–99)
POTASSIUM SERPL-SCNC: 3.7 MMOL/L (ref 3.6–5.5)
SODIUM SERPL-SCNC: 133 MMOL/L (ref 135–145)

## 2021-11-07 PROCEDURE — 74019 RADEX ABDOMEN 2 VIEWS: CPT

## 2021-11-07 PROCEDURE — 700102 HCHG RX REV CODE 250 W/ 637 OVERRIDE(OP): Performed by: PHYSICAL MEDICINE & REHABILITATION

## 2021-11-07 PROCEDURE — 700102 HCHG RX REV CODE 250 W/ 637 OVERRIDE(OP): Performed by: UROLOGY

## 2021-11-07 PROCEDURE — 770001 HCHG ROOM/CARE - MED/SURG/GYN PRIV*

## 2021-11-07 PROCEDURE — 700101 HCHG RX REV CODE 250: Performed by: INTERNAL MEDICINE

## 2021-11-07 PROCEDURE — 700111 HCHG RX REV CODE 636 W/ 250 OVERRIDE (IP): Performed by: STUDENT IN AN ORGANIZED HEALTH CARE EDUCATION/TRAINING PROGRAM

## 2021-11-07 PROCEDURE — 36415 COLL VENOUS BLD VENIPUNCTURE: CPT

## 2021-11-07 PROCEDURE — 700102 HCHG RX REV CODE 250 W/ 637 OVERRIDE(OP): Performed by: INTERNAL MEDICINE

## 2021-11-07 PROCEDURE — 700105 HCHG RX REV CODE 258: Performed by: INTERNAL MEDICINE

## 2021-11-07 PROCEDURE — 700111 HCHG RX REV CODE 636 W/ 250 OVERRIDE (IP): Performed by: INTERNAL MEDICINE

## 2021-11-07 PROCEDURE — A9270 NON-COVERED ITEM OR SERVICE: HCPCS | Performed by: PHYSICAL MEDICINE & REHABILITATION

## 2021-11-07 PROCEDURE — 99233 SBSQ HOSP IP/OBS HIGH 50: CPT | Performed by: INTERNAL MEDICINE

## 2021-11-07 PROCEDURE — A9270 NON-COVERED ITEM OR SERVICE: HCPCS | Performed by: UROLOGY

## 2021-11-07 PROCEDURE — A9270 NON-COVERED ITEM OR SERVICE: HCPCS | Performed by: INTERNAL MEDICINE

## 2021-11-07 PROCEDURE — 80048 BASIC METABOLIC PNL TOTAL CA: CPT

## 2021-11-07 RX ORDER — ENEMA 19; 7 G/133ML; G/133ML
1 ENEMA RECTAL ONCE
Status: DISCONTINUED | OUTPATIENT
Start: 2021-11-07 | End: 2021-11-08

## 2021-11-07 RX ORDER — SODIUM CHLORIDE, SODIUM LACTATE, POTASSIUM CHLORIDE, CALCIUM CHLORIDE 600; 310; 30; 20 MG/100ML; MG/100ML; MG/100ML; MG/100ML
INJECTION, SOLUTION INTRAVENOUS CONTINUOUS
Status: DISCONTINUED | OUTPATIENT
Start: 2021-11-07 | End: 2021-11-08

## 2021-11-07 RX ORDER — OXYCODONE HYDROCHLORIDE 10 MG/1
10 TABLET ORAL EVERY 4 HOURS PRN
Status: DISCONTINUED | OUTPATIENT
Start: 2021-11-07 | End: 2021-11-08

## 2021-11-07 RX ORDER — OXYCODONE HYDROCHLORIDE 5 MG/1
5 TABLET ORAL EVERY 4 HOURS PRN
Status: DISCONTINUED | OUTPATIENT
Start: 2021-11-07 | End: 2021-11-08

## 2021-11-07 RX ADMIN — HEPARIN SODIUM 5000 UNITS: 5000 INJECTION, SOLUTION INTRAVENOUS; SUBCUTANEOUS at 14:42

## 2021-11-07 RX ADMIN — ACETAMINOPHEN 1000 MG: 500 TABLET ORAL at 00:23

## 2021-11-07 RX ADMIN — LEVOTHYROXINE SODIUM 50 MCG: 0.05 TABLET ORAL at 05:17

## 2021-11-07 RX ADMIN — GABAPENTIN 200 MG: 100 CAPSULE ORAL at 11:22

## 2021-11-07 RX ADMIN — OMEPRAZOLE 20 MG: 20 CAPSULE, DELAYED RELEASE ORAL at 05:17

## 2021-11-07 RX ADMIN — ALLOPURINOL 300 MG: 100 TABLET ORAL at 05:17

## 2021-11-07 RX ADMIN — OXYCODONE HYDROCHLORIDE 10 MG: 10 TABLET ORAL at 00:27

## 2021-11-07 RX ADMIN — ACETAMINOPHEN 1000 MG: 500 TABLET ORAL at 11:34

## 2021-11-07 RX ADMIN — HYDRALAZINE HYDROCHLORIDE 20 MG: 20 INJECTION INTRAMUSCULAR; INTRAVENOUS at 18:05

## 2021-11-07 RX ADMIN — LIDOCAINE 2 PATCH: 50 PATCH TOPICAL at 19:36

## 2021-11-07 RX ADMIN — SODIUM CHLORIDE, POTASSIUM CHLORIDE, SODIUM LACTATE AND CALCIUM CHLORIDE: 600; 310; 30; 20 INJECTION, SOLUTION INTRAVENOUS at 08:07

## 2021-11-07 RX ADMIN — OXYCODONE HYDROCHLORIDE 10 MG: 10 TABLET ORAL at 05:17

## 2021-11-07 RX ADMIN — ACETAMINOPHEN 1000 MG: 500 TABLET ORAL at 17:11

## 2021-11-07 RX ADMIN — ACETAMINOPHEN 1000 MG: 500 TABLET ORAL at 05:17

## 2021-11-07 RX ADMIN — OXYCODONE 5 MG: 5 TABLET ORAL at 13:22

## 2021-11-07 RX ADMIN — SENNOSIDES-DOCUSATE SODIUM TAB 8.6-50 MG 2 TABLET: 8.6-5 TAB at 17:12

## 2021-11-07 RX ADMIN — AMLODIPINE BESYLATE 10 MG: 10 TABLET ORAL at 17:12

## 2021-11-07 RX ADMIN — OXYCODONE 5 MG: 5 TABLET ORAL at 19:28

## 2021-11-07 RX ADMIN — HEPARIN SODIUM 5000 UNITS: 5000 INJECTION, SOLUTION INTRAVENOUS; SUBCUTANEOUS at 21:42

## 2021-11-07 RX ADMIN — GABAPENTIN 200 MG: 100 CAPSULE ORAL at 05:17

## 2021-11-07 RX ADMIN — GABAPENTIN 200 MG: 100 CAPSULE ORAL at 17:12

## 2021-11-07 RX ADMIN — NICOTINE TRANSDERMAL SYSTEM 21 MG: 21 PATCH, EXTENDED RELEASE TRANSDERMAL at 05:16

## 2021-11-07 ASSESSMENT — ENCOUNTER SYMPTOMS
BLURRED VISION: 0
SHORTNESS OF BREATH: 0
NECK PAIN: 0
NERVOUS/ANXIOUS: 0
DIZZINESS: 0
BACK PAIN: 0
FEVER: 0
VOMITING: 0
BLOOD IN STOOL: 0
CONSTIPATION: 0
CHILLS: 0
SORE THROAT: 0
HEARTBURN: 1
DIARRHEA: 1
NAUSEA: 0
HEADACHES: 0
ABDOMINAL PAIN: 1

## 2021-11-07 ASSESSMENT — PAIN DESCRIPTION - PAIN TYPE
TYPE: ACUTE PAIN

## 2021-11-07 ASSESSMENT — PATIENT HEALTH QUESTIONNAIRE - PHQ9
SUM OF ALL RESPONSES TO PHQ9 QUESTIONS 1 AND 2: 0
1. LITTLE INTEREST OR PLEASURE IN DOING THINGS: NOT AT ALL

## 2021-11-07 NOTE — PROGRESS NOTES
Assumed care of patient at 0700 from Tonya NGUYEN. Patient is A&O x4, states pain level is 4/10. Bed locked in lowest position with 3 rail up. Call light  in place, belongings at bedside. Patient expresses pain management needs at this time and hourly rounding is in place. Labs reviewed.

## 2021-11-07 NOTE — CARE PLAN
The patient is Stable - Low risk of patient condition declining or worsening    Shift Goals  Clinical Goals: pain control  Patient Goals: pain control      Progress made toward(s) clinical / shift goals:  Pt reported 9/10 pain to left hip from sciatica pain, applied Lidocaine patches and medicated per MAR for pain. On reassessment pt reported 3/10 pain to left hip.     Patient is not progressing towards the following goals:

## 2021-11-07 NOTE — PROGRESS NOTES
Covid surge in effect, crisis charting in use     Rec'd report from day shift RN. Assumed pt care. Assessment completed. AA&OX4. Reports pain to left hip from sciatica, medicated per MAR. No s/s of discomfort or distress. Have not ambulated pt at this time, per report ambulates with 1 assist and use of FWW. Changed dressing to LLQ, staples in place, open incision without staples, covered with new gauze and hypafix. Bed in lowest position, bed locked, bed alarm on for safety, treaded socks in place, RN and CNA numbers provided, call light within reach.

## 2021-11-07 NOTE — CARE PLAN
Problem: Pain - Standard  Goal: Alleviation of pain or a reduction in pain to the patient’s comfort goal  Outcome: Progressing     Problem: Knowledge Deficit - Standard  Goal: Patient and family/care givers will demonstrate understanding of plan of care, disease process/condition, diagnostic tests and medications  Outcome: Progressing   The patient is Stable - Low risk of patient condition declining or worsening    Shift Goals  Clinical Goals: pain control  Patient Goals: pain control  Family Goals: N/A    Progress made toward(s) clinical / shift goals:  Continuing to monitor and manage the patients pain and maintaining comfort. Patient demonstrates understanding of the plan of care.     Patient is not progressing towards the following goals:

## 2021-11-07 NOTE — PROGRESS NOTES
[facility] Medicine Daily Progress Note    Date of Service  11/7/2021    Chief Complaint  Hypotension s/p left partial nephrectomy for mass.    Hospital Course  Thais Munroe is a 66 y.o. female with PMH of hypertension, hep C, smoker, history of blood clots, hypothyroidism, and renal mass.  Who presented 10/27/2021 for resection of left adrenal mass with Dr. Mccracken.  Patient underwent robotic assisted laparoscopic partial left nephrectomy on 10/27/2021.  Surgery went without complication with an EBL of 100 mL, and a JARRETT drain was placed.  Was reported to have 180 cc / 24 hours.  Patient initially with complaints of poor pain control, but now improved.  Her postop course was complicated by pain control issues requiring significant narcotics.  She developed worsening abdominal pain and nausea/vomiting.  She was ultimately transferred to the ICU for hypotension refractory to fluid boluses.  Her antibiotics were changed to Zosyn.  General surgery was consulted CT scan ordered which showed significant distention of the ascending and transverse colon with wall thickening, inflammation, edema and possible pneumatosis.  NG tube was placed with minimal output at that time.  There was concern that she had either ileus versus colonic pseudoobstruction.  GI was consulted and she underwent colonoscopy with decompression however no mechanical obstruction was identified.  Repeat imaging showed SBO with incarcerated incisional hernia.  Patient went to the OR for reduction and repair on 11/2.     Interval Problem Update  - POD#5 s/p reduction and repair of incarcerated incisional hernia  - having BMs, passing gas, looks more distended today, will get xray  - pain controlled, will try to start weaning narcotic pain meds  - BP more controlled today, added amlodipine yesterday  - Cr improving    I have personally seen and examined the patient at bedside. I discussed the plan of care with patient, bedside RN, charge RN,  and  pharmacy.    Consultants/Specialty  critical care, general surgery, GI and urology    Code Status  Full Code    Disposition  Patient is medically cleared.   Anticipate discharge to to skilled nursing facility vs rehab.  I have placed the appropriate orders for post-discharge needs.    Review of Systems  Review of Systems   Constitutional: Negative for chills and fever.   HENT: Negative for nosebleeds and sore throat.    Eyes: Negative for blurred vision.   Respiratory: Negative for shortness of breath.    Cardiovascular: Positive for leg swelling. Negative for chest pain.   Gastrointestinal: Positive for abdominal pain, diarrhea and heartburn. Negative for blood in stool, constipation, nausea and vomiting.   Genitourinary: Negative for dysuria.   Musculoskeletal: Negative for back pain and neck pain.   Skin: Negative for rash.   Neurological: Negative for dizziness and headaches.   Psychiatric/Behavioral: The patient is not nervous/anxious.         Physical Exam  Temp:  [36.4 °C (97.5 °F)-37.3 °C (99.1 °F)] 37.3 °C (99.1 °F)  Pulse:  [] 104  Resp:  [17-19] 19  BP: (119-162)/() 125/87  SpO2:  [95 %-98 %] 95 %    Physical Exam  Vitals reviewed.   Constitutional:       General: She is not in acute distress.     Appearance: Normal appearance. She is obese. She is not toxic-appearing or diaphoretic.   HENT:      Head: Normocephalic and atraumatic.      Nose: Nose normal.      Mouth/Throat:      Mouth: Mucous membranes are moist.      Pharynx: No oropharyngeal exudate.   Eyes:      General: No scleral icterus.        Right eye: No discharge.         Left eye: No discharge.      Conjunctiva/sclera: Conjunctivae normal.   Cardiovascular:      Rate and Rhythm: Normal rate and regular rhythm.      Heart sounds: No murmur heard.  No friction rub. No gallop.    Pulmonary:      Effort: Pulmonary effort is normal. No respiratory distress.      Breath sounds: Rales present. No wheezing.   Abdominal:      General: There  is distension.      Palpations: Abdomen is soft.      Tenderness: There is abdominal tenderness. There is no guarding or rebound.      Comments: High pitched tinkering bowel sounds,  LLQ surgical site with dressing c/d/i   Musculoskeletal:         General: No swelling or tenderness.      Cervical back: Normal range of motion. No muscular tenderness.      Right lower leg: Edema present.      Left lower leg: Edema present.   Skin:     Coloration: Skin is not jaundiced or pale.   Neurological:      General: No focal deficit present.      Mental Status: She is alert and oriented to person, place, and time. Mental status is at baseline.      Cranial Nerves: No cranial nerve deficit.   Psychiatric:         Mood and Affect: Mood normal.         Behavior: Behavior normal.         Fluids  No intake or output data in the 24 hours ending 11/07/21 1026    Laboratory  Recent Labs     11/05/21  0510   WBC 10.3   RBC 3.51*   HEMOGLOBIN 10.9*   HEMATOCRIT 33.2*   MCV 94.6   MCH 31.1   MCHC 32.8*   RDW 49.1   PLATELETCT 271   MPV 9.5     Recent Labs     11/05/21  0510 11/06/21  0306 11/07/21  0132   SODIUM 138 135 133*   POTASSIUM 3.6 3.2* 3.7   CHLORIDE 105 102 102   CO2 24 22 19*   GLUCOSE 96 109* 125*   BUN 30* 23* 19   CREATININE 1.86* 1.56* 1.29   CALCIUM 9.0 8.7 9.1                   Imaging  CT-ABDOMEN-PELVIS W/O   Final Result      1.  Partial small bowel obstruction involving the midportion of the small bowel secondary to new left-sided abdominal hernia.      2.  No evidence of abdominal or pelvic abscess or urinoma.      3.  Persistent dilatation of the cecum measuring 10 cm in diameter.      4.  Distended gallbladder with minimal gallbladder wall thickening. No calcified gallstone or biliary dilatation identified.         Findings were discussed with ANH STERLING on 11/2/2021 12:53 PM.      [id number]-3 VIEW   Final Result      1.  Cecal dilatation continues to be seen which can be seen in the setting of a cecal  bascule.   2.  Enteric tube projects over the distal esophagus. Recommend advancement.      UM-TSVNUIM-1 VIEWS   Final Result      1.  Overall stable predominantly cecal colonic dilatation with the cecum located in the midline and just to the left of midline possibly related to a cecal bascule with volvulus considered a more remote possibility due to the fact there has been no    interval progression.   2.  There is no pneumatosis or free intraperitoneal air.      YX-[id number]-2 VIEWS   Final Result      1.  There is a similar appearance to the mildly distended air-filled colon with relatively decompressed sigmoid and rectum.   2.  There is no free intraperitoneal air.      TX-YOCQSOQ-8 VIEWS   Final Result         1.  Air-filled distention of the cecum measuring 12.3 cm, appearance concerning for [place]'s.  Similar to prior study.      These findings were discussed with the patient's clinician, Angeles Wolf, on 10/30/2021 4:19 AM.      TV-QFLLFSO-1 VIEWS   Final Result      1.  Mild colonic dilation      2.  Enteric catheter appears appropriately located      3.  Mild right basilar atelectasis and/or pleural effusion      DX-ABDOMEN FOR TUBE PLACEMENT   Final Result      Enteric tube has been placed and the tip projects over the stomach.      CT-ABDOMEN-PELVIS W/O   Final Result         1.  Air-filled distention of the cecum, follow-up recommended to exclude progression to Jailene's   2.  Fluid and air-filled distended loops of small bowel in the left abdomen, consider component of ileus.   3.  Intra-abdominal foci of free air, a nonspecific finding for recent postop changes with surgical drain in place   4.  Scattered mild abdominal ascites and hazy mesenteric inflammatory changes.   5.  Small right and trace left pleural effusion.   6.  Linear densities in the bilateral lung bases favors changes of atelectasis.   7.  Left nephrolithiasis without visualized obstructive changes.   8.  Hepatomegaly and changes of  cirrhosis   9.  Diverticulosis   10.  Atherosclerosis      [id number]-8 VIEW   Final Result         1.  Air-filled distended bowel loops, appearance compatible with evolving obstruction versus ileus. Recommend radiographic follow-up to resolution.      DX-CHEST-PORTABLE (1 VIEW)   Final Result         1.  No acute cardiopulmonary disease.   2.  Cardiomegaly           Assessment/Plan  * Small bowel obstruction (HCC)  Assessment & Plan  NG removed 11/3  NPO-->sips of clears 11/3-->GI soft 11/5 11/2 CT Abd:    1.  Partial small bowel obstruction involving the midportion of the small bowel secondary to new left-sided abdominal hernia.    2.  No evidence of abdominal or pelvic abscess or urinoma.    3.  Persistent dilatation of the cecum measuring 10 cm in diameter.    4.  Distended gallbladder with minimal gallbladder wall thickening. No calcified gallstone or biliary dilatation identified.  Surgery following  S/p reduction and repair of incarcerated incisional hernia on 11/2  Mobility as able.    Correct electrolyte deficits  Minimize narcotics as able.  Zosyn 10/29-11/5    More distended today 11/7, getting xray and weaning narcotics  Bicarb 19, having loose stools so may be from that, will start gentle IVF, if worsens will check lactic    Normocytic anemia  Assessment & Plan  Likely in setting of surgery and hospitalization  H/H stable  CTM    Acquired hypothyroidism  Assessment & Plan  TSH 18  FT4 low  Synthroid 37.5mcg--> 50mcg daily  Recheck TSH in ~6 weeks    Tobacco abuse  Assessment & Plan  Nicotine patch  Discussed smoking cessation with patient including benefits, patch, total time = 8 minutes    Hypertension  Assessment & Plan  Uncontrolled  Home regimen: Amlodipine 10 mg daily, valsartan 325 mg nightly  Inpatient regimen: start amlodipine 10mg daily + PRNs, if Cr stablizes can restart valsartan    Renal cell carcinoma (HCC)  Assessment & Plan  S/p surgical excision of mass.  Pathology confirmed Renal  Cell CA.  Urology to monitor.    Hypokalemia- (present on admission)  Assessment & Plan  K still low, s/p po supplementation  Check Mg  CTM    Hypoglycemia  Assessment & Plan  Resolved    SHEBA (acute kidney injury) (HCC)  Assessment & Plan  Improving  S/p partial nephrectomy due to Renal Cell Carcinoma.  Monitor I/O's, labs, vitals.  Avoid nephrotoxins.      Gout- (present on admission)  Assessment & Plan  Continue home allopurinol  Monitor Cr    Post-op pain  Assessment & Plan    Minimize narcotics, added gapentin  Lidocaine patch.  Heat/ice  Unable to give NSAIDS due to renal function.  Tums + PPI    Hypotension- (present on admission)  Assessment & Plan  Resolved  S/p midodrine  Check TSH w/ reflex to free T4 if not done.       VTE prophylaxis: SCDs/TEDs and heparin ppx    I have performed a physical exam and reviewed and updated ROS and Plan today (11/7/2021). In review of yesterday's note (11/6/2021), there are no changes except as documented above.

## 2021-11-08 PROBLEM — K56.7 ILEUS (HCC): Status: ACTIVE | Noted: 2021-11-08

## 2021-11-08 PROBLEM — K56.609 SMALL BOWEL OBSTRUCTION (HCC): Status: RESOLVED | Noted: 2021-10-29 | Resolved: 2021-11-08

## 2021-11-08 PROBLEM — E16.2 HYPOGLYCEMIA: Status: RESOLVED | Noted: 2021-10-31 | Resolved: 2021-11-08

## 2021-11-08 PROBLEM — I95.9 HYPOTENSION: Status: RESOLVED | Noted: 2021-10-28 | Resolved: 2021-11-08

## 2021-11-08 LAB
ANION GAP SERPL CALC-SCNC: 9 MMOL/L (ref 7–16)
BASOPHILS # BLD AUTO: 0.6 % (ref 0–1.8)
BASOPHILS # BLD: 0.07 K/UL (ref 0–0.12)
BUN SERPL-MCNC: 15 MG/DL (ref 8–22)
CALCIUM SERPL-MCNC: 8.8 MG/DL (ref 8.5–10.5)
CHLORIDE SERPL-SCNC: 102 MMOL/L (ref 96–112)
CO2 SERPL-SCNC: 21 MMOL/L (ref 20–33)
CREAT SERPL-MCNC: 1.33 MG/DL (ref 0.5–1.4)
EOSINOPHIL # BLD AUTO: 0.37 K/UL (ref 0–0.51)
EOSINOPHIL NFR BLD: 3.1 % (ref 0–6.9)
ERYTHROCYTE [DISTWIDTH] IN BLOOD BY AUTOMATED COUNT: 46.9 FL (ref 35.9–50)
GLUCOSE SERPL-MCNC: 98 MG/DL (ref 65–99)
HCT VFR BLD AUTO: 30.1 % (ref 37–47)
HGB BLD-MCNC: 9.9 G/DL (ref 12–16)
IMM GRANULOCYTES # BLD AUTO: 0.11 K/UL (ref 0–0.11)
IMM GRANULOCYTES NFR BLD AUTO: 0.9 % (ref 0–0.9)
LYMPHOCYTES # BLD AUTO: 2.76 K/UL (ref 1–4.8)
LYMPHOCYTES NFR BLD: 23.2 % (ref 22–41)
MCH RBC QN AUTO: 29.8 PG (ref 27–33)
MCHC RBC AUTO-ENTMCNC: 32.9 G/DL (ref 33.6–35)
MCV RBC AUTO: 90.7 FL (ref 81.4–97.8)
MONOCYTES # BLD AUTO: 0.89 K/UL (ref 0–0.85)
MONOCYTES NFR BLD AUTO: 7.5 % (ref 0–13.4)
NEUTROPHILS # BLD AUTO: 7.68 K/UL (ref 2–7.15)
NEUTROPHILS NFR BLD: 64.7 % (ref 44–72)
NRBC # BLD AUTO: 0 K/UL
NRBC BLD-RTO: 0 /100 WBC
PLATELET # BLD AUTO: 290 K/UL (ref 164–446)
PMV BLD AUTO: 9.9 FL (ref 9–12.9)
POTASSIUM SERPL-SCNC: 3.5 MMOL/L (ref 3.6–5.5)
RBC # BLD AUTO: 3.32 M/UL (ref 4.2–5.4)
SODIUM SERPL-SCNC: 132 MMOL/L (ref 135–145)
WBC # BLD AUTO: 11.9 K/UL (ref 4.8–10.8)

## 2021-11-08 PROCEDURE — 99232 SBSQ HOSP IP/OBS MODERATE 35: CPT | Performed by: INTERNAL MEDICINE

## 2021-11-08 PROCEDURE — 97116 GAIT TRAINING THERAPY: CPT | Mod: CQ

## 2021-11-08 PROCEDURE — 700101 HCHG RX REV CODE 250: Performed by: INTERNAL MEDICINE

## 2021-11-08 PROCEDURE — 700102 HCHG RX REV CODE 250 W/ 637 OVERRIDE(OP): Performed by: PHYSICAL MEDICINE & REHABILITATION

## 2021-11-08 PROCEDURE — A9270 NON-COVERED ITEM OR SERVICE: HCPCS | Performed by: UROLOGY

## 2021-11-08 PROCEDURE — 770001 HCHG ROOM/CARE - MED/SURG/GYN PRIV*

## 2021-11-08 PROCEDURE — 36415 COLL VENOUS BLD VENIPUNCTURE: CPT

## 2021-11-08 PROCEDURE — 97530 THERAPEUTIC ACTIVITIES: CPT | Mod: CQ

## 2021-11-08 PROCEDURE — 700111 HCHG RX REV CODE 636 W/ 250 OVERRIDE (IP): Performed by: INTERNAL MEDICINE

## 2021-11-08 PROCEDURE — 700102 HCHG RX REV CODE 250 W/ 637 OVERRIDE(OP): Performed by: INTERNAL MEDICINE

## 2021-11-08 PROCEDURE — 97530 THERAPEUTIC ACTIVITIES: CPT

## 2021-11-08 PROCEDURE — A9270 NON-COVERED ITEM OR SERVICE: HCPCS | Performed by: PHYSICAL MEDICINE & REHABILITATION

## 2021-11-08 PROCEDURE — A9270 NON-COVERED ITEM OR SERVICE: HCPCS | Performed by: INTERNAL MEDICINE

## 2021-11-08 PROCEDURE — 80048 BASIC METABOLIC PNL TOTAL CA: CPT

## 2021-11-08 PROCEDURE — 700102 HCHG RX REV CODE 250 W/ 637 OVERRIDE(OP): Performed by: UROLOGY

## 2021-11-08 PROCEDURE — 85025 COMPLETE CBC W/AUTO DIFF WBC: CPT

## 2021-11-08 PROCEDURE — 700105 HCHG RX REV CODE 258: Performed by: INTERNAL MEDICINE

## 2021-11-08 RX ORDER — OXYCODONE HYDROCHLORIDE 5 MG/1
5 TABLET ORAL
Status: DISCONTINUED | OUTPATIENT
Start: 2021-11-08 | End: 2021-11-09 | Stop reason: HOSPADM

## 2021-11-08 RX ORDER — OXYCODONE HYDROCHLORIDE 10 MG/1
10 TABLET ORAL
Status: DISCONTINUED | OUTPATIENT
Start: 2021-11-08 | End: 2021-11-09 | Stop reason: HOSPADM

## 2021-11-08 RX ORDER — POTASSIUM CHLORIDE 20 MEQ/1
40 TABLET, EXTENDED RELEASE ORAL ONCE
Status: COMPLETED | OUTPATIENT
Start: 2021-11-08 | End: 2021-11-08

## 2021-11-08 RX ORDER — ENEMA 19; 7 G/133ML; G/133ML
1 ENEMA RECTAL ONCE
Status: COMPLETED | OUTPATIENT
Start: 2021-11-08 | End: 2021-11-08

## 2021-11-08 RX ORDER — OXYCODONE HYDROCHLORIDE 5 MG/1
5 TABLET ORAL EVERY 6 HOURS PRN
Status: DISCONTINUED | OUTPATIENT
Start: 2021-11-08 | End: 2021-11-08

## 2021-11-08 RX ORDER — OXYCODONE HYDROCHLORIDE 10 MG/1
10 TABLET ORAL EVERY 6 HOURS PRN
Status: DISCONTINUED | OUTPATIENT
Start: 2021-11-08 | End: 2021-11-08

## 2021-11-08 RX ADMIN — GABAPENTIN 200 MG: 100 CAPSULE ORAL at 12:02

## 2021-11-08 RX ADMIN — ACETAMINOPHEN 1000 MG: 500 TABLET ORAL at 18:05

## 2021-11-08 RX ADMIN — OXYCODONE 5 MG: 5 TABLET ORAL at 08:46

## 2021-11-08 RX ADMIN — HEPARIN SODIUM 5000 UNITS: 5000 INJECTION, SOLUTION INTRAVENOUS; SUBCUTANEOUS at 14:39

## 2021-11-08 RX ADMIN — GABAPENTIN 200 MG: 100 CAPSULE ORAL at 18:05

## 2021-11-08 RX ADMIN — NICOTINE TRANSDERMAL SYSTEM 21 MG: 21 PATCH, EXTENDED RELEASE TRANSDERMAL at 04:46

## 2021-11-08 RX ADMIN — ACETAMINOPHEN 1000 MG: 500 TABLET ORAL at 00:39

## 2021-11-08 RX ADMIN — GABAPENTIN 200 MG: 100 CAPSULE ORAL at 04:46

## 2021-11-08 RX ADMIN — ACETAMINOPHEN 1000 MG: 500 TABLET ORAL at 04:46

## 2021-11-08 RX ADMIN — POTASSIUM CHLORIDE 40 MEQ: 1500 TABLET, EXTENDED RELEASE ORAL at 08:46

## 2021-11-08 RX ADMIN — SODIUM CHLORIDE, POTASSIUM CHLORIDE, SODIUM LACTATE AND CALCIUM CHLORIDE: 600; 310; 30; 20 INJECTION, SOLUTION INTRAVENOUS at 03:00

## 2021-11-08 RX ADMIN — HEPARIN SODIUM 5000 UNITS: 5000 INJECTION, SOLUTION INTRAVENOUS; SUBCUTANEOUS at 04:56

## 2021-11-08 RX ADMIN — OXYCODONE 5 MG: 5 TABLET ORAL at 00:39

## 2021-11-08 RX ADMIN — ACETAMINOPHEN 1000 MG: 500 TABLET ORAL at 12:03

## 2021-11-08 RX ADMIN — ALLOPURINOL 300 MG: 100 TABLET ORAL at 04:46

## 2021-11-08 RX ADMIN — AMLODIPINE BESYLATE 10 MG: 10 TABLET ORAL at 18:05

## 2021-11-08 RX ADMIN — HEPARIN SODIUM 5000 UNITS: 5000 INJECTION, SOLUTION INTRAVENOUS; SUBCUTANEOUS at 20:37

## 2021-11-08 RX ADMIN — LIDOCAINE 2 PATCH: 50 PATCH TOPICAL at 20:35

## 2021-11-08 RX ADMIN — OXYCODONE 5 MG: 5 TABLET ORAL at 14:38

## 2021-11-08 RX ADMIN — OMEPRAZOLE 20 MG: 20 CAPSULE, DELAYED RELEASE ORAL at 04:46

## 2021-11-08 RX ADMIN — LEVOTHYROXINE SODIUM 50 MCG: 0.05 TABLET ORAL at 04:46

## 2021-11-08 RX ADMIN — OXYCODONE HYDROCHLORIDE 10 MG: 10 TABLET ORAL at 20:34

## 2021-11-08 RX ADMIN — SODIUM PHOSPHATE 133 ML: 7; 19 ENEMA RECTAL at 12:03

## 2021-11-08 RX ADMIN — POLYETHYLENE GLYCOL 3350 1 PACKET: 17 POWDER, FOR SOLUTION ORAL at 04:55

## 2021-11-08 ASSESSMENT — ENCOUNTER SYMPTOMS
ABDOMINAL PAIN: 1
SORE THROAT: 0
BACK PAIN: 0
DIARRHEA: 1
SHORTNESS OF BREATH: 0
NECK PAIN: 0
VOMITING: 0
NERVOUS/ANXIOUS: 0
NAUSEA: 0
DIZZINESS: 0
BLOOD IN STOOL: 0
CONSTIPATION: 0
CHILLS: 0
HEADACHES: 0
HEARTBURN: 1
FEVER: 0
BLURRED VISION: 0

## 2021-11-08 ASSESSMENT — COGNITIVE AND FUNCTIONAL STATUS - GENERAL
TURNING FROM BACK TO SIDE WHILE IN FLAT BAD: A LOT
MOVING FROM LYING ON BACK TO SITTING ON SIDE OF FLAT BED: A LITTLE
SUGGESTED CMS G CODE MODIFIER MOBILITY: CK
STANDING UP FROM CHAIR USING ARMS: A LITTLE
CLIMB 3 TO 5 STEPS WITH RAILING: A LITTLE
WALKING IN HOSPITAL ROOM: A LITTLE
MOBILITY SCORE: 17
MOVING TO AND FROM BED TO CHAIR: A LITTLE

## 2021-11-08 ASSESSMENT — GAIT ASSESSMENTS
DISTANCE (FEET): 75
ASSISTIVE DEVICE: FRONT WHEEL WALKER
GAIT LEVEL OF ASSIST: MINIMAL ASSIST

## 2021-11-08 ASSESSMENT — PAIN DESCRIPTION - PAIN TYPE
TYPE: ACUTE PAIN

## 2021-11-08 NOTE — PROGRESS NOTES
[facility] Medicine Daily Progress Note    Date of Service  11/8/2021    Chief Complaint  Hypotension s/p left partial nephrectomy for mass.    Hospital Course  Thais Munroe is a 66 y.o. female with PMH of hypertension, hep C, smoker, history of blood clots, hypothyroidism, and renal mass.  Who presented 10/27/2021 for resection of left adrenal mass with Dr. Mccracken.  Patient underwent robotic assisted laparoscopic partial left nephrectomy on 10/27/2021.  Surgery went without complication with an EBL of 100 mL, and a JARRETT drain was placed.  Was reported to have 180 cc / 24 hours.  Patient initially with complaints of poor pain control, but now improved.  Her postop course was complicated by pain control issues requiring significant narcotics.  She developed worsening abdominal pain and nausea/vomiting.  She was ultimately transferred to the ICU for hypotension refractory to fluid boluses.  Her antibiotics were changed to Zosyn.  General surgery was consulted CT scan ordered which showed significant distention of the ascending and transverse colon with wall thickening, inflammation, edema and possible pneumatosis.  NG tube was placed with minimal output at that time.  There was concern that she had either ileus versus colonic pseudoobstruction.  GI was consulted and she underwent colonoscopy with decompression however no mechanical obstruction was identified.  Repeat imaging showed SBO with incarcerated incisional hernia.  Patient went to the OR for reduction and repair on 11/2. Patient had improvement in her symptoms, passing gas and stool.  Had more distention 11/7 so KUB obtained, still with evidence of ileus, enema given 11/8.  Will having follow up with Dr. Banegas in 10 days and Dr. Mccracken next week to discuss cancer treatments/therapy.     Interval Problem Update  - POD#6 s/p reduction and repair of incarcerated incisional hernia  - had more distention yesterday, KUB still showing cecum distention and ileus,  called Dr. Olvera (on surgery), recommended enema, enema given today  - trying to wean narcotics but patient still having a lot of pain, q3-->q4-->q5 (wasn't able to make it more than 5 hours during the day)  - Cr stable 1.29-->1.33, K 3.5, bicarb improved 19-->21  - On RA    I have personally seen and examined the patient at bedside. I discussed the plan of care with patient, bedside RN, charge RN,  and pharmacy.    Consultants/Specialty  critical care, general surgery, GI and urology    Code Status  Full Code    Disposition  Patient is medically cleared.   Anticipate discharge to to an inpatient rehabilitation hospital, [place] 11/9  I have placed the appropriate orders for post-discharge needs.    Review of Systems  Review of Systems   Constitutional: Negative for chills and fever.   HENT: Negative for nosebleeds and sore throat.    Eyes: Negative for blurred vision.   Respiratory: Negative for shortness of breath.    Cardiovascular: Positive for leg swelling. Negative for chest pain.   Gastrointestinal: Positive for abdominal pain, diarrhea and heartburn. Negative for blood in stool, constipation, nausea and vomiting.   Genitourinary: Negative for dysuria.   Musculoskeletal: Negative for back pain and neck pain.   Skin: Negative for rash.   Neurological: Negative for dizziness and headaches.   Psychiatric/Behavioral: The patient is not nervous/anxious.         Physical Exam  Temp:  [36.1 °C (97 °F)-36.9 °C (98.4 °F)] 36.2 °C (97.1 °F)  Pulse:  [] 95  Resp:  [16-19] 16  BP: (131-165)/() 143/89  SpO2:  [94 %-97 %] 94 %    Physical Exam  Vitals reviewed.   Constitutional:       General: She is not in acute distress.     Appearance: Normal appearance. She is obese. She is not toxic-appearing or diaphoretic.   HENT:      Head: Normocephalic and atraumatic.      Nose: Nose normal.      Mouth/Throat:      Mouth: Mucous membranes are moist.      Pharynx: No oropharyngeal exudate.   Eyes:      General:  No scleral icterus.        Right eye: No discharge.         Left eye: No discharge.      Conjunctiva/sclera: Conjunctivae normal.   Cardiovascular:      Rate and Rhythm: Normal rate and regular rhythm.      Heart sounds: No murmur heard.  No friction rub. No gallop.    Pulmonary:      Effort: Pulmonary effort is normal. No respiratory distress.      Breath sounds: Rales present. No wheezing.   Abdominal:      General: Bowel sounds are normal. There is distension.      Palpations: Abdomen is soft.      Tenderness: There is abdominal tenderness. There is no guarding or rebound.      Comments: LLQ surgical site with dressing c/d/i   Musculoskeletal:         General: No swelling or tenderness.      Cervical back: Normal range of motion. No muscular tenderness.      Right lower leg: Edema present.      Left lower leg: Edema present.   Skin:     Coloration: Skin is not jaundiced or pale.   Neurological:      General: No focal deficit present.      Mental Status: She is alert and oriented to person, place, and time. Mental status is at baseline.      Cranial Nerves: No cranial nerve deficit.   Psychiatric:         Mood and Affect: Mood normal.         Behavior: Behavior normal.         Fluids    Intake/Output Summary (Last 24 hours) at 11/8/2021 0923  Last data filed at 11/8/2021 0600  Gross per 24 hour   Intake 996 ml   Output --   Net 996 ml       Laboratory  Recent Labs     11/08/21  0030   WBC 11.9*   RBC 3.32*   HEMOGLOBIN 9.9*   HEMATOCRIT 30.1*   MCV 90.7   MCH 29.8   MCHC 32.9*   RDW 46.9   PLATELETCT 290   MPV 9.9     Recent Labs     11/06/21  0306 11/07/21  0132 11/08/21  0030   SODIUM 135 133* 132*   POTASSIUM 3.2* 3.7 3.5*   CHLORIDE 102 102 102   CO2 22 19* 21   GLUCOSE 109* 125* 98   BUN 23* 19 15   CREATININE 1.56* 1.29 1.33   CALCIUM 8.7 9.1 8.8                   Imaging  [id number]-9 VIEWS   Final Result      NG tube has been removed      Dominant gas-filled cecum is similar in dilatation and medial  displacement without evidence of perforation. The cecal configuration indicates bascule.      Some new gaseous distention of small and distal large bowel suggesting ileus given the distribution      CT-ABDOMEN-PELVIS W/O   Final Result      1.  Partial small bowel obstruction involving the midportion of the small bowel secondary to new left-sided abdominal hernia.      2.  No evidence of abdominal or pelvic abscess or urinoma.      3.  Persistent dilatation of the cecum measuring 10 cm in diameter.      4.  Distended gallbladder with minimal gallbladder wall thickening. No calcified gallstone or biliary dilatation identified.         Findings were discussed with ANH STERLING on 11/2/2021 12:53 PM.      [id number]-5 VIEW   Final Result      1.  Cecal dilatation continues to be seen which can be seen in the setting of a cecal bascule.   2.  Enteric tube projects over the distal esophagus. Recommend advancement.      FJ-BLTDJIF-2 VIEWS   Final Result      1.  Overall stable predominantly cecal colonic dilatation with the cecum located in the midline and just to the left of midline possibly related to a cecal bascule with volvulus considered a more remote possibility due to the fact there has been no    interval progression.   2.  There is no pneumatosis or free intraperitoneal air.      BV-QEQAVRD-1 VIEWS   Final Result      1.  There is a similar appearance to the mildly distended air-filled colon with relatively decompressed sigmoid and rectum.   2.  There is no free intraperitoneal air.      GF-ZPGUWKC-6 VIEWS   Final Result         1.  Air-filled distention of the cecum measuring 12.3 cm, appearance concerning for [place]'s.  Similar to prior study.      These findings were discussed with the patient's clinician, Angeles Wolf, on 10/30/2021 4:19 AM.      [id number]-7 VIEWS   Final Result      1.  Mild colonic dilation      2.  Enteric catheter appears appropriately located      3.  Mild right basilar  atelectasis and/or pleural effusion      DX-ABDOMEN FOR TUBE PLACEMENT   Final Result      Enteric tube has been placed and the tip projects over the stomach.      CT-ABDOMEN-PELVIS W/O   Final Result         1.  Air-filled distention of the cecum, follow-up recommended to exclude progression to Jailene's   2.  Fluid and air-filled distended loops of small bowel in the left abdomen, consider component of ileus.   3.  Intra-abdominal foci of free air, a nonspecific finding for recent postop changes with surgical drain in place   4.  Scattered mild abdominal ascites and hazy mesenteric inflammatory changes.   5.  Small right and trace left pleural effusion.   6.  Linear densities in the bilateral lung bases favors changes of atelectasis.   7.  Left nephrolithiasis without visualized obstructive changes.   8.  Hepatomegaly and changes of cirrhosis   9.  Diverticulosis   10.  Atherosclerosis      [id number]-9 VIEW   Final Result         1.  Air-filled distended bowel loops, appearance compatible with evolving obstruction versus ileus. Recommend radiographic follow-up to resolution.      DX-CHEST-PORTABLE (1 VIEW)   Final Result         1.  No acute cardiopulmonary disease.   2.  Cardiomegaly           Assessment/Plan  * Small bowel obstruction (HCC)  Assessment & Plan  NG removed 11/3  NPO-->sips of clears 11/3-->GI soft 11/5 11/2 CT Abd:    1.  Partial small bowel obstruction involving the midportion of the small bowel secondary to new left-sided abdominal hernia.    2.  No evidence of abdominal or pelvic abscess or urinoma.    3.  Persistent dilatation of the cecum measuring 10 cm in diameter.    4.  Distended gallbladder with minimal gallbladder wall thickening. No calcified gallstone or biliary dilatation identified.  Surgery following  S/p reduction and repair of incarcerated incisional hernia on 11/2  Mobility as able.    Correct electrolyte deficits  Minimize narcotics as able.  Zosyn 10/29-11/5    More  distended today 11/7, KUB showed increased dilation of cecum and evidence of ileus, called surgery ([place]) who recommended enema    Normocytic anemia  Assessment & Plan  Likely in setting of surgery and hospitalization  H/H stable  CTM    Acquired hypothyroidism  Assessment & Plan  TSH 18  FT4 low  Synthroid 37.5mcg--> 50mcg daily  Recheck TSH in ~6 weeks    Tobacco abuse  Assessment & Plan  Nicotine patch  Discussed smoking cessation with patient including benefits, patch, total time = 8 minutes    Hypertension  Assessment & Plan  Uncontrolled  Home regimen: Amlodipine 10 mg daily, valsartan 325 mg nightly  Inpatient regimen: start amlodipine 10mg daily + PRNs, if Cr stablizes can restart valsartan    Renal cell carcinoma (HCC)  Assessment & Plan  S/p surgical excision of mass.  Pathology confirmed Renal Cell CA.  Urology to monitor.    Hypokalemia- (present on admission)  Assessment & Plan  K still low, s/p po supplementation  Check Mg  CTM    SHEBA (acute kidney injury) (HCC)  Assessment & Plan  Improving  S/p partial nephrectomy due to Renal Cell Carcinoma.  Monitor I/O's, labs, vitals.  Avoid nephrotoxins.      Gout- (present on admission)  Assessment & Plan  Continue home allopurinol  Monitor Cr    Post-op pain  Assessment & Plan  Minimize narcotics, added gapentin  Lidocaine patch.  Heat/ice  Unable to give NSAIDS due to renal function.  Tums + PPI    Hyponatremia- (present on admission)  Assessment & Plan  Na downtrending again, mild  CTM at rehab       VTE prophylaxis: SCDs/TEDs and heparin ppx    I have performed a physical exam and reviewed and updated ROS and Plan today (11/8/2021). In review of yesterday's note (11/7/2021), there are no changes except as documented above.

## 2021-11-08 NOTE — THERAPY
Physical Therapy   Daily Treatment     Patient Name: Thais Munroe  Age:  66 y.o., Sex:  female  Medical Record #: [id number]  Today's Date: 11/8/2021     Precautions  Precautions: (P) Fall Risk  Comments: (P) abdominal incision    Assessment    Pt feeling better, commenting on how much easier it is to move 2* improvement w/pain. Pt still needing HOB elevated and use of railing to get seated EOB w/no assist, functional transfers and her amb efforts still w/min assist. Improvement w/distance tolerated w/her amb efforts from last PT session.     Plan    Continue current treatment plan.    DC Equipment Recommendations: Unable to determine at this time  Discharge Recommendations: Recommend post-acute placement for additional physical therapy services prior to discharge home       Objective       11/08/21 1009   Other Treatments   Other Treatments Provided Assisted pt w/donning of hospital panties/brief 2* urinary incont to keep from using the Pur-wick. Pt did state she was incont PTA and wore pads.    Balance   Sitting Balance (Static) Fair +   Sitting Balance (Dynamic) Fair   Standing Balance (Static) Fair   Standing Balance (Dynamic) Fair -   Weight Shift Sitting Fair   Weight Shift Standing Fair   Comments w/FWW   Gait Analysis   Gait Level Of Assist Minimal Assist   Assistive Device Front Wheel Walker   Distance (Feet) 75   # of Times Distance was Traveled 1   Skilled Intervention Verbal Cuing;Postural Facilitation;Compensatory Strategies   Comments Improvement w/pts amb efforts from last PT session, conts to require 2L O2. Pt instructed to push the walker vs picking it up and then taking steps. Improvement w/pts activity tolerance.    Bed Mobility    Supine to Sit Supervised  (HOB partially elevated and use of railing)   Sit to Supine   (pt left seated in chair)   Scooting Supervised  (seated)   Rolling Supervised  (w/railing and HOB elevated)   Skilled Intervention Verbal Cuing   Comments Improvement w/pts bed  mobility tasks 2* improvement w/her pain.    Functional Mobility   Sit to Stand Supervised  (from EOB/toilet height)   Bed, Chair, Wheelchair Transfer Minimal Assist  (w/FWW)   Toilet Transfers Minimal Assist  (BSC over the toilet)   Skilled Intervention Verbal Cuing   Comments Pt enouraged to start amb to the BR.    How much difficulty does the patient currently have...   Turning over in bed (including adjusting bedclothes, sheets and blankets)? 2   Sitting down on and standing up from a chair with arms (e.g., wheelchair, bedside commode, etc.) 3   Moving from lying on back to sitting on the side of the bed? 3   How much help from another person does the patient currently need...   Moving to and from a bed to a chair (including a wheelchair)? 3   Need to walk in a hospital room? 3   Climbing 3-5 steps with a railing? 3   6 clicks Mobility Score 17   Short Term Goals    Short Term Goal # 1 Pt will perform supine<>sit with HOB flat and SPV within 6 visits to improve ind with bed mob.   Goal Outcome # 1 goal not met   Short Term Goal # 2 Pt will perform all fxnl transfers with LRAD and SPV within 6 visits to increase OOB activity.   Goal Outcome # 2 Goal not met   Short Term Goal # 3 Pt to ambulate 150 ft w/ fww and spv in 6 visits to improve fxl indep   Goal Outcome # 3 Goal not met

## 2021-11-08 NOTE — CARE PLAN
The patient is Stable - Low risk of patient condition declining or worsening    Shift Goals  Clinical Goals: Pain control and management  Patient Goals: Sleep and have BM  Family Goals: N/A    Progress made toward(s) clinical / shift goals:  Progressing    Problem: Pain - Standard  Goal: Alleviation of pain or a reduction in pain to the patient’s comfort goal  Outcome: Progressing   Pt is progressing towards goal of managing pain. Pt aware of interventions for pain control. Pt educated on verbalizing pain as needed.   Problem: Knowledge Deficit - Standard  Goal: Patient and family/care givers will demonstrate understanding of plan of care, disease process/condition, diagnostic tests and medications  Outcome: Progressing   Pt is progressing towards goal of demonstrating care plan understanding. Pt educated on importance of diagnostic tests and medications. Pt able to verbalize understanding and need.

## 2021-11-08 NOTE — DISCHARGE PLANNING
Follow up for post acute services Admin approval for transfer today then on hold for today as 1 discharge did not leave. Will follow up for am admit. CM aware.

## 2021-11-08 NOTE — PROGRESS NOTES
Assume care of pt at 0700. Report received from NOC RN. Pt is A/O x4. Pain is 6/10 to L abdomen. Pt is resting in bed. Bed in lowest and locked position, call light within reach, hourly rounding in place. Labs reviewed. Communication board updated. Will continue to implement care. Crisis charting in place due to COVID-19 surge.

## 2021-11-08 NOTE — CARE PLAN
The patient is Stable - Low risk of patient condition declining or worsening    Shift Goals  Clinical Goals: pain control  Patient Goals: pain control      Progress made toward(s) clinical / shift goals:  Medicated per MAR for abdominal pain, pt reported 5/10 pain, after invention reported 4/10 pain.     Patient is not progressing towards the following goals:

## 2021-11-08 NOTE — DISCHARGE PLANNING
Potential transfer to [facility] today pending follow up for Oncology plan call out to Dr. Mccracken [phone] pending a return call.

## 2021-11-08 NOTE — PROGRESS NOTES
Covid surge in effect, crisis charting in use     Rec'd report from day shift RN. Assumed pt care. Assessment completed. AA&OX4. Reports pain to left hip from sciatica, medicated per MAR. No s/s of discomfort or distress. Have not ambulated pt at this time, per report ambulates with 1 assist and use of FWW. Changed dressing to LLQ, staples in place, open incision without staples, covered with new gauze and hypafix. Bed in lowest position, bed locked, treaded socks in place, RN and CNA numbers provided, call light within reach.

## 2021-11-09 ENCOUNTER — PATIENT OUTREACH (OUTPATIENT)
Dept: HEALTH INFORMATION MANAGEMENT | Facility: OTHER | Age: 66
End: 2021-11-09

## 2021-11-09 ENCOUNTER — HOSPITAL ENCOUNTER (INPATIENT)
Facility: REHABILITATION | Age: 66
LOS: 7 days | DRG: 949 | End: 2021-11-16
Attending: PHYSICAL MEDICINE & REHABILITATION | Admitting: PHYSICAL MEDICINE & REHABILITATION
Payer: COMMERCIAL

## 2021-11-09 ENCOUNTER — APPOINTMENT (OUTPATIENT)
Dept: RADIOLOGY | Facility: REHABILITATION | Age: 66
DRG: 949 | End: 2021-11-09
Attending: PHYSICAL MEDICINE & REHABILITATION
Payer: COMMERCIAL

## 2021-11-09 VITALS
BODY MASS INDEX: 32.1 KG/M2 | TEMPERATURE: 97.8 F | OXYGEN SATURATION: 94 % | HEIGHT: 65 IN | DIASTOLIC BLOOD PRESSURE: 82 MMHG | SYSTOLIC BLOOD PRESSURE: 130 MMHG | RESPIRATION RATE: 18 BRPM | WEIGHT: 192.68 LBS | HEART RATE: 90 BPM

## 2021-11-09 DIAGNOSIS — G89.18 POST-OP PAIN: ICD-10-CM

## 2021-11-09 DIAGNOSIS — E03.9 ACQUIRED HYPOTHYROIDISM: ICD-10-CM

## 2021-11-09 DIAGNOSIS — K56.609 SMALL BOWEL OBSTRUCTION (HCC): ICD-10-CM

## 2021-11-09 PROBLEM — T81.30XA WOUND DEHISCENCE: Status: ACTIVE | Noted: 2021-11-09

## 2021-11-09 LAB
ANION GAP SERPL CALC-SCNC: 10 MMOL/L (ref 7–16)
BASOPHILS # BLD AUTO: 0.7 % (ref 0–1.8)
BASOPHILS # BLD: 0.07 K/UL (ref 0–0.12)
BUN SERPL-MCNC: 12 MG/DL (ref 8–22)
CALCIUM SERPL-MCNC: 8.4 MG/DL (ref 8.5–10.5)
CHLORIDE SERPL-SCNC: 105 MMOL/L (ref 96–112)
CO2 SERPL-SCNC: 21 MMOL/L (ref 20–33)
CREAT SERPL-MCNC: 1.12 MG/DL (ref 0.5–1.4)
EOSINOPHIL # BLD AUTO: 0.29 K/UL (ref 0–0.51)
EOSINOPHIL NFR BLD: 2.8 % (ref 0–6.9)
ERYTHROCYTE [DISTWIDTH] IN BLOOD BY AUTOMATED COUNT: 48.2 FL (ref 35.9–50)
GLUCOSE SERPL-MCNC: 95 MG/DL (ref 65–99)
HCT VFR BLD AUTO: 28.2 % (ref 37–47)
HGB BLD-MCNC: 9.3 G/DL (ref 12–16)
IMM GRANULOCYTES # BLD AUTO: 0.1 K/UL (ref 0–0.11)
IMM GRANULOCYTES NFR BLD AUTO: 1 % (ref 0–0.9)
LYMPHOCYTES # BLD AUTO: 2.11 K/UL (ref 1–4.8)
LYMPHOCYTES NFR BLD: 20.7 % (ref 22–41)
MCH RBC QN AUTO: 30.1 PG (ref 27–33)
MCHC RBC AUTO-ENTMCNC: 33 G/DL (ref 33.6–35)
MCV RBC AUTO: 91.3 FL (ref 81.4–97.8)
MONOCYTES # BLD AUTO: 0.97 K/UL (ref 0–0.85)
MONOCYTES NFR BLD AUTO: 9.5 % (ref 0–13.4)
NEUTROPHILS # BLD AUTO: 6.67 K/UL (ref 2–7.15)
NEUTROPHILS NFR BLD: 65.3 % (ref 44–72)
NRBC # BLD AUTO: 0 K/UL
NRBC BLD-RTO: 0 /100 WBC
PLATELET # BLD AUTO: 307 K/UL (ref 164–446)
PMV BLD AUTO: 10.1 FL (ref 9–12.9)
POTASSIUM SERPL-SCNC: 3.7 MMOL/L (ref 3.6–5.5)
RBC # BLD AUTO: 3.09 M/UL (ref 4.2–5.4)
SODIUM SERPL-SCNC: 136 MMOL/L (ref 135–145)
WBC # BLD AUTO: 10.2 K/UL (ref 4.8–10.8)

## 2021-11-09 PROCEDURE — A9270 NON-COVERED ITEM OR SERVICE: HCPCS | Performed by: UROLOGY

## 2021-11-09 PROCEDURE — 85025 COMPLETE CBC W/AUTO DIFF WBC: CPT

## 2021-11-09 PROCEDURE — 700102 HCHG RX REV CODE 250 W/ 637 OVERRIDE(OP): Performed by: PHYSICAL MEDICINE & REHABILITATION

## 2021-11-09 PROCEDURE — 700102 HCHG RX REV CODE 250 W/ 637 OVERRIDE(OP): Performed by: UROLOGY

## 2021-11-09 PROCEDURE — 770010 HCHG ROOM/CARE - REHAB SEMI PRIVAT*

## 2021-11-09 PROCEDURE — 700102 HCHG RX REV CODE 250 W/ 637 OVERRIDE(OP): Performed by: INTERNAL MEDICINE

## 2021-11-09 PROCEDURE — 700111 HCHG RX REV CODE 636 W/ 250 OVERRIDE (IP): Performed by: STUDENT IN AN ORGANIZED HEALTH CARE EDUCATION/TRAINING PROGRAM

## 2021-11-09 PROCEDURE — 36415 COLL VENOUS BLD VENIPUNCTURE: CPT

## 2021-11-09 PROCEDURE — 80048 BASIC METABOLIC PNL TOTAL CA: CPT

## 2021-11-09 PROCEDURE — 700111 HCHG RX REV CODE 636 W/ 250 OVERRIDE (IP): Performed by: PHYSICAL MEDICINE & REHABILITATION

## 2021-11-09 PROCEDURE — 99223 1ST HOSP IP/OBS HIGH 75: CPT | Performed by: PHYSICAL MEDICINE & REHABILITATION

## 2021-11-09 PROCEDURE — U0003 INFECTIOUS AGENT DETECTION BY NUCLEIC ACID (DNA OR RNA); SEVERE ACUTE RESPIRATORY SYNDROME CORONAVIRUS 2 (SARS-COV-2) (CORONAVIRUS DISEASE [COVID-19]), AMPLIFIED PROBE TECHNIQUE, MAKING USE OF HIGH THROUGHPUT TECHNOLOGIES AS DESCRIBED BY CMS-2020-01-R: HCPCS

## 2021-11-09 PROCEDURE — 99239 HOSP IP/OBS DSCHRG MGMT >30: CPT | Performed by: HOSPITALIST

## 2021-11-09 PROCEDURE — A9270 NON-COVERED ITEM OR SERVICE: HCPCS | Performed by: PHYSICAL MEDICINE & REHABILITATION

## 2021-11-09 PROCEDURE — U0005 INFEC AGEN DETEC AMPLI PROBE: HCPCS

## 2021-11-09 PROCEDURE — 700101 HCHG RX REV CODE 250: Performed by: PHYSICAL MEDICINE & REHABILITATION

## 2021-11-09 PROCEDURE — A9270 NON-COVERED ITEM OR SERVICE: HCPCS | Performed by: INTERNAL MEDICINE

## 2021-11-09 PROCEDURE — 94760 N-INVAS EAR/PLS OXIMETRY 1: CPT

## 2021-11-09 PROCEDURE — 700111 HCHG RX REV CODE 636 W/ 250 OVERRIDE (IP): Performed by: INTERNAL MEDICINE

## 2021-11-09 RX ORDER — SIMETHICONE 80 MG
80 TABLET,CHEWABLE ORAL
Status: DISCONTINUED | OUTPATIENT
Start: 2021-11-09 | End: 2021-11-10

## 2021-11-09 RX ORDER — CALCIUM CARBONATE 500 MG/1
1000 TABLET, CHEWABLE ORAL 3 TIMES DAILY PRN
Status: DISCONTINUED | OUTPATIENT
Start: 2021-11-09 | End: 2021-11-16 | Stop reason: HOSPADM

## 2021-11-09 RX ORDER — BISACODYL 10 MG
10 SUPPOSITORY, RECTAL RECTAL
Status: CANCELLED | OUTPATIENT
Start: 2021-11-09

## 2021-11-09 RX ORDER — ACETAMINOPHEN 500 MG
1000 TABLET ORAL EVERY 6 HOURS PRN
Status: DISCONTINUED | OUTPATIENT
Start: 2021-11-09 | End: 2021-11-09

## 2021-11-09 RX ORDER — LANOLIN ALCOHOL/MO/W.PET/CERES
3 CREAM (GRAM) TOPICAL NIGHTLY PRN
Status: DISCONTINUED | OUTPATIENT
Start: 2021-11-09 | End: 2021-11-16 | Stop reason: HOSPADM

## 2021-11-09 RX ORDER — AMOXICILLIN 250 MG
2 CAPSULE ORAL 2 TIMES DAILY
Status: DISCONTINUED | OUTPATIENT
Start: 2021-11-09 | End: 2021-11-12

## 2021-11-09 RX ORDER — HEPARIN SODIUM 5000 [USP'U]/ML
5000 INJECTION, SOLUTION INTRAVENOUS; SUBCUTANEOUS EVERY 8 HOURS
Status: DISCONTINUED | OUTPATIENT
Start: 2021-11-09 | End: 2021-11-16 | Stop reason: HOSPADM

## 2021-11-09 RX ORDER — POLYVINYL ALCOHOL 14 MG/ML
1 SOLUTION/ DROPS OPHTHALMIC PRN
Status: DISCONTINUED | OUTPATIENT
Start: 2021-11-09 | End: 2021-11-16 | Stop reason: HOSPADM

## 2021-11-09 RX ORDER — POLYETHYLENE GLYCOL 3350 17 G/17G
1 POWDER, FOR SOLUTION ORAL DAILY
Status: DISCONTINUED | OUTPATIENT
Start: 2021-11-10 | End: 2021-11-12

## 2021-11-09 RX ORDER — AMLODIPINE BESYLATE 5 MG/1
10 TABLET ORAL EVERY EVENING
Status: DISCONTINUED | OUTPATIENT
Start: 2021-11-09 | End: 2021-11-16 | Stop reason: HOSPADM

## 2021-11-09 RX ORDER — ACETAMINOPHEN 325 MG/1
650 TABLET ORAL EVERY 4 HOURS PRN
Status: DISCONTINUED | OUTPATIENT
Start: 2021-11-09 | End: 2021-11-10

## 2021-11-09 RX ORDER — ONDANSETRON 4 MG/1
4 TABLET, ORALLY DISINTEGRATING ORAL 4 TIMES DAILY PRN
Status: DISCONTINUED | OUTPATIENT
Start: 2021-11-09 | End: 2021-11-16 | Stop reason: HOSPADM

## 2021-11-09 RX ORDER — HEPARIN SODIUM 5000 [USP'U]/ML
5000 INJECTION, SOLUTION INTRAVENOUS; SUBCUTANEOUS EVERY 8 HOURS
Status: CANCELLED | OUTPATIENT
Start: 2021-11-09

## 2021-11-09 RX ORDER — LIDOCAINE 50 MG/G
2 PATCH TOPICAL EVERY 24 HOURS
Status: CANCELLED | OUTPATIENT
Start: 2021-11-09

## 2021-11-09 RX ORDER — ACETAMINOPHEN 500 MG
1000 TABLET ORAL EVERY 6 HOURS PRN
Status: CANCELLED | OUTPATIENT
Start: 2021-11-09

## 2021-11-09 RX ORDER — BISACODYL 10 MG
10 SUPPOSITORY, RECTAL RECTAL DAILY
Status: DISCONTINUED | OUTPATIENT
Start: 2021-11-09 | End: 2021-11-12

## 2021-11-09 RX ORDER — TRAZODONE HYDROCHLORIDE 50 MG/1
50 TABLET ORAL
Status: DISCONTINUED | OUTPATIENT
Start: 2021-11-09 | End: 2021-11-16 | Stop reason: HOSPADM

## 2021-11-09 RX ORDER — CALCIUM CARBONATE 500 MG/1
1000 TABLET, CHEWABLE ORAL 3 TIMES DAILY PRN
Status: CANCELLED | OUTPATIENT
Start: 2021-11-09

## 2021-11-09 RX ORDER — ONDANSETRON 2 MG/ML
4 INJECTION INTRAMUSCULAR; INTRAVENOUS 4 TIMES DAILY PRN
Status: DISCONTINUED | OUTPATIENT
Start: 2021-11-09 | End: 2021-11-16 | Stop reason: HOSPADM

## 2021-11-09 RX ORDER — SIMETHICONE 80 MG
80 TABLET,CHEWABLE ORAL
Status: CANCELLED | OUTPATIENT
Start: 2021-11-09

## 2021-11-09 RX ORDER — AMOXICILLIN 250 MG
2 CAPSULE ORAL 2 TIMES DAILY
Status: CANCELLED | OUTPATIENT
Start: 2021-11-09

## 2021-11-09 RX ORDER — POLYETHYLENE GLYCOL 3350 17 G/17G
1 POWDER, FOR SOLUTION ORAL DAILY
Status: DISCONTINUED | OUTPATIENT
Start: 2021-11-10 | End: 2021-11-09

## 2021-11-09 RX ORDER — LEVOTHYROXINE SODIUM 0.05 MG/1
50 TABLET ORAL
Status: CANCELLED | OUTPATIENT
Start: 2021-11-09

## 2021-11-09 RX ORDER — BISACODYL 10 MG
10 SUPPOSITORY, RECTAL RECTAL
Status: DISCONTINUED | OUTPATIENT
Start: 2021-11-09 | End: 2021-11-09

## 2021-11-09 RX ORDER — LEVOTHYROXINE SODIUM 0.05 MG/1
50 TABLET ORAL
Status: DISCONTINUED | OUTPATIENT
Start: 2021-11-09 | End: 2021-11-16 | Stop reason: HOSPADM

## 2021-11-09 RX ORDER — AMLODIPINE BESYLATE 10 MG/1
10 TABLET ORAL EVERY EVENING
Status: CANCELLED | OUTPATIENT
Start: 2021-11-09

## 2021-11-09 RX ORDER — GABAPENTIN 100 MG/1
200 CAPSULE ORAL 3 TIMES DAILY
Status: CANCELLED | OUTPATIENT
Start: 2021-11-09

## 2021-11-09 RX ORDER — NICOTINE 21 MG/24HR
1 PATCH, TRANSDERMAL 24 HOURS TRANSDERMAL EVERY 24 HOURS
Status: CANCELLED | OUTPATIENT
Start: 2021-11-09

## 2021-11-09 RX ORDER — OXYCODONE HYDROCHLORIDE 10 MG/1
10 TABLET ORAL
Status: DISCONTINUED | OUTPATIENT
Start: 2021-11-09 | End: 2021-11-16 | Stop reason: HOSPADM

## 2021-11-09 RX ORDER — NICOTINE 21 MG/24HR
1 PATCH, TRANSDERMAL 24 HOURS TRANSDERMAL EVERY 24 HOURS
Status: DISCONTINUED | OUTPATIENT
Start: 2021-11-09 | End: 2021-11-16 | Stop reason: HOSPADM

## 2021-11-09 RX ORDER — ALLOPURINOL 300 MG/1
300 TABLET ORAL DAILY
Status: CANCELLED | OUTPATIENT
Start: 2021-11-09

## 2021-11-09 RX ORDER — AMOXICILLIN 250 MG
2 CAPSULE ORAL 2 TIMES DAILY
Status: DISCONTINUED | OUTPATIENT
Start: 2021-11-09 | End: 2021-11-09

## 2021-11-09 RX ORDER — OXYCODONE HYDROCHLORIDE 5 MG/1
5 TABLET ORAL
Status: CANCELLED | OUTPATIENT
Start: 2021-11-09

## 2021-11-09 RX ORDER — LACTULOSE 20 G/30ML
30 SOLUTION ORAL
Status: DISCONTINUED | OUTPATIENT
Start: 2021-11-09 | End: 2021-11-16 | Stop reason: HOSPADM

## 2021-11-09 RX ORDER — ALLOPURINOL 300 MG/1
300 TABLET ORAL DAILY
Status: DISCONTINUED | OUTPATIENT
Start: 2021-11-09 | End: 2021-11-16 | Stop reason: HOSPADM

## 2021-11-09 RX ORDER — LIDOCAINE 50 MG/G
2 PATCH TOPICAL EVERY 24 HOURS
Status: DISCONTINUED | OUTPATIENT
Start: 2021-11-09 | End: 2021-11-16 | Stop reason: HOSPADM

## 2021-11-09 RX ORDER — OXYCODONE HYDROCHLORIDE 10 MG/1
10 TABLET ORAL
Status: CANCELLED | OUTPATIENT
Start: 2021-11-09

## 2021-11-09 RX ORDER — POLYETHYLENE GLYCOL 3350 17 G/17G
1 POWDER, FOR SOLUTION ORAL DAILY
Status: CANCELLED | OUTPATIENT
Start: 2021-11-10

## 2021-11-09 RX ORDER — OMEPRAZOLE 20 MG/1
20 CAPSULE, DELAYED RELEASE ORAL DAILY
Status: DISCONTINUED | OUTPATIENT
Start: 2021-11-09 | End: 2021-11-16 | Stop reason: HOSPADM

## 2021-11-09 RX ORDER — HYDROXYZINE HYDROCHLORIDE 25 MG/1
50 TABLET, FILM COATED ORAL EVERY 6 HOURS PRN
Status: DISCONTINUED | OUTPATIENT
Start: 2021-11-09 | End: 2021-11-16 | Stop reason: HOSPADM

## 2021-11-09 RX ORDER — OXYCODONE HYDROCHLORIDE 5 MG/1
5 TABLET ORAL
Status: DISCONTINUED | OUTPATIENT
Start: 2021-11-09 | End: 2021-11-16 | Stop reason: HOSPADM

## 2021-11-09 RX ORDER — OMEPRAZOLE 20 MG/1
20 CAPSULE, DELAYED RELEASE ORAL DAILY
Status: CANCELLED | OUTPATIENT
Start: 2021-11-09

## 2021-11-09 RX ORDER — ALUMINA, MAGNESIA, AND SIMETHICONE 2400; 2400; 240 MG/30ML; MG/30ML; MG/30ML
20 SUSPENSION ORAL
Status: DISCONTINUED | OUTPATIENT
Start: 2021-11-09 | End: 2021-11-16 | Stop reason: HOSPADM

## 2021-11-09 RX ORDER — HYDRALAZINE HYDROCHLORIDE 10 MG/1
10 TABLET, FILM COATED ORAL EVERY 8 HOURS PRN
Status: DISCONTINUED | OUTPATIENT
Start: 2021-11-09 | End: 2021-11-16 | Stop reason: HOSPADM

## 2021-11-09 RX ORDER — ECHINACEA PURPUREA EXTRACT 125 MG
2 TABLET ORAL PRN
Status: DISCONTINUED | OUTPATIENT
Start: 2021-11-09 | End: 2021-11-16 | Stop reason: HOSPADM

## 2021-11-09 RX ORDER — GABAPENTIN 100 MG/1
200 CAPSULE ORAL 3 TIMES DAILY
Status: DISCONTINUED | OUTPATIENT
Start: 2021-11-09 | End: 2021-11-16 | Stop reason: HOSPADM

## 2021-11-09 RX ADMIN — HEPARIN SODIUM 5000 UNITS: 5000 INJECTION, SOLUTION INTRAVENOUS; SUBCUTANEOUS at 20:13

## 2021-11-09 RX ADMIN — SENNOSIDES AND DOCUSATE SODIUM 2 TABLET: 8.6; 5 TABLET ORAL at 13:41

## 2021-11-09 RX ADMIN — GABAPENTIN 200 MG: 100 CAPSULE ORAL at 04:59

## 2021-11-09 RX ADMIN — ACETAMINOPHEN 650 MG: 325 TABLET ORAL at 20:21

## 2021-11-09 RX ADMIN — OXYCODONE HYDROCHLORIDE 10 MG: 10 TABLET ORAL at 08:30

## 2021-11-09 RX ADMIN — ACETAMINOPHEN 1000 MG: 500 TABLET ORAL at 05:01

## 2021-11-09 RX ADMIN — OMEPRAZOLE 20 MG: 20 CAPSULE, DELAYED RELEASE ORAL at 17:19

## 2021-11-09 RX ADMIN — NICOTINE TRANSDERMAL SYSTEM 21 MG: 21 PATCH, EXTENDED RELEASE TRANSDERMAL at 04:58

## 2021-11-09 RX ADMIN — AMLODIPINE BESYLATE 10 MG: 5 TABLET ORAL at 20:12

## 2021-11-09 RX ADMIN — OXYCODONE HYDROCHLORIDE 10 MG: 10 TABLET ORAL at 20:48

## 2021-11-09 RX ADMIN — OMEPRAZOLE 20 MG: 20 CAPSULE, DELAYED RELEASE ORAL at 04:58

## 2021-11-09 RX ADMIN — SENNOSIDES AND DOCUSATE SODIUM 2 TABLET: 50; 8.6 TABLET ORAL at 20:12

## 2021-11-09 RX ADMIN — LEVOTHYROXINE SODIUM 50 MCG: 0.05 TABLET ORAL at 05:00

## 2021-11-09 RX ADMIN — ALLOPURINOL 300 MG: 100 TABLET ORAL at 05:00

## 2021-11-09 RX ADMIN — HEPARIN SODIUM 5000 UNITS: 5000 INJECTION, SOLUTION INTRAVENOUS; SUBCUTANEOUS at 13:41

## 2021-11-09 RX ADMIN — SIMETHICONE 80 MG: 80 TABLET, CHEWABLE ORAL at 17:17

## 2021-11-09 RX ADMIN — HEPARIN SODIUM 5000 UNITS: 5000 INJECTION, SOLUTION INTRAVENOUS; SUBCUTANEOUS at 04:59

## 2021-11-09 RX ADMIN — LIDOCAINE 2 PATCH: 50 PATCH TOPICAL at 20:14

## 2021-11-09 RX ADMIN — OXYCODONE HYDROCHLORIDE 10 MG: 10 TABLET ORAL at 17:18

## 2021-11-09 RX ADMIN — GABAPENTIN 200 MG: 100 CAPSULE ORAL at 20:12

## 2021-11-09 RX ADMIN — SIMETHICONE 80 MG: 80 TABLET, CHEWABLE ORAL at 20:13

## 2021-11-09 RX ADMIN — ALLOPURINOL 300 MG: 300 TABLET ORAL at 17:19

## 2021-11-09 RX ADMIN — HYDRALAZINE HYDROCHLORIDE 20 MG: 20 INJECTION INTRAMUSCULAR; INTRAVENOUS at 05:25

## 2021-11-09 RX ADMIN — GABAPENTIN 200 MG: 100 CAPSULE ORAL at 15:17

## 2021-11-09 ASSESSMENT — LIFESTYLE VARIABLES
AVERAGE NUMBER OF DAYS PER WEEK YOU HAVE A DRINK CONTAINING ALCOHOL: 5
TOTAL SCORE: 0
ALCOHOL_USE: YES
EVER HAD A DRINK FIRST THING IN THE MORNING TO STEADY YOUR NERVES TO GET RID OF A HANGOVER: NO
TOTAL SCORE: 0
ON A TYPICAL DAY WHEN YOU DRINK ALCOHOL HOW MANY DRINKS DO YOU HAVE: 6
TOTAL SCORE: 0
CONSUMPTION TOTAL: POSITIVE
ALCOHOL_USE: YES
HAVE YOU EVER FELT YOU SHOULD CUT DOWN ON YOUR DRINKING: NO
EVER_SMOKED: YES
EVER FELT BAD OR GUILTY ABOUT YOUR DRINKING: NO
HOW MANY TIMES IN THE PAST YEAR HAVE YOU HAD 5 OR MORE DRINKS IN A DAY: 90
HAVE PEOPLE ANNOYED YOU BY CRITICIZING YOUR DRINKING: NO

## 2021-11-09 ASSESSMENT — PAIN DESCRIPTION - PAIN TYPE
TYPE: ACUTE PAIN

## 2021-11-09 ASSESSMENT — PATIENT HEALTH QUESTIONNAIRE - PHQ9
2. FEELING DOWN, DEPRESSED, IRRITABLE, OR HOPELESS: NOT AT ALL
1. LITTLE INTEREST OR PLEASURE IN DOING THINGS: NOT AT ALL
2. FEELING DOWN, DEPRESSED, IRRITABLE, OR HOPELESS: NOT AT ALL
SUM OF ALL RESPONSES TO PHQ9 QUESTIONS 1 AND 2: 0
SUM OF ALL RESPONSES TO PHQ9 QUESTIONS 1 AND 2: 0
1. LITTLE INTEREST OR PLEASURE IN DOING THINGS: NOT AT ALL

## 2021-11-09 ASSESSMENT — FIBROSIS 4 INDEX: FIB4 SCORE: 2.02

## 2021-11-09 NOTE — CARE PLAN
The patient is Stable - Low risk of patient condition declining or worsening    Shift Goals  Clinical Goals: Pain control and rest  Patient Goals: Sleep and comfort  Family Goals: N/A    Progress made toward(s) clinical / shift goals:  Progressing    Problem: Pain - Standard  Goal: Alleviation of pain or a reduction in pain to the patient’s comfort goal  Outcome: Progressing   Pt is progressing towards goal of managing pain. Pt educated on interventions for pain and is able to verbalize comfort goal.   Problem: Knowledge Deficit - Standard  Goal: Patient and family/care givers will demonstrate understanding of plan of care, disease process/condition, diagnostic tests and medications  Outcome: Progressing   Pt is progressing towards goal of demonstrating care plan understanding with disease process. Pt understands how current diagnostic tests and medications fit into the current care plan. Pt able to verbalize understanding appropriately.

## 2021-11-09 NOTE — THERAPY
"Occupational Therapy  Daily Treatment     Patient Name: Thais Munroe  Age:  66 y.o., Sex:  female  Medical Record #: [id number]  Today's Date: 11/8/2021     Precautions  Precautions: Fall Risk  Comments: abdominal incision    Assessment    Patient seen for OT treatment this afternoon. Patient declining to get out of bed, however much time was spent discussing DME and AE for home. She reports that she cannot get her LB dressed because of her stomach distention. We reviewed alternative methods for dressing in the bed, and she is able to get to foot quite high while lying down, however for sufficiently high for dressing. Discussed sock aid and reacher for dressing, long-handled sponge and tub transfer bench for bathing, Care Chest for supplies. She is hoping to go to rehab prior to DC home. Will attempt to see tomorrow for mobility and ADLs.     Plan    Continue current treatment plan.    DC Equipment Recommendations: Unable to determine at this time  Discharge Recommendations: Recommend post-acute placement for additional occupational therapy services prior to discharge home    Subjective    \"My stomach is just so big\"     Objective       11/08/21 1407   Session Information   Date / Session Number  11/8 edu only. 11/5 #4 (3/3)         "

## 2021-11-09 NOTE — DISCHARGE SUMMARY
Discharge Summary    CHIEF COMPLAINT ON ADMISSION  No chief complaint on file.      Reason for Admission  RENAL MASS     Admission Date  10/27/2021    CODE STATUS  Full Code    HPI & HOSPITAL COURSE  Patient is a 66 year old female with history of hypertension, hep C, nicotine dependence, blood clots, hypothyroidism, and renal mass.  She presented to [facility] on 10/27/2021 for resection of left adrenal mass with Dr. Mccracken. She underwent a robotic assisted laparoscopic partial left nephrectomy on 10/27/2021. Her postop course was complicated by pain control issues requiring significant narcotics.  She developed worsening abdominal pain and nausea/vomiting.  She was ultimately transferred to the ICU for hypotension refractory to fluid boluses.  Her antibiotics were changed to Zosyn.  General surgery was consulted CT scan ordered which showed significant distention of the ascending and transverse colon with wall thickening, inflammation, edema and possible pneumatosis.  NG tube was placed with minimal output at that time.  There was concern that she had either ileus versus colonic pseudoobstruction.  GI was consulted and she underwent a colonoscopy with decompression - no mechanical obstruction was identified.  Repeat imaging showed SBO with incarcerated incisional hernia.  Patient went to the OR for reduction and repair on 11/2. Patient had improvement in her symptoms, passing gas and stool.  Had more distention 11/7 so KUB obtained, enema given 11/8.  Following this enema she reported passing a large amount of gas and had some liquid stool.  She reports that the distention has decreased significantly since that time and currently denies abdominal pain.  She will follow up with Dr. Banegas in 10 days and Dr. Mccracken next week to discuss cancer treatments/therapy.   She has been accepted to [facility] acute rehab for further therapy and treatment at this time.    Therefore, she is discharged in fair and stable condition to  an inpatient rehabilitation hospital.    The patient met 2-midnight criteria for an inpatient stay at the time of discharge.    Discharge Date  11/9/21    FOLLOW UP ITEMS POST DISCHARGE  Surgery  Urology  Pcp    DISCHARGE DIAGNOSES  Principal Problem:    Small bowel obstruction (HCC) POA: No  Active Problems:    Hyponatremia POA: Yes    Post-op pain POA: No    Gout (Chronic) POA: Yes    SHEBA (acute kidney injury) (HCC) POA: Unknown    Hypokalemia POA: Yes    Renal cell carcinoma (HCC) POA: Unknown    Hypertension POA: Unknown      Overview: pt states well controlled on meds    Hepatitis C POA: Unknown    Tobacco abuse POA: Unknown    Acquired hypothyroidism POA: Unknown    Normocytic anemia POA: Unknown    Ileus (HCC) POA: Unknown  Resolved Problems:    Hypotension POA: Yes    Renal mass POA: Yes    Leukocytosis POA: Yes    Elevated lactic acid level POA: Yes    Hypoglycemia POA: Unknown      FOLLOW UP  No future appointments.  No follow-up provider specified.    MEDICATIONS ON DISCHARGE     Medication List      ASK your doctor about these medications      Instructions   acetaminophen 500 MG Tabs  Commonly known as: TYLENOL   Take 1,000 mg by mouth one time. Indications: Pain  Dose: 1,000 mg     allopurinol 300 MG Tabs  Commonly known as: ZYLOPRIM  Ask about: Which instructions should I use?   allopurinol 300 mg tablet   TAKE 1 TABLET BY MOUTH EVERY DAY     amLODIPine 10 MG Tabs  Commonly known as: NORVASC   Take 10 mg by mouth every evening.  Dose: 10 mg     amoxicillin-clavulanate 875-125 MG Tabs  Commonly known as: AUGMENTIN   Take 1 Tablet by mouth 2 times a day.  Dose: 1 Tablet     [employer] Nicotine 21 MG/24HR Pt24  Generic drug: nicotine   Place 1 Patch on the skin every 24 hours.  Dose: 1 Patch     gabapentin 300 MG Caps  Commonly known as: NEURONTIN   Take 600 mg by mouth 2 times a day.  Dose: 600 mg     levothyroxine 25 MCG Tabs  Commonly known as: SYNTHROID  Ask about: Which instructions should I use?   Take  37.5 mcg by mouth every morning on an empty stomach.  Dose: 37.5 mcg     valsartan 320 MG tablet  Commonly known as: DIOVAN  Ask about: Which instructions should I use?   Take 320 mg by mouth at bedtime.  Dose: 320 mg            Allergies  No Known Allergies    DIET  Orders Placed This Encounter   Procedures   • Diet Order Diet: Low Fiber(GI Soft)     Standing Status:   Standing     Number of Occurrences:   1     Order Specific Question:   Diet:     Answer:   Low Fiber(GI Soft) [2]       ACTIVITY  As tolerated.  Weight bearing as tolerated    CONSULTATIONS  [facility]- Surgery  [facility]  [facility]        PROCEDURES  [id number] VIEWS   Final Result      NG tube has been removed      Dominant gas-filled cecum is similar in dilatation and medial displacement without evidence of perforation. The cecal configuration indicates bascule.      Some new gaseous distention of small and distal large bowel suggesting ileus given the distribution      CT-ABDOMEN-PELVIS W/O   Final Result      1.  Partial small bowel obstruction involving the midportion of the small bowel secondary to new left-sided abdominal hernia.      2.  No evidence of abdominal or pelvic abscess or urinoma.      3.  Persistent dilatation of the cecum measuring 10 cm in diameter.      4.  Distended gallbladder with minimal gallbladder wall thickening. No calcified gallstone or biliary dilatation identified.         Findings were discussed with ANH STERLING on 11/2/2021 12:53 PM.      [id number] VIEW   Final Result      1.  Cecal dilatation continues to be seen which can be seen in the setting of a cecal bascule.   2.  Enteric tube projects over the distal esophagus. Recommend advancement.      SC-VCHEUCM-1 VIEWS   Final Result      1.  Overall stable predominantly cecal colonic dilatation with the cecum located in the midline and just to the left of midline possibly related to a cecal bascule with volvulus considered a more remote  possibility due to the fact there has been no    interval progression.   2.  There is no pneumatosis or free intraperitoneal air.      RY-VXEROFP-5 VIEWS   Final Result      1.  There is a similar appearance to the mildly distended air-filled colon with relatively decompressed sigmoid and rectum.   2.  There is no free intraperitoneal air.      [id number]-0 VIEWS   Final Result         1.  Air-filled distention of the cecum measuring 12.3 cm, appearance concerning for [place]'s.  Similar to prior study.      These findings were discussed with the patient's clinician, Angeles Wolf, on 10/30/2021 4:19 AM.      CU-[id number]-9 VIEWS   Final Result      1.  Mild colonic dilation      2.  Enteric catheter appears appropriately located      3.  Mild right basilar atelectasis and/or pleural effusion      DX-ABDOMEN FOR TUBE PLACEMENT   Final Result      Enteric tube has been placed and the tip projects over the stomach.      CT-ABDOMEN-PELVIS W/O   Final Result         1.  Air-filled distention of the cecum, follow-up recommended to exclude progression to [place]'s   2.  Fluid and air-filled distended loops of small bowel in the left abdomen, consider component of ileus.   3.  Intra-abdominal foci of free air, a nonspecific finding for recent postop changes with surgical drain in place   4.  Scattered mild abdominal ascites and hazy mesenteric inflammatory changes.   5.  Small right and trace left pleural effusion.   6.  Linear densities in the bilateral lung bases favors changes of atelectasis.   7.  Left nephrolithiasis without visualized obstructive changes.   8.  Hepatomegaly and changes of cirrhosis   9.  Diverticulosis   10.  Atherosclerosis      [id number]-[id number]-8 VIEW   Final Result         1.  Air-filled distended bowel loops, appearance compatible with evolving obstruction versus ileus. Recommend radiographic follow-up to resolution.      DX-CHEST-PORTABLE (1 VIEW)   Final Result         1.  No acute cardiopulmonary  disease.   2.  Cardiomegaly            LABORATORY  Lab Results   Component Value Date    SODIUM 136 11/09/2021    POTASSIUM 3.7 11/09/2021    CHLORIDE 105 11/09/2021    CO2 21 11/09/2021    GLUCOSE 95 11/09/2021    BUN 12 11/09/2021    CREATININE 1.12 11/09/2021        Lab Results   Component Value Date    WBC 10.2 11/09/2021    HEMOGLOBIN 9.3 (L) 11/09/2021    HEMATOCRIT 28.2 (L) 11/09/2021    PLATELETCT 307 11/09/2021        Total time of the discharge process exceeds 40 minutes.

## 2021-11-09 NOTE — DISCHARGE PLANNING
Anticipated Discharge Disposition: [facility] Acute Rehab     Action: Transfer at 1030 this morning     Barriers to Discharge: none     Plan: COBRA sheet handed to bedside RN.

## 2021-11-09 NOTE — CARE PLAN
The patient is Stable - Low risk of patient condition declining or worsening    Shift Goals  Clinical Goals: pain control, ambulate to bedside commode   Patient Goals: sleep  Family Goals: NA    Progress made toward(s) clinical / shift goals:  Pt rates pain 8/10 in abdomen area and has been medicated per MAR. Pt is up self and able to go to commode at bedside. Pt is resting and in bed, calls appropriately.       Problem: Pain - Standard  Goal: Alleviation of pain or a reduction in pain to the patient’s comfort goal  Outcome: Progressing     Problem: Knowledge Deficit - Standard  Goal: Patient and family/care givers will demonstrate understanding of plan of care, disease process/condition, diagnostic tests and medications  Outcome: Progressing

## 2021-11-09 NOTE — PROGRESS NOTES
Report received from previous shift. Assumed care of patient at 1900, patient is A&O x4; pt knows self, location, time, and understands situation. Patient is a low Fall risk, bed locked and in lowest position, bed alarm on. Patient reports pain 8/10; pt medicated with pain med. Plan of care reviewed with patient, will implement and continue to monitor. Hourly rounding is in place and call light/belongings within reach.

## 2021-11-09 NOTE — DISCHARGE INSTRUCTIONS
Discharge Instructions    Discharged to other by [facility] with self. Discharged via wheelchair, hospital escort: Yes.  Special equipment needed: Not Applicable    Be sure to schedule a follow-up appointment with your primary care doctor or any specialists as instructed.     Discharge Plan:   Diet Plan: Discussed  Activity Level: Discussed  Confirmed Follow up Appointment: Appointment Scheduled  Confirmed Symptoms Management: Discussed  Medication Reconciliation Updated: No (Comments)  Influenza Vaccine Indication: Patient Refuses    I understand that a diet low in cholesterol, fat, and sodium is recommended for good health. Unless I have been given specific instructions below for another diet, I accept this instruction as my diet prescription.   Other diet: Low fiber; GI soft    Special Instructions: Chronic Obstructive Pulmonary Disease (COPD) is a long-term, progressive lung disease that makes it harder to breathe. It includes chronic bronchitis, emphysema, and refractory (non-reversible) asthma. With COPD, the airways in your lungs become inflamed and thicken, and the tissue where oxygen is exchanged is destroyed. The flow of air in and out of your lungs decreases. When that happens, less oxygen gets into your body tissues, and it becomes harder to get rid of the waste gas carbon dioxide. As the disease gets worse, shortness of breath makes it harder to remain active. There is no cure for COPD, but it is preventable and treatable.    COPD Patient Discharge Instructions    • Diet  o Follow a low fat, low cholesterol, low salt diet unless instructed otherwise by your Doctor. Read the labels on the back of food products and track your intake of fat, cholesterol and salt.  • No smoking  o Discontinuing smoking will have the biggest impact on preventing progression of disease.  o To participate in [employer]’s Quit Tobacco Program, call 881-7677 or visit [email or web address]  • Oxygen  o If your doctor has  order that you wear oxygen at home, it is important to wear it as ordered.  o Do not smoke, vape, or use e-cigarettes with oxygen on.  o Store in an appropriate location: upright in its sen or laid flat down, away from open flames and stoves.   o Do not use oil-based creams and moisturizers (ie: petroleum products, oil-based lip moisturizers) or aerosol sprays (ie: hair sprays or deodorants) when using your oxygen equipment.  o Be careful with tubing placement to prevent yourself and others from tripping.  • Medications  o Refer to your personalized Action Plan to manage your symptoms.  • Warning signs of an exacerbation  o Breathing fast and shallow, worsening shortness of breath (like you just finished exercising)  o Chest tightness  o Increases in sputum production  o Changes in sputum color  o Lower oxygen levels than baseline  • When to call your doctor  o If the warning signs of an exacerbation do not improve  o Refer to your personalized Action Plan   • Pulmonary Rehab  o Your doctor has ordered you a referral to Pulmonary Rehab. Call [phone] to schedule an appointment  • Attend your follow-up appointment with your PCP and/or Pulmonologist  • Remote Monitoring: At the direction of the remote monitoring on-call provider, you may increase your oxygen by 2 liters above your baseline.     See the educational handout provided by your COPD Navigator for more information. This also explains more about COPD, symptoms of an exacerbation, and some of the tests that you have undergone. and None    · Is patient discharged on Warfarin / Coumadin?   No     Depression / Suicide Risk    As you are discharged from this [facility] facility, it is important to learn how to keep safe from harming yourself.    Recognize the warning signs:  · Abrupt changes in personality, positive or negative- including increase in energy   · Giving away possessions  · Change in eating patterns- significant weight changes-  positive or  negative  · Change in sleeping patterns- unable to sleep or sleeping all the time   · Unwillingness or inability to communicate  · Depression  · Unusual sadness, discouragement and loneliness  · Talk of wanting to die  · Neglect of personal appearance   · Rebelliousness- reckless behavior  · Withdrawal from people/activities they love  · Confusion- inability to concentrate     If you or a loved one observes any of these behaviors or has concerns about self-harm, here's what you can do:  · Talk about it- your feelings and reasons for harming yourself  · Remove any means that you might use to hurt yourself (examples: pills, rope, extension cords, firearm)  · Get professional help from the community (Mental Health, Substance Abuse, psychological counseling)  · Do not be alone:Call your Safe Contact- someone whom you trust who will be there for you.  · Call your local CRISIS HOTLINE 038-6949 or 309-468-8993  · Call your local Children's Mobile Crisis Response Team [place] (729) 736-9369 or wwwPrepair  · Call the toll free National Suicide Prevention Hotlines   · National Suicide Prevention Lifeline 070-860-VWER (9881)  · National Hope Line Network 800-SUICIDE (288-5611)    Kidney Cancer    Kidney cancer is an abnormal growth of cells in one or both kidneys. The kidneys filter waste from your blood and produce urine. Kidney cancer may spread to other parts of your body. This type of cancer may also be called renal cell carcinoma.  What are the causes?  The cause of this condition is not always known. In some cases, abnormal changes to genes (genetic mutations) can cause cells to form cancer.  What increases the risk?  You may be more likely to develop kidney cancer if you:  · Are over age 60. The risk increases with age.  · Have a family history of kidney cancer.  · Are of -American, , or Native [place] descent.  · Smoke.  · Are male.  · Are obese.  · Have high blood pressure  (hypertension).  · Have advanced kidney disease, especially if you need long-term dialysis.  · Have certain conditions that are passed from parent to child (inherited), such as von Hippel-Lindau disease, tuberous sclerosis, or hereditary papillary renal carcinoma.  · Have been exposed to certain chemicals.  What are the signs or symptoms?  In the early stages, kidney cancer does not cause symptoms. As the cancer grows, symptoms may include:  · Blood in the urine.  · Pain in the upper back or abdomen, just below the rib cage. You may feel pain on one or both sides of the body.  · Fatigue.  · Unexplained weight loss.  · Fever.  How is this diagnosed?  This condition may be diagnosed based on:  · Your symptoms and medical history.  · A physical exam.  · Blood and urine tests.  · X-rays.  · Imaging tests, such as CT scans, MRIs, and PET scans.  · Having dye injected into your blood through an IV, and then having X-rays taken of:  ? Your kidneys and the rest of the organs involved in making and storing urine (intravenous pyelogram).  ? Your blood vessels (angiogram).  · Removal and testing of a kidney tissue sample (biopsy).  Your cancer will be assessed (staged), based on how severe it is and how much it has spread.  How is this treated?  Treatment depends on the type and stage of the cancer. Treatment may include one or more of the following:  · Surgery. This may include surgery to remove:  ? Just the tumor (nephron-sparing surgery).  ? The entire kidney (nephrectomy).  ? The kidney, some of the surrounding healthy tissue, nearby lymph nodes, and the adrenal gland in certain cases (radical nephrectomy).  · Medicines that kill cancer cells (chemotherapy).  · High-energy rays that kill cancer cells (radiation therapy).  · Targeted therapy. This targets specific parts of cancer cells and the area around them to block the growth and the spread of the cancer. Targeted therapy can help to limit the damage to healthy  cells.  · Medicines that help your body's disease-fighting system (immune system) fight cancer cells (immunotherapy).  · Freezing cancer cells using gas or liquid that is delivered through a needle (cryoablation).  · Destroying cancer cells using high-energy radio waves that are delivered through a needle-like probe (radiofrequency ablation).  · A procedure to block the artery that supplies blood to the tumor, which kills the cancer cells (embolization).  Follow these instructions at home:  Eating and drinking  · Some of your treatments might affect your appetite and your ability to chew and swallow. If you are having problems eating, or if you do not have an appetite, meet with a diet and nutrition specialist (dietitian).  · If you have side effects that affect eating, it may help to:  ? Eat smaller meals and snacks often.  ? Drink high-nutrition and high-calorie shakes or supplements.  ? Eat bland and soft foods that are easy to eat.  ? Not eat foods that are hot, spicy, or hard to swallow.  Lifestyle  · Do not drink alcohol.  · Do not use any products that contain nicotine or tobacco, such as cigarettes and e-cigarettes. If you need help quitting, ask your health care provider.  General instructions    · Take over-the-counter and prescription medicines only as told by your health care provider. This includes vitamins, supplements, and herbal products.  · Consider joining a support group to help you cope with the stress of having kidney cancer.  · Work with your health care provider to manage any side effects of treatment.  · Keep all follow-up visits as told by your health care provider. This is important.  Where to find more information  · American Cancer Society: https://www.cancer.org  · National Cancer Perkins (NCI): https://www.cancer.gov  Contact a health care provider if you:  · Notice that you bruise or bleed easily.  · Are losing weight without trying.  · Have new or increased fatigue or weakness.  Get  help right away if you have:  · Blood in your urine.  · A sudden increase in pain.  · A fever.  · Shortness of breath.  · Chest pain.  · Yellow skin or whites of your eyes (jaundice).  Summary  · Kidney cancer is an abnormal growth of cells (tumor) in one or both kidneys. Tumors may spread to other parts of your body.  · In the early stages, kidney cancer does not cause symptoms. As the cancer grows, symptoms may include blood in the urine, pain in the upper back or abdomen, unexplained weight loss, fatigue, and fever.  · Treatment depends on the type and stage of the cancer. It may include surgery to remove the tumor, procedures and medicines to kill the cancer cells, or medicines to help your body fight cancer cells.  This information is not intended to replace advice given to you by your health care provider. Make sure you discuss any questions you have with your health care provider.  Document Released: 10/28/2005 Document Revised: 01/09/2019 Document Reviewed: 01/06/2019  [employer] Patient Education © 2020 [employer]    Infection Prevention in the Home  If you have an infection, may have been exposed to an infection, or are taking care of someone who has an infection, it is important to know how to keep the infection from spreading. Follow your health care provider's instructions and use these guidelines to help stop the spread of infection.  How infections are spread  In order for an infection to spread, the following must be present:  · A germ. This may be a virus, bacteria, fungus, or parasite.  · A place for the germ to live. This may be:  ? On or in a person, animal, plant, or food.  ? In soil or water.  ? On surfaces, such as a door handle.  · A person or animal who can develop a disease if the germ enters the body (host). The host does not have resistance to the germ.  · A way for the germ to enter the host. This may occur by:  ? Direct contact with an infected person or animal. This can happen through  shaking hands or hugging. Some germs can also travel through the air and spread to others. This can happen when an infected person coughs or sneezes on or near other people.  ? Indirect contact. This occurs when the germ enters the host through contact with an infected object. Examples include:  § Eating or drinking food or water that has the germ (is contaminated).  § Touching a contaminated surface with your hands, and then touching your face, eyes, nose, or mouth.  Supplies needed:  · Soap.  · Alcohol-based hand .  · Standard cleaning products.  · Disinfectants, such as bleach.  · Reusable cleaning cloths, sponges, or paper towels.  · Disposable or reusable utility gloves.  How to prevent infection from spreading  There are several things that you can do to help prevent infection from spreading.  Take these general actions  Everyone should take the following actions to prevent the spread of infection:  · Wash your hands often with soap and water for at least 20 seconds. If soap and water are not available, use alcohol-based hand .  · Avoid touching your face, mouth, nose, or eyes.  · Cough or sneeze into a tissue, sleeve, or elbow instead of into your hand or into the air.  ? If you cough or sneeze into a tissue, throw it away immediately and wash your hands.    Keep your bathroom clean  · Provide soap.  · Change towels and washcloths frequently.  · Change toothbrushes often and store them separately in a clean, dry place.  · Clean and disinfect all surfaces, including the toilet, floor, tub, shower, and sink.  · Do not share personal items, such as razors, toothbrushes, deodorant, ibrahim, brushes, towels, and washcloths.  Maintain hygiene in the kitchen    · Wash your hands before and after preparing food and before you eat.  · Clean the inside of your refrigerator each week.  · Keep your refrigerator set at 40°F (4°C) or less, and set your freezer at 0°F (-18°C) or less.  · Keep work surfaces  clean. Disinfect them regularly.  · Wash your dishes in hot, soapy water. Air-dry your dishes or use a .  · Do not share dishes or eating utensils.  Handle food safely  · Store food carefully.  · Refrigerate leftovers promptly in covered containers.  · Throw out stale or spoiled food.  · Thaw foods in the refrigerator or microwave, not at room temperature.  · Serve foods at the proper temperature. Do not eat raw meat. Make sure it is cooked to the appropriate temperature. Cook eggs until they are firm.  · Wash fruits and vegetables under running water.  · Use separate cutting boards, plates, and utensils for raw foods and cooked foods.  · Use a clean spoon each time you sample food while cooking.  Do laundry the right way  · Wear gloves if laundry is visibly soiled.  · Do not shake soiled laundry. Doing that may send germs into the air.  · Wash laundry in hot water.  · If you cannot wash the laundry right away, place it in a plastic bag and wash it as soon as possible.  Be careful around animals and pets  · Wash your hands before and after touching animals.  · If you have a pet, ensure that your pet stays clean. Do not let people with weak immune systems touch bird droppings, fish tank water, or a litter box.  ? If you have a pet cage or litter box, be sure to clean it every day.  · If you are sick, stay away from animals and have someone else care for them if possible.  How to clean and disinfect objects and surfaces  Precautions  · Some disinfectants work for certain germs and not others. Read the 's instructions or read online resources to determine if the product you are using will work for the germ you are trying to remove.  · If you choose to use bleach, use it safely. Never mix it with other cleaning products, especially those that contain ammonia. This mixture can create a dangerous gas that may be deadly.  · Keep proper movement of fresh air in your home (ventilation).  · Pour used mop  water down the utility sink or toilet. Do not pour this water down the kitchen sink.  Objects and surfaces    · If surfaces are visibly soiled, clean them first with soap and water before disinfecting.  · Disinfect surfaces that are frequently touched every day. This may include:  ? Counters.  ? Tables.  ? Doorknobs.  ? Sinks and faucets.  ? Electronics, such as:  § Phones.  § Remote controls.  § Keyboards.  § Computers and tablets.  Cleaning supplies  Some cleaning supplies can breed germs. Take good care of them to prevent germs from spreading. To do this:  · Soak toilet brushes, mops, and sponges in bleach and water for 5 minutes after each use, or according to 's instructions.  · Wash reusable cleaning cloths and sanitize sponges after each use.  · Throw away disposable gloves after one use.  · Replace reusable utility gloves if they are cracked or torn or if they start to peel.  Additional actions if you are sick  If you live with other people:    · Avoid close contact with those around you. Stay at least 3 ft (1 m) away from others, if possible.  · Use a separate bathroom, if possible.  · If possible, sleep in a separate bedroom or in a separate bed to prevent infecting other household members.  ? Change bedroom linens each week or whenever they are soiled.  · Have everyone in the household wash hands often with soap and water. If soap and water are not available, use alcohol-based hand .  In general:  · Stay home except to get medical care. Call ahead before visiting your health care provider.  · Ask others to get groceries and household supplies and to refill prescriptions for you.  · Avoid public areas. Try not to take public transportation.  · If you can, wear a mask if you need to go out of the house, or if you are in close contact with someone who is not sick.  · Avoid visitors until you have completely recovered, or until you have no signs and symptoms of infection.  · Avoid  preparing food or providing care for others. If you must prepare food or provide care for others, wear a mask and wash your hands before and after doing these things.  Where to find more information  · Centers for Disease Control and Prevention: www.cdc.gov/nonpharmaceutical-interventions/index.html  · World Health Organization (WHO): www.who.int/infection-prevention/about/en/  · Association for Professionals in Infection Control and Epidemiology: professionals.site.apic.org/settings-of-care/non-healthcare-setting/home/  Summary  · It is important to know how to keep the infection from spreading.  · Make sure everyone in your household washes their hands often with soap and water.  · Disinfect surfaces that are frequently touched every day.  · If you are sick, stay home except to get medical care.  This information is not intended to replace advice given to you by your health care provider. Make sure you discuss any questions you have with your health care provider.  Document Released: 09/26/2009 Document Revised: 04/14/2020 Document Reviewed: 03/13/2020  [employer] Patient Education © 2020 [employer]

## 2021-11-09 NOTE — PROGRESS NOTES
Patient admitted to facility at 11:25, wheel chair accompanied by [facility] staff.  Patient assisted to room and positioned in bed for comfort and safety; call light within reach.  Patient assisted with stowing belongings and oriented to room and facility.  Admission assessment performed and documented in computer.  Admission paperwork completed; signed copies placed in chart.  2 RN skin check done with admitting RN Yesenia and Nurse Randa. Face photo and skin photos documented in media. Appropriate LDAs opened.   Pt with Meño score of 19.

## 2021-11-09 NOTE — PROGRESS NOTES
Assume care of pt at 0700. Report received from NOC RN. Pt is A/O x4. Pain is 8/10 to mid abdomen. Pt is resting in bed. Bed in lowest and locked position, call light within reach, hourly rounding in place. Labs reviewed. Communication board updated. Will continue to implement care at this time. Crisis charting in place due to COVID-19 surge.

## 2021-11-09 NOTE — CARE PLAN
Problem: Nutritional:  Goal: Achieve adequate nutritional intake  Description: Advance diet as tolerated past clear liquids. Patient will consume >50% of meals.  Outcome: Met

## 2021-11-09 NOTE — CARE PLAN
The patient is Stable - Low risk of patient condition declining or worsening         Progress made toward(s) clinical / shift goals:    Problem: Pain - Standard  Goal: Alleviation of pain or a reduction in pain to the patient’s comfort goal  Outcome: Progressing   Patient able to verbalize pain level and verbalize an acceptable level of pain.

## 2021-11-09 NOTE — DISCHARGE PLANNING
0928: Transport arranged for 1030 with [facility] to [facility]. Nurse report #[id number]. Dr Deng accepting physician at [facility]. Notified attending, bedside nurse, and .     1000: Met with patient at bedside and notified pt of transport time. Answered patient's questions about [facility] and explained current Covid policy. Patient confirmed she lives with adult children and grandchildren who will be able to assist at [place]. Pt states someone is always home to help her. Pt reports no additional needs at this time, TCC to remain available if further intervention is needed.

## 2021-11-10 ENCOUNTER — APPOINTMENT (OUTPATIENT)
Dept: RADIOLOGY | Facility: REHABILITATION | Age: 66
DRG: 949 | End: 2021-11-10
Attending: PHYSICAL MEDICINE & REHABILITATION
Payer: COMMERCIAL

## 2021-11-10 LAB
ALBUMIN SERPL BCP-MCNC: 3.6 G/DL (ref 3.2–4.9)
ALBUMIN/GLOB SERPL: 0.9 G/DL
ALP SERPL-CCNC: 116 U/L (ref 30–99)
ALT SERPL-CCNC: 17 U/L (ref 2–50)
ANION GAP SERPL CALC-SCNC: 9 MMOL/L (ref 7–16)
AST SERPL-CCNC: 41 U/L (ref 12–45)
BASOPHILS # BLD AUTO: 0.6 % (ref 0–1.8)
BASOPHILS # BLD: 0.06 K/UL (ref 0–0.12)
BILIRUB SERPL-MCNC: 0.3 MG/DL (ref 0.1–1.5)
BUN SERPL-MCNC: 10 MG/DL (ref 8–22)
CALCIUM SERPL-MCNC: 8.8 MG/DL (ref 8.5–10.5)
CHLORIDE SERPL-SCNC: 103 MMOL/L (ref 96–112)
CO2 SERPL-SCNC: 22 MMOL/L (ref 20–33)
CREAT SERPL-MCNC: 1.24 MG/DL (ref 0.5–1.4)
EOSINOPHIL # BLD AUTO: 0.29 K/UL (ref 0–0.51)
EOSINOPHIL NFR BLD: 3 % (ref 0–6.9)
ERYTHROCYTE [DISTWIDTH] IN BLOOD BY AUTOMATED COUNT: 49.1 FL (ref 35.9–50)
GLOBULIN SER CALC-MCNC: 3.8 G/DL (ref 1.9–3.5)
GLUCOSE SERPL-MCNC: 98 MG/DL (ref 65–99)
HCT VFR BLD AUTO: 32.3 % (ref 37–47)
HGB BLD-MCNC: 10.8 G/DL (ref 12–16)
IMM GRANULOCYTES # BLD AUTO: 0.08 K/UL (ref 0–0.11)
IMM GRANULOCYTES NFR BLD AUTO: 0.8 % (ref 0–0.9)
LYMPHOCYTES # BLD AUTO: 2.79 K/UL (ref 1–4.8)
LYMPHOCYTES NFR BLD: 28.5 % (ref 22–41)
MAGNESIUM SERPL-MCNC: 1.7 MG/DL (ref 1.5–2.5)
MCH RBC QN AUTO: 30.9 PG (ref 27–33)
MCHC RBC AUTO-ENTMCNC: 33.4 G/DL (ref 33.6–35)
MCV RBC AUTO: 92.6 FL (ref 81.4–97.8)
MONOCYTES # BLD AUTO: 0.85 K/UL (ref 0–0.85)
MONOCYTES NFR BLD AUTO: 8.7 % (ref 0–13.4)
NEUTROPHILS # BLD AUTO: 5.73 K/UL (ref 2–7.15)
NEUTROPHILS NFR BLD: 58.4 % (ref 44–72)
NRBC # BLD AUTO: 0 K/UL
NRBC BLD-RTO: 0 /100 WBC
PLATELET # BLD AUTO: 405 K/UL (ref 164–446)
PMV BLD AUTO: 10.1 FL (ref 9–12.9)
POTASSIUM SERPL-SCNC: 4.2 MMOL/L (ref 3.6–5.5)
PROT SERPL-MCNC: 7.4 G/DL (ref 6–8.2)
RBC # BLD AUTO: 3.49 M/UL (ref 4.2–5.4)
SARS-COV-2 RNA RESP QL NAA+PROBE: NOTDETECTED
SODIUM SERPL-SCNC: 134 MMOL/L (ref 135–145)
SPECIMEN SOURCE: NORMAL
T4 FREE SERPL-MCNC: 1.28 NG/DL (ref 0.93–1.7)
TSH SERPL DL<=0.005 MIU/L-ACNC: 21.6 UIU/ML (ref 0.38–5.33)
WBC # BLD AUTO: 9.8 K/UL (ref 4.8–10.8)

## 2021-11-10 PROCEDURE — 97116 GAIT TRAINING THERAPY: CPT

## 2021-11-10 PROCEDURE — 84439 ASSAY OF FREE THYROXINE: CPT

## 2021-11-10 PROCEDURE — 84443 ASSAY THYROID STIM HORMONE: CPT

## 2021-11-10 PROCEDURE — 700102 HCHG RX REV CODE 250 W/ 637 OVERRIDE(OP): Performed by: PHYSICAL MEDICINE & REHABILITATION

## 2021-11-10 PROCEDURE — 97535 SELF CARE MNGMENT TRAINING: CPT

## 2021-11-10 PROCEDURE — A9270 NON-COVERED ITEM OR SERVICE: HCPCS | Performed by: PHYSICAL MEDICINE & REHABILITATION

## 2021-11-10 PROCEDURE — 700102 HCHG RX REV CODE 250 W/ 637 OVERRIDE(OP): Performed by: HOSPITALIST

## 2021-11-10 PROCEDURE — 700111 HCHG RX REV CODE 636 W/ 250 OVERRIDE (IP): Performed by: PHYSICAL MEDICINE & REHABILITATION

## 2021-11-10 PROCEDURE — 85025 COMPLETE CBC W/AUTO DIFF WBC: CPT

## 2021-11-10 PROCEDURE — 97165 OT EVAL LOW COMPLEX 30 MIN: CPT

## 2021-11-10 PROCEDURE — 97162 PT EVAL MOD COMPLEX 30 MIN: CPT

## 2021-11-10 PROCEDURE — 770010 HCHG ROOM/CARE - REHAB SEMI PRIVAT*

## 2021-11-10 PROCEDURE — 74018 RADEX ABDOMEN 1 VIEW: CPT

## 2021-11-10 PROCEDURE — 99223 1ST HOSP IP/OBS HIGH 75: CPT | Performed by: HOSPITALIST

## 2021-11-10 PROCEDURE — 80053 COMPREHEN METABOLIC PANEL: CPT

## 2021-11-10 PROCEDURE — 97605 NEG PRS WND THER DME<=50SQCM: CPT

## 2021-11-10 PROCEDURE — A9270 NON-COVERED ITEM OR SERVICE: HCPCS | Performed by: HOSPITALIST

## 2021-11-10 PROCEDURE — 700101 HCHG RX REV CODE 250: Performed by: PHYSICAL MEDICINE & REHABILITATION

## 2021-11-10 PROCEDURE — 36415 COLL VENOUS BLD VENIPUNCTURE: CPT

## 2021-11-10 PROCEDURE — 83735 ASSAY OF MAGNESIUM: CPT

## 2021-11-10 PROCEDURE — 97530 THERAPEUTIC ACTIVITIES: CPT

## 2021-11-10 PROCEDURE — 82306 VITAMIN D 25 HYDROXY: CPT

## 2021-11-10 PROCEDURE — 99233 SBSQ HOSP IP/OBS HIGH 50: CPT | Performed by: PHYSICAL MEDICINE & REHABILITATION

## 2021-11-10 RX ORDER — ACETAMINOPHEN 500 MG
1000 TABLET ORAL 3 TIMES DAILY
Status: DISCONTINUED | OUTPATIENT
Start: 2021-11-10 | End: 2021-11-16 | Stop reason: HOSPADM

## 2021-11-10 RX ORDER — SIMETHICONE 80 MG
120 TABLET,CHEWABLE ORAL
Status: DISCONTINUED | OUTPATIENT
Start: 2021-11-10 | End: 2021-11-13

## 2021-11-10 RX ADMIN — AMLODIPINE BESYLATE 10 MG: 5 TABLET ORAL at 20:19

## 2021-11-10 RX ADMIN — OXYCODONE HYDROCHLORIDE 10 MG: 10 TABLET ORAL at 23:13

## 2021-11-10 RX ADMIN — HEPARIN SODIUM 5000 UNITS: 5000 INJECTION, SOLUTION INTRAVENOUS; SUBCUTANEOUS at 05:48

## 2021-11-10 RX ADMIN — ACETAMINOPHEN 650 MG: 325 TABLET ORAL at 08:22

## 2021-11-10 RX ADMIN — NICOTINE TRANSDERMAL SYSTEM 21 MG: 21 PATCH, EXTENDED RELEASE TRANSDERMAL at 08:15

## 2021-11-10 RX ADMIN — OMEPRAZOLE 20 MG: 20 CAPSULE, DELAYED RELEASE ORAL at 08:15

## 2021-11-10 RX ADMIN — ALLOPURINOL 300 MG: 300 TABLET ORAL at 08:14

## 2021-11-10 RX ADMIN — LIDOCAINE 2 PATCH: 50 PATCH TOPICAL at 20:14

## 2021-11-10 RX ADMIN — ACETAMINOPHEN 1000 MG: 500 TABLET, FILM COATED ORAL at 17:33

## 2021-11-10 RX ADMIN — POLYETHYLENE GLYCOL 3350 1 PACKET: 17 POWDER, FOR SOLUTION ORAL at 08:13

## 2021-11-10 RX ADMIN — OXYCODONE HYDROCHLORIDE 10 MG: 10 TABLET ORAL at 05:48

## 2021-11-10 RX ADMIN — LEVOTHYROXINE SODIUM 50 MCG: 50 TABLET ORAL at 05:48

## 2021-11-10 RX ADMIN — OXYCODONE HYDROCHLORIDE 10 MG: 10 TABLET ORAL at 16:26

## 2021-11-10 RX ADMIN — GABAPENTIN 200 MG: 100 CAPSULE ORAL at 20:20

## 2021-11-10 RX ADMIN — HEPARIN SODIUM 5000 UNITS: 5000 INJECTION, SOLUTION INTRAVENOUS; SUBCUTANEOUS at 22:47

## 2021-11-10 RX ADMIN — OXYCODONE HYDROCHLORIDE 10 MG: 10 TABLET ORAL at 12:54

## 2021-11-10 RX ADMIN — GABAPENTIN 200 MG: 100 CAPSULE ORAL at 14:10

## 2021-11-10 RX ADMIN — SIMETHICONE 80 MG: 80 TABLET, CHEWABLE ORAL at 08:14

## 2021-11-10 RX ADMIN — SIMETHICONE 120 MG: 80 TABLET, CHEWABLE ORAL at 20:19

## 2021-11-10 RX ADMIN — GABAPENTIN 200 MG: 100 CAPSULE ORAL at 08:14

## 2021-11-10 RX ADMIN — HEPARIN SODIUM 5000 UNITS: 5000 INJECTION, SOLUTION INTRAVENOUS; SUBCUTANEOUS at 14:11

## 2021-11-10 RX ADMIN — SIMETHICONE 120 MG: 80 TABLET, CHEWABLE ORAL at 17:33

## 2021-11-10 RX ADMIN — ACETAMINOPHEN 1000 MG: 500 TABLET, FILM COATED ORAL at 20:19

## 2021-11-10 ASSESSMENT — PATIENT HEALTH QUESTIONNAIRE - PHQ9
2. FEELING DOWN, DEPRESSED, IRRITABLE, OR HOPELESS: NOT AT ALL
1. LITTLE INTEREST OR PLEASURE IN DOING THINGS: NOT AT ALL
1. LITTLE INTEREST OR PLEASURE IN DOING THINGS: NOT AT ALL
SUM OF ALL RESPONSES TO PHQ9 QUESTIONS 1 AND 2: 0
2. FEELING DOWN, DEPRESSED, IRRITABLE, OR HOPELESS: NOT AT ALL
SUM OF ALL RESPONSES TO PHQ9 QUESTIONS 1 AND 2: 0

## 2021-11-10 ASSESSMENT — BRIEF INTERVIEW FOR MENTAL STATUS (BIMS)
INITIAL REPETITION OF BED BLUE SOCK - FIRST ATTEMPT: 3
ASKED TO RECALL SOCK: NO, COULD NOT RECALL
WHAT MONTH IS IT: ACCURATE WITHIN 5 DAYS
ASKED TO RECALL BLUE: NO, COULD NOT RECALL
WHAT YEAR IS IT: CORRECT
ASKED TO RECALL BED: NO, COULD NOT RECALL
WHAT DAY OF THE WEEK IS IT: CORRECT
BIMS SUMMARY SCORE: 9

## 2021-11-10 ASSESSMENT — ACTIVITIES OF DAILY LIVING (ADL)
TOILET_TRANSFER_DESCRIPTION: GRAB BAR;INCREASED TIME;ADAPTIVE EQUIPMENT
TOILETING_LEVEL_OF_ASSIST_DESCRIPTION: GRAB BAR;INCREASED TIME;SUPERVISION FOR SAFETY
BED_CHAIR_WHEELCHAIR_TRANSFER_DESCRIPTION: ADAPTIVE EQUIPMENT;INCREASED TIME;SET-UP OF EQUIPMENT;SUPERVISION FOR SAFETY;VERBAL CUEING
TOILET_TRANSFER_DESCRIPTION: ADAPTIVE EQUIPMENT;INCREASED TIME;SET-UP OF EQUIPMENT;SUPERVISION FOR SAFETY;VERBAL CUEING
TOILETING: INDEPENDENT
TUB_SHOWER_TRANSFER_DESCRIPTION: ADAPTIVE EQUIPMENT;GRAB BAR;SHOWER BENCH
TOILETING_LEVEL_OF_ASSIST_DESCRIPTION: GRAB BAR;SUPERVISION FOR SAFETY
TOILET_TRANSFER_DESCRIPTION: ADAPTIVE EQUIPMENT;INCREASED TIME;SET-UP OF EQUIPMENT;SUPERVISION FOR SAFETY;VERBAL CUEING
BED_CHAIR_WHEELCHAIR_TRANSFER_DESCRIPTION: ADAPTIVE EQUIPMENT;INCREASED TIME;SET-UP OF EQUIPMENT;SUPERVISION FOR SAFETY;VERBAL CUEING
TOILET_TRANSFER_DESCRIPTION: GRAB BAR;SET-UP OF EQUIPMENT;SUPERVISION FOR SAFETY;VERBAL CUEING

## 2021-11-10 ASSESSMENT — PAIN SCALES - WONG BAKER
WONGBAKER_NUMERICALRESPONSE: HURTS A LITTLE MORE
WONGBAKER_NUMERICALRESPONSE: HURTS A LITTLE MORE

## 2021-11-10 ASSESSMENT — GAIT ASSESSMENTS
GAIT LEVEL OF ASSIST: CONTACT GUARD ASSIST
DEVIATION: ANTALGIC;TRENDELENBERG;BRADYKINETIC;DECREASED HEEL STRIKE;DECREASED TOE OFF
DISTANCE (FEET): 25
ASSISTIVE DEVICE: FRONT WHEEL WALKER
DEVIATION: ANTALGIC;TRENDELENBERG;BRADYKINETIC;DECREASED HEEL STRIKE;DECREASED TOE OFF
GAIT LEVEL OF ASSIST: CONTACT GUARD ASSIST
ASSISTIVE DEVICE: FRONT WHEEL WALKER

## 2021-11-10 ASSESSMENT — PAIN DESCRIPTION - PAIN TYPE: TYPE: ACUTE PAIN

## 2021-11-10 NOTE — CARE PLAN
Problem: Pain - Standard  Goal: Alleviation of pain or a reduction in pain to the patient’s comfort goal  Flowsheets (Taken 11/10/2021 2733)  Pain Rating Scale (NPRS): 6  Note: Patient complained of abdominal pain; requested prn tylenol. Patient also takes oxycodone 10 mg prn. At that time, it was too soon to take oxycodone. Will continue to monitor.      Problem: Infection  Goal: Patient will remain free from infection  Note: Patient remains free from s/s infection; afebrile.  Will continue to monitor.    The patient is Watcher - Medium risk of patient condition declining or worsening    Shift Goals  Clinical Goals: safety   Patient Goals: pain management

## 2021-11-10 NOTE — FLOWSHEET NOTE
11/09/21 1733   Incentive Spirometry Treatment   Incentive Spirometer Volume 1500 mL  (Good effort and technique)

## 2021-11-10 NOTE — CARE PLAN
"The patient is Stable - Low risk of patient condition declining or worsening    Shift Goals  Clinical Goals: pain management   Patient Goals: Pain control      Problem: Knowledge Deficit - Standard  Goal: Patient and family/care givers will demonstrate understanding of plan of care, disease process/condition, diagnostic tests and medications  Outcome: Progressing: Pt had received an oxy 10 mg at 1718 by the day shift nurse. Pt has oxy 5 & 10 mg tabs available Q5 hours PRN. Pt requested a PRN 10 mg oxy with her bedtime meds at 2030. Pt was informed that her next pain pill will not be available until 2218 per the last dose she had. Pt stated \"I never got that last dose, the nurse never gave it to me. If I had gotten that dose my pain wouldn't be this bad right now.\"  Dr. Laboy was called and gave permission to give her an oxy 10 mg early, but the next one has to wait 5 hours until the next dose. Pt was given the oxy 10 mg at 2238. Pt double checked the pill's packaging to make sure she was given a 10 mg and not a 5 mg.      Problem: Pain - Standard  Goal: Alleviation of pain or a reduction in pain to the patient’s comfort goal  Outcome: Progressing: Pt said she had pain 9/10 in her lower abdomen.    Problem: Bowel Elimination  Goal: Patient will participate in bowel management program  Outcome: Progressing: pt refused her scheduled suppository for having \"multiple BMs already today.\" Pt educated about the importance of regular bowel movements.          "

## 2021-11-10 NOTE — THERAPY
Occupational Therapy   Initial Evaluation     Patient Name: Thais Munroe  Age:  66 y.o., Sex:  female  Medical Record #: [id number]  Today's Date: 11/10/2021     Subjective    Pt received seated edge of bed, agreeable to OT evaluation     Objective       11/10/21 0701   Prior Living Situation   Prior Services Home-Independent   Housing / Facility 1 Story House   Steps Into Home 8   Steps In Home 0   Rail None   Bathroom Set up Bathtub / Shower Combination;Shower Curtain   Equipment Owned 4-Wheel Walker   Lives with - Patient's Self Care Capacity Adult Children  (2 daughters, adult grandaughter, 18 mo great grandson)   Comments pt lives in [facility] with family, 7 dogs, someone is usually home with her   Prior Level of ADL Function   Self Feeding Independent   Grooming / Hygiene Independent   Bathing Independent   Dressing Independent   Toileting Independent   Prior Level of IADL Function   Medication Management Independent   Laundry Independent   Kitchen Mobility Independent   Finances Independent   Home Management Requires Assist   Shopping Requires Assist   Prior Level Of Mobility Independent Without Device in Community   Driving / Transportation Driving Independent   Occupation (Pre-Hospital Vocational) Other (Comments)  (laid off during covid, has not returned due to med issues)   Leisure Interests Other (Comments)  (enjoys playing with grandson)   Prior Functioning: Everyday Activities   Self Care Independent   Indoor Mobility (Ambulation) Independent   Stairs Independent   Functional Cognition Independent   Prior Device Use None of the given options   Vitals   Pulse 89   Patient BP Position Sitting  (after walking into bathroom)   Blood Pressure 134/88   Cognition    Level of Consciousness Alert   Comments requires redirection   ABS (Agitated Behavior Scale)   Agitated Behavior Scale Performed No   Cognitive Pattern Assessment   Cognitive Pattern Assessment Used BIMS   Brief Interview for Mental Status  "(BIMS)   Repetition of Three Words (First Attempt) 3   Temporal Orientation: Year Correct   Temporal Orientation: Month Accurate within 5 days   Temporal Orientation: Day Correct   Recall: \"Sock\" No, could not recall   Recall: \"Blue\" No, could not recall   Recall: \"Bed\" No, could not recall   BIMS Summary Score 9   Vision Screen   Vision Not tested   Passive ROM Upper Body   Passive ROM Upper Body WDL   Active ROM Upper Body   Active ROM Upper Body  WDL   Dominant Hand Right   Strength Upper Body   Upper Body Strength  WDL   Sensation Upper Body   Upper Extremity Sensation  Not Tested   Upper Body Muscle Tone   Upper Body Muscle Tone  Not Tested   Balance Assessment   Sitting Balance (Static) Good   Sitting Balance (Dynamic) Fair +   Standing Balance (Static) Fair -   Standing Balance (Dynamic) Fair -   Weight Shift Sitting Good   Weight Shift Standing Fair   Bed Mobility    Supine to Sit Minimal Assist   Sit to Stand Minimal Assist   Scooting Stand by Assist   Coordination Upper Body   Coordination WDL   Eating   Assistance Needed Set-up / clean-up   Physical Assistance Level No physical assistance   CARE Score - Eating 5   Eating Discharge Goal   Discharge Goal 6   Oral Hygiene   Assistance Needed Set-up / clean-up   Physical Assistance Level No physical assistance   CARE Score - Oral Hygiene 5   Oral Hygiene Discharge Goal   Discharge Goal 6   Shower/Bathe Self   Assistance Needed Physical assistance   Physical Assistance Level 25% or less   CARE Score - Shower/Bathe Self 3   Shower/Bathe Self Discharge Goal   Discharge Goal 6   Upper Body Dressing   Assistance Needed Set-up / clean-up   Physical Assistance Level No physical assistance   CARE Score - Upper Body Dressing 5   Upper Body Dressing Discharge Goal   Discharge Goal 6   Lower Body Dressing   Assistance Needed Physical assistance   Physical Assistance Level 76% or more   CARE Score - Lower Body Dressing 2   Lower Body Dressing Discharge Goal   Discharge " Goal 6   Putting On/Taking Off Footwear   Assistance Needed Physical assistance   Physical Assistance Level Total assistance   CARE Score - Putting On/Taking Off Footwear 1   Putting On/Taking Off Footwear Discharge Goal   Discharge Goal 6   Toileting Hygiene   Assistance Needed Physical assistance   Physical Assistance Level 25% or less   CARE Score - Toileting Hygiene 3   Toileting Hygiene Discharge Goal   Discharge Goal 6   Toilet Transfer   Assistance Needed Physical assistance   Physical Assistance Level 25% or less   CARE Score - Toilet Transfer 3   Toilet Transfer Discharge Goal   Discharge Goal 6   Hearing, Speech, and Vision   Expression of Ideas and Wants Without difficulty   Understanding Verbal and Non-Verbal Content Understands   Functional Level of Assist   Eating Independent   Grooming Stand by Assist;Seated   Grooming Description Set-up of equipment;Seated in wheelchair at sink   Bathing Contact Guard Assist   Bathing Description Tub bench;Grab bar;Hand held shower;Increased time;Supervision for safety;Verbal cueing   Upper Body Dressing Supervision   Upper Body Dressing Description Set-up of equipment   Lower Body Dressing Maximal Assist   Lower Body Dressing Description Supervision for safety  (able to pull pants up in standing, assist for all other task)   Toileting Supervision   Toileting Description Grab bar;Increased time;Supervision for safety   Toilet Transfers Contact Guard Assist   Toilet Transfer Description Grab bar;Increased time;Adaptive equipment  (FWW)   Tub / Shower Transfers Contact Guard Assist   Tub Shower Transfer Description Adaptive equipment;Grab bar;Shower bench  (FWW in/out, GB for transition)   Comprehension Independent   Expression Independent   Problem Solving Supervision   Problem Solving Description Verbal cueing   Memory Supervision   Memory Description Verbal cueing;Therapy schedule   Problem List   Problem List Decreased Active Daily Living Skills;Decreased  Homemaking Skills;Decreased Functional Mobility;Decreased Activity Tolerance;Limited Knowledge of Post Op Precautions;Impaired Postural Control / Balance   Precautions   Precautions Fall Risk   Comments abdominal incision, requires redirection   Current Discharge Plan   Current Discharge Plan Return to Prior Living Situation   Benefit    Therapy Benefit Patient Would Benefit from Inpatient Rehab Occupational Therapy to Maximize [place] with ADLs, IADLs and Functional Mobility.   Interdisciplinary Plan of Care Collaboration   IDT Collaboration with  Physical Therapist;Nursing   Patient Position at End of Therapy Seated;Call Light within Reach;Tray Table within Reach   Collaboration Comments re: CLOF   Strengths & Barriers   Strengths Alert and oriented;Independent prior level of function;Motivated for self care and independence;Pleasant and cooperative;Willingly participates in therapeutic activities   Barriers Decreased endurance;Generalized weakness;Impaired activity tolerance;Impaired balance;Pain  (impaired attention and memory)   OT Total Time Spent   OT Individual Total Time Spent (Mins) 60   OT Charge Group   Charges Yes   OT Self Care / ADL 1   OT Evaluation OT Evaluation Low       Assessment  Patient is 66 y.o. female who presented on 10/27/2021 12:21 PM for resection of left adrenal mass with Dr. Mccracken.  Patient underwent robotic assisted partial left nephrectomy without complication.  Postop course was complicated by abdominal pain, nausea and vomiting.  She then developed hypotension requiring transfer to the ICU.  CT abdomen showed distention of the ascending and transverse colon with wall thickening, inflammation, edema and possible pneumatosis.  NG tube was placed, GI was consulted and she underwent colonoscopy with decompression on 11/1 with Jesus Bland MD.  No mechanical obstruction was identified.  Repeat imaging showed SBO with incarcerated incisional hernia, requiring return to the OR  "on 11/2 with Tino Banegas MD for reduction and repair.\"  Additional factors influencing patient status / progress (ie: cognitive factors, co-morbidities, social support, etc): past medical history of hypertension, hep C, tobacco abuse, history of blood clots, hypothyroidism, renal mass, chronic low back pain.      At baseline pt is independent with ADLs and mobility with no device, receives some assist from family for IADLs. At time of OT evaluation patient presents below baseline requiring sup to total for ADLs and min A for mobility with FWW. She is primarily limited by chronic low back pain as well as abdominal pain in incision impacting tolerance and ability to reach feet for lower body ADLs, she also presents with decreased activity tolerance and mild balance impairments.     Plan  Recommend Occupational Therapy  minutes per day 5-7 days per week for 7-10 days for the following treatments:  OT Self Care/ADL, OT Neuro Re-Ed/Balance, OT Therapeutic Activity, OT Evaluation and OT Therapeutic Exercise.    Goals:  Long term and short term goals have been discussed with patient and they are in agreement.    Occupational Therapy Goals (Active)     Problem: Bathing     Dates: Start: 11/10/21       Goal: STG-Within one week, patient will bathe with setup/supervision     Dates: Start: 11/10/21             Problem: Dressing     Dates: Start: 11/10/21       Goal: STG-Within one week, patient will dress LB with setup using AE as needed     Dates: Start: 11/10/21             Problem: Functional Transfers     Dates: Start: 11/10/21       Goal: STG-Within one week, patient will transfer to toilet with supervision using LRAD     Dates: Start: 11/10/21             Problem: OT Long Term Goals     Dates: Start: 11/10/21       Goal: LTG-By discharge, patient will complete basic self care tasks with supervision to modified independence     Dates: Start: 11/10/21          Goal: LTG-By discharge, patient will perform bathroom " transfers with supervision to modified independence using LRAD     Dates: Start: 11/10/21             Problem: Toileting     Dates: Start: 11/10/21       Goal: STG-Within one week, patient will complete toileting tasks with supervision     Dates: Start: 11/10/21

## 2021-11-10 NOTE — FLOWSHEET NOTE
11/09/21 1730   Patient History   Pulmonary Diagnosis None   Procedures Relevant to Respiratory Status Multiple abdominal surgeries    Home O2 No   Nocturnal CPAP No   Home Treatments/Frequency No   Protocol Pathways   Protocol Pathways Hyperinflation Protocol   Hyperinflation Protocol   Hyperinflation Protocol Indications Abdominal/Thoracic Surgeries (Open Heart)   Hyperinflation Protocol Goals/Outcome Greater Than 60% of Predicted I.S. Volume x 24 hrs

## 2021-11-10 NOTE — THERAPY
Physical Therapy   Initial Evaluation     Patient Name: Thais Munroe  Age:  66 y.o., Sex:  female  Medical Record #: [id number]  Today's Date: 11/10/2021     Subjective    Patient agreeable to PT; received sitting in w/c at bedside.     Objective       11/10/21 0831   Prior Living Situation   Prior Services None   Housing / Facility 1 Story House   Steps Into Home   (3 curb/platform steps)   Steps In Home 0   Rail None   Equipment Owned 4-Wheel Walker   Lives with - Patient's Self Care Capacity Adult Children;Child Less than 18 Years of Age   Prior Level of Functional Mobility   Bed Mobility Independent   Transfer Status Independent   Ambulation Independent   Distance Ambulation (Feet)   (community distances)   Assistive Devices Used None   Stairs Independent   Prior Functioning: Everyday Activities   Self Care Independent   Indoor Mobility (Ambulation) Independent   Stairs Independent   Functional Cognition Independent   Prior Device Use None of the given options   Vitals   O2 (LPM) 0   O2 Delivery Device None - Room Air   Pain   Intervention Education;Ambulation / Increased Activity;Rest;Repositioned;Distraction   Pain 0 - 10 Group   Location Abdomen   Location Orientation Left;Right;Lateral   Description Cramping;Deep;Sore;Intermittent   Comfort Goal 0   Therapist Pain Assessment During Activity   Cognition    Cognition / Consciousness X   Safety Awareness Impaired   New Learning Impaired   Attention Impaired   Comments Pt uses reading glasses; also reports has full uppers and lower dentures but only the uppers are here.   ABS (Agitated Behavior Scale)   Agitated Behavior Scale Performed No   Passive ROM Lower Body   Passive ROM Lower Body WDL   Active ROM Lower Body    Active ROM Lower Body  WDL   Strength Lower Body   Lower Body Strength  X   Comments B hip flexion at least 3+/5 but limited by abdominal discomfort at this time; grossly 4/5 BLE MMTs   Sensation Lower Body   Comments B proprioception WNL; pt  "reports hx of \"sciatic\" LBP for about 10 years but variable intensity   Lower Body Muscle Tone   Lower Body Muscle Tone  WDL   Bed Mobility    Supine to Sit Minimal Assist   Sit to Supine Moderate Assist   Sit to Stand Contact Guard Assist   Scooting Stand by Assist   Rolling Supervised   Neurological Concerns   Neurological Concerns Yes   Paresthesia Present   Comments Pt has BLE edema of 2+ and declined compression stockings this session; provided education on benefits; pt does report elevated B feet when in bed; reviewed B ankle pumps at well for 1x10 in sitting and discussed ability to perform in supine as well.   Coordination Lower Body    Coordination Lower Body  WDL   Roll Left and Right   Assistance Needed Verbal cues   Physical Assistance Level No physical assistance   CARE Score - Roll Left and Right 4   Roll Left and Right Discharge Goal   Discharge Goal 6   Sit to Lying   Assistance Needed Physical assistance   Physical Assistance Level 26%-50%   CARE Score - Sit to Lying 3   Sit to Lying Discharge Goal   Discharge Goal 6   Lying to Sitting on Side of Bed   Assistance Needed Physical assistance   Physical Assistance Level 25% or less   CARE Score - Lying to Sitting on Side of Bed 3   Lying to Sitting on Side of Bed Discharge Goal   Discharge Goal 6   Sit to Stand   Assistance Needed Incidental touching   Physical Assistance Level No physical assistance   CARE Score - Sit to Stand 4   Sit to Stand Discharge Goal   Discharge Goal 6   Chair/Bed-to-Chair Transfer   Assistance Needed Incidental touching   Physical Assistance Level No physical assistance   CARE Score - Chair/Bed-to-Chair Transfer 4   Chair/Bed-to-Chair Transfer Discharge Goal   Discharge Goal 6   Toilet Transfer   Assistance Needed Incidental touching   Physical Assistance Level No physical assistance   CARE Score - Toilet Transfer 4   Toilet Transfer Discharge Goal   Discharge Goal 6   Car Transfer   Reason if not Attempted Environmental " limitations   CARE Score - Car Transfer 10   Car Transfer Discharge Goal   Discharge Goal 4   Walk 10 Feet   Assistance Needed Incidental touching   Physical Assistance Level No physical assistance   CARE Score - Walk 10 Feet 4   Walk 10 Feet Discharge Goal   Discharge Goal 6   Walk 50 Feet with Two Turns   Assistance Needed Incidental touching   Physical Assistance Level No physical assistance   CARE Score - Walk 50 Feet with Two Turns 4   Walk 50 Feet with Two Turns Discharge Goal   Discharge Goal 6   Walk 150 Feet   Reason if not Attempted Safety concerns   CARE Score - Walk 150 Feet 88   Walk 150 Feet Discharge Goal   Discharge Goal 4   Walking 10 Feet on Uneven Surfaces   Reason if not Attempted Environmental limitations   CARE Score - Walking 10 Feet on Uneven Surfaces 10   Walking 10 Feet on Uneven Surfaces Discharge Goal   Discharge Goal 4   1 Step (Curb)   Reason if not Attempted Environmental limitations   CARE Score - 1 Step (Curb) 10   1 Step (Curb) Discharge Goal   Discharge Goal 4   4 Steps   Reason if not Attempted Environmental limitations   CARE Score - 4 Steps 10   4 Steps Discharge Goal   Discharge Goal 4   12 Steps   Reason if not Attempted Safety concerns   CARE Score - 12 Steps 88   12 Steps Discharge Goal   Discharge Goal 4   Picking Up Object   Reason if not Attempted Safety concerns   CARE Score - Picking Up Object 88   Picking Up Object Discharge Goal   Discharge Goal 88   Wheel 50 Feet with Two Turns   Reason if not Attempted Activity not applicable   CARE Score - Wheel 50 Feet with Two Turns 9   Wheel 50 Feet with Two Turns Discharge Goal   Discharge Goal 9   Wheel 150 Feet   Reason if not Attempted Activity not applicable   CARE Score - Wheel 150 Feet 9   Wheel 150 Feet Discharge Goal   Discharge Goal 9   Gait Functional Level of Assist    Gait Level Of Assist Contact Guard Assist   Assistive Device Front Wheel Walker  (and Gait belt)   Distance (Feet)   (95 ft, 50 ft, and 30 ft)   # of  Times Distance was Traveled   (per above)   Deviation Antalgic;Trendelenberg;Bradykinetic;Decreased Heel Strike;Decreased Toe Off   Wheelchair Functional Level of Assist   Wheelchair Assist   (n/a)   Distance Wheelchair (Feet or Distance)   (n/a)   Wheelchair Description   (n/a)   Stairs Functional Level of Assist   Level of Assist with Stairs   (Unable to perform due to Admit isolation precautions)   # of Stairs Climbed   (Unable to perform due to Admit isolation precautions)   Stairs Description   (Unable to perform due to Admit isolation precautions)   Transfer Functional Level of Assist   Bed, Chair, Wheelchair Transfer Contact Guard Assist   Bed Chair Wheelchair Transfer Description Adaptive equipment;Increased time;Set-up of equipment;Supervision for safety;Verbal cueing   Toilet Transfers Contact Guard Assist   Toilet Transfer Description Adaptive equipment;Increased time;Set-up of equipment;Supervision for safety;Verbal cueing   Problem List    Problems Pain;Impaired Bed Mobility;Impaired Transfers;Impaired Ambulation;Functional Strength Deficit;Impaired Balance;Decreased Activity Tolerance;Safety Awareness Deficits / Cognition;Limited Knowledge of Post-Op Precautions   Precautions   Precautions Fall Risk   Comments abdominal incision, requires redirection, admit isolation   Current Discharge Plan   Current Discharge Plan Return to Prior Living Situation   Interdisciplinary Plan of Care Collaboration   IDT Collaboration with  Certified Nursing Assistant;Hospitalist RN;Occupational Therapist   Patient Position at End of Therapy Seated;Chair Alarm On;Self Releasing Lap Belt Applied;Call Light within Reach;Tray Table within Reach;Phone within Reach   Collaboration Comments CNA: Medium BM, room board, CLOF.  OT: CLOF, progress, education carryover, strengths, barriers, home setup, PLOF.  Hospitalist: present during session for 10 min, CLOF, barriers.   Benefit   Therapy Benefit Patient Would Benefit from  Inpatient Rehabilitation Physical Therapy to Maximize Functional [place] with ADLs, IADLs and Mobility.   Strengths & Barriers   Strengths Able to follow instructions;Alert and oriented;Independent prior level of function;Making steady progress towards goals;Manages pain appropriately;Pleasant and cooperative;Willingly participates in therapeutic activities   Barriers Constipation;Decreased endurance;Generalized weakness;Home accessibility;Impaired activity tolerance;Impaired balance;Impaired carryover of learning;Lack of motivation;Limited mobility;Pain   PT Total Time Spent   PT Individual Total Time Spent (Mins) 60   PT Charge Group   Charges Yes   PT Gait Training 1   PT Evaluation PT Evaluation Mod       Assessment  Patient is 66 y.o. female with a diagnosis of Renal cell carcinoma, resection of Left adrenal mass and partial Left nephrectomy, Abdominal distension, and GI colonoscopy with decompression.    Additional factors influencing patient status / progress (ie: cognitive factors, co-morbidities, social support, etc): status-post NG tube and now on Soft GI (low fiber) diet, status-post JARRETT drain, pt benefits from re-direction to task and repetition of education, reports 3 platform steps to enter home, reports IND PLOF with AD but also reports having two old 4WWs available, impaired endurance, abdominal pain especially with bed mobility, impaired lumbar core/trunk stabilization, good social support, motivated.    Plan  Recommend Physical Therapy  minutes per day 5-7 days per week for 8 days for the following treatments:  PT Group Therapy, PT Gait Training, PT Therapeutic Exercises, PT Neuro Re-Ed/Balance, PT Therapeutic Activity and PT Manual Therapy.    Passport items to be completed:  Passport items to be completed:  Get in/out of bed safely, in/out of a vehicle, safely use mobility device, walk or wheel around home/community, navigate up and down stairs, show how to get up/down from the ground,  ensure home is accessible, demonstrate HEP, complete caregiver training    Goals:  Long term and short term goals have been discussed with patient and they are in agreement.    Physical Therapy Problems (Active)     Problem: Mobility     Dates: Start: 11/10/21       Goal: STG-Within one week, patient will ambulate community distances x200 feet with a 4WW at SPV level.     Dates: Start: 11/10/21          Goal: STG-Within one week, patient will ambulate up/down three curbs with a 4WW at SBA level.     Dates: Start: 11/10/21          Goal: STG-Within one week, patient will ambulate in-room distances with a 4WW at IND level.     Dates: Start: 11/10/21             Problem: Mobility Transfers     Dates: Start: 11/10/21       Goal: STG-Within one week, patient will transfer bed to chair with a 4WW at IND level.     Dates: Start: 11/10/21          Goal: STG-Within one week, patient will transfer in/out of car with a 4WW at SPV level.     Dates: Start: 11/10/21             Problem: PT-Long Term Goals     Dates: Start: 11/10/21       Goal: LTG-By discharge, patient will ambulate x200 feet with a 4WW at SPV level.     Dates: Start: 11/10/21          Goal: LTG-By discharge, patient will transfer one surface to another with a 4WW at IND level.     Dates: Start: 11/10/21          Goal: LTG-By discharge, patient will transfer in/out of a car with a 4WW at SPV level.     Dates: Start: 11/10/21          Goal: LTG-By discharge, patient will ambulate up/down three curbs with a 4WW at SBA level.     Dates: Start: 11/10/21

## 2021-11-10 NOTE — FLOWSHEET NOTE
11/09/21 1720   Events/Summary/Plan   Events/Summary/Plan RT Assessment   Vital Signs   Pulse 98   Respiration 16   Pulse Oximetry 94 %   $ Pulse Oximetry (Spot Check) Yes   Respiratory Assessment   Respiratory Pattern Within Normal Limits   Breath Sounds   RUL Breath Sounds Clear   RML Breath Sounds Clear   RLL Breath Sounds Clear   SCOTTY Breath Sounds Clear   LLL Breath Sounds Clear   Secretions   Cough Non Productive   Oxygen   O2 Delivery Device None - Room Air   Smoking History   Have you ever smoked Yes   Have you smoked in the last 12 months Yes   Have you quit smoking Yes   Confirm Quit Date 10/26/21

## 2021-11-10 NOTE — CONSULTS
HOSPITAL MEDICINE CONSULTATION    Requesting Physician:  Dr. Deng    Reason for Consult:  Post-operative ileus    History of Present Illness:  The patient is a 66-year-old  female with past medical history significant for hypertension, hepatitis C, hypoythyroidism, and gout.  She was admitted to [facility] on 10/27/21 for scheduled surgery with Dr. Mccracken.  She underwent robotic-assisted laparoscopic left partial nephrectomy; pathology demonstrated renal cell carcinoma.  The patient's post-operative course was complicated by colonic pseudo-obstruction requiring colonoscopic decompression.  She then developed small bowel obstruction with incarcerated incisional hernia, and underwent surgical reduction and repair on 11/2/21 by Dr. Banegas.  This was followed by an ileus and acute kidney injury, which has since resolved.  Due to her ongoing functional debility, the patient was transferred to [facility] on 11/9/21.  Hospital Medicine consultation is requested to assist in the management of this patient's unresolving post-op ileus.    Review of Systems:  Review of Systems   Constitutional: Negative for chills and fever.   HENT: Negative.    Eyes: Negative.    Respiratory: Negative for cough and shortness of breath.    Cardiovascular: Negative for chest pain and palpitations.   Gastrointestinal: Positive for abdominal pain. Negative for nausea and vomiting.        +bloating   Musculoskeletal:        Wound pain    Skin: Negative for itching and rash.   Endo/Heme/Allergies: Negative for polydipsia. Does not bruise/bleed easily.   All other systems reviewed and are negative.      Allergies:  No Known Allergies    Medications:    Current Facility-Administered Medications:   •  vitamin D3 (cholecalciferol) tablet 2,000 Units, 2,000 Units, Oral, DAILY, Chanel Matt M.D., 2,000 Units at 11/14/21 1318  •  senna-docusate (PERICOLACE or SENOKOT S) 8.6-50 MG per tablet 2 Tablet, 2  Tablet, Oral, BID, Chanel Matt M.D., 2 Tablet at 11/14/21 2124  •  simethicone (MYLICON) chewable tab 160 mg, 160 mg, Oral, 4X/DAY WITH MEALS + NIGHTLY, Chanel Matt M.D., 160 mg at 11/14/21 2124  •  [DISCONTINUED] senna-docusate (PERICOLACE or SENOKOT S) 8.6-50 MG per tablet 2 Tablet, 2 Tablet, Oral, BID, 2 Tablet at 11/13/21 0745 **AND** polyethylene glycol/lytes (MIRALAX) PACKET 1 Packet, 1 Packet, Oral, BID, 1 Packet at 11/14/21 2124 **AND** magnesium hydroxide (MILK OF MAGNESIA) suspension 30 mL, 30 mL, Oral, QDAY PRN **AND** bisacodyl (DULCOLAX) suppository 10 mg, 10 mg, Rectal, DAILY, Mya Deng M.D.  •  acetaminophen (TYLENOL) tablet 1,000 mg, 1,000 mg, Oral, TID, Mya Deng M.D., 1,000 mg at 11/14/21 2123  •  hydrOXYzine HCl (ATARAX) tablet 50 mg, 50 mg, Oral, Q6HRS PRN, Mya Deng M.D.  •  melatonin tablet 3 mg, 3 mg, Oral, HS PRN, Mya Deng M.D.  •  Respiratory Therapy Consult, , Nebulization, Continuous RT, Mya Deng M.D.  •  Pharmacy Consult Request ...Pain Management Review 1 Each, 1 Each, Other, PHARMACY TO DOSE, Mya Deng M.D.  •  hydrALAZINE (APRESOLINE) tablet 10 mg, 10 mg, Oral, Q8HRS PRN, Mya Deng M.D.  •  lactulose 20 GM/30ML solution 30 mL, 30 mL, Oral, QDAY PRN, Mya Deng M.D.  •  artificial tears ophthalmic solution 1 Drop, 1 Drop, Both Eyes, PRN, Mya Deng M.D.  •  benzocaine-menthol (CEPACOL) lozenge 1 Lozenge, 1 Lozenge, Mouth/Throat, Q2HRS PRN, Mya Deng M.D.  •  mag hydrox-al hydrox-simeth (MAALOX PLUS ES or MYLANTA DS) suspension 20 mL, 20 mL, Oral, Q2HRS PRN, Mya Deng M.D.  •  ondansetron (ZOFRAN ODT) dispertab 4 mg, 4 mg, Oral, 4X/DAY PRN **OR** ondansetron (ZOFRAN) syringe/vial injection 4 mg, 4 mg, Intramuscular, 4X/DAY PRN, Mya Deng M.D.  •  traZODone (DESYREL) tablet 50 mg, 50 mg, Oral, QHS PRN, Mya Deng M.D.  •  sodium chloride (OCEAN) 0.65 % nasal spray 2 Spray, 2  Spray, Nasal, PRN, Mya Deng M.D.  •  oxyCODONE immediate-release (ROXICODONE) tablet 5 mg, 5 mg, Oral, Q5H PRN **OR** oxyCODONE immediate release (ROXICODONE) tablet 10 mg, 10 mg, Oral, Q5H PRN, Mya Deng M.D., 10 mg at 11/14/21 2123  •  allopurinol (ZYLOPRIM) tablet 300 mg, 300 mg, Oral, DAILY, Mya Deng M.D., 300 mg at 11/14/21 0823  •  amLODIPine (NORVASC) tablet 10 mg, 10 mg, Oral, Q EVENING, Mya Deng M.D., 10 mg at 11/14/21 2123  •  calcium carbonate (TUMS) chewable tab 1,000 mg, 1,000 mg, Oral, TID PRN, Mya Deng M.D.  •  gabapentin (NEURONTIN) capsule 200 mg, 200 mg, Oral, TID, Mya Deng M.D., 200 mg at 11/14/21 2123  •  heparin injection 5,000 Units, 5,000 Units, Subcutaneous, Q8HRS, Mya Deng M.D., 5,000 Units at 11/14/21 2124  •  levothyroxine (SYNTHROID) tablet 50 mcg, 50 mcg, Oral, AM ES, Mya Deng M.D., 50 mcg at 11/14/21 0536  •  lidocaine (LIDODERM) 5 % 2 Patch, 2 Patch, Transdermal, Q24HR, Mya Deng M.D., 2 Patch at 11/14/21 2124  •  nicotine (NICODERM) 21 MG/24HR 21 mg, 1 Patch, Transdermal, Q24HRS, Mya Deng M.D., 21 mg at 11/14/21 0823  •  omeprazole (PRILOSEC) capsule 20 mg, 20 mg, Oral, DAILY, Mya Deng M.D., 20 mg at 11/14/21 0823    Past Medical/Surgical History:  Past Medical History:   Diagnosis Date   • Blood clotting disorder (HCC)     leg    • Cancer (HCC)     basil cell   • Cancer (HCC) 10/13/2021    renal   • Coughing blood 10/13/2021    pt states dry clear cough   • Dental disorder 10/13/2021    upper lower dentures   • Disorder of thyroid    • Hepatitis C    • Hypertension 10/13/2021    pt states well controlled on meds   • Pain 10/13/2021    spine   • Urinary incontinence      Past Surgical History:   Procedure Laterality Date   • UMBILICAL HERNIA REPAIR  11/2/2021    Procedure: REPAIR, HERNIA, INCISIONAL - INCARCERATED;  Surgeon: Tino Banegas M.D.;  Location: SURGERY [facility];   Service: General   • PB COLONOSCOPY,DIAGNOSTIC N/A 11/1/2021    Procedure: COLONOSCOPY;  Surgeon: Jesus Bland M.D.;  Location: SURGERY SAME DAY [facility];  Service: Gastroenterology   • PB LAP,PARTIAL NEPHRECTOMY Left 10/27/2021    Procedure: NEPHRECTOMY, PARTIAL, ROBOT-ASSISTED, USING DA NAT XI;  Surgeon: Peter Mccracken M.D.;  Location: SURGERY [facility];  Service: Uro Robotic   • PB ULTRASONIC GUIDANCE, INTRAOPERATIVE Left 10/27/2021    Procedure: ULTRASOUND GUIDANCE;  Surgeon: Peter Mccracken M.D.;  Location: SURGERY [facility];  Service: Uro Robotic   • OTHER  1960    tonsils   • OTHER      basal cell removal back       Social History:  Social History     Socioeconomic History   • Marital status: Single     Spouse name: Not on file   • Number of children: Not on file   • Years of education: Not on file   • Highest education level: Not on file   Occupational History   • Not on file   Tobacco Use   • Smoking status: Current Some Day Smoker     Packs/day: 1.50     Years: 50.00     Pack years: 75.00     Types: Cigarettes   • Smokeless tobacco: Never Used   Vaping Use   • Vaping Use: Never used   Substance and Sexual Activity   • Alcohol use: Yes     Comment: daily   • Drug use: Not Currently   • Sexual activity: Not on file   Other Topics Concern   • Not on file   Social History Narrative   • Not on file     Social Determinants of Health     Financial Resource Strain:    • Difficulty of Paying Living Expenses: Not on file   Food Insecurity:    • Worried About Running Out of Food in the Last Year: Not on file   • Ran Out of Food in the Last Year: Not on file   Transportation Needs:    • Lack of Transportation (Medical): Not on file   • Lack of Transportation (Non-Medical): Not on file   Physical Activity:    • Days of Exercise per Week: Not on file   • Minutes of Exercise per Session: Not on file   Stress:    • Feeling of Stress : Not on file   Social Connections:    • Frequency of Communication with Friends  and Family: Not on file   • Frequency of Social Gatherings with Friends and Family: Not on file   • Attends Worship Services: Not on file   • Active Member of Clubs or Organizations: Not on file   • Attends Club or Organization Meetings: Not on file   • Marital Status: Not on file   Intimate Partner Violence:    • Fear of Current or Ex-Partner: Not on file   • Emotionally Abused: Not on file   • Physically Abused: Not on file   • Sexually Abused: Not on file   Housing Stability:    • Unable to Pay for Housing in the Last Year: Not on file   • Number of Places Lived in the Last Year: Not on file   • Unstable Housing in the Last Year: Not on file       Family History:  History reviewed. No pertinent family history.    Physical Examination:   Vitals:    11/13/21 0630 11/13/21 1915 11/14/21 0700 11/14/21 1850   BP: 134/81 144/94 129/85 140/84   Pulse: 90 92 88 88   Resp: 19 18 14 18   Temp: 37.2 °C (98.9 °F) 36.4 °C (97.5 °F) 36.7 °C (98 °F) 36.3 °C (97.4 °F)   TempSrc: Temporal Temporal Temporal Oral   SpO2: 96%  94%    Weight:       Height:           Physical Exam  Vitals reviewed.   Constitutional:       General: She is not in acute distress.     Appearance: Normal appearance. She is not ill-appearing.   HENT:      Head: Normocephalic and atraumatic.      Right Ear: External ear normal.      Left Ear: External ear normal.      Nose: Nose normal.      Mouth/Throat:      Pharynx: Oropharynx is clear.   Eyes:      General:         Right eye: No discharge.         Left eye: No discharge.      Extraocular Movements: Extraocular movements intact.      Conjunctiva/sclera: Conjunctivae normal.   Cardiovascular:      Rate and Rhythm: Normal rate and regular rhythm.   Pulmonary:      Effort: Pulmonary effort is normal. No respiratory distress.      Breath sounds: Normal breath sounds. No wheezing.   Abdominal:      General: Bowel sounds are normal. There is distension.      Palpations: Abdomen is soft.      Tenderness: There  is abdominal tenderness. There is no guarding or rebound.   Musculoskeletal:      Cervical back: Normal range of motion and neck supple.      Right lower leg: Edema present.      Left lower leg: Edema present.   Skin:     General: Skin is warm and dry.   Neurological:      Mental Status: She is alert and oriented to person, place, and time.           Impressions/Recommendations:  Hepatitis C  Details unknown  Hepatology F/U    Hypertension  Observe blood pressure trends on Norvasc    Acquired hypothyroidism  TSH 21.6 and FT4 1.28  On Synthroid  Needs F/U    Gout  On Allopurinol  Monitor for acute flare-ups    Renal cell carcinoma (HCC)  S/P robotic assisted laparoscopic left partial nephrectomy on 10/27/21 by Dr. Mccracken  Pathology +RCC  Needs F/U    Small bowel obstruction (HCC)  Pt initially had colonic pseudo-obstruction S/P colonoscopic decompression  Then developed SBO S/P reduction and repair of incarcerated incisional hernia on 11/2/21 by Dr. Banegas  Now w/ post op ileus  Start scheduled high dose Simethicone and aggressive bowel regimen  Wound care and pain control per Physiatry    Full Code    Discussed with Dr. Deng.  Thank you for the opportunity to assist in this patient's care.  We will continue to follow along with you.

## 2021-11-10 NOTE — THERAPY
Occupational Therapy  Daily Treatment     Patient Name: Thais Munroe  Age:  66 y.o., Sex:  female  Medical Record #: [id number]  Today's Date: 11/10/2021     Precautions  Precautions: (P) Fall Risk  Comments: (P) abdominal incision, requires redirection         Subjective    Patient remarked several times about how swollen her feet and legs are. Stated the tread socks hurt her feet.     Objective       11/10/21 1001   Precautions   Precautions Fall Risk   Comments abdominal incision, requires redirection   Functional Level of Assist   Grooming Modified Independent;Seated   Grooming Description   (washed hands mod I after toileting, seated)   Lower Body Dressing Moderate Assist   Lower Body Dressing Description Assistive devices;Sock aid;Dressing stick;Application of orthotic or brace  (able to don/doff socks w/ AE; assist to don TEDs)   Toileting Supervision   Toileting Description Grab bar;Supervision for safety   Toilet Transfers Stand by Assist   Toilet Transfer Description Grab bar;Set-up of equipment;Supervision for safety;Verbal cueing  (cues to lock brakes)   Bed Mobility    Sit to Supine Stand by Assist   Scooting Stand by Assist   Interdisciplinary Plan of Care Collaboration   IDT Collaboration with  Occupational Therapist;Physician   Patient Position at End of Therapy In Bed;Call Light within Reach;Tray Table within Reach;Phone within Reach   Collaboration Comments Dr. Deng requested OT get MALCOM hose for patient; primary OT requested AE be taken to patient and teach her how to use it   OT Total Time Spent   OT Individual Total Time Spent (Mins) 30   OT Charge Group   OT Self Care / ADL 2     Educated patient on how to use sock aide and dressing stick for LB dressing.    Assessment    Patient with good understanding and ability to use sock aide and dressing stick for donning/doffing socks.  Did not have time to review use of reacher for donning pants.  Strengths: Alert and oriented,Independent prior  level of function,Motivated for self care and independence,Pleasant and cooperative,Willingly participates in therapeutic activities  Barriers: Decreased endurance,Generalized weakness,Impaired activity tolerance,Impaired balance,Pain (impaired attention and memory)    Plan    ADLs with AE, IADLs, functional mobility, standing balance, general strength and endurance building    Occupational Therapy Goals (Active)     Problem: Bathing     Dates: Start: 11/10/21       Goal: STG-Within one week, patient will bathe with setup/supervision     Dates: Start: 11/10/21             Problem: Dressing     Dates: Start: 11/10/21       Goal: STG-Within one week, patient will dress LB with setup using AE as needed     Dates: Start: 11/10/21             Problem: Functional Transfers     Dates: Start: 11/10/21       Goal: STG-Within one week, patient will transfer to toilet with supervision using LRAD     Dates: Start: 11/10/21             Problem: OT Long Term Goals     Dates: Start: 11/10/21       Goal: LTG-By discharge, patient will complete basic self care tasks with supervision to modified independence     Dates: Start: 11/10/21          Goal: LTG-By discharge, patient will perform bathroom transfers with supervision to modified independence using LRAD     Dates: Start: 11/10/21             Problem: Toileting     Dates: Start: 11/10/21       Goal: STG-Within one week, patient will complete toileting tasks with supervision     Dates: Start: 11/10/21

## 2021-11-10 NOTE — H&P
"REHABILITATION HISTORY AND PHYSICAL/POST ADMISSION EVALUATION    11/9/2021  4:34 PM  Thais Munroe  RH12/[id number]  Admission  11/9/2021 11:31 AM  [facility] Code/Reason for admission: 0016 - Debility (Non-Cardiac, Non-Pulmonary)   Etiologic diagnosis/problem: Renal cell carcinoma (HCC)  Chief Complaint: Abdominal pain    HPI:  Per Dr. Bruce's consult \"The patient is a 66 y.o. female with a past medical history of hypertension, hep C, tobacco abuse, history of blood clots, hypothyroidism, renal mass;  who presented on 10/27/2021 12:21 PM for resection of left adrenal mass with Dr. Mccracken.  Patient underwent robotic assisted partial left nephrectomy without complication.  Postop course was complicated by abdominal pain, nausea and vomiting.  She then developed hypotension requiring transfer to the ICU.  CT abdomen showed distention of the ascending and transverse colon with wall thickening, inflammation, edema and possible pneumatosis.  NG tube was placed, GI was consulted and she underwent colonoscopy with decompression on 11/1 with Jesus Bland MD.  No mechanical obstruction was identified.  Repeat imaging showed SBO with incarcerated incisional hernia, requiring return to the OR on 11/2 with Tino Banegas MD for reduction and repair.\"    Pathology returned as renal cell carcinoma.  Plan for outpatient follow-up with surgery and oncology for treatment plan after discharge.      Postoperatively patient with issues with pain control, acute kidney injury, ileus requiring an NG tube.  Since that time her NG tube has been removed, her JARRETT drain has been removed, and she has been moving her bowels.  TSH elevated at 18.2 with a free T4 of 0.80 on 11/4.    Patient current reports abdominal pain, and some distention though she said it is much improved compared to yesterday.  She does have swelling in her feet but she reports this is a chronic problem she has even at home.  She reports a history of memory difficulties even prior " to the surgery.  She reports she was delirious when on morphine at the acute hospital.    Patient was evaluated by Rehab Medicine physician and Physical Therapy and Occupational Therapy and determined to be appropriate for acute inpatient rehab and was transferred to [facility] on 11/9/2021 11:31 AM.    With this acute therapeutic intervention, this patient hopes to improve her functional status, and return to independent living with the supportive care of family.    REVIEW OF SYSTEMS:     A complete review of systems was performed and was negative in detail with the exception of items mentioned elsewhere in this document.    PMH:  Past Medical History:   Diagnosis Date   • Blood clotting disorder (HCC)     leg    • Cancer (HCC)     basil cell   • Cancer (HCC) 10/13/2021    renal   • Coughing blood 10/13/2021    pt states dry clear cough   • Dental disorder 10/13/2021    upper lower dentures   • Disorder of thyroid    • Hepatitis C    • Hypertension 10/13/2021    pt states well controlled on meds   • Pain 10/13/2021    spine   • Urinary incontinence        PSH:  Past Surgical History:   Procedure Laterality Date   • UMBILICAL HERNIA REPAIR  11/2/2021    Procedure: REPAIR, HERNIA, INCISIONAL - INCARCERATED;  Surgeon: Tino Banegas M.D.;  Location: SURGERY [facility];  Service: General   • PB COLONOSCOPY,DIAGNOSTIC N/A 11/1/2021    Procedure: COLONOSCOPY;  Surgeon: Jesus Bland M.D.;  Location: SURGERY SAME DAY [facility];  Service: Gastroenterology   • PB LAP,PARTIAL NEPHRECTOMY Left 10/27/2021    Procedure: NEPHRECTOMY, PARTIAL, ROBOT-ASSISTED, USING DA NAT XI;  Surgeon: Peter Mccracken M.D.;  Location: SURGERY [facility];  Service: Uro Robotic   • PB ULTRASONIC GUIDANCE, INTRAOPERATIVE Left 10/27/2021    Procedure: ULTRASOUND GUIDANCE;  Surgeon: Peter Mccracken M.D.;  Location: SURGERY [facility];  Service: Uro Robotic   • OTHER  1958    tonsils   • OTHER      basal cell removal back        History reviewed. No pertinent family history.     MEDICATIONS:  Current Facility-Administered Medications   Medication Dose   • hydrOXYzine HCl (ATARAX) tablet 50 mg  50 mg   • melatonin tablet 3 mg  3 mg   • Respiratory Therapy Consult     • Pharmacy Consult Request ...Pain Management Review 1 Each  1 Each   • hydrALAZINE (APRESOLINE) tablet 10 mg  10 mg   • acetaminophen (Tylenol) tablet 650 mg  650 mg   • lactulose 20 GM/30ML solution 30 mL  30 mL   • artificial tears ophthalmic solution 1 Drop  1 Drop   • benzocaine-menthol (CEPACOL) lozenge 1 Lozenge  1 Lozenge   • mag hydrox-al hydrox-simeth (MAALOX PLUS ES or MYLANTA DS) suspension 20 mL  20 mL   • ondansetron (ZOFRAN ODT) dispertab 4 mg  4 mg    Or   • ondansetron (ZOFRAN) syringe/vial injection 4 mg  4 mg   • traZODone (DESYREL) tablet 50 mg  50 mg   • sodium chloride (OCEAN) 0.65 % nasal spray 2 Spray  2 Spray   • acetaminophen (TYLENOL) tablet 1,000 mg  1,000 mg   • oxyCODONE immediate-release (ROXICODONE) tablet 5 mg  5 mg    Or   • oxyCODONE immediate release (ROXICODONE) tablet 10 mg  10 mg   • simethicone (MYLICON) chewable tab 80 mg  80 mg   • allopurinol (ZYLOPRIM) tablet 300 mg  300 mg   • amLODIPine (NORVASC) tablet 10 mg  10 mg   • calcium carbonate (TUMS) chewable tab 1,000 mg  1,000 mg   • gabapentin (NEURONTIN) capsule 200 mg  200 mg   • heparin injection 5,000 Units  5,000 Units   • levothyroxine (SYNTHROID) tablet 50 mcg  50 mcg   • lidocaine (LIDODERM) 5 % 2 Patch  2 Patch   • nicotine (NICODERM) 21 MG/24HR 21 mg  1 Patch   • omeprazole (PRILOSEC) capsule 20 mg  20 mg   • senna-docusate (PERICOLACE or SENOKOT S) 8.6-50 MG per tablet 2 Tablet  2 Tablet    And   • [START ON 11/10/2021] polyethylene glycol/lytes (MIRALAX) PACKET 1 Packet  1 Packet    And   • magnesium hydroxide (MILK OF MAGNESIA) suspension 30 mL  30 mL    And   • bisacodyl (DULCOLAX) suppository 10 mg  10 mg       ALLERGIES:  Patient has no known  allergies.    PSYCHOSOCIAL HISTORY:  1 SH  1 JL  With: Adult children, one works during the day    LEVEL OF FUNCTION PRIOR TO DISABILTY:  Independent, working, driving    LEVEL OF FUNCTION PRIOR TO ADMISSION to [facility]:  PT:     11/08/21 1009   Other Treatments   Other Treatments Provided Assisted pt w/donning of hospital panties/brief 2* urinary incont to keep from using the Pur-wick. Pt did state she was incont PTA and wore pads.    Balance   Sitting Balance (Static) Fair +   Sitting Balance (Dynamic) Fair   Standing Balance (Static) Fair   Standing Balance (Dynamic) Fair -   Weight Shift Sitting Fair   Weight Shift Standing Fair   Comments w/FWW   Gait Analysis   Gait Level Of Assist Minimal Assist   Assistive Device Front Wheel Walker   Distance (Feet) 75   # of Times Distance was Traveled 1   Skilled Intervention Verbal Cuing;Postural Facilitation;Compensatory Strategies   Comments Improvement w/pts amb efforts from last PT session, conts to require 2L O2. Pt instructed to push the walker vs picking it up and then taking steps. Improvement w/pts activity tolerance.    Bed Mobility    Supine to Sit Supervised  (HOB partially elevated and use of railing)   Sit to Supine    (pt left seated in chair)   Scooting Supervised  (seated)   Rolling Supervised  (w/railing and HOB elevated)   Skilled Intervention Verbal Cuing   Comments Improvement w/pts bed mobility tasks 2* improvement w/her pain.    Functional Mobility   Sit to Stand Supervised  (from EOB/toilet height)   Bed, Chair, Wheelchair Transfer Minimal Assist  (w/FWW)   Toilet Transfers Minimal Assist  (BSC over the toilet)   Skilled Intervention Verbal Cuing   Comments Pt enouraged to start amb to the BR.          OT:    Upper Body Dressing: Minimal Assist  Lower Body Dressing: Maximal Assist  Toileting:  (NT-refused need)      CURRENT LEVEL OF FUNCTION:   Same as level of function prior to admission to [facility]  "Hospital    PHYSICAL EXAM:     VITAL SIGNS:   height is 1.676 m (5' 6\") and weight is 86.4 kg (190 lb 7.6 oz). Her oral temperature is 36.7 °C (98.1 °F). Her blood pressure is 144/84 and her pulse is 91. Her respiration is 18 and oxygen saturation is 96%.     GENERAL: No apparent distress  HEENT: Normocephalic/atraumatic, moist mucous membranes  CARDIAC: Regular rate and rhythm, normal S1, S2, no murmurs  LUNGS: Clear to auscultation, normal respiratory effort, on room air   ABDOMINAL: soft, nontender and distended, incision with wound dehiscence  EXTREMITIES:   MSK: Edema in bilateral feet    NEURO:    Mental status: Alert, hyperverbal    Motor:  Shoulder flexors:  Right -  5/5, Left -  5/5  Elbow flexors:  Right -  5/5, Left -  5/5  Elbow extensors:  Right -  5/5, Left -  5/5  Symmetrical   Hip flexors:  Right -  5/5, Left -  5/5  Knee ext:  Right -  5/5, Left -  5/5  Dorsiflexors:  Right -  5/5, Left -  5/5  EHL:  Right -  5/5, Left -  5/5  Plantar flexors:  Right -  5/5, Left -  5/5       RADIOLOGY:                   Results for orders placed during the hospital encounter of 10/27/21    CT-ABDOMEN-PELVIS W/O    Impression  1.  Partial small bowel obstruction involving the midportion of the small bowel secondary to new left-sided abdominal hernia.    2.  No evidence of abdominal or pelvic abscess or urinoma.    3.  Persistent dilatation of the cecum measuring 10 cm in diameter.    4.  Distended gallbladder with minimal gallbladder wall thickening. No calcified gallstone or biliary dilatation identified.      Findings were discussed with ANH STERLING on 11/2/2021 12:53 PM.                    LABS:  Recent Labs     11/07/21  0132 11/08/21 0030 11/09/21  0418   SODIUM 133* 132* 136   POTASSIUM 3.7 3.5* 3.7   CHLORIDE 102 102 105   CO2 19* 21 21   GLUCOSE 125* 98 95   BUN 19 15 12   CREATININE 1.29 1.33 1.12   CALCIUM 9.1 8.8 8.4*     Recent Labs     11/08/21  0030 11/09/21  0418   WBC 11.9* 10.2   RBC 3.32* " 3.09*   HEMOGLOBIN 9.9* 9.3*   HEMATOCRIT 30.1* 28.2*   MCV 90.7 91.3   MCH 29.8 30.1   MCHC 32.9* 33.0*   RDW 46.9 48.2   PLATELETCT 290 307   MPV 9.9 10.1         PRIMARY REHAB DIAGNOSIS:    This patient is a 66 y.o. female admitted for acute inpatient rehabilitation with Renal cell carcinoma (HCC).    IMPAIRMENTS:   Cognitive  ADLs/IADLs  Mobility    SECONDARY DIAGNOSIS/MEDICAL CO-MORBIDITIES AFFECTING FUNCTION:    Postoperative ileus  Wound dehiscence  Acute kidney injury  Hypertension  Hypothyroidism  Normocytic anemia  Tobacco use  History of gout      RELEVANT CHANGES SINCE PREADMISSION EVALUATION:    Status unchanged    The patient's rehabilitation potential is Good  The patient's medical prognosis is fair    PLAN:   Discussion and Recommendations, discussed with the patient and/or family:   1. The patient requires an acute inpatient rehabilitation program with a coordinated program of care at an intensity and frequency not available at a lower level of care. This recommendation is substantiated by the patient's medical physicians who recommend that the patient's intervention and assessment of medical issues needs to be done at an acute level of care for patient's safety and maximum outcome.     2. A coordinated program of care will be supplied by an interdisciplinary team of physical therapy, occupational therapy, rehab physician, rehab nursing, and, if needed, speech therapy and rehab psychology. Rehab team presents a patient-specific rehabilitation and education program concentrating on prevention of future problems related to accessibility, mobility, skin, bowel, bladder, sexuality, and psychosocial and medical/surgical problems.     3. Need for Rehabilitation Physician: The rehab physician will be evaluating the patient on a multi-weekly basis to help coordinate the program of care. The rehab physician communicates between medical physicians, therapists, and nurses to maximize the patient's potential  outcome. Specific areas in which the rehab physician will be providing daily assessment include the following:   A. Assessing the patient's heart rate and blood pressure response (vitals monitoring) to activity and making adjustments in medications or conservative measures as needed.   B. The rehab physician will be assessing the frequency at which the program can be increased to allow the patient to reach optimal functional outcome.   C. The rehab physician will also provide assessments in daily skin care, especially in light of patient's impairments in mobility.   D. The rehab physician will provide special expertise in understanding how to work with functional impairment and recommend appropriate interventions, compensatory techniques, and education that will facilitate the patient's outcome.     4. Rehab R.N.   The rehab RN will be working with patient to carry over in room mobility and activities of daily living when the patient is not in 3 hours of skilled therapy. Rehab nursing will be working in conjunction with rehab physician to address all the medical issues above and continue to assess laboratory work and discuss abnormalities with the treating physicians, assess vitals, and response to activity, and discuss and report abnormalities with the rehab physician. Rehab RN will also continue daily skin care, supervise bladder/bowel program, instruct in medication administration, and ensure patient safety.     5. Therapies to treat at intensity and frequency of (may change after completion of evaluation by all therapeutic disciplines):       PT:  Physical therapy to address mobility, transfer, gait training and evaluation for adaptive equipment needs 1hour/day at least 5 days/week for the duration of the ELOS (see below)       OT:  Occupational therapy to address ADLs, self-care, home management training, functional mobility/transfers and assistive device evaluation, and community re-integration 1hour/day at  least 5 days/week for the duration of the ELOS (see below).        ST/Dysphagia:  Speech therapy to address speech, language, and cognitive deficits as well as swallowing difficulties with retraining/dysphagia management and community re-integration with comprehension, expression, cognitive training 1hour/day at least 5 days/week for the duration of the ELOS (see below).     6. Medical management / Rehabilitation Issues/Adverse Potential affecting function as part of rehabilitation plan.    Small bowel obstruction status post   S/P surgical repair  11/2  Pain management  Outpatient follow-up with surgery    Postoperative ileus  Started on simethicone  Check x-ray, portable x-ray machine is currently down     Wound dehiscence  Wound care consult  May need wound VAC    Acute kidney injury  Check a.m. labs    Hypertension  Amlodipine    Hypothyroidism  Levothyroxine  Check TSH    Normocytic anemia  Check a.m. labs    Tobacco use  Nicotine patch  Will need counseling    History of gout  Allopurinol    GI prophylaxis  Omeprazole    I performed a complete drug regimen review and did not identify any potential clinically significant medication issues.    The patient's CODE STATUS was confirmed as FULL CODE on admission, with the patient and/or family at bedside.    REHABILITATION ISSUES/ADVERSE POTENTIAL:  1.  Debility: Patient demonstrates functional deficits in strength, balance, coordination, and ADL's. Patient is admitted to [facility] for comprehensive rehabilitation therapy as described below.   Rehabilitation nursing monitors bowel and bladder control, educates on medication administration, co-morbidities and monitors patient safety.    2.  DVT prophylaxis:  Patient is on heparin for anticoagulation upon transfer. Encourage OOB. Monitor daily for signs and symptoms of DVT including but not limited to swelling and pain to prevent the development of DVT that may interfere with therapies.    3.   Pain: Gabapentin, as needed Tylenol or oxycodone.    4.  Nutrition/Dysphagia: Dietician monitors nutrient intake, recommend supplements prn and provide nutrition education to pt/family to promote optimal nutrition for wound healing/recovery.     5.  Bladder/bowel:  Start bowel and bladder program, to prevent constipation, urinary retention (which may lead to UTI), and urinary incontinence (which will impact upon pt's functional independence).   - TV Q3h while awake with post void bladder scans, I&O cath for PVRs >400  - up to commode after meal     6.  Skin/dermal ulcer prophylaxis: Monitor for new skin conditions with q.2 h. turns as required to prevent the development of skin breakdown.     7.  Cognition/Behavior:  Psychologist Dr. Christopher provides adjustment counseling to illness and psychosocial barriers that may be potential barriers to rehabilitation.     8. Respiratory therapy: RT performs O2 management prn, breathing retraining, pulmonary hygiene and bronchospasm management prn to optimize participation in therapies.    Pt was seen today for 73 min, and entire time spent in face-to-face contact was >50% in counseling and coordination of care as detailed in A/P above.        GOALS/EXPECTED LEVEL OF FUNCTION BASED ON CURRENT MEDICAL AND FUNCTIONAL STATUS (may change based on patient's medical status and rate of impairment recovery):  Transfers:   Modified Independent  Mobility/Gait:   Modified Independent  ADL's:   Modified Independent    DISPOSITION: Discharge to pre-morbid independent living setting with the supportive care of patient's family.      ELOS: 10-12 days    Mya Deng M.D.  Physical Medicine and Rehabilitation

## 2021-11-11 PROCEDURE — 700101 HCHG RX REV CODE 250: Performed by: PHYSICAL MEDICINE & REHABILITATION

## 2021-11-11 PROCEDURE — 99232 SBSQ HOSP IP/OBS MODERATE 35: CPT | Performed by: HOSPITALIST

## 2021-11-11 PROCEDURE — 97530 THERAPEUTIC ACTIVITIES: CPT

## 2021-11-11 PROCEDURE — 700102 HCHG RX REV CODE 250 W/ 637 OVERRIDE(OP): Performed by: PHYSICAL MEDICINE & REHABILITATION

## 2021-11-11 PROCEDURE — A9270 NON-COVERED ITEM OR SERVICE: HCPCS | Performed by: PHYSICAL MEDICINE & REHABILITATION

## 2021-11-11 PROCEDURE — 770010 HCHG ROOM/CARE - REHAB SEMI PRIVAT*

## 2021-11-11 PROCEDURE — 97116 GAIT TRAINING THERAPY: CPT

## 2021-11-11 PROCEDURE — 99231 SBSQ HOSP IP/OBS SF/LOW 25: CPT | Performed by: PHYSICAL MEDICINE & REHABILITATION

## 2021-11-11 PROCEDURE — 700102 HCHG RX REV CODE 250 W/ 637 OVERRIDE(OP): Performed by: HOSPITALIST

## 2021-11-11 PROCEDURE — A9270 NON-COVERED ITEM OR SERVICE: HCPCS | Performed by: HOSPITALIST

## 2021-11-11 PROCEDURE — 700111 HCHG RX REV CODE 636 W/ 250 OVERRIDE (IP): Performed by: PHYSICAL MEDICINE & REHABILITATION

## 2021-11-11 PROCEDURE — 97110 THERAPEUTIC EXERCISES: CPT

## 2021-11-11 RX ADMIN — LIDOCAINE 2 PATCH: 50 PATCH TOPICAL at 20:39

## 2021-11-11 RX ADMIN — SIMETHICONE 120 MG: 80 TABLET, CHEWABLE ORAL at 08:15

## 2021-11-11 RX ADMIN — HEPARIN SODIUM 5000 UNITS: 5000 INJECTION, SOLUTION INTRAVENOUS; SUBCUTANEOUS at 20:41

## 2021-11-11 RX ADMIN — OXYCODONE HYDROCHLORIDE 10 MG: 10 TABLET ORAL at 20:50

## 2021-11-11 RX ADMIN — POLYETHYLENE GLYCOL 3350 1 PACKET: 17 POWDER, FOR SOLUTION ORAL at 08:24

## 2021-11-11 RX ADMIN — LEVOTHYROXINE SODIUM 50 MCG: 50 TABLET ORAL at 05:35

## 2021-11-11 RX ADMIN — SENNOSIDES AND DOCUSATE SODIUM 2 TABLET: 50; 8.6 TABLET ORAL at 20:41

## 2021-11-11 RX ADMIN — HEPARIN SODIUM 5000 UNITS: 5000 INJECTION, SOLUTION INTRAVENOUS; SUBCUTANEOUS at 14:42

## 2021-11-11 RX ADMIN — OXYCODONE HYDROCHLORIDE 10 MG: 10 TABLET ORAL at 08:25

## 2021-11-11 RX ADMIN — GABAPENTIN 200 MG: 100 CAPSULE ORAL at 20:40

## 2021-11-11 RX ADMIN — ACETAMINOPHEN 1000 MG: 500 TABLET, FILM COATED ORAL at 20:40

## 2021-11-11 RX ADMIN — OMEPRAZOLE 20 MG: 20 CAPSULE, DELAYED RELEASE ORAL at 08:15

## 2021-11-11 RX ADMIN — GABAPENTIN 200 MG: 100 CAPSULE ORAL at 08:15

## 2021-11-11 RX ADMIN — HEPARIN SODIUM 5000 UNITS: 5000 INJECTION, SOLUTION INTRAVENOUS; SUBCUTANEOUS at 05:34

## 2021-11-11 RX ADMIN — SIMETHICONE 120 MG: 80 TABLET, CHEWABLE ORAL at 11:50

## 2021-11-11 RX ADMIN — ALLOPURINOL 300 MG: 300 TABLET ORAL at 08:15

## 2021-11-11 RX ADMIN — GABAPENTIN 200 MG: 100 CAPSULE ORAL at 14:44

## 2021-11-11 RX ADMIN — OXYCODONE HYDROCHLORIDE 10 MG: 10 TABLET ORAL at 14:42

## 2021-11-11 RX ADMIN — AMLODIPINE BESYLATE 10 MG: 5 TABLET ORAL at 20:41

## 2021-11-11 RX ADMIN — NICOTINE TRANSDERMAL SYSTEM 21 MG: 21 PATCH, EXTENDED RELEASE TRANSDERMAL at 08:18

## 2021-11-11 RX ADMIN — ACETAMINOPHEN 1000 MG: 500 TABLET, FILM COATED ORAL at 15:00

## 2021-11-11 RX ADMIN — SIMETHICONE 120 MG: 80 TABLET, CHEWABLE ORAL at 17:34

## 2021-11-11 RX ADMIN — SIMETHICONE 120 MG: 80 TABLET, CHEWABLE ORAL at 20:40

## 2021-11-11 RX ADMIN — ACETAMINOPHEN 1000 MG: 500 TABLET, FILM COATED ORAL at 08:21

## 2021-11-11 ASSESSMENT — ACTIVITIES OF DAILY LIVING (ADL)
BED_CHAIR_WHEELCHAIR_TRANSFER_DESCRIPTION: ADAPTIVE EQUIPMENT;INCREASED TIME;SET-UP OF EQUIPMENT;SUPERVISION FOR SAFETY;VERBAL CUEING
TOILET_TRANSFER_DESCRIPTION: GRAB BAR;INCREASED TIME;SUPERVISION FOR SAFETY;VERBAL CUEING
BED_CHAIR_WHEELCHAIR_TRANSFER_DESCRIPTION: ADAPTIVE EQUIPMENT;INCREASED TIME;SET-UP OF EQUIPMENT;SUPERVISION FOR SAFETY;VERBAL CUEING
TOILETING_LEVEL_OF_ASSIST_DESCRIPTION: GRAB BAR;INCREASED TIME;SUPERVISION FOR SAFETY;VERBAL CUEING

## 2021-11-11 ASSESSMENT — PAIN DESCRIPTION - PAIN TYPE: TYPE: ACUTE PAIN

## 2021-11-11 ASSESSMENT — FIBROSIS 4 INDEX: FIB4 SCORE: 1.57

## 2021-11-11 ASSESSMENT — GAIT ASSESSMENTS
GAIT LEVEL OF ASSIST: STAND BY ASSIST
DEVIATION: TRENDELENBERG;BRADYKINETIC;DECREASED HEEL STRIKE;DECREASED TOE OFF;OTHER (COMMENT)
ASSISTIVE DEVICE: FRONT WHEEL WALKER
DISTANCE (FEET): 110

## 2021-11-11 NOTE — THERAPY
Occupational Therapy  Daily Treatment     Patient Name: Thais Munroe  Age:  66 y.o., Sex:  female  Medical Record #: [id number]  Today's Date: 11/11/2021     Precautions  Precautions: (P) Fall Risk  Comments: (P) abdominal incision with wound vac         Subjective    Pt received up in w/c, agreeable to OT session     Objective       11/11/21 1301   Precautions   Precautions Fall Risk   Comments abdominal incision with wound vac   Functional Level of Assist   Grooming Supervision;Standing   Grooming Description Standing at sink   Toileting Supervision   Toileting Description Grab bar;Increased time;Supervision for safety;Verbal cueing   Toilet Transfers Supervised   Toilet Transfer Description Grab bar;Increased time;Supervision for safety;Verbal cueing   Supine Lower Body Exercise   Supine Lower Body Exercises Yes   Other Exercises supine glute/hamstring strech, posterior pelvic tilts for low back pain management   Bed Mobility    Supine to Sit Stand by Assist   Sit to Supine Stand by Assist   Skilled Intervention Verbal Cuing  (VCs for logroll technique on therapy mat)   OT Total Time Spent   OT Individual Total Time Spent (Mins) 60   OT Charge Group   OT Therapy Activity 4     Functional mobility on unit with FWW SBA, intermittent rest breaks for energy conservation, cues for upright posture and increased step length    Standing balance therex with focus on decreased reliance of UE support; supervision ball toss 1x20 reps soccer ball, marching in place x20 reps, sit to stands x5, cone taps 2x5 each LE CGA    Assessment    Pt tolerated session well with focus on tolerance for mobility household distances and standing balance with reduction of UE reliance, continues to c/o pain in stomach and intermittent low back pain with activity, functional balance and upright tolerance improving.     Strengths: Alert and oriented,Independent prior level of function,Motivated for self care and independence,Pleasant and  cooperative,Willingly participates in therapeutic activities  Barriers: Decreased endurance,Generalized weakness,Impaired activity tolerance,Impaired balance,Pain (impaired attention and memory)    Plan    ADLs with AE, IADLs, functional mobility, standing balance, general strength and endurance building    Occupational Therapy Goals (Active)     Problem: Bathing     Dates: Start: 11/10/21       Goal: STG-Within one week, patient will bathe with setup/supervision     Dates: Start: 11/10/21             Problem: Dressing     Dates: Start: 11/10/21       Goal: STG-Within one week, patient will dress LB with setup using AE as needed     Dates: Start: 11/10/21             Problem: Functional Transfers     Dates: Start: 11/10/21       Goal: STG-Within one week, patient will transfer to toilet with supervision using LRAD     Dates: Start: 11/10/21             Problem: OT Long Term Goals     Dates: Start: 11/10/21       Goal: LTG-By discharge, patient will complete basic self care tasks with supervision to modified independence     Dates: Start: 11/10/21          Goal: LTG-By discharge, patient will perform bathroom transfers with supervision to modified independence using LRAD     Dates: Start: 11/10/21             Problem: Toileting     Dates: Start: 11/10/21       Goal: STG-Within one week, patient will complete toileting tasks with supervision     Dates: Start: 11/10/21

## 2021-11-11 NOTE — THERAPY
"Physical Therapy   Daily Treatment     Patient Name: Thais Munroe  Age:  66 y.o., Sex:  female  Medical Record #: [id number]  Today's Date: 11/10/2021     Precautions  Precautions: Fall Risk  Comments: abdominal incision, requires redirection, admit isolation    Subjective    Patient reports significantly decreasing pain compared to being \"doubled over\" in bathroom 1-2 hours earlier \"like I was giving birth.\"  Pt agreeable to PT, requests to use restroom.     Objective       11/10/21 1401   Vitals   O2 (LPM) 0   O2 Delivery Device None - Room Air   Gait Functional Level of Assist    Gait Level Of Assist Contact Guard Assist   Assistive Device Front Wheel Walker  (and gait belt)   Distance (Feet) 25  (bed to toilet)   # of Times Distance was Traveled 2   Deviation Antalgic;Trendelenberg;Bradykinetic;Decreased Heel Strike;Decreased Toe Off   Transfer Functional Level of Assist   Bed, Chair, Wheelchair Transfer Contact Guard Assist   Bed Chair Wheelchair Transfer Description Adaptive equipment;Increased time;Set-up of equipment;Supervision for safety;Verbal cueing   Toilet Transfers Stand by Assist   Toilet Transfer Description Adaptive equipment;Increased time;Set-up of equipment;Supervision for safety;Verbal cueing   Supine Lower Body Exercise   Supine Lower Body Exercises Yes   Heel Slide   (1x6 reps and 1x8 reps; while floating heel)   Ankle Pumps   (1 set of 4 reps; requires cueing for additional reps)   Bed Mobility    Supine to Sit Contact Guard Assist   Sit to Supine Stand by Assist   Sit to Stand Contact Guard Assist   Scooting Stand by Assist   Rolling Supervised   Interdisciplinary Plan of Care Collaboration   IDT Collaboration with  Nursing   Patient Position at End of Therapy In Bed;Bed Alarm On;Call Light within Reach;Tray Table within Reach;Phone within Reach   Collaboration Comments meds admin; medium non-formed BM   PT Total Time Spent   PT Individual Total Time Spent (Mins) 30   PT Charge Group "   PT Gait Training 1   PT Therapeutic Activities 1     Discussed treatment goals and pain management.  CNA and RN confirm still on admit isolation precautions at this time.    Assessment    Patient has improving sit to supine this afternoon; requires cueing for safety and setup for transfers; education carryover impaired but improves with repetition; fair motivation; BLE edema as usual but pt did have B compression stockings donned before this session.  Strengths: Able to follow instructions,Alert and oriented,Independent prior level of function,Making steady progress towards goals,Manages pain appropriately,Pleasant and cooperative,Willingly participates in therapeutic activities  Barriers: Constipation,Decreased endurance,Generalized weakness,Home accessibility,Impaired activity tolerance,Impaired balance,Impaired carryover of learning,Lack of motivation,Limited mobility,Pain    Plan    Orientation to rehab facility, FWW ambulation, Core/trunk stabilization as tolerated, Ther ex to improve activity tolerance and endurance, Education, Activity tolerance    Passport items to be completed:  Passport items to be completed:  Get in/out of bed safely, in/out of a vehicle, safely use mobility device, walk or wheel around home/community, navigate up and down stairs, show how to get up/down from the ground, ensure home is accessible, demonstrate HEP, complete caregiver training    Physical Therapy Problems (Active)     Problem: Mobility     Dates: Start: 11/10/21       Goal: STG-Within one week, patient will ambulate community distances x200 feet with a 4WW at SPV level.     Dates: Start: 11/10/21          Goal: STG-Within one week, patient will ambulate up/down three curbs with a 4WW at SBA level.     Dates: Start: 11/10/21          Goal: STG-Within one week, patient will ambulate in-room distances with a 4WW at IND level.     Dates: Start: 11/10/21             Problem: Mobility Transfers     Dates: Start: 11/10/21        Goal: STG-Within one week, patient will transfer bed to chair with a 4WW at IND level.     Dates: Start: 11/10/21          Goal: STG-Within one week, patient will transfer in/out of car with a 4WW at SPV level.     Dates: Start: 11/10/21             Problem: PT-Long Term Goals     Dates: Start: 11/10/21       Goal: LTG-By discharge, patient will ambulate x200 feet with a 4WW at SPV level.     Dates: Start: 11/10/21          Goal: LTG-By discharge, patient will transfer one surface to another with a 4WW at IND level.     Dates: Start: 11/10/21          Goal: LTG-By discharge, patient will transfer in/out of a car with a 4WW at SPV level.     Dates: Start: 11/10/21          Goal: LTG-By discharge, patient will ambulate up/down three curbs with a 4WW at SBA level.     Dates: Start: 11/10/21

## 2021-11-11 NOTE — DISCHARGE PLANNING
CASE MANAGEMENT INITIAL ASSESSMENT    Admit Date:  11/9/2021     Patient is a  66 y.o. female transferred from [facility].  She has had multiple surgical procedures with Urology, General Surgery and GI.  I have reviewed medical records and case management will follow for team conference updates.  Patient's admitting physician for rehab is Dr. Deng.    Diagnosis: Debility [R53.81]    Co-morbidities:   Patient Active Problem List    Diagnosis Date Noted   • Wound dehiscence 11/09/2021   • Ileus (HCC) 11/08/2021   • Normocytic anemia 11/05/2021   • Acquired hypothyroidism 11/04/2021   • Renal cell carcinoma (HCC) 11/02/2021   • Hepatitis C    • Tobacco abuse    • Hypokalemia 11/01/2021   • Post-op pain 10/30/2021   • Gout 10/30/2021   • SHEBA (acute kidney injury) (HCC) 10/30/2021   • Small bowel obstruction (HCC) 10/29/2021   • Hyponatremia 10/28/2021   • Hypertension 10/13/2021     Prior Living Situation:  Housing / Facility: [address]  Lives with - Patient's Self Care Capacity: Adult Children    Prior Level of Function:  Medication Management: Independent  Finances: Independent  Home Management: Requires Assist  Shopping: Requires Assist  Prior Level Of Mobility: Independent Without Device in Community  Driving / Transportation: Driving Independent    Support Systems:  Primary : Babs levi Yarelis Garciavitaliy at [phone]  Other support systems:        Previous Services Utilized:   Equipment Owned: 4-Wheel Walker  Prior Services: None,Home-Independent    Other Information:  Occupation (Pre-Hospital Vocational): Not Employed  Primary Payor Source: Private Insurance ([employer])  Primary Care Practitioner : Courtney SHERMAN  Other MDs: Dr Mccracken for urology, Dr. Bland for GI, Dr. Banegas for general surgery.    Patient / Family Goal:  Patient / Family Goal: Patient will return home with her daughters and granddaughter.  She has a 4ww.  Her home is a single level and family can assist.  We will follow for  recomendations for home health versus outpatient therapy,  She will need follow up with urology and general surgery.    Plan:  1. Continue to follow patient through hospitalization and provide discharge planning in collaboration with patient, family, physicians and ancillary services.     2. Utilize community resources to ensure a safe discharge.

## 2021-11-11 NOTE — THERAPY
Occupational Therapy  Daily Treatment     Patient Name: Thais Munroe  Age:  66 y.o., Sex:  female  Medical Record #: [id number]  Today's Date: 11/11/2021     Precautions  Precautions: (P) Fall Risk  Comments: (P) abdominal incision with wound vac, requires redirection         Subjective    Pt received up in w/c, agreeable to OT and excited to leave the room     Objective       11/11/21 0931   Precautions   Precautions Fall Risk   Comments abdominal incision with wound vac, requires redirection   Sitting Lower Body Exercises   Sitting Lower Body Exercises Yes   Nustep Resistance Level 3  (10 min BUE/BLE)   OT Total Time Spent   OT Individual Total Time Spent (Mins) 30   OT Charge Group   Charges Yes   OT Therapy Activity 2     Min A and verbal cues for stand step w/c <> NuStep. Min A sit to stand at scale as pt requested to weight herself    Assessment    Pt tolerated session well, educated on importance of moving her body and benefit of NuStep toward decreased LE swelling and stiffness in back, reported feeling less stiff through LEs after NeStep.     Strengths: Alert and oriented,Independent prior level of function,Motivated for self care and independence,Pleasant and cooperative,Willingly participates in therapeutic activities  Barriers: Decreased endurance,Generalized weakness,Impaired activity tolerance,Impaired balance,Pain (impaired attention and memory)    Plan    ADLs with AE, IADLs, functional mobility, standing balance, general strength and endurance building    Occupational Therapy Goals (Active)     Problem: Bathing     Dates: Start: 11/10/21       Goal: STG-Within one week, patient will bathe with setup/supervision     Dates: Start: 11/10/21             Problem: Dressing     Dates: Start: 11/10/21       Goal: STG-Within one week, patient will dress LB with setup using AE as needed     Dates: Start: 11/10/21             Problem: Functional Transfers     Dates: Start: 11/10/21       Goal: STG-Within  one week, patient will transfer to toilet with supervision using LRAD     Dates: Start: 11/10/21             Problem: OT Long Term Goals     Dates: Start: 11/10/21       Goal: LTG-By discharge, patient will complete basic self care tasks with supervision to modified independence     Dates: Start: 11/10/21          Goal: LTG-By discharge, patient will perform bathroom transfers with supervision to modified independence using LRAD     Dates: Start: 11/10/21             Problem: Toileting     Dates: Start: 11/10/21       Goal: STG-Within one week, patient will complete toileting tasks with supervision     Dates: Start: 11/10/21

## 2021-11-11 NOTE — CARE PLAN
Problem: Bladder / Voiding  Goal: Patient will establish and maintain regular urinary output  Outcome: Progressing  Note: Oob to bathroom with assist , voiding freely without difficulty.     Problem: Skin Integrity  Goal: Patient's skin integrity will be maintained or improve  Outcome: Progressing  Note: Abdominal incision site , dehiscence site with wound vac at 125 mm/hg suction, no draiange noted.,  incision site with staples, biotane dressing applied.      Problem: Pain - Standard  Goal: Alleviation of pain or a reduction in pain to the patient’s comfort goal  Outcome: Progressing  Note: Medicated  with oxycodone 10 mg for abdominal pain wit relief[ see mar]     The patient is Watcher - Medium risk of patient condition declining or worsening    Shift Goals  Clinical Goals: safety   Patient Goals: pain management     Progress made toward(s) clinical / shift goals: Pt free from fall and injury, compliant to safety measures, Pain under control.

## 2021-11-11 NOTE — PROGRESS NOTES
"Rehab Progress Note     Date of Service: 11/11/2021  Chief Complaint: Follow-up debility due to abdominal surgery    Interval Events (Subjective)    Patient seen and examined today in the therapy gym.  She is working with occupational therapy.  She reports she continues to have a lot of gas.  She now has a wound VAC in place.  Surgeon was updated.  Patient has no complaints today.    ROS: No changes to bowel, bladder, pain, mood, or sleep.         Objective:  VITAL SIGNS: /88   Pulse 97   Temp 36.4 °C (97.6 °F) (Temporal)   Resp 19   Ht 1.676 m (5' 6\")   Wt 80.6 kg (177 lb 9.6 oz)   SpO2 92%   BMI 28.67 kg/m²   Gen: alert, no apparent distress  CV: regular rate and rhythm, no murmurs, improved ankle bilateral peripheral edema  Resp: clear to ascultation bilaterally, normal respiratory effort  GI: soft, non-tender abdomen, bowel sounds present, wound VAC      Recent Results (from the past 72 hour(s))   Basic Metabolic Panel    Collection Time: 11/09/21  4:18 AM   Result Value Ref Range    Sodium 136 135 - 145 mmol/L    Potassium 3.7 3.6 - 5.5 mmol/L    Chloride 105 96 - 112 mmol/L    Co2 21 20 - 33 mmol/L    Glucose 95 65 - 99 mg/dL    Bun 12 8 - 22 mg/dL    Creatinine 1.12 0.50 - 1.40 mg/dL    Calcium 8.4 (L) 8.5 - 10.5 mg/dL    Anion Gap 10.0 7.0 - 16.0   CBC WITH DIFFERENTIAL    Collection Time: 11/09/21  4:18 AM   Result Value Ref Range    WBC 10.2 4.8 - 10.8 K/uL    RBC 3.09 (L) 4.20 - 5.40 M/uL    Hemoglobin 9.3 (L) 12.0 - 16.0 g/dL    Hematocrit 28.2 (L) 37.0 - 47.0 %    MCV 91.3 81.4 - 97.8 fL    MCH 30.1 27.0 - 33.0 pg    MCHC 33.0 (L) 33.6 - 35.0 g/dL    RDW 48.2 35.9 - 50.0 fL    Platelet Count 307 164 - 446 K/uL    MPV 10.1 9.0 - 12.9 fL    Neutrophils-Polys 65.30 44.00 - 72.00 %    Lymphocytes 20.70 (L) 22.00 - 41.00 %    Monocytes 9.50 0.00 - 13.40 %    Eosinophils 2.80 0.00 - 6.90 %    Basophils 0.70 0.00 - 1.80 %    Immature Granulocytes 1.00 (H) 0.00 - 0.90 %    Nucleated RBC 0.00 /100 " WBC    Neutrophils (Absolute) 6.67 2.00 - 7.15 K/uL    Lymphs (Absolute) 2.11 1.00 - 4.80 K/uL    Monos (Absolute) 0.97 (H) 0.00 - 0.85 K/uL    Eos (Absolute) 0.29 0.00 - 0.51 K/uL    Baso (Absolute) 0.07 0.00 - 0.12 K/uL    Immature Granulocytes (abs) 0.10 0.00 - 0.11 K/uL    NRBC (Absolute) 0.00 K/uL   ESTIMATED GFR    Collection Time: 11/09/21  4:18 AM   Result Value Ref Range    GFR If  59 (A) >60 mL/min/1.73 m 2    GFR If Non African American 49 (A) >60 mL/min/1.73 m 2   SARS-CoV-2 PCR (24 hour In-House): Collect NP swab in [facility]    Collection Time: 11/09/21 12:00 PM    Specimen: Nasopharyngeal; Respirate   Result Value Ref Range    SARS-CoV-2 Source NP Swab     SARS-CoV-2 by PCR NotDetected    CBC with Differential    Collection Time: 11/10/21  6:38 AM   Result Value Ref Range    WBC 9.8 4.8 - 10.8 K/uL    RBC 3.49 (L) 4.20 - 5.40 M/uL    Hemoglobin 10.8 (L) 12.0 - 16.0 g/dL    Hematocrit 32.3 (L) 37.0 - 47.0 %    MCV 92.6 81.4 - 97.8 fL    MCH 30.9 27.0 - 33.0 pg    MCHC 33.4 (L) 33.6 - 35.0 g/dL    RDW 49.1 35.9 - 50.0 fL    Platelet Count 405 164 - 446 K/uL    MPV 10.1 9.0 - 12.9 fL    Neutrophils-Polys 58.40 44.00 - 72.00 %    Lymphocytes 28.50 22.00 - 41.00 %    Monocytes 8.70 0.00 - 13.40 %    Eosinophils 3.00 0.00 - 6.90 %    Basophils 0.60 0.00 - 1.80 %    Immature Granulocytes 0.80 0.00 - 0.90 %    Nucleated RBC 0.00 /100 WBC    Neutrophils (Absolute) 5.73 2.00 - 7.15 K/uL    Lymphs (Absolute) 2.79 1.00 - 4.80 K/uL    Monos (Absolute) 0.85 0.00 - 0.85 K/uL    Eos (Absolute) 0.29 0.00 - 0.51 K/uL    Baso (Absolute) 0.06 0.00 - 0.12 K/uL    Immature Granulocytes (abs) 0.08 0.00 - 0.11 K/uL    NRBC (Absolute) 0.00 K/uL   Comp Metabolic Panel (CMP)    Collection Time: 11/10/21  6:38 AM   Result Value Ref Range    Sodium 134 (L) 135 - 145 mmol/L    Potassium 4.2 3.6 - 5.5 mmol/L    Chloride 103 96 - 112 mmol/L    Co2 22 20 - 33 mmol/L    Anion Gap 9.0 7.0 - 16.0    Glucose 98 65 - 99 mg/dL     Bun 10 8 - 22 mg/dL    Creatinine 1.24 0.50 - 1.40 mg/dL    Calcium 8.8 8.5 - 10.5 mg/dL    AST(SGOT) 41 12 - 45 U/L    ALT(SGPT) 17 2 - 50 U/L    Alkaline Phosphatase 116 (H) 30 - 99 U/L    Total Bilirubin 0.3 0.1 - 1.5 mg/dL    Albumin 3.6 3.2 - 4.9 g/dL    Total Protein 7.4 6.0 - 8.2 g/dL    Globulin 3.8 (H) 1.9 - 3.5 g/dL    A-G Ratio 0.9 g/dL   Magnesium    Collection Time: 11/10/21  6:38 AM   Result Value Ref Range    Magnesium 1.7 1.5 - 2.5 mg/dL   TSH WITH REFLEX TO FT4    Collection Time: 11/10/21  6:38 AM   Result Value Ref Range    TSH 21.600 (H) 0.380 - 5.330 uIU/mL   ESTIMATED GFR    Collection Time: 11/10/21  6:38 AM   Result Value Ref Range    GFR If  53 (A) >60 mL/min/1.73 m 2    GFR If Non  43 (A) >60 mL/min/1.73 m 2   FREE THYROXINE    Collection Time: 11/10/21  6:38 AM   Result Value Ref Range    Free T-4 1.28 0.93 - 1.70 ng/dL       Current Facility-Administered Medications   Medication Frequency   • simethicone (MYLICON) chewable tab 120 mg 4X/DAY WITH MEALS + NIGHTLY   • acetaminophen (TYLENOL) tablet 1,000 mg TID   • hydrOXYzine HCl (ATARAX) tablet 50 mg Q6HRS PRN   • melatonin tablet 3 mg HS PRN   • Respiratory Therapy Consult Continuous RT   • Pharmacy Consult Request ...Pain Management Review 1 Each PHARMACY TO DOSE   • hydrALAZINE (APRESOLINE) tablet 10 mg Q8HRS PRN   • lactulose 20 GM/30ML solution 30 mL QDAY PRN   • artificial tears ophthalmic solution 1 Drop PRN   • benzocaine-menthol (CEPACOL) lozenge 1 Lozenge Q2HRS PRN   • mag hydrox-al hydrox-simeth (MAALOX PLUS ES or MYLANTA DS) suspension 20 mL Q2HRS PRN   • ondansetron (ZOFRAN ODT) dispertab 4 mg 4X/DAY PRN    Or   • ondansetron (ZOFRAN) syringe/vial injection 4 mg 4X/DAY PRN   • traZODone (DESYREL) tablet 50 mg QHS PRN   • sodium chloride (OCEAN) 0.65 % nasal spray 2 Spray PRN   • oxyCODONE immediate-release (ROXICODONE) tablet 5 mg Q5H PRN    Or   • oxyCODONE immediate release (ROXICODONE) tablet  10 mg Q5H PRN   • allopurinol (ZYLOPRIM) tablet 300 mg DAILY   • amLODIPine (NORVASC) tablet 10 mg Q EVENING   • calcium carbonate (TUMS) chewable tab 1,000 mg TID PRN   • gabapentin (NEURONTIN) capsule 200 mg TID   • heparin injection 5,000 Units Q8HRS   • levothyroxine (SYNTHROID) tablet 50 mcg AM ES   • lidocaine (LIDODERM) 5 % 2 Patch Q24HR   • nicotine (NICODERM) 21 MG/24HR 21 mg Q24HRS   • omeprazole (PRILOSEC) capsule 20 mg DAILY   • bisacodyl (DULCOLAX) suppository 10 mg DAILY    And   • senna-docusate (PERICOLACE or SENOKOT S) 8.6-50 MG per tablet 2 Tablet BID    And   • polyethylene glycol/lytes (MIRALAX) PACKET 1 Packet DAILY    And   • magnesium hydroxide (MILK OF MAGNESIA) suspension 30 mL QDAY PRN       Orders Placed This Encounter   Procedures   • Diet Order Diet: Low Fiber(GI Soft)     Standing Status:   Standing     Number of Occurrences:   1     Order Specific Question:   Diet:     Answer:   Low Fiber(GI Soft) [2]       Radiology    [id number] [id number]   Final Result      Significant improvement in gaseous dilated bowel loops/ileus.              Assessment:  Active Hospital Problems    Diagnosis    • *Renal cell carcinoma (HCC)    • Wound dehiscence    • Ileus (HCC)    • Normocytic anemia    • Acquired hypothyroidism    • Hepatitis C    • Tobacco abuse    • Gout    • Post-op pain    • SHEBA (acute kidney injury) (HCC)    • Small bowel obstruction (HCC)    • Hypertension      This patient is a 66 y.o. female admitted for acute inpatient rehabilitation with debility secondary to Renal cell carcinoma (HCC).    Therapy notes reviewed.    Will the first glucose on Monday to discuss a discharge date.      Medical Decision Making and Plan:    Debility  PT/OT, 1.5 hr each discipline, 5 days per week    Renal cell carcinoma  Partial nephrectomy 10/27 Dr. Mccracken  Outpatient follow up    Pain management  Scheduled Tylenol  Gabapentin, monitor need to increase  Lidocaine patch  As needed oxycodone    Small bowel  obstruction status post   S/P surgical repair  11/2 Dr. Banegas   Pain management  Outpatient follow-up with surgery     Postoperative ileus, improved   Continue simethicone  X-ray per above     Wound dehiscence  Wound care consult  Wound VAC placed  Surgeon updated     Acute kidney injury, continues  Avoid nephrotoxins  Renally dose medications   Consult hospitalist, appreciate assistance     Hypertension  Amlodipine  Consult hospitalist, appreciate assistance    Hypothyroidism  Abnormal thyroid studies  Levothyroxine  Outpatient follow-up with her PCP     Normocytic anemia  Improved  Monitor with weekly labs     Tobacco use  Nicotine patch  Will need counseling     History of gout  Allopurinol     GI prophylaxis  Omeprazole    Bowel program  Continue bowel medications  Last BM 11/10    Bladder program  Check PVRs - 48, 96  Bladder scan for no voids  ICP for over 400 cc  Scheduled toileting    Bilateral edema in the feet  MALCOM hose    DVT prophylaxis  Heparin due to impaired kidney function    Total time:  18 minutes.  I spent greater than 50% of the time for patient care, counseling, and coordination on this date, including patient face-to face time, unit/floor time with review of records/pertinent lab data and studies, as well as discussing diagnostic evaluation/work up, planned therapeutic interventions, and future disposition of care, as per the interval events/subjective and the assessment and plan as noted above.    I have performed a physical exam, reviewed and updated ROS, as well as the assessment and plan today 11/11/2021. In review of note from 11/10/2021 there are no new changes except as documented above.            Mya Deng M.D.   Physical Medicine and Rehabilitation

## 2021-11-12 ENCOUNTER — APPOINTMENT (OUTPATIENT)
Dept: RADIOLOGY | Facility: REHABILITATION | Age: 66
DRG: 949 | End: 2021-11-12
Attending: HOSPITALIST
Payer: COMMERCIAL

## 2021-11-12 LAB
25(OH)D3 SERPL-MCNC: 12 NG/ML (ref 30–80)
ANION GAP SERPL CALC-SCNC: 10 MMOL/L (ref 7–16)
BUN SERPL-MCNC: 9 MG/DL (ref 8–22)
CALCIUM SERPL-MCNC: 8.2 MG/DL (ref 8.5–10.5)
CHLORIDE SERPL-SCNC: 105 MMOL/L (ref 96–112)
CO2 SERPL-SCNC: 23 MMOL/L (ref 20–33)
CREAT SERPL-MCNC: 1.22 MG/DL (ref 0.5–1.4)
ERYTHROCYTE [DISTWIDTH] IN BLOOD BY AUTOMATED COUNT: 48.9 FL (ref 35.9–50)
GLUCOSE SERPL-MCNC: 90 MG/DL (ref 65–99)
HCT VFR BLD AUTO: 28.3 % (ref 37–47)
HGB BLD-MCNC: 9.3 G/DL (ref 12–16)
MCH RBC QN AUTO: 30.3 PG (ref 27–33)
MCHC RBC AUTO-ENTMCNC: 32.9 G/DL (ref 33.6–35)
MCV RBC AUTO: 92.2 FL (ref 81.4–97.8)
PHOSPHATE SERPL-MCNC: 3.4 MG/DL (ref 2.5–4.5)
PLATELET # BLD AUTO: 376 K/UL (ref 164–446)
PMV BLD AUTO: 10.2 FL (ref 9–12.9)
POTASSIUM SERPL-SCNC: 3.8 MMOL/L (ref 3.6–5.5)
RBC # BLD AUTO: 3.07 M/UL (ref 4.2–5.4)
SODIUM SERPL-SCNC: 138 MMOL/L (ref 135–145)
WBC # BLD AUTO: 8.9 K/UL (ref 4.8–10.8)

## 2021-11-12 PROCEDURE — 84100 ASSAY OF PHOSPHORUS: CPT

## 2021-11-12 PROCEDURE — 36415 COLL VENOUS BLD VENIPUNCTURE: CPT

## 2021-11-12 PROCEDURE — 97116 GAIT TRAINING THERAPY: CPT

## 2021-11-12 PROCEDURE — 700111 HCHG RX REV CODE 636 W/ 250 OVERRIDE (IP): Performed by: PHYSICAL MEDICINE & REHABILITATION

## 2021-11-12 PROCEDURE — 770010 HCHG ROOM/CARE - REHAB SEMI PRIVAT*

## 2021-11-12 PROCEDURE — 99231 SBSQ HOSP IP/OBS SF/LOW 25: CPT | Mod: 25 | Performed by: PHYSICAL MEDICINE & REHABILITATION

## 2021-11-12 PROCEDURE — 97110 THERAPEUTIC EXERCISES: CPT

## 2021-11-12 PROCEDURE — 80048 BASIC METABOLIC PNL TOTAL CA: CPT

## 2021-11-12 PROCEDURE — 700101 HCHG RX REV CODE 250: Performed by: PHYSICAL MEDICINE & REHABILITATION

## 2021-11-12 PROCEDURE — A9270 NON-COVERED ITEM OR SERVICE: HCPCS | Performed by: HOSPITALIST

## 2021-11-12 PROCEDURE — 74018 RADEX ABDOMEN 1 VIEW: CPT

## 2021-11-12 PROCEDURE — 99232 SBSQ HOSP IP/OBS MODERATE 35: CPT | Performed by: HOSPITALIST

## 2021-11-12 PROCEDURE — 99406 BEHAV CHNG SMOKING 3-10 MIN: CPT | Mod: GZ | Performed by: PHYSICAL MEDICINE & REHABILITATION

## 2021-11-12 PROCEDURE — 700102 HCHG RX REV CODE 250 W/ 637 OVERRIDE(OP): Performed by: PHYSICAL MEDICINE & REHABILITATION

## 2021-11-12 PROCEDURE — 85027 COMPLETE CBC AUTOMATED: CPT

## 2021-11-12 PROCEDURE — 97535 SELF CARE MNGMENT TRAINING: CPT

## 2021-11-12 PROCEDURE — 97530 THERAPEUTIC ACTIVITIES: CPT

## 2021-11-12 PROCEDURE — 700102 HCHG RX REV CODE 250 W/ 637 OVERRIDE(OP): Performed by: HOSPITALIST

## 2021-11-12 PROCEDURE — A9270 NON-COVERED ITEM OR SERVICE: HCPCS | Performed by: PHYSICAL MEDICINE & REHABILITATION

## 2021-11-12 RX ORDER — AMOXICILLIN 250 MG
2 CAPSULE ORAL 2 TIMES DAILY
Status: DISCONTINUED | OUTPATIENT
Start: 2021-11-12 | End: 2021-11-13

## 2021-11-12 RX ORDER — POLYETHYLENE GLYCOL 3350 17 G/17G
1 POWDER, FOR SOLUTION ORAL 2 TIMES DAILY
Status: DISCONTINUED | OUTPATIENT
Start: 2021-11-12 | End: 2021-11-16 | Stop reason: HOSPADM

## 2021-11-12 RX ORDER — BISACODYL 10 MG
10 SUPPOSITORY, RECTAL RECTAL DAILY
Status: DISCONTINUED | OUTPATIENT
Start: 2021-11-12 | End: 2021-11-16 | Stop reason: HOSPADM

## 2021-11-12 RX ADMIN — OMEPRAZOLE 20 MG: 20 CAPSULE, DELAYED RELEASE ORAL at 08:00

## 2021-11-12 RX ADMIN — AMLODIPINE BESYLATE 10 MG: 5 TABLET ORAL at 20:10

## 2021-11-12 RX ADMIN — GABAPENTIN 200 MG: 100 CAPSULE ORAL at 20:09

## 2021-11-12 RX ADMIN — SENNOSIDES AND DOCUSATE SODIUM 2 TABLET: 8.6; 5 TABLET ORAL at 20:11

## 2021-11-12 RX ADMIN — SIMETHICONE 120 MG: 80 TABLET, CHEWABLE ORAL at 08:00

## 2021-11-12 RX ADMIN — ACETAMINOPHEN 1000 MG: 500 TABLET, FILM COATED ORAL at 08:01

## 2021-11-12 RX ADMIN — ACETAMINOPHEN 1000 MG: 500 TABLET, FILM COATED ORAL at 14:34

## 2021-11-12 RX ADMIN — OXYCODONE HYDROCHLORIDE 10 MG: 10 TABLET ORAL at 02:35

## 2021-11-12 RX ADMIN — ACETAMINOPHEN 1000 MG: 500 TABLET, FILM COATED ORAL at 20:10

## 2021-11-12 RX ADMIN — SIMETHICONE 120 MG: 80 TABLET, CHEWABLE ORAL at 14:24

## 2021-11-12 RX ADMIN — POLYETHYLENE GLYCOL 3350 1 PACKET: 17 POWDER, FOR SOLUTION ORAL at 20:11

## 2021-11-12 RX ADMIN — SENNOSIDES AND DOCUSATE SODIUM 2 TABLET: 50; 8.6 TABLET ORAL at 08:00

## 2021-11-12 RX ADMIN — OXYCODONE HYDROCHLORIDE 10 MG: 10 TABLET ORAL at 20:09

## 2021-11-12 RX ADMIN — LIDOCAINE 2 PATCH: 50 PATCH TOPICAL at 20:18

## 2021-11-12 RX ADMIN — HEPARIN SODIUM 5000 UNITS: 5000 INJECTION, SOLUTION INTRAVENOUS; SUBCUTANEOUS at 05:43

## 2021-11-12 RX ADMIN — LEVOTHYROXINE SODIUM 50 MCG: 50 TABLET ORAL at 05:43

## 2021-11-12 RX ADMIN — HEPARIN SODIUM 5000 UNITS: 5000 INJECTION, SOLUTION INTRAVENOUS; SUBCUTANEOUS at 20:17

## 2021-11-12 RX ADMIN — POLYETHYLENE GLYCOL 3350 1 PACKET: 17 POWDER, FOR SOLUTION ORAL at 08:07

## 2021-11-12 RX ADMIN — NICOTINE TRANSDERMAL SYSTEM 21 MG: 21 PATCH, EXTENDED RELEASE TRANSDERMAL at 08:07

## 2021-11-12 RX ADMIN — HEPARIN SODIUM 5000 UNITS: 5000 INJECTION, SOLUTION INTRAVENOUS; SUBCUTANEOUS at 14:24

## 2021-11-12 RX ADMIN — GABAPENTIN 200 MG: 100 CAPSULE ORAL at 14:34

## 2021-11-12 RX ADMIN — ALLOPURINOL 300 MG: 300 TABLET ORAL at 08:00

## 2021-11-12 RX ADMIN — GABAPENTIN 200 MG: 100 CAPSULE ORAL at 08:01

## 2021-11-12 RX ADMIN — OXYCODONE HYDROCHLORIDE 10 MG: 10 TABLET ORAL at 14:34

## 2021-11-12 RX ADMIN — SIMETHICONE 120 MG: 80 TABLET, CHEWABLE ORAL at 20:08

## 2021-11-12 RX ADMIN — OXYCODONE HYDROCHLORIDE 10 MG: 10 TABLET ORAL at 08:44

## 2021-11-12 ASSESSMENT — PAIN DESCRIPTION - PAIN TYPE
TYPE: ACUTE PAIN

## 2021-11-12 ASSESSMENT — ACTIVITIES OF DAILY LIVING (ADL): TUB_SHOWER_TRANSFER_DESCRIPTION: GRAB BAR;SHOWER BENCH;SUPERVISION FOR SAFETY;VERBAL CUEING

## 2021-11-12 NOTE — THERAPY
Physical Therapy   Daily Treatment     Patient Name: Thais Munroe  Age:  66 y.o., Sex:  female  Medical Record #: [id number]  Today's Date: 11/12/2021     Precautions  Precautions: Fall Risk  Comments: abdominal incision with wound vac    Subjective    Patient agreeable to PT.     Objective       11/11/21 1031   Precautions   Precautions Fall Risk   Comments abdominal incision with wound vac   Vitals   O2 (LPM) 0   O2 Delivery Device None - Room Air   Gait Functional Level of Assist    Gait Level Of Assist Stand by Assist   Assistive Device Front Wheel Walker  (and setup with wound vac management)   Distance (Feet) 110   # of Times Distance was Traveled 3  (with significant rest breaks between reps)   Deviation Trendelenberg;Bradykinetic;Decreased Heel Strike;Decreased Toe Off;Other (Comment)  (use of verbal distraction through conversation and education)   Transfer Functional Level of Assist   Bed, Chair, Wheelchair Transfer Contact Guard Assist   Bed Chair Wheelchair Transfer Description Adaptive equipment;Increased time;Set-up of equipment;Supervision for safety;Verbal cueing   Bed Mobility    Supine to Sit Stand by Assist   Sit to Stand Contact Guard Assist   Scooting Stand by Assist   Rolling Supervised   Interdisciplinary Plan of Care Collaboration   Patient Position at End of Therapy Seated;Chair Alarm On;Self Releasing Lap Belt Applied;Call Light within Reach;Tray Table within Reach;Phone within Reach   PT Total Time Spent   PT Individual Total Time Spent (Mins) 60   PT Charge Group   PT Gait Training 3   PT Therapeutic Activities 1     Education on wound vac setup, power cord of wound vac, and purpose; discussed treatment goals and oriented patient to rehab facility.    Assessment    Patient has decreased A and increased endurance with ambulation; prolonged rest breaks; good motivation and verbalization of education but requires repetition.  Strengths: Able to follow instructions,Alert and  oriented,Independent prior level of function,Making steady progress towards goals,Manages pain appropriately,Pleasant and cooperative,Willingly participates in therapeutic activities  Barriers: Constipation,Decreased endurance,Generalized weakness,Home accessibility,Impaired activity tolerance,Impaired balance,Impaired carryover of learning,Lack of motivation,Limited mobility,Pain    Plan    FWW ambulation, Core/trunk stabilization as tolerated, Ther ex to improve activity tolerance and endurance, Education, Activity tolerance    Passport items to be completed:  Passport items to be completed:  Get in/out of bed safely, in/out of a vehicle, safely use mobility device, walk or wheel around home/community, navigate up and down stairs, show how to get up/down from the ground, ensure home is accessible, demonstrate HEP, complete caregiver training    Physical Therapy Problems (Active)     Problem: Mobility     Dates: Start: 11/10/21       Goal: STG-Within one week, patient will ambulate community distances x200 feet with a 4WW at SPV level.     Dates: Start: 11/10/21          Goal: STG-Within one week, patient will ambulate up/down three curbs with a 4WW at SBA level.     Dates: Start: 11/10/21          Goal: STG-Within one week, patient will ambulate in-room distances with a 4WW at IND level.     Dates: Start: 11/10/21             Problem: Mobility Transfers     Dates: Start: 11/10/21       Goal: STG-Within one week, patient will transfer bed to chair with a 4WW at IND level.     Dates: Start: 11/10/21          Goal: STG-Within one week, patient will transfer in/out of car with a 4WW at SPV level.     Dates: Start: 11/10/21             Problem: PT-Long Term Goals     Dates: Start: 11/10/21       Goal: LTG-By discharge, patient will ambulate x200 feet with a 4WW at SPV level.     Dates: Start: 11/10/21          Goal: LTG-By discharge, patient will transfer one surface to another with a 4WW at IND level.     Dates:  Start: 11/10/21          Goal: LTG-By discharge, patient will transfer in/out of a car with a 4WW at SPV level.     Dates: Start: 11/10/21          Goal: LTG-By discharge, patient will ambulate up/down three curbs with a 4WW at SBA level.     Dates: Start: 11/10/21

## 2021-11-12 NOTE — THERAPY
Physical Therapy   Daily Treatment     Patient Name: Thais Munroe  Age:  66 y.o., Sex:  female  Medical Record #: [id number]  Today's Date: 11/12/2021     Precautions  Precautions: Fall Risk  Comments: abdominal incision with wound vac    Subjective    Patient agreeable to PT.     Objective       11/11/21 1401   Vitals   O2 (LPM) 0   O2 Delivery Device None - Room Air   Non Verbal Descriptors   Non Verbal Scale  Calm   Transfer Functional Level of Assist   Bed, Chair, Wheelchair Transfer Contact Guard Assist   Bed Chair Wheelchair Transfer Description Adaptive equipment;Increased time;Set-up of equipment;Supervision for safety;Verbal cueing   Sitting Lower Body Exercises   Nustep Resistance Level 4  (12 minutes with 2 shorter breaks; avg 49 steps/min)   Bed Mobility    Sit to Supine Stand by Assist   Sit to Stand Contact Guard Assist   Scooting Stand by Assist   Rolling Supervised   Interdisciplinary Plan of Care Collaboration   Patient Position at End of Therapy In Bed;Call Light within Reach;Tray Table within Reach;Phone within Reach   PT Total Time Spent   PT Individual Total Time Spent (Mins) 30   PT Charge Group   Charges Yes   PT Therapeutic Exercise 1   PT Therapeutic Activities 1       Assessment    Occasional discomfort at abdominal incision but pt reports more fatigue limitations present this afternoon; good receptiveness to POC discussion; good motivation; improving endurance.  Strengths: Able to follow instructions,Alert and oriented,Independent prior level of function,Making steady progress towards goals,Manages pain appropriately,Pleasant and cooperative,Willingly participates in therapeutic activities  Barriers: Constipation,Decreased endurance,Generalized weakness,Home accessibility,Impaired activity tolerance,Impaired balance,Impaired carryover of learning,Lack of motivation,Limited mobility,Pain    Plan    FWW ambulation, Core/trunk stabilization as tolerated, Ther ex to improve activity  tolerance and endurance, Education, Activity tolerance    Passport items to be completed:  Passport items to be completed:  Get in/out of bed safely, in/out of a vehicle, safely use mobility device, walk or wheel around home/community, navigate up and down stairs, show how to get up/down from the ground, ensure home is accessible, demonstrate HEP, complete caregiver training    Physical Therapy Problems (Active)     Problem: Mobility     Dates: Start: 11/10/21       Goal: STG-Within one week, patient will ambulate community distances x200 feet with a 4WW at SPV level.     Dates: Start: 11/10/21          Goal: STG-Within one week, patient will ambulate up/down three curbs with a 4WW at SBA level.     Dates: Start: 11/10/21          Goal: STG-Within one week, patient will ambulate in-room distances with a 4WW at IND level.     Dates: Start: 11/10/21             Problem: Mobility Transfers     Dates: Start: 11/10/21       Goal: STG-Within one week, patient will transfer bed to chair with a 4WW at IND level.     Dates: Start: 11/10/21          Goal: STG-Within one week, patient will transfer in/out of car with a 4WW at SPV level.     Dates: Start: 11/10/21             Problem: PT-Long Term Goals     Dates: Start: 11/10/21       Goal: LTG-By discharge, patient will ambulate x200 feet with a 4WW at SPV level.     Dates: Start: 11/10/21          Goal: LTG-By discharge, patient will transfer one surface to another with a 4WW at IND level.     Dates: Start: 11/10/21          Goal: LTG-By discharge, patient will transfer in/out of a car with a 4WW at SPV level.     Dates: Start: 11/10/21          Goal: LTG-By discharge, patient will ambulate up/down three curbs with a 4WW at SBA level.     Dates: Start: 11/10/21

## 2021-11-12 NOTE — CARE PLAN
Problem: Mobility  Goal: Patient's capacity to carry out activities will improve  Outcome: Progressing  Note: Patient able to perform regular activities this shift.  Pain controlled this shift.  Pain management includes PRN pain meds as well as non-pharmacological measures such as emotional support, rest, and repositioning.  Will continue to monitor.    The patient is Stable - Low risk of patient condition declining or worsening    Shift Goals  Clinical Goals: safety   Patient Goals: pain management     Progress made toward(s) clinical / shift goals:  wnl    Patient is not progressing towards the following goals:

## 2021-11-12 NOTE — THERAPY
Occupational Therapy  Daily Treatment     Patient Name: Thais Munroe  Age:  66 y.o., Sex:  female  Medical Record #: [id number]  Today's Date: 11/12/2021     Precautions  Precautions: (P) Fall Risk  Comments: (P) abdominal incision with wound vac         Subjective    Pt received up in w/c, clothes set out ready for shower     Objective       11/12/21 0931   Precautions   Precautions Fall Risk   Comments abdominal incision with wound vac   Functional Level of Assist   Grooming Modified Independent;Standing   Grooming Description Standing at sink   Bathing Supervision   Bathing Description Grab bar;Hand held shower;Tub bench;Increased time;Supervision for safety   Upper Body Dressing Supervision   Upper Body Dressing Description Set-up of equipment   Lower Body Dressing Minimal Assist   Lower Body Dressing Description Other (comment);Reacher;Sock aid;Dressing stick  (cues for AE strategy, min A TEDs)   Tub / Shower Transfers Stand by Assist   Tub Shower Transfer Description Grab bar;Shower bench;Supervision for safety;Verbal cueing  (VCs for FWW placement and safe transition)   Sitting Lower Body Exercises   Nustep Resistance Level 3  (13 minutes)   OT Total Time Spent   OT Individual Total Time Spent (Mins) 90   OT Charge Group   OT Self Care / ADL 4   OT Therapy Activity 2       Assessment    Pt tolerated session well, completing ADL routine at primarily supervised level using AE for lower body ADLs for pain management and incision protection, required intermittent cues for AE use but overall good carryover of prior education, required assist for TEDs at this time which she is wearing due to ongoing LE swelling. Doing well with balance using FWW, may benefit of trial of 4WW as more functional option for managing household needs. Enjoys NuStep as she feels it helps her LE pain and swelling.     Strengths: Alert and oriented,Independent prior level of function,Motivated for self care and independence,Pleasant and  cooperative,Willingly participates in therapeutic activities  Barriers: Decreased endurance,Generalized weakness,Impaired activity tolerance,Impaired balance,Pain (impaired attention and memory)    Plan    ADLs with AE, IADLs, functional mobility, standing balance, general strength and endurance building    Occupational Therapy Goals (Active)     Problem: Bathing     Dates: Start: 11/10/21       Goal: STG-Within one week, patient will bathe with setup/supervision     Dates: Start: 11/10/21             Problem: Dressing     Dates: Start: 11/10/21       Goal: STG-Within one week, patient will dress LB with setup using AE as needed     Dates: Start: 11/10/21             Problem: Functional Transfers     Dates: Start: 11/10/21       Goal: STG-Within one week, patient will transfer to toilet with supervision using LRAD     Dates: Start: 11/10/21             Problem: OT Long Term Goals     Dates: Start: 11/10/21       Goal: LTG-By discharge, patient will complete basic self care tasks with supervision to modified independence     Dates: Start: 11/10/21          Goal: LTG-By discharge, patient will perform bathroom transfers with supervision to modified independence using LRAD     Dates: Start: 11/10/21             Problem: Toileting     Dates: Start: 11/10/21       Goal: STG-Within one week, patient will complete toileting tasks with supervision     Dates: Start: 11/10/21

## 2021-11-12 NOTE — CARE PLAN
Problem: Skin Integrity  Goal: Patient's skin integrity will be maintained or improve  Note: Patient's skin remains free from new or accidental injury this shift.       Problem: Pain - Standard  Goal: Alleviation of pain or a reduction in pain to the patient’s comfort goal  Flowsheets  Taken 11/11/2021 2050 by Mary Barnett RARNOLD  Pain Rating Scale (NPRS): 7  Taken 11/11/2021 0100 by Norma Huff R.N.  Non Verbal Scale:   Calm   Sleeping  Note: Patient able to verbalize pain level and verbalize an acceptable level of pain.    The patient is Stable - Low risk of patient condition declining or worsening

## 2021-11-12 NOTE — CARE PLAN
Problem: Bladder / Voiding  Goal: Patient will establish and maintain regular urinary output  Outcome: Progressing     Problem: Skin Integrity  Goal: Patient's skin integrity will be maintained or improve  Outcome: Progressing   The patient is Stable - Low risk of patient condition declining or worsening    Shift Goals  Clinical Goals: safety   Patient Goals: pain management     Progress made toward(s) clinical / shift goals:      Patient is not progressing towards the following goals:

## 2021-11-13 ENCOUNTER — APPOINTMENT (OUTPATIENT)
Dept: RADIOLOGY | Facility: REHABILITATION | Age: 66
DRG: 949 | End: 2021-11-13
Attending: HOSPITALIST
Payer: COMMERCIAL

## 2021-11-13 PROCEDURE — 97110 THERAPEUTIC EXERCISES: CPT

## 2021-11-13 PROCEDURE — 97112 NEUROMUSCULAR REEDUCATION: CPT

## 2021-11-13 PROCEDURE — 99232 SBSQ HOSP IP/OBS MODERATE 35: CPT | Performed by: HOSPITALIST

## 2021-11-13 PROCEDURE — 97530 THERAPEUTIC ACTIVITIES: CPT

## 2021-11-13 PROCEDURE — A9270 NON-COVERED ITEM OR SERVICE: HCPCS | Performed by: PHYSICAL MEDICINE & REHABILITATION

## 2021-11-13 PROCEDURE — 700102 HCHG RX REV CODE 250 W/ 637 OVERRIDE(OP): Performed by: PHYSICAL MEDICINE & REHABILITATION

## 2021-11-13 PROCEDURE — A9270 NON-COVERED ITEM OR SERVICE: HCPCS | Performed by: HOSPITALIST

## 2021-11-13 PROCEDURE — 74018 RADEX ABDOMEN 1 VIEW: CPT

## 2021-11-13 PROCEDURE — 97605 NEG PRS WND THER DME<=50SQCM: CPT

## 2021-11-13 PROCEDURE — 700102 HCHG RX REV CODE 250 W/ 637 OVERRIDE(OP): Performed by: HOSPITALIST

## 2021-11-13 PROCEDURE — 97602 WOUND(S) CARE NON-SELECTIVE: CPT

## 2021-11-13 PROCEDURE — 97535 SELF CARE MNGMENT TRAINING: CPT

## 2021-11-13 PROCEDURE — 700101 HCHG RX REV CODE 250: Performed by: PHYSICAL MEDICINE & REHABILITATION

## 2021-11-13 PROCEDURE — 97116 GAIT TRAINING THERAPY: CPT

## 2021-11-13 PROCEDURE — 770010 HCHG ROOM/CARE - REHAB SEMI PRIVAT*

## 2021-11-13 PROCEDURE — 700111 HCHG RX REV CODE 636 W/ 250 OVERRIDE (IP): Performed by: PHYSICAL MEDICINE & REHABILITATION

## 2021-11-13 RX ORDER — AMOXICILLIN 250 MG
2 CAPSULE ORAL 2 TIMES DAILY
Status: DISCONTINUED | OUTPATIENT
Start: 2021-11-13 | End: 2021-11-16 | Stop reason: HOSPADM

## 2021-11-13 RX ORDER — SIMETHICONE 80 MG
160 TABLET,CHEWABLE ORAL
Status: DISCONTINUED | OUTPATIENT
Start: 2021-11-13 | End: 2021-11-16 | Stop reason: HOSPADM

## 2021-11-13 RX ADMIN — HEPARIN SODIUM 5000 UNITS: 5000 INJECTION, SOLUTION INTRAVENOUS; SUBCUTANEOUS at 14:32

## 2021-11-13 RX ADMIN — HEPARIN SODIUM 5000 UNITS: 5000 INJECTION, SOLUTION INTRAVENOUS; SUBCUTANEOUS at 05:52

## 2021-11-13 RX ADMIN — OXYCODONE HYDROCHLORIDE 10 MG: 10 TABLET ORAL at 18:02

## 2021-11-13 RX ADMIN — SIMETHICONE 160 MG: 80 TABLET, CHEWABLE ORAL at 17:36

## 2021-11-13 RX ADMIN — ACETAMINOPHEN 1000 MG: 500 TABLET, FILM COATED ORAL at 20:29

## 2021-11-13 RX ADMIN — SIMETHICONE 120 MG: 80 TABLET, CHEWABLE ORAL at 12:38

## 2021-11-13 RX ADMIN — GABAPENTIN 200 MG: 100 CAPSULE ORAL at 07:44

## 2021-11-13 RX ADMIN — SENNOSIDES AND DOCUSATE SODIUM 2 TABLET: 50; 8.6 TABLET ORAL at 20:33

## 2021-11-13 RX ADMIN — OXYCODONE HYDROCHLORIDE 10 MG: 10 TABLET ORAL at 12:44

## 2021-11-13 RX ADMIN — ACETAMINOPHEN 1000 MG: 500 TABLET, FILM COATED ORAL at 14:32

## 2021-11-13 RX ADMIN — ACETAMINOPHEN 1000 MG: 500 TABLET, FILM COATED ORAL at 07:45

## 2021-11-13 RX ADMIN — SIMETHICONE 160 MG: 80 TABLET, CHEWABLE ORAL at 20:30

## 2021-11-13 RX ADMIN — OMEPRAZOLE 20 MG: 20 CAPSULE, DELAYED RELEASE ORAL at 07:45

## 2021-11-13 RX ADMIN — HEPARIN SODIUM 5000 UNITS: 5000 INJECTION, SOLUTION INTRAVENOUS; SUBCUTANEOUS at 20:30

## 2021-11-13 RX ADMIN — GABAPENTIN 200 MG: 100 CAPSULE ORAL at 20:30

## 2021-11-13 RX ADMIN — LIDOCAINE 2 PATCH: 50 PATCH TOPICAL at 20:30

## 2021-11-13 RX ADMIN — ALLOPURINOL 300 MG: 300 TABLET ORAL at 07:45

## 2021-11-13 RX ADMIN — OXYCODONE HYDROCHLORIDE 10 MG: 10 TABLET ORAL at 07:45

## 2021-11-13 RX ADMIN — GABAPENTIN 200 MG: 100 CAPSULE ORAL at 14:32

## 2021-11-13 RX ADMIN — AMLODIPINE BESYLATE 10 MG: 5 TABLET ORAL at 20:29

## 2021-11-13 RX ADMIN — SIMETHICONE 120 MG: 80 TABLET, CHEWABLE ORAL at 07:45

## 2021-11-13 RX ADMIN — LEVOTHYROXINE SODIUM 50 MCG: 50 TABLET ORAL at 05:51

## 2021-11-13 RX ADMIN — SENNOSIDES AND DOCUSATE SODIUM 2 TABLET: 8.6; 5 TABLET ORAL at 07:45

## 2021-11-13 RX ADMIN — NICOTINE TRANSDERMAL SYSTEM 21 MG: 21 PATCH, EXTENDED RELEASE TRANSDERMAL at 07:45

## 2021-11-13 RX ADMIN — POLYETHYLENE GLYCOL 3350 1 PACKET: 17 POWDER, FOR SOLUTION ORAL at 20:31

## 2021-11-13 ASSESSMENT — ACTIVITIES OF DAILY LIVING (ADL)
TOILET_TRANSFER_DESCRIPTION: GRAB BAR;VERBAL CUEING;SUPERVISION FOR SAFETY;INCREASED TIME
TOILET_TRANSFER_DESCRIPTION: GRAB BAR;SUPERVISION FOR SAFETY
TOILET_TRANSFER_DESCRIPTION: GRAB BAR;SUPERVISION FOR SAFETY
TOILETING_LEVEL_OF_ASSIST_DESCRIPTION: GRAB BAR;INCREASED TIME;INITIAL PREPARATION FOR TASK;SUPERVISION FOR SAFETY
TOILETING_LEVEL_OF_ASSIST_DESCRIPTION: GRAB BAR;SUPERVISION FOR SAFETY

## 2021-11-13 ASSESSMENT — GAIT ASSESSMENTS
DEVIATION: BRADYKINETIC;SHUFFLED GAIT
ASSISTIVE DEVICE: FRONT WHEEL WALKER
DISTANCE (FEET): 550
GAIT LEVEL OF ASSIST: STAND BY ASSIST

## 2021-11-13 ASSESSMENT — PAIN DESCRIPTION - PAIN TYPE
TYPE: ACUTE PAIN

## 2021-11-13 ASSESSMENT — PATIENT HEALTH QUESTIONNAIRE - PHQ9
1. LITTLE INTEREST OR PLEASURE IN DOING THINGS: NOT AT ALL
SUM OF ALL RESPONSES TO PHQ9 QUESTIONS 1 AND 2: 0
2. FEELING DOWN, DEPRESSED, IRRITABLE, OR HOPELESS: NOT AT ALL

## 2021-11-13 ASSESSMENT — PAIN SCALES - WONG BAKER: WONGBAKER_NUMERICALRESPONSE: HURTS A LITTLE MORE

## 2021-11-13 NOTE — CARE PLAN
The patient is Stable - Low risk of patient condition declining or worsening      Problem: Bladder / Voiding  Goal: Patient will establish and maintain bladder regimen  Outcome: Progressing  Note: Pt continent of bladder this shift; voiding adequately in the BR. She denies dysuria.     Problem: Bowel Elimination  Goal: Patient will participate in bowel management program  Outcome: Progressing  Note: Pt refused her scheduled suppository tonight but took other bowel meds such as senna and miralax. LBM11/12/21.

## 2021-11-13 NOTE — THERAPY
Physical Therapy   Daily Treatment     Patient Name: Thais Munroe  Age:  66 y.o., Sex:  female  Medical Record #: [id number]  Today's Date: 11/13/2021     Precautions  Precautions: Fall Risk  Comments: abdominal incision with wound vac    Subjective    Pt states she had diarrhea this morning and doesn't want to do too much especially since she wheeled around in the wheelchair yesterday and is now sore and tired.     Objective       11/13/21 0831   Cognition    Level of Consciousness Alert   ABS (Agitated Behavior Scale)   Agitated Behavior Scale Performed No   Sleep/Wake Cycle   Sleep & Rest Awake   Wheelchair Functional Level of Assist   Wheelchair Assist Modified Independent   Distance Wheelchair (Feet or Distance) 50   Wheelchair Description Extra time   Sitting Lower Body Exercises   Hip Abduction 3 sets of 10;Light Resistance Theraband   Hip Adduction 3 sets of 10;Light Resistance Theraband   Long Arc Quad 3 sets of 10;Light Resistance Theraband   Marching 3 sets of 10   Hamstring Curl 3 sets of 10;Light Resistance Theraband   Comments performed with orange/green therabands   Neuro-Muscular Treatments   Neuro-Muscular Treatments Verbal Cuing   Strengths & Barriers   Strengths Able to follow instructions;Alert and oriented;Effective communication skills;Making steady progress towards goals;Motivated for self care and independence;Pleasant and cooperative;Willingly participates in therapeutic activities   PT Total Time Spent   PT Individual Total Time Spent (Mins) 30   PT Charge Group   Charges Yes   PT Gait Training 2       Assessment    Pt limited to seated therex this session d/t GI distress. Pt performs seated therex with mod verbal cueing for form and is provided multiple therabands for home use. Pt requires increased time for rest breaks d/t pain in hips from reported sciatica. Throughout session, pt requires max verbal cueing to attend to task as she is highly distracted.     Pt with adequate BLE  strength but is limited by precense of hip pain during activities requiring hip flexion and/or knee extension.      Strengths: (P) Able to follow instructions,Alert and oriented,Effective communication skills,Making steady progress towards goals,Motivated for self care and independence,Pleasant and cooperative,Willingly participates in therapeutic activities  Barriers: Constipation,Decreased endurance,Generalized weakness,Home accessibility,Impaired activity tolerance,Impaired balance,Impaired carryover of learning,Lack of motivation,Limited mobility,Pain    Plan    Ambulation, stair training, sciatic nerve flossing for pain management.     Passport items to be completed:  Get in/out of bed safely, in/out of a vehicle, safely use mobility device, walk or wheel around home/community, navigate up and down stairs, show how to get up/down from the ground, ensure home is accessible, demonstrate HEP, complete caregiver training    Physical Therapy Problems (Active)     Problem: Mobility     Dates: Start: 11/10/21       Goal: STG-Within one week, patient will ambulate community distances x200 feet with a 4WW at SPV level.     Dates: Start: 11/10/21          Goal: STG-Within one week, patient will ambulate up/down three curbs with a 4WW at SBA level.     Dates: Start: 11/10/21          Goal: STG-Within one week, patient will ambulate in-room distances with a 4WW at IND level.     Dates: Start: 11/10/21             Problem: Mobility Transfers     Dates: Start: 11/10/21       Goal: STG-Within one week, patient will transfer bed to chair with a 4WW at IND level.     Dates: Start: 11/10/21          Goal: STG-Within one week, patient will transfer in/out of car with a 4WW at SPV level.     Dates: Start: 11/10/21             Problem: PT-Long Term Goals     Dates: Start: 11/10/21       Goal: LTG-By discharge, patient will ambulate x200 feet with a 4WW at SPV level.     Dates: Start: 11/10/21          Goal: LTG-By discharge,  patient will transfer one surface to another with a 4WW at IND level.     Dates: Start: 11/10/21          Goal: LTG-By discharge, patient will transfer in/out of a car with a 4WW at SPV level.     Dates: Start: 11/10/21          Goal: LTG-By discharge, patient will ambulate up/down three curbs with a 4WW at SBA level.     Dates: Start: 11/10/21

## 2021-11-13 NOTE — THERAPY
"Occupational Therapy  Daily Treatment     Patient Name: Thais Munroe  Age:  66 y.o., Sex:  female  Medical Record #: [id number]  Today's Date: 11/13/2021     Precautions  Precautions: Fall Risk  Comments: abdominal incision with wound vac         Subjective       Objective       11/13/21 1001   Precautions   Precautions Fall Risk   Comments abdominal incision with wound vac   Vitals   O2 Delivery Device None - Room Air   Non Verbal Descriptors   Non Verbal Scale  Calm   Cognition    Level of Consciousness Alert   Sleep/Wake Cycle   Sleep & Rest Awake   Functional Level of Assist   Toileting Supervision   Toileting Description Grab bar;Increased time;Initial preparation for task;Supervision for safety   Toilet Transfers Supervised   Toilet Transfer Description Grab bar;Supervision for safety   Sitting Upper Body Exercises   Chest Press 3 sets of 10  (\"Equalizer\" 3x10@ 10#'s)   Tricep Press 3 sets of 10  (Rickshaw:[id number]@15#'s, [id number]@10#'s)   Other Exercise Seated Row @ \"Equalizer\" 3x10@ 10#'s   Sitting Lower Body Exercises   Nustep Resistance Level 4  (15 mins, rest break x 2, .39 miles.)   Interdisciplinary Plan of Care Collaboration   IDT Collaboration with  Certified Nursing Assistant   Patient Position at End of Therapy Seated;Self Releasing Lap Belt Applied;Call Light within Reach;Tray Table within Reach;Phone within Reach;Chair Alarm On   Collaboration Comments CLOF   OT Total Time Spent   OT Individual Total Time Spent (Mins) 60   OT Charge Group   OT Self Care / ADL 1   OT Therapeutic Exercise  3       Assessment    Pt was alert and cooperative w/ tx.  Tx emphasis on ADL's, transfer and TherEx - see notes above for details.  Strengths: Alert and oriented,Independent prior level of function,Motivated for self care and independence,Pleasant and cooperative,Willingly participates in therapeutic activities  Barriers: Decreased endurance,Generalized weakness,Impaired activity tolerance,Impaired " balance,Pain (impaired attention and memory)    Plan    ADLs with AE, IADLs, functional mobility, standing balance, general strength and endurance building    Passport items to be completed:  Passport items to be completed:  Perform bathroom transfers, complete dressing, complete feeding, get ready for the day, prepare a simple meal, participate in household tasks, adapt home for safety needs, demonstrate home exercise program, complete caregiver training     Occupational Therapy Goals (Active)     Problem: Bathing     Dates: Start: 11/10/21       Goal: STG-Within one week, patient will bathe with setup/supervision     Dates: Start: 11/10/21             Problem: Dressing     Dates: Start: 11/10/21       Goal: STG-Within one week, patient will dress LB with setup using AE as needed     Dates: Start: 11/10/21             Problem: Functional Transfers     Dates: Start: 11/10/21       Goal: STG-Within one week, patient will transfer to toilet with supervision using LRAD     Dates: Start: 11/10/21             Problem: OT Long Term Goals     Dates: Start: 11/10/21       Goal: LTG-By discharge, patient will complete basic self care tasks with supervision to modified independence     Dates: Start: 11/10/21          Goal: LTG-By discharge, patient will perform bathroom transfers with supervision to modified independence using LRAD     Dates: Start: 11/10/21             Problem: Toileting     Dates: Start: 11/10/21       Goal: STG-Within one week, patient will complete toileting tasks with supervision     Dates: Start: 11/10/21

## 2021-11-13 NOTE — CARE PLAN
Problem: VTE Prevention  Goal: Patient will remain free from venous thromboembolism (VTE)  Outcome: Progressing  Note: Pt is up and walking, participates in therapies, and up to dinning room for all meals.    The patient is Stable - Low risk of patient condition declining or worsening    Shift Goals  Clinical Goals: safety   Patient Goals: pain management     Progress made toward(s) clinical / shift goals:  wnl    Patient is not progressing towards the following goals:

## 2021-11-13 NOTE — PROGRESS NOTES
"Rehab Progress Note     Date of Service: 11/12/2021  Chief Complaint: Follow-up debility due to abdominal surgery    Interval Events (Subjective)    Patient seen and examined today in the therapy gym.  She continues to report intermittent pain and discomfort in her abdomen.  We discussed tobacco cessation strategies.  She does not want to quit.  She has no other new questions concerns or complaints today.    ROS: No changes to bowel, bladder, pain, mood, or sleep.            Objective:  VITAL SIGNS: /86   Pulse 90   Temp 36.7 °C (98.1 °F) (Temporal)   Resp 19   Ht 1.676 m (5' 6\")   Wt 80.6 kg (177 lb 9.6 oz)   SpO2 93%   BMI 28.67 kg/m²   Gen: alert, no apparent distress  GI: Hypoactive bowel sounds, wound VAC in place      Recent Results (from the past 72 hour(s))   CBC with Differential    Collection Time: 11/10/21  6:38 AM   Result Value Ref Range    WBC 9.8 4.8 - 10.8 K/uL    RBC 3.49 (L) 4.20 - 5.40 M/uL    Hemoglobin 10.8 (L) 12.0 - 16.0 g/dL    Hematocrit 32.3 (L) 37.0 - 47.0 %    MCV 92.6 81.4 - 97.8 fL    MCH 30.9 27.0 - 33.0 pg    MCHC 33.4 (L) 33.6 - 35.0 g/dL    RDW 49.1 35.9 - 50.0 fL    Platelet Count 405 164 - 446 K/uL    MPV 10.1 9.0 - 12.9 fL    Neutrophils-Polys 58.40 44.00 - 72.00 %    Lymphocytes 28.50 22.00 - 41.00 %    Monocytes 8.70 0.00 - 13.40 %    Eosinophils 3.00 0.00 - 6.90 %    Basophils 0.60 0.00 - 1.80 %    Immature Granulocytes 0.80 0.00 - 0.90 %    Nucleated RBC 0.00 /100 WBC    Neutrophils (Absolute) 5.73 2.00 - 7.15 K/uL    Lymphs (Absolute) 2.79 1.00 - 4.80 K/uL    Monos (Absolute) 0.85 0.00 - 0.85 K/uL    Eos (Absolute) 0.29 0.00 - 0.51 K/uL    Baso (Absolute) 0.06 0.00 - 0.12 K/uL    Immature Granulocytes (abs) 0.08 0.00 - 0.11 K/uL    NRBC (Absolute) 0.00 K/uL   Comp Metabolic Panel (CMP)    Collection Time: 11/10/21  6:38 AM   Result Value Ref Range    Sodium 134 (L) 135 - 145 mmol/L    Potassium 4.2 3.6 - 5.5 mmol/L    Chloride 103 96 - 112 mmol/L    Co2 22 20 - " 33 mmol/L    Anion Gap 9.0 7.0 - 16.0    Glucose 98 65 - 99 mg/dL    Bun 10 8 - 22 mg/dL    Creatinine 1.24 0.50 - 1.40 mg/dL    Calcium 8.8 8.5 - 10.5 mg/dL    AST(SGOT) 41 12 - 45 U/L    ALT(SGPT) 17 2 - 50 U/L    Alkaline Phosphatase 116 (H) 30 - 99 U/L    Total Bilirubin 0.3 0.1 - 1.5 mg/dL    Albumin 3.6 3.2 - 4.9 g/dL    Total Protein 7.4 6.0 - 8.2 g/dL    Globulin 3.8 (H) 1.9 - 3.5 g/dL    A-G Ratio 0.9 g/dL   Magnesium    Collection Time: 11/10/21  6:38 AM   Result Value Ref Range    Magnesium 1.7 1.5 - 2.5 mg/dL   TSH WITH REFLEX TO FT4    Collection Time: 11/10/21  6:38 AM   Result Value Ref Range    TSH 21.600 (H) 0.380 - 5.330 uIU/mL   ESTIMATED GFR    Collection Time: 11/10/21  6:38 AM   Result Value Ref Range    GFR If  53 (A) >60 mL/min/1.73 m 2    GFR If Non  43 (A) >60 mL/min/1.73 m 2   FREE THYROXINE    Collection Time: 11/10/21  6:38 AM   Result Value Ref Range    Free T-4 1.28 0.93 - 1.70 ng/dL   PHOSPHORUS    Collection Time: 11/12/21  6:27 AM   Result Value Ref Range    Phosphorus 3.4 2.5 - 4.5 mg/dL   CBC WITHOUT DIFFERENTIAL    Collection Time: 11/12/21  6:27 AM   Result Value Ref Range    WBC 8.9 4.8 - 10.8 K/uL    RBC 3.07 (L) 4.20 - 5.40 M/uL    Hemoglobin 9.3 (L) 12.0 - 16.0 g/dL    Hematocrit 28.3 (L) 37.0 - 47.0 %    MCV 92.2 81.4 - 97.8 fL    MCH 30.3 27.0 - 33.0 pg    MCHC 32.9 (L) 33.6 - 35.0 g/dL    RDW 48.9 35.9 - 50.0 fL    Platelet Count 376 164 - 446 K/uL    MPV 10.2 9.0 - 12.9 fL   Basic Metabolic Panel    Collection Time: 11/12/21  6:27 AM   Result Value Ref Range    Sodium 138 135 - 145 mmol/L    Potassium 3.8 3.6 - 5.5 mmol/L    Chloride 105 96 - 112 mmol/L    Co2 23 20 - 33 mmol/L    Glucose 90 65 - 99 mg/dL    Bun 9 8 - 22 mg/dL    Creatinine 1.22 0.50 - 1.40 mg/dL    Calcium 8.2 (L) 8.5 - 10.5 mg/dL    Anion Gap 10.0 7.0 - 16.0   ESTIMATED GFR    Collection Time: 11/12/21  6:27 AM   Result Value Ref Range    GFR If  54 (A)  >60 mL/min/1.73 m 2    GFR If Non  44 (A) >60 mL/min/1.73 m 2       Current Facility-Administered Medications   Medication Frequency   • simethicone (MYLICON) chewable tab 120 mg 4X/DAY WITH MEALS + NIGHTLY   • acetaminophen (TYLENOL) tablet 1,000 mg TID   • hydrOXYzine HCl (ATARAX) tablet 50 mg Q6HRS PRN   • melatonin tablet 3 mg HS PRN   • Respiratory Therapy Consult Continuous RT   • Pharmacy Consult Request ...Pain Management Review 1 Each PHARMACY TO DOSE   • hydrALAZINE (APRESOLINE) tablet 10 mg Q8HRS PRN   • lactulose 20 GM/30ML solution 30 mL QDAY PRN   • artificial tears ophthalmic solution 1 Drop PRN   • benzocaine-menthol (CEPACOL) lozenge 1 Lozenge Q2HRS PRN   • mag hydrox-al hydrox-simeth (MAALOX PLUS ES or MYLANTA DS) suspension 20 mL Q2HRS PRN   • ondansetron (ZOFRAN ODT) dispertab 4 mg 4X/DAY PRN    Or   • ondansetron (ZOFRAN) syringe/vial injection 4 mg 4X/DAY PRN   • traZODone (DESYREL) tablet 50 mg QHS PRN   • sodium chloride (OCEAN) 0.65 % nasal spray 2 Spray PRN   • oxyCODONE immediate-release (ROXICODONE) tablet 5 mg Q5H PRN    Or   • oxyCODONE immediate release (ROXICODONE) tablet 10 mg Q5H PRN   • allopurinol (ZYLOPRIM) tablet 300 mg DAILY   • amLODIPine (NORVASC) tablet 10 mg Q EVENING   • calcium carbonate (TUMS) chewable tab 1,000 mg TID PRN   • gabapentin (NEURONTIN) capsule 200 mg TID   • heparin injection 5,000 Units Q8HRS   • levothyroxine (SYNTHROID) tablet 50 mcg AM ES   • lidocaine (LIDODERM) 5 % 2 Patch Q24HR   • nicotine (NICODERM) 21 MG/24HR 21 mg Q24HRS   • omeprazole (PRILOSEC) capsule 20 mg DAILY   • bisacodyl (DULCOLAX) suppository 10 mg DAILY    And   • senna-docusate (PERICOLACE or SENOKOT S) 8.6-50 MG per tablet 2 Tablet BID    And   • polyethylene glycol/lytes (MIRALAX) PACKET 1 Packet DAILY    And   • magnesium hydroxide (MILK OF MAGNESIA) suspension 30 mL QDAY PRN       Orders Placed This Encounter   Procedures   • Diet Order Diet: Low Fiber(GI Soft)      Standing Status:   Standing     Number of Occurrences:   1     Order Specific Question:   Diet:     Answer:   Low Fiber(GI Soft) [2]       Radiology    [id number] VIEW   Final Result      Limited exam showing no evidence of bowel obstruction.      [id number] VIEW   Final Result      Significant improvement in gaseous dilated bowel loops/ileus.              Assessment:  Active Hospital Problems    Diagnosis    • *Renal cell carcinoma (HCC)    • Wound dehiscence    • Ileus (HCC)    • Normocytic anemia    • Acquired hypothyroidism    • Hepatitis C    • Tobacco abuse    • Gout    • Post-op pain    • SHEBA (acute kidney injury) (HCC)    • Small bowel obstruction (HCC)    • Hypertension      This patient is a 66 y.o. female admitted for acute inpatient rehabilitation with debility secondary to Renal cell carcinoma (HCC).    Will the first glucose on Monday to discuss a discharge date.      Medical Decision Making and Plan:    Debility  Generalized weakness, improved  PT/OT, 1.5 hr each discipline, 5 days per week    Renal cell carcinoma  Partial nephrectomy 10/27 Dr. Mccracekn  Outpatient follow up    Pain management  Scheduled Tylenol  Gabapentin, monitor need to increase  Lidocaine patch  As needed oxycodone  Currently with good control    Small bowel obstruction status post   S/P surgical repair  11/2 Dr. Banegas   Pain management  Outpatient follow-up with surgery     Postoperative ileus, improved   Continue simethicone  X-ray per above     Wound dehiscence  Wound care consult  Wound VAC placed  Surgeon updated     Acute kidney injury, continues  Avoid nephrotoxins  Renally dose medications   Consult hospitalist, appreciate assistance    Hypertension  Amlodipine  Consult hospitalist, appreciate assistance    Hypothyroidism  Abnormal thyroid studies  Levothyroxine  Outpatient follow-up with her PCP     Normocytic anemia  Improved  Monitor with weekly labs     Tobacco use  Nicotine patch  Counseling provided  today     History of gout  Allopurinol     GI prophylaxis  Omeprazole    Bowel program  Continue bowel medications  Last BM 11/11    Bladder program  Check PVRs - 48, 96  Not retaining  Bladder scan for no voids  ICP for over 400 cc  Scheduled toileting    Bilateral edema in the feet  MALCOM hose    DVT prophylaxis  Heparin due to impaired kidney function    Total time:  16 minutes.  I spent greater than 50% of the time for patient care, counseling, and coordination on this date, including patient face-to face time, unit/floor time with review of records/pertinent lab data and studies, as well as discussing diagnostic evaluation/work up, planned therapeutic interventions, and future disposition of care, as per the interval events/subjective and the assessment and plan as noted above.      I had a 5 minute discussion with patient on 11/12/2021 about smoking cessation.  Discussed that smoking is associated with respiratory, cardiovascular, oncologic, and neurologic illnesses.  Patient was provided advice and counseling on different methods of smoking cessation as well as provided supportive listening for psychologic aspect of addiction.             Mya Deng M.D.   Physical Medicine and Rehabilitation

## 2021-11-13 NOTE — THERAPY
Occupational Therapy  Daily Treatment     Patient Name: Thais Munroe  Age:  66 y.o., Sex:  female  Medical Record #: [id number]  Today's Date: 11/13/2021     Precautions  Precautions: Fall Risk  Comments: abdominal incision with wound vac         Subjective       Objective     11/13/21 1401   Precautions   Precautions Fall Risk   Comments abdominal incision with wound vac   Vitals   O2 Delivery Device None - Room Air   Non Verbal Descriptors   Non Verbal Scale  Calm   Cognition    Level of Consciousness Alert   Sleep/Wake Cycle   Sleep & Rest Awake   Functional Level of Assist   Toileting Supervision   Toileting Description Grab bar;Supervision for safety   Toilet Transfers Supervised  (wc/commode)   Toilet Transfer Description Grab bar;Supervision for safety   Standing Upper Body Exercises   Comments Parallel Bar (PB)/Ladder Ball (LB) activity - Patient standing at one end of PB's w/ ladder for LB 6' behind patient.  12' at opposite end of PB's LB's placed.  CGA via gait belt + management of wound vac by therapist, pt instructed to navigate 12' to opposite end of PB's to retrieve one LB at a time and return 12' to starting position to toss LB at ladder.  1 LB = 12'x2 = 24'.  Pt completed 2x5 w/ seated rest break between sets.  No LOB.  2x5 = 10x24' = 240'.w/ intermittent stabilization via BUE's (<50%).   Bed Mobility    Supine to Sit Stand by Assist   Interdisciplinary Plan of Care Collaboration   IDT Collaboration with  Nursing   Patient Position at End of Therapy Seated;Call Light within Reach   Collaboration Comments CLOF   OT Total Time Spent   OT Individual Total Time Spent (Mins) 30   OT Charge Group   OT Self Care / ADL 1   OT Therapeutic Exercise  1       Assessment    Pt was alert and cooperative w/ tx.  tx emphasis on ADL's/transfers and TherEx - see notes above for details.  Strengths: Alert and oriented,Independent prior level of function,Motivated for self care and independence,Pleasant and  cooperative,Willingly participates in therapeutic activities  Barriers: Decreased endurance,Generalized weakness,Impaired activity tolerance,Impaired balance,Pain (impaired attention and memory)    Plan    ADLs with AE, IADLs, functional mobility, standing balance, general strength and endurance building    Passport items to be completed:  Passport items to be completed:  Perform bathroom transfers, complete dressing, complete feeding, get ready for the day, prepare a simple meal, participate in household tasks, adapt home for safety needs, demonstrate home exercise program, complete caregiver training     Occupational Therapy Goals (Active)     Problem: Bathing     Dates: Start: 11/10/21       Goal: STG-Within one week, patient will bathe with setup/supervision     Dates: Start: 11/10/21             Problem: Dressing     Dates: Start: 11/10/21       Goal: STG-Within one week, patient will dress LB with setup using AE as needed     Dates: Start: 11/10/21             Problem: Functional Transfers     Dates: Start: 11/10/21       Goal: STG-Within one week, patient will transfer to toilet with supervision using LRAD     Dates: Start: 11/10/21             Problem: OT Long Term Goals     Dates: Start: 11/10/21       Goal: LTG-By discharge, patient will complete basic self care tasks with supervision to modified independence     Dates: Start: 11/10/21          Goal: LTG-By discharge, patient will perform bathroom transfers with supervision to modified independence using LRAD     Dates: Start: 11/10/21             Problem: Toileting     Dates: Start: 11/10/21       Goal: STG-Within one week, patient will complete toileting tasks with supervision     Dates: Start: 11/10/21

## 2021-11-14 ENCOUNTER — APPOINTMENT (OUTPATIENT)
Dept: RADIOLOGY | Facility: REHABILITATION | Age: 66
DRG: 949 | End: 2021-11-14
Attending: HOSPITALIST
Payer: COMMERCIAL

## 2021-11-14 LAB
APPEARANCE UR: CLEAR
BILIRUB UR QL STRIP.AUTO: NEGATIVE
COLOR UR: YELLOW
GLUCOSE UR STRIP.AUTO-MCNC: NEGATIVE MG/DL
KETONES UR STRIP.AUTO-MCNC: NEGATIVE MG/DL
LEUKOCYTE ESTERASE UR QL STRIP.AUTO: NEGATIVE
MICRO URNS: NORMAL
NITRITE UR QL STRIP.AUTO: NEGATIVE
PH UR STRIP.AUTO: 7 [PH] (ref 5–8)
PROT UR QL STRIP: NEGATIVE MG/DL
RBC UR QL AUTO: NEGATIVE
SP GR UR STRIP.AUTO: 1.01
UROBILINOGEN UR STRIP.AUTO-MCNC: 0.2 MG/DL

## 2021-11-14 PROCEDURE — 700111 HCHG RX REV CODE 636 W/ 250 OVERRIDE (IP): Performed by: PHYSICAL MEDICINE & REHABILITATION

## 2021-11-14 PROCEDURE — 97530 THERAPEUTIC ACTIVITIES: CPT

## 2021-11-14 PROCEDURE — 81003 URINALYSIS AUTO W/O SCOPE: CPT

## 2021-11-14 PROCEDURE — 97110 THERAPEUTIC EXERCISES: CPT

## 2021-11-14 PROCEDURE — A9270 NON-COVERED ITEM OR SERVICE: HCPCS | Performed by: PHYSICAL MEDICINE & REHABILITATION

## 2021-11-14 PROCEDURE — 74018 RADEX ABDOMEN 1 VIEW: CPT

## 2021-11-14 PROCEDURE — 99232 SBSQ HOSP IP/OBS MODERATE 35: CPT | Performed by: HOSPITALIST

## 2021-11-14 PROCEDURE — A9270 NON-COVERED ITEM OR SERVICE: HCPCS | Performed by: HOSPITALIST

## 2021-11-14 PROCEDURE — 700102 HCHG RX REV CODE 250 W/ 637 OVERRIDE(OP): Performed by: PHYSICAL MEDICINE & REHABILITATION

## 2021-11-14 PROCEDURE — 700102 HCHG RX REV CODE 250 W/ 637 OVERRIDE(OP): Performed by: HOSPITALIST

## 2021-11-14 PROCEDURE — 770010 HCHG ROOM/CARE - REHAB SEMI PRIVAT*

## 2021-11-14 PROCEDURE — 700101 HCHG RX REV CODE 250: Performed by: PHYSICAL MEDICINE & REHABILITATION

## 2021-11-14 RX ORDER — VITAMIN B COMPLEX
2000 TABLET ORAL DAILY
Status: DISCONTINUED | OUTPATIENT
Start: 2021-11-14 | End: 2021-11-16 | Stop reason: HOSPADM

## 2021-11-14 RX ADMIN — LIDOCAINE 2 PATCH: 50 PATCH TOPICAL at 21:24

## 2021-11-14 RX ADMIN — HEPARIN SODIUM 5000 UNITS: 5000 INJECTION, SOLUTION INTRAVENOUS; SUBCUTANEOUS at 14:35

## 2021-11-14 RX ADMIN — GABAPENTIN 200 MG: 100 CAPSULE ORAL at 21:23

## 2021-11-14 RX ADMIN — OXYCODONE HYDROCHLORIDE 10 MG: 10 TABLET ORAL at 14:35

## 2021-11-14 RX ADMIN — HEPARIN SODIUM 5000 UNITS: 5000 INJECTION, SOLUTION INTRAVENOUS; SUBCUTANEOUS at 21:24

## 2021-11-14 RX ADMIN — ACETAMINOPHEN 1000 MG: 500 TABLET, FILM COATED ORAL at 08:41

## 2021-11-14 RX ADMIN — HEPARIN SODIUM 5000 UNITS: 5000 INJECTION, SOLUTION INTRAVENOUS; SUBCUTANEOUS at 05:36

## 2021-11-14 RX ADMIN — POLYETHYLENE GLYCOL 3350 1 PACKET: 17 POWDER, FOR SOLUTION ORAL at 21:24

## 2021-11-14 RX ADMIN — OXYCODONE HYDROCHLORIDE 10 MG: 10 TABLET ORAL at 00:30

## 2021-11-14 RX ADMIN — SIMETHICONE 160 MG: 80 TABLET, CHEWABLE ORAL at 21:24

## 2021-11-14 RX ADMIN — ACETAMINOPHEN 1000 MG: 500 TABLET, FILM COATED ORAL at 14:35

## 2021-11-14 RX ADMIN — NICOTINE TRANSDERMAL SYSTEM 21 MG: 21 PATCH, EXTENDED RELEASE TRANSDERMAL at 08:23

## 2021-11-14 RX ADMIN — OXYCODONE HYDROCHLORIDE 10 MG: 10 TABLET ORAL at 21:23

## 2021-11-14 RX ADMIN — SIMETHICONE 160 MG: 80 TABLET, CHEWABLE ORAL at 17:51

## 2021-11-14 RX ADMIN — POLYETHYLENE GLYCOL 3350 1 PACKET: 17 POWDER, FOR SOLUTION ORAL at 08:24

## 2021-11-14 RX ADMIN — SENNOSIDES AND DOCUSATE SODIUM 2 TABLET: 50; 8.6 TABLET ORAL at 21:24

## 2021-11-14 RX ADMIN — Medication 2000 UNITS: at 13:18

## 2021-11-14 RX ADMIN — ACETAMINOPHEN 1000 MG: 500 TABLET, FILM COATED ORAL at 21:23

## 2021-11-14 RX ADMIN — SIMETHICONE 160 MG: 80 TABLET, CHEWABLE ORAL at 08:21

## 2021-11-14 RX ADMIN — SIMETHICONE 160 MG: 80 TABLET, CHEWABLE ORAL at 13:17

## 2021-11-14 RX ADMIN — GABAPENTIN 200 MG: 100 CAPSULE ORAL at 08:23

## 2021-11-14 RX ADMIN — GABAPENTIN 200 MG: 100 CAPSULE ORAL at 14:34

## 2021-11-14 RX ADMIN — SENNOSIDES AND DOCUSATE SODIUM 2 TABLET: 50; 8.6 TABLET ORAL at 08:23

## 2021-11-14 RX ADMIN — OXYCODONE HYDROCHLORIDE 10 MG: 10 TABLET ORAL at 08:40

## 2021-11-14 RX ADMIN — ALLOPURINOL 300 MG: 300 TABLET ORAL at 08:23

## 2021-11-14 RX ADMIN — AMLODIPINE BESYLATE 10 MG: 5 TABLET ORAL at 21:23

## 2021-11-14 RX ADMIN — LEVOTHYROXINE SODIUM 50 MCG: 50 TABLET ORAL at 05:36

## 2021-11-14 RX ADMIN — OMEPRAZOLE 20 MG: 20 CAPSULE, DELAYED RELEASE ORAL at 08:23

## 2021-11-14 ASSESSMENT — PAIN SCALES - WONG BAKER
WONGBAKER_NUMERICALRESPONSE: DOESN'T HURT AT ALL

## 2021-11-14 ASSESSMENT — PAIN SCALES - PAIN ASSESSMENT IN ADVANCED DEMENTIA (PAINAD)
BODYLANGUAGE: RELAXED
CONSOLABILITY: NO NEED TO CONSOLE
BODYLANGUAGE: RELAXED
FACIALEXPRESSION: SMILING OR INEXPRESSIVE
BREATHING: NORMAL
TOTALSCORE: 0
BREATHING: NORMAL
CONSOLABILITY: NO NEED TO CONSOLE
FACIALEXPRESSION: SMILING OR INEXPRESSIVE
TOTALSCORE: 0

## 2021-11-14 ASSESSMENT — PATIENT HEALTH QUESTIONNAIRE - PHQ9
SUM OF ALL RESPONSES TO PHQ9 QUESTIONS 1 AND 2: 0
1. LITTLE INTEREST OR PLEASURE IN DOING THINGS: NOT AT ALL
2. FEELING DOWN, DEPRESSED, IRRITABLE, OR HOPELESS: NOT AT ALL
2. FEELING DOWN, DEPRESSED, IRRITABLE, OR HOPELESS: NOT AT ALL
SUM OF ALL RESPONSES TO PHQ9 QUESTIONS 1 AND 2: 0
1. LITTLE INTEREST OR PLEASURE IN DOING THINGS: NOT AT ALL

## 2021-11-14 ASSESSMENT — PAIN DESCRIPTION - PAIN TYPE: TYPE: ACUTE PAIN

## 2021-11-14 ASSESSMENT — ACTIVITIES OF DAILY LIVING (ADL): TOILET_TRANSFER_DESCRIPTION: GRAB BAR;SET-UP OF EQUIPMENT;VERBAL CUEING

## 2021-11-14 NOTE — WOUND TEAM
[facility]  Inpatient Services  Initial Wound and Skin Care Evaluation    Admission Date: 11/9/2021     Last order of IP CONSULT TO WOUND CARE was found on 11/9/2021 from Hospital Encounter on 11/3/2021     HPI, PMH, SH: Reviewed    Past Surgical History:   Procedure Laterality Date   • UMBILICAL HERNIA REPAIR  11/2/2021    Procedure: REPAIR, HERNIA, INCISIONAL - INCARCERATED;  Surgeon: Tino Banegas M.D.;  Location: SURGERY [facility];  Service: General   • PB COLONOSCOPY,DIAGNOSTIC N/A 11/1/2021    Procedure: COLONOSCOPY;  Surgeon: Jesus Bland M.D.;  Location: SURGERY SAME DAY [facility];  Service: Gastroenterology   • PB LAP,PARTIAL NEPHRECTOMY Left 10/27/2021    Procedure: NEPHRECTOMY, PARTIAL, ROBOT-ASSISTED, USING DA NAT XI;  Surgeon: Peter Mccracken M.D.;  Location: SURGERY [facility];  Service: Uro Robotic   • PB ULTRASONIC GUIDANCE, INTRAOPERATIVE Left 10/27/2021    Procedure: ULTRASOUND GUIDANCE;  Surgeon: Peter Mccracken M.D.;  Location: SURGERY [facility];  Service: Uro Robotic   • OTHER  1960    tonsils   • OTHER      basal cell removal back     Social History     Tobacco Use   • Smoking status: Current Some Day Smoker     Packs/day: 1.50     Years: 50.00     Pack years: 75.00     Types: Cigarettes   • Smokeless tobacco: Never Used   Substance Use Topics   • Alcohol use: Yes     Comment: daily     No chief complaint on file.    Diagnosis: Debility [R53.81]    Unit where seen by Wound Team: 12/02     WOUND CONSULT/FOLLOW UP RELATED TO:  Vac change to abdomen     WOUND HISTORY:  Patient admitted to [facility] to undergo a resection of a left adrenal mass with Dr. Mccracken. She unfortunately developed a SBO and had to return to OR with Dr. Banegas on 11/2 for reduction and repair of a incisional hernia. Following inpatient hospital recovery, patient was deemed appropriate for Rehab and transferred to [facility] on 11/9/2021. With this acute therapeutic intervention, this patient hopes  to improve her functional status, and return to independent living with the supportive care of family.    WOUND ASSESSMENT/LDA        Negative Pressure Wound Therapy 11/10/21 Surgical Abdomen Lower Left (Active)   NPWT Pump Mode / Pressure Setting Ulta;Continuous;125 mmHg 11/13/21 1700   Dressing Type Small;Black Foam (Regular) 11/13/21 1700   Number of Foam Pieces Used 2 11/13/21 1700   Canister Changed No 11/13/21 1700   NEXT Dressing Change/Treatment Date 11/16/21 11/13/21 1700   WOUND NURSE ONLY - Time Spent with Patient (mins) 30 11/13/21 1700           Wound 11/02/21 Incision Abdomen Lower Left (Active)   Wound Image    11/10/21 1700   Site Assessment Red 11/13/21 1700   Periwound Assessment Dry;Intact 11/13/21 1700   Margins Defined edges;Unattached edges 11/13/21 1700   Closure [place];Secondary intention 11/13/21 1700   Drainage Amount Scant 11/13/21 1700   Drainage Description Serosanguineous 11/13/21 1700   Treatments Cleansed;Site care 11/13/21 1700   Wound Cleansing Approved Wound Cleanser 11/13/21 1700   Periwound Protectant Skin Protectant Wipes to Periwound;Drape 11/13/21 1700   Dressing Cleansing/Solutions Not Applicable 11/13/21 1700   Dressing Options Wound Vac 11/13/21 1700   Dressing Changed Changed 11/13/21 1700   Dressing Status Clean;Dry;Intact 11/13/21 1700   Dressing Change/Treatment Frequency Tuesday, Thursday, Saturday, and As Needed 11/13/21 1700   NEXT Dressing Change/Treatment Date 11/16/21 11/13/21 1700   NEXT Weekly Photo (Inpatient Only) 11/16/21 11/13/21 1700   Non-staged Wound Description Full thickness 11/13/21 1700   Wound Length (cm) 0.6 cm 11/10/21 1700   Wound Width (cm) 3.6 cm 11/10/21 1700   Wound Depth (cm) 1.8 cm 11/10/21 1700   Wound Surface Area (cm^2) 2.16 cm^2 11/10/21 1700   Wound Volume (cm^3) 3.888 cm^3 11/10/21 1700   Post-Procedure Length (cm) 0.6 cm 11/10/21 1700   Post-Procedure Width (cm) 3.6 cm 11/10/21 1700   Post-Procedure Depth (cm) 1.8 cm 11/10/21 1700    Post-Procedure Surface Area (cm^2) 2.16 cm^2 11/10/21 1700   Post-Procedure Volume (cm^3) 3.888 cm^3 11/10/21 1700   Shape linear 21   Wound Odor None 11/10/21 1700   Exposed Structures None 21   WOUND NURSE ONLY - Time Spent with Patient (mins) 30 21          Vascular:    FERNANDO:   No results found.    Lab Values:    Lab Results   Component Value Date/Time    WBC 8.9 2021 06:27 AM    RBC 3.07 (L) 2021 06:27 AM    HEMOGLOBIN 9.3 (L) 2021 06:27 AM    HEMATOCRIT 28.3 (L) 2021 06:27 AM        Culture Results show:  No results found for this or any previous visit (from the past 720 hour(s)).    Pain Level/Medicated:  Medicated by bedside RN     INTERVENTIONS BY WOUND TEAM:  Chart and images reviewed. Discussed with bedside RN. All areas of concern (based on picture review, LDA review and discussion with bedside RN) have been thoroughly assessed. Documentation of areas based on significant findings. This RN in to assess patient. Performed standard wound care which includes appropriate positioning, dressing removal and non-selective debridement. Pictures and measurements obtained weekly if/when required.    Preparation for Dressing removal: Dressing soaked with wound cleanser  Non-selectively Debrided with:  wound cleanser and gauze.  Sharp debridement: NA  Angela wound: Cleansed with wound cleanser and gauze, Prepped with mepitel one over staples, drape, and no sting skin prep   Primary Dressin piece black foam to wound bed and continued along abdomen for small bridge. Secured with drape.   Secondary (Outer) Dressing: Button, trac pad, suction obtained.     Interdisciplinary consultation: Patient, Bedside RN Marely), Wound RN Mary     EVALUATION / RATIONALE FOR TREATMENT:  Most Recent Date:  2021: Wound bed with granulation present, small decrease in size noted. Will continue to benefit from NPWT to assist in closure by secondary intention, management  of bioburden, and increase oxygenation and granulation to the area.      Goals: Steady decrease in wound area and depth weekly.    WOUND TEAM PLAN OF CARE ([X] for frequency of wound follow up,):   Nursing to follow orders written for wound care. Contact wound team if area fails to progress, deteriorates or with any questions/concerns  Dressing changes by wound team:                   Follow up 3 times weekly:                NPWT change 3 times weekly:   x  Follow up 1-2 times weekly:      Follow up Bi-Monthly:                   Follow up as needed:     Other (explain):     NURSING PLAN OF CARE ORDERS (X):  Dressing changes: See Dressing Care orders: X  Skin care: See Skin Care orders:   RN Prevention Protocol:   Rectal tube care: See Rectal Tube Care orders:   Other orders:    RSKIN:   CURRENTLY IN PLACE (X), APPLIED THIS VISIT (A), ORDERED (O):   Q shift Meño:  X  Q shift pressure point assessments:  X    Surface/Positioning   Pressure redistribution mattress        X    Low Airloss          Bariatric foam      Bariatric CORAL     Waffle cushion        Waffle Overlay          Reposition q 2 hours      TAPs Turning system     Z Rashid Pillow     Offloading/Redistribution   Sacral Mepilex (Silicone dressing)     Heel Mepilex (Silicone dressing)         Heel float boots (Prevalon boot)             Float Heels off Bed with Pillows           Respiratory   Silicone O2 tubing         Gray Foam Ear protectors     Cannula fixation Device (Tender )          High flow offloading Clip    Elastic head band offloading device      Anchorfast                                                         Trach with Optifoam split foam             Containment/Moisture Prevention   Rectal tube or BMS    Purwick/Condom Cath        [place] Catheter    Barrier wipes           Barrier paste       Antifungal tx      Interdry        Mobilization   Up to chair      x  Ambulate      PT/OT  x    Nutrition  Dietician        Diabetes Education       PO   x  TF     TPN     NPO   # days     Other        Anticipated discharge plans:   LTACH:        SNF/Rehab:                X  Home Health Care:           Outpatient Wound Center:            Self/Family Care:        Other:                  Vac Discharge Needs:   Not Applicable Pt not on a wound vac:       Regular Vac while inpatient, alternative dressing at DC:      X  Regular Vac in use and continued at DC:            Reg. Vac w/ Skin Sub/Biologic in use. Will need to be changed 2x wkly:      Veraflo Vac while inpatient, ok to transition to Regular Vac on Discharge:           Veraflo Vac while inpatient, will need to remain on Veraflo Vac upon discharge:

## 2021-11-14 NOTE — THERAPY
"Physical Therapy   Daily Treatment     Patient Name: Thais Munroe  Age:  66 y.o., Sex:  female  Medical Record #: [id number]  Today's Date: 11/13/2021     Precautions  Precautions: Fall Risk  Comments: abdominal incision with wound vac    Subjective    Patient agreement to therapy session, reports increased abdominal pain/discomfort.  Had an abdominal x-ray, and \"i'm fully backed up of gas\"     Objective       11/13/21 1431   Gait Functional Level of Assist    Gait Level Of Assist Stand by Assist   Assistive Device Front Wheel Walker   Distance (Feet) 550  ((5 laps in gym, last 2 laps increased gait speed))   # of Times Distance was Traveled 1   Deviation Bradykinetic;Shuffled Gait  (verbal distractions w/ conversation)   Wheelchair Functional Level of Assist   Wheelchair Assist Modified Independent   Distance Wheelchair (Feet or Distance) 150x2   Wheelchair Description Extra time   Transfer Functional Level of Assist   Bed, Chair, Wheelchair Transfer Stand by Assist  (w/c <> EOM)   Bed Chair Wheelchair Transfer Description Assist with two limbs;Increased time;Supervision for safety;Verbal cueing   Toilet Transfers Supervised   Toilet Transfer Description Grab bar;Verbal cueing;Supervision for safety;Increased time   Bed Mobility    Sit to Stand Stand by Assist   Interdisciplinary Plan of Care Collaboration   IDT Collaboration with  Occupational Therapist;Certified Nursing Assistant   Patient Position at End of Therapy Seated;Other (Comments)  (on toilet, nursing aware)   Collaboration Comments nursing: requesting pt mod I, deferred to primary PT, OT: CLOF   PT Total Time Spent   PT Individual Total Time Spent (Mins) 60   PT Charge Group   PT Gait Training 1   PT Neuromuscular Re-Education / Balance 1   PT Therapeutic Activities 2   Patient completes w/c <> toilet x 2 with good safety using brakes and hand placements using bars.      Seated core balance/endurance: EOM arms across chest without foot support " weight shifts L/R and ant/post x 15 each. Cues to increase ROM outside BRANDI.  Foot supported sitting on dynadisk pelvic tilts L/R, ant/post and ccw/cw x 30 each.  Cues to focus on core control and abdominal contractions. Seated on dynadisk arms across chest leg lifts x 5 B.         Assessment    Patient reporting increased abdominal discomfort and pain s/p abdominal x-ray.  This limits her tolerance to seated core activity.  Bathroom transfers go well with good safety and brake use.  Deferred mod I decision to primary PT/OT.     Strengths: Able to follow instructions,Alert and oriented,Effective communication skills,Making steady progress towards goals,Motivated for self care and independence,Pleasant and cooperative,Willingly participates in therapeutic activities  Barriers: Constipation,Decreased endurance,Generalized weakness,Home accessibility,Impaired activity tolerance,Impaired balance,Impaired carryover of learning,Lack of motivation,Limited mobility,Pain    Plan    FWW ambulation, Core/trunk stabilization as tolerated, Ther ex to improve activity tolerance and endurance, Education, Activity tolerance     Passport items to be completed:  Passport items to be completed:  Get in/out of bed safely, in/out of a vehicle, safely use mobility device, walk or wheel around home/community, navigate up and down stairs, show how to get up/down from the ground, ensure home is accessible, demonstrate HEP, complete caregiver training    Physical Therapy Problems (Active)     Problem: Mobility     Dates: Start: 11/10/21       Goal: STG-Within one week, patient will ambulate community distances x200 feet with a 4WW at SPV level.     Dates: Start: 11/10/21          Goal: STG-Within one week, patient will ambulate up/down three curbs with a 4WW at SBA level.     Dates: Start: 11/10/21          Goal: STG-Within one week, patient will ambulate in-room distances with a 4WW at IND level.     Dates: Start: 11/10/21              Problem: Mobility Transfers     Dates: Start: 11/10/21       Goal: STG-Within one week, patient will transfer bed to chair with a 4WW at IND level.     Dates: Start: 11/10/21          Goal: STG-Within one week, patient will transfer in/out of car with a 4WW at SPV level.     Dates: Start: 11/10/21             Problem: PT-Long Term Goals     Dates: Start: 11/10/21       Goal: LTG-By discharge, patient will ambulate x200 feet with a 4WW at SPV level.     Dates: Start: 11/10/21          Goal: LTG-By discharge, patient will transfer one surface to another with a 4WW at IND level.     Dates: Start: 11/10/21          Goal: LTG-By discharge, patient will transfer in/out of a car with a 4WW at SPV level.     Dates: Start: 11/10/21          Goal: LTG-By discharge, patient will ambulate up/down three curbs with a 4WW at SBA level.     Dates: Start: 11/10/21

## 2021-11-14 NOTE — CARE PLAN
Problem: Skin Integrity  Goal: Patient's skin integrity will be maintained or improve  Outcome: Progressing  Note:   Meño Score: 19    Patient's skin remains intact and free from new or accidental injury this shift; no s/s of infection. RN wound protocol checked.  Encouraged hydration and educated about the importance of nutrition to keep skin integrity. Will continue to monitor.     The patient is Stable - Low risk of patient condition declining or worsening    Shift Goals  Clinical Goals: safety   Patient Goals: pain management

## 2021-11-15 PROBLEM — R30.0 DYSURIA: Status: ACTIVE | Noted: 2021-11-15

## 2021-11-15 PROBLEM — E55.9 VITAMIN D DEFICIENCY: Status: ACTIVE | Noted: 2021-11-15

## 2021-11-15 PROCEDURE — 99232 SBSQ HOSP IP/OBS MODERATE 35: CPT | Performed by: HOSPITALIST

## 2021-11-15 PROCEDURE — 700102 HCHG RX REV CODE 250 W/ 637 OVERRIDE(OP): Performed by: HOSPITALIST

## 2021-11-15 PROCEDURE — 700102 HCHG RX REV CODE 250 W/ 637 OVERRIDE(OP): Performed by: PHYSICAL MEDICINE & REHABILITATION

## 2021-11-15 PROCEDURE — 97116 GAIT TRAINING THERAPY: CPT

## 2021-11-15 PROCEDURE — 97530 THERAPEUTIC ACTIVITIES: CPT

## 2021-11-15 PROCEDURE — A9270 NON-COVERED ITEM OR SERVICE: HCPCS | Performed by: PHYSICAL MEDICINE & REHABILITATION

## 2021-11-15 PROCEDURE — 97535 SELF CARE MNGMENT TRAINING: CPT

## 2021-11-15 PROCEDURE — 700111 HCHG RX REV CODE 636 W/ 250 OVERRIDE (IP): Performed by: PHYSICAL MEDICINE & REHABILITATION

## 2021-11-15 PROCEDURE — 770010 HCHG ROOM/CARE - REHAB SEMI PRIVAT*

## 2021-11-15 PROCEDURE — A9270 NON-COVERED ITEM OR SERVICE: HCPCS | Performed by: HOSPITALIST

## 2021-11-15 PROCEDURE — 99233 SBSQ HOSP IP/OBS HIGH 50: CPT | Performed by: PHYSICAL MEDICINE & REHABILITATION

## 2021-11-15 PROCEDURE — 700101 HCHG RX REV CODE 250: Performed by: PHYSICAL MEDICINE & REHABILITATION

## 2021-11-15 RX ADMIN — SIMETHICONE 160 MG: 80 TABLET, CHEWABLE ORAL at 11:00

## 2021-11-15 RX ADMIN — GABAPENTIN 200 MG: 100 CAPSULE ORAL at 20:52

## 2021-11-15 RX ADMIN — LEVOTHYROXINE SODIUM 50 MCG: 50 TABLET ORAL at 06:19

## 2021-11-15 RX ADMIN — HEPARIN SODIUM 5000 UNITS: 5000 INJECTION, SOLUTION INTRAVENOUS; SUBCUTANEOUS at 20:53

## 2021-11-15 RX ADMIN — GABAPENTIN 200 MG: 100 CAPSULE ORAL at 08:14

## 2021-11-15 RX ADMIN — HEPARIN SODIUM 5000 UNITS: 5000 INJECTION, SOLUTION INTRAVENOUS; SUBCUTANEOUS at 05:20

## 2021-11-15 RX ADMIN — GABAPENTIN 200 MG: 100 CAPSULE ORAL at 14:38

## 2021-11-15 RX ADMIN — SIMETHICONE 160 MG: 80 TABLET, CHEWABLE ORAL at 20:52

## 2021-11-15 RX ADMIN — ALLOPURINOL 300 MG: 300 TABLET ORAL at 08:14

## 2021-11-15 RX ADMIN — AMLODIPINE BESYLATE 10 MG: 5 TABLET ORAL at 20:52

## 2021-11-15 RX ADMIN — SIMETHICONE 160 MG: 80 TABLET, CHEWABLE ORAL at 17:04

## 2021-11-15 RX ADMIN — NICOTINE TRANSDERMAL SYSTEM 21 MG: 21 PATCH, EXTENDED RELEASE TRANSDERMAL at 08:12

## 2021-11-15 RX ADMIN — HEPARIN SODIUM 5000 UNITS: 5000 INJECTION, SOLUTION INTRAVENOUS; SUBCUTANEOUS at 14:38

## 2021-11-15 RX ADMIN — SENNOSIDES AND DOCUSATE SODIUM 2 TABLET: 50; 8.6 TABLET ORAL at 08:14

## 2021-11-15 RX ADMIN — OXYCODONE HYDROCHLORIDE 10 MG: 10 TABLET ORAL at 05:32

## 2021-11-15 RX ADMIN — OMEPRAZOLE 20 MG: 20 CAPSULE, DELAYED RELEASE ORAL at 08:13

## 2021-11-15 RX ADMIN — POLYETHYLENE GLYCOL 3350 1 PACKET: 17 POWDER, FOR SOLUTION ORAL at 20:53

## 2021-11-15 RX ADMIN — ACETAMINOPHEN 1000 MG: 500 TABLET, FILM COATED ORAL at 14:38

## 2021-11-15 RX ADMIN — SENNOSIDES AND DOCUSATE SODIUM 2 TABLET: 50; 8.6 TABLET ORAL at 20:52

## 2021-11-15 RX ADMIN — SIMETHICONE 160 MG: 80 TABLET, CHEWABLE ORAL at 08:13

## 2021-11-15 RX ADMIN — ACETAMINOPHEN 1000 MG: 500 TABLET, FILM COATED ORAL at 08:13

## 2021-11-15 RX ADMIN — OXYCODONE HYDROCHLORIDE 10 MG: 10 TABLET ORAL at 13:27

## 2021-11-15 RX ADMIN — POLYETHYLENE GLYCOL 3350 1 PACKET: 17 POWDER, FOR SOLUTION ORAL at 08:12

## 2021-11-15 RX ADMIN — OXYCODONE HYDROCHLORIDE 10 MG: 10 TABLET ORAL at 21:07

## 2021-11-15 RX ADMIN — ACETAMINOPHEN 1000 MG: 500 TABLET, FILM COATED ORAL at 20:52

## 2021-11-15 RX ADMIN — LIDOCAINE 2 PATCH: 50 PATCH TOPICAL at 20:53

## 2021-11-15 RX ADMIN — Medication 2000 UNITS: at 08:13

## 2021-11-15 ASSESSMENT — ENCOUNTER SYMPTOMS
EYES NEGATIVE: 1
ABDOMINAL PAIN: 0
BRUISES/BLEEDS EASILY: 0
FEVER: 0
COUGH: 0
POLYDIPSIA: 0
PALPITATIONS: 0
NAUSEA: 0
CHILLS: 0
SHORTNESS OF BREATH: 0
ABDOMINAL PAIN: 1
VOMITING: 0

## 2021-11-15 ASSESSMENT — PAIN SCALES - WONG BAKER
WONGBAKER_NUMERICALRESPONSE: HURTS JUST A LITTLE BIT
WONGBAKER_NUMERICALRESPONSE: HURTS JUST A LITTLE BIT
WONGBAKER_NUMERICALRESPONSE: DOESN'T HURT AT ALL
WONGBAKER_NUMERICALRESPONSE: HURTS JUST A LITTLE BIT
WONGBAKER_NUMERICALRESPONSE: HURTS JUST A LITTLE BIT

## 2021-11-15 ASSESSMENT — ACTIVITIES OF DAILY LIVING (ADL)
TOILET_TRANSFER_DESCRIPTION: ADAPTIVE EQUIPMENT;GRAB BAR;INCREASED TIME
TUB_SHOWER_TRANSFER_DESCRIPTION: GRAB BAR;SHOWER BENCH;ADAPTIVE EQUIPMENT
TUB_SHOWER_TRANSFER_DESCRIPTION: VERBAL CUEING
TOILET_TRANSFER_DESCRIPTION: GRAB BAR;INCREASED TIME;ADAPTIVE EQUIPMENT
TOILETING_LEVEL_OF_ASSIST_DESCRIPTION: GRAB BAR;INCREASED TIME;ADAPTIVE EQUIPMENT
BED_CHAIR_WHEELCHAIR_TRANSFER_DESCRIPTION: INCREASED TIME
BED_CHAIR_WHEELCHAIR_TRANSFER_DESCRIPTION: ADAPTIVE EQUIPMENT;INCREASED TIME
BED_CHAIR_WHEELCHAIR_TRANSFER_DESCRIPTION: ADAPTIVE EQUIPMENT
TOILET_TRANSFER_DESCRIPTION: ADAPTIVE EQUIPMENT;GRAB BAR;INCREASED TIME

## 2021-11-15 ASSESSMENT — PATIENT HEALTH QUESTIONNAIRE - PHQ9
SUM OF ALL RESPONSES TO PHQ9 QUESTIONS 1 AND 2: 0
1. LITTLE INTEREST OR PLEASURE IN DOING THINGS: NOT AT ALL
2. FEELING DOWN, DEPRESSED, IRRITABLE, OR HOPELESS: NOT AT ALL

## 2021-11-15 ASSESSMENT — GAIT ASSESSMENTS
DEVIATION: BRADYKINETIC;DECREASED HEEL STRIKE
DISTANCE (FEET): 250
ASSISTIVE DEVICE: 4 WHEEL WALKER

## 2021-11-15 ASSESSMENT — PAIN DESCRIPTION - PAIN TYPE: TYPE: ACUTE PAIN

## 2021-11-15 NOTE — PROGRESS NOTES
Hospital Medicine Daily Progress Note      Chief Complaint  Post-operative Ileus  Hypertension    Interval Problem Update  Eating well.  Labs reviewed.    Review of Systems  Review of Systems   Constitutional: Negative for chills and fever.   HENT: Negative.    Eyes: Negative.    Respiratory: Negative for cough and shortness of breath.    Cardiovascular: Negative for chest pain and palpitations.   Gastrointestinal: Positive for abdominal pain. Negative for nausea and vomiting.   Musculoskeletal:        Wound pain    Skin: Negative for itching and rash.   Endo/Heme/Allergies: Negative for polydipsia. Does not bruise/bleed easily.        Physical Exam  Temp:  [36.3 °C (97.4 °F)-36.8 °C (98.3 °F)] 36.8 °C (98.3 °F)  Pulse:  [82-88] 82  Resp:  [18] 18  BP: (114-140)/(84-86) 114/86  SpO2:  [91 %] 91 %    Physical Exam  Vitals reviewed.   Constitutional:       General: She is not in acute distress.     Appearance: Normal appearance. She is not ill-appearing.   HENT:      Head: Normocephalic and atraumatic.      Right Ear: External ear normal.      Left Ear: External ear normal.      Nose: Nose normal.      Mouth/Throat:      Pharynx: Oropharynx is clear.   Eyes:      General:         Right eye: No discharge.         Left eye: No discharge.      Extraocular Movements: Extraocular movements intact.      Conjunctiva/sclera: Conjunctivae normal.   Cardiovascular:      Rate and Rhythm: Normal rate and regular rhythm.   Pulmonary:      Effort: Pulmonary effort is normal. No respiratory distress.      Breath sounds: Normal breath sounds. No wheezing.   Abdominal:      General: Bowel sounds are normal. There is distension.      Palpations: Abdomen is soft.      Tenderness: There is abdominal tenderness. There is no guarding or rebound.   Musculoskeletal:      Cervical back: Normal range of motion and neck supple.      Right lower leg: No edema.      Left lower leg: No edema.   Skin:     General: Skin is warm and dry.    Neurological:      Mental Status: She is alert and oriented to person, place, and time.         Fluids    Intake/Output Summary (Last 24 hours) at 11/15/2021 1414  Last data filed at 11/15/2021 0830  Gross per 24 hour   Intake 820 ml   Output --   Net 820 ml           Assessment/Plan  * Renal cell carcinoma (HCC)- (present on admission)  Assessment & Plan  S/P robotic assisted laparoscopic left partial nephrectomy on 10/27/21 by Dr. Mccracken  Pathology +RCC  Needs F/U    Acquired hypothyroidism- (present on admission)  Assessment & Plan  TSH 21.6 and FT4 1.28  On Synthroid  Needs F/U    Hepatitis C- (present on admission)  Assessment & Plan  Details unknown  Hepatology F/U    Hypertension- (present on admission)  Assessment & Plan  Observe blood pressure trends on Norvasc    Gout- (present on admission)  Assessment & Plan  On Allopurinol  Monitor for acute flare-ups    Small bowel obstruction (HCC)- (present on admission)  Assessment & Plan  Pt initially had colonic pseudo-obstruction S/P colonoscopic decompression  Then developed SBO S/P reduction and repair of incarcerated incisional hernia on 11/2/21 by Dr. Banegas  Now w/ post op ileus  KUB 11/10/21 significant improvement in gaseous dilated bowel loops/ileus  KUB 11/12/21 no bowel obstruction  Continue scheduled high dose Simethicone and aggressive bowel regimen  Wound care and pain control per Physiatry     Full Code

## 2021-11-15 NOTE — CARE PLAN
The patient is Stable - Low risk of patient condition declining or worsening    Shift Goals  Clinical Goals: safety   Patient Goals: pain management       Problem: Infection  Goal: Patient will remain free from infection  Outcome: Progressing  Note:   Patient remains free from s/s infection; afebrile.  Will continue to monitor.       Problem: Pain - Standard  Goal: Alleviation of pain or a reduction in pain to the patient’s comfort goal  Outcome: Progressing  Flowsheets  Taken 11/15/2021 0013  OB Pain Intervention:   Medication - See MAR   Declines   Distraction  Taken 11/14/2021 2323  Non Verbal Scale:   Calm   Sleeping  Douglass-Baker Scale: 0   Pain Rating Scale (NPRS): 0  Taken 11/14/2021 2223  Critical-Care Pain Observation Score: 0  PAINAD Score: 0  Taken 11/14/2021 0230  OB Pain Level: 0-No Pain  Note: Patient has pain scale of 7, pain medication oxycodone 10mg administered. Encouraged to use alternative form of alleviating pain. Patient expressed relief. Will continue to monitor.

## 2021-11-15 NOTE — PROGRESS NOTES
Hospital Medicine Daily Progress Note      Chief Complaint  Post-operative Ileus  Hypertension    Interval Problem Update  Pt c/o burning on urination.  Abdominal pain better everyday.  Imaging reviewed.    Review of Systems  Review of Systems   Constitutional: Negative for chills and fever.   HENT: Negative.    Eyes: Negative.    Respiratory: Negative for cough and shortness of breath.    Cardiovascular: Negative for chest pain and palpitations.   Gastrointestinal: Positive for abdominal pain. Negative for nausea and vomiting.   Genitourinary: Positive for dysuria.   Musculoskeletal:        Wound pain    Skin: Negative for itching and rash.   Endo/Heme/Allergies: Negative for polydipsia. Does not bruise/bleed easily.        Physical Exam  Temp:  [36.3 °C (97.4 °F)-36.8 °C (98.3 °F)] 36.5 °C (97.7 °F)  Pulse:  [82-89] 89  Resp:  [18] 18  BP: (114-148)/(84-90) 148/90  SpO2:  [91 %-95 %] 95 %    Physical Exam  Vitals reviewed.   Constitutional:       General: She is not in acute distress.     Appearance: Normal appearance. She is not ill-appearing.   HENT:      Head: Normocephalic and atraumatic.      Right Ear: External ear normal.      Left Ear: External ear normal.      Nose: Nose normal.      Mouth/Throat:      Pharynx: Oropharynx is clear.   Eyes:      General:         Right eye: No discharge.         Left eye: No discharge.      Extraocular Movements: Extraocular movements intact.      Conjunctiva/sclera: Conjunctivae normal.   Cardiovascular:      Rate and Rhythm: Normal rate and regular rhythm.   Pulmonary:      Effort: Pulmonary effort is normal. No respiratory distress.      Breath sounds: Normal breath sounds. No wheezing.   Abdominal:      General: Bowel sounds are normal. There is distension.      Palpations: Abdomen is soft.      Tenderness: There is abdominal tenderness. There is no guarding or rebound.   Musculoskeletal:      Cervical back: Normal range of motion and neck supple.      Right lower leg:  No edema.      Left lower leg: No edema.   Skin:     General: Skin is warm and dry.   Neurological:      Mental Status: She is alert and oriented to person, place, and time.         Fluids    Intake/Output Summary (Last 24 hours) at 11/15/2021 1422  Last data filed at 11/15/2021 1230  Gross per 24 hour   Intake 1230 ml   Output --   Net 1230 ml           Assessment/Plan  * Renal cell carcinoma (HCC)- (present on admission)  Assessment & Plan  S/P robotic assisted laparoscopic left partial nephrectomy on 10/27/21 by Dr. Mccracken  Pathology +RCC  Needs F/U    Dysuria  Assessment & Plan  Check UA    Vitamin D deficiency  Assessment & Plan  Vit D level 12  Start supplementation    Acquired hypothyroidism- (present on admission)  Assessment & Plan  TSH 21.6 and FT4 1.28  On Synthroid  Needs F/U    Hepatitis C- (present on admission)  Assessment & Plan  Details unknown  Hepatology F/U    Hypertension- (present on admission)  Assessment & Plan  Blood pressure controlled on Norvasc    Gout- (present on admission)  Assessment & Plan  On Allopurinol  Monitor for acute flare-ups    Small bowel obstruction (HCC)- (present on admission)  Assessment & Plan  Pt initially had colonic pseudo-obstruction S/P colonoscopic decompression  Then developed SBO S/P reduction and repair of incarcerated incisional hernia on 11/2/21 by Dr. Banegas  Now w/ post op ileus  KUB 11/10/21 significant improvement in gaseous dilated bowel loops/ileus  KUB 11/12/21 no bowel obstruction  KUB 11/13/21 increasing bowel gas without definite obstruction  KUB 11/14/21 decreased bowel gas, no obstruction  Continue Simethicone and bowel regimen  Wound care and pain control per Physiatry     Full Code

## 2021-11-15 NOTE — PROGRESS NOTES
Hospital Medicine Daily Progress Note      Chief Complaint  Post-operative Ileus  Hypertension    Interval Problem Update  Pt reports she's passing less gas and abdomen feels less bloated.  UA reviewed.    Review of Systems  Review of Systems   Constitutional: Negative for chills and fever.   HENT: Negative.    Eyes: Negative.    Respiratory: Negative for cough and shortness of breath.    Cardiovascular: Negative for chest pain and palpitations.   Gastrointestinal: Negative for abdominal pain, nausea and vomiting.   Genitourinary: Negative for dysuria.   Musculoskeletal:        Wound pain    Skin: Negative for itching and rash.   Endo/Heme/Allergies: Negative for polydipsia. Does not bruise/bleed easily.        Physical Exam  Temp:  [36.3 °C (97.4 °F)-36.8 °C (98.3 °F)] 36.5 °C (97.7 °F)  Pulse:  [82-89] 89  Resp:  [18] 18  BP: (114-148)/(84-90) 148/90  SpO2:  [91 %-95 %] 95 %    Physical Exam  Vitals reviewed.   Constitutional:       General: She is not in acute distress.     Appearance: Normal appearance. She is not ill-appearing.   HENT:      Head: Normocephalic and atraumatic.      Right Ear: External ear normal.      Left Ear: External ear normal.      Nose: Nose normal.      Mouth/Throat:      Pharynx: Oropharynx is clear.   Eyes:      General:         Right eye: No discharge.         Left eye: No discharge.      Extraocular Movements: Extraocular movements intact.      Conjunctiva/sclera: Conjunctivae normal.   Cardiovascular:      Rate and Rhythm: Normal rate and regular rhythm.   Pulmonary:      Effort: Pulmonary effort is normal. No respiratory distress.      Breath sounds: Normal breath sounds. No wheezing.   Abdominal:      General: Bowel sounds are normal. There is no distension.      Palpations: Abdomen is soft.      Tenderness: There is no abdominal tenderness. There is no guarding or rebound.   Musculoskeletal:      Cervical back: Normal range of motion and neck supple.      Right lower leg: No  edema.      Left lower leg: No edema.   Skin:     General: Skin is warm and dry.   Neurological:      Mental Status: She is alert and oriented to person, place, and time.         Fluids    Intake/Output Summary (Last 24 hours) at 11/15/2021 1423  Last data filed at 11/15/2021 1230  Gross per 24 hour   Intake 1230 ml   Output --   Net 1230 ml           Assessment/Plan  * Renal cell carcinoma (HCC)- (present on admission)  Assessment & Plan  S/P robotic assisted laparoscopic left partial nephrectomy on 10/27/21 by Dr. Mccracken  Pathology +RCC  Needs F/U    Dysuria  Assessment & Plan  UA negative    Vitamin D deficiency  Assessment & Plan  Vit D level 12  On supplementation    Acquired hypothyroidism- (present on admission)  Assessment & Plan  TSH 21.6 and FT4 1.28  On Synthroid  Needs F/U    Hepatitis C- (present on admission)  Assessment & Plan  Details unknown  Hepatology F/U    Hypertension- (present on admission)  Assessment & Plan  Blood pressure controlled on Norvasc    Gout- (present on admission)  Assessment & Plan  On Allopurinol  Monitor for acute flare-ups    Small bowel obstruction (HCC)- (present on admission)  Assessment & Plan  Pt initially had colonic pseudo-obstruction S/P colonoscopic decompression  Then developed SBO S/P reduction and repair of incarcerated incisional hernia on 11/2/21 by Dr. Banegas  Now w/ post op ileus  KUB 11/10/21 significant improvement in gaseous dilated bowel loops/ileus  KUB 11/12/21 no bowel obstruction  KUB 11/13/21 increasing bowel gas without definite obstruction  KUB 11/14/21 decreased bowel gas, no obstruction  Continue Simethicone and bowel regimen  Wound care and pain control per Physiatry     Full Code    Discussed w/ pt and Dr. Deng

## 2021-11-15 NOTE — ASSESSMENT & PLAN NOTE
Pt initially had colonic pseudo-obstruction S/P colonoscopic decompression  Then developed SBO S/P reduction and repair of incarcerated incisional hernia on 11/2/21 by Dr. Banegas  Now w/ post op ileus  KUB 11/10/21 significant improvement in gaseous dilated bowel loops/ileus  KUB 11/12/21 no bowel obstruction  KUB 11/13/21 increasing bowel gas without definite obstruction  KUB 11/14/21 decreased bowel gas, no obstruction  Continue Simethicone and bowel regimen  Wound care and pain control per Physiatry

## 2021-11-15 NOTE — PROGRESS NOTES
"Rehab Progress Note     Date of Service: 11/15/2021  Chief Complaint: Follow-up debility due to abdominal surgery    Interval Events (Subjective)    Patient seen and examined today in her room.  She is about ready to do occupational therapy.  She continues to report improvement in her abdominal pain and is moving her bowels.  She has no new complaints.      ROS: No changes to bowel, bladder, pain, mood, or sleep.              Objective:  VITAL SIGNS: /86   Pulse 82   Temp 36.8 °C (98.3 °F) (Temporal)   Resp 18   Ht 1.676 m (5' 6\")   Wt 80.6 kg (177 lb 9.6 oz)   SpO2 91%   BMI 28.67 kg/m²   Gen: alert, no apparent distress  CV: regular rate and rhythm, no murmurs  Resp: clear to ascultation bilaterally, normal respiratory effort  GI: soft, non-tender abdomen, bowel sounds present, wound VAC in place      Recent Results (from the past 72 hour(s))   URINALYSIS    Collection Time: 11/14/21  2:30 PM    Specimen: Urine, Clean Catch   Result Value Ref Range    Color Yellow     Character Clear     Specific Gravity 1.011 <1.035    Ph 7.0 5.0 - 8.0    Glucose Negative Negative mg/dL    Ketones Negative Negative mg/dL    Protein Negative Negative mg/dL    Bilirubin Negative Negative    Urobilinogen, Urine 0.2 Negative    Nitrite Negative Negative    Leukocyte Esterase Negative Negative    Occult Blood Negative Negative    Micro Urine Req see below        Current Facility-Administered Medications   Medication Frequency   • vitamin D3 (cholecalciferol) tablet 2,000 Units DAILY   • senna-docusate (PERICOLACE or SENOKOT S) 8.6-50 MG per tablet 2 Tablet BID   • simethicone (MYLICON) chewable tab 160 mg 4X/DAY WITH MEALS + NIGHTLY   • polyethylene glycol/lytes (MIRALAX) PACKET 1 Packet BID    And   • magnesium hydroxide (MILK OF MAGNESIA) suspension 30 mL QDAY PRN    And   • bisacodyl (DULCOLAX) suppository 10 mg DAILY   • acetaminophen (TYLENOL) tablet 1,000 mg TID   • hydrOXYzine HCl (ATARAX) tablet 50 mg Q6HRS PRN "   • melatonin tablet 3 mg HS PRN   • Respiratory Therapy Consult Continuous RT   • Pharmacy Consult Request ...Pain Management Review 1 Each PHARMACY TO DOSE   • hydrALAZINE (APRESOLINE) tablet 10 mg Q8HRS PRN   • lactulose 20 GM/30ML solution 30 mL QDAY PRN   • artificial tears ophthalmic solution 1 Drop PRN   • benzocaine-menthol (CEPACOL) lozenge 1 Lozenge Q2HRS PRN   • mag hydrox-al hydrox-simeth (MAALOX PLUS ES or MYLANTA DS) suspension 20 mL Q2HRS PRN   • ondansetron (ZOFRAN ODT) dispertab 4 mg 4X/DAY PRN    Or   • ondansetron (ZOFRAN) syringe/vial injection 4 mg 4X/DAY PRN   • traZODone (DESYREL) tablet 50 mg QHS PRN   • sodium chloride (OCEAN) 0.65 % nasal spray 2 Spray PRN   • oxyCODONE immediate-release (ROXICODONE) tablet 5 mg Q5H PRN    Or   • oxyCODONE immediate release (ROXICODONE) tablet 10 mg Q5H PRN   • allopurinol (ZYLOPRIM) tablet 300 mg DAILY   • amLODIPine (NORVASC) tablet 10 mg Q EVENING   • calcium carbonate (TUMS) chewable tab 1,000 mg TID PRN   • gabapentin (NEURONTIN) capsule 200 mg TID   • heparin injection 5,000 Units Q8HRS   • levothyroxine (SYNTHROID) tablet 50 mcg AM ES   • lidocaine (LIDODERM) 5 % 2 Patch Q24HR   • nicotine (NICODERM) 21 MG/24HR 21 mg Q24HRS   • omeprazole (PRILOSEC) capsule 20 mg DAILY       Orders Placed This Encounter   Procedures   • Diet Order Diet: Low Fiber(GI Soft)     Standing Status:   Standing     Number of Occurrences:   1     Order Specific Question:   Diet:     Answer:   Low Fiber(GI Soft) [2]       Radiology    [id number] VIEW   Final Result      Decreased bowel gas from prior.  No evidence for obstruction.      [id number] VIEW   Final Result      1.  Postoperative changes as described.   2.  Increasing bowel gas without definite obstruction.      [id number] VIEW   Final Result      Limited exam showing no evidence of bowel obstruction.      [id number] VIEW   Final Result      Significant improvement in gaseous dilated bowel loops/ileus.               Assessment:  Active Hospital Problems    Diagnosis    • *Renal cell carcinoma (HCC)    • Wound dehiscence    • Ileus (HCC)    • Normocytic anemia    • Acquired hypothyroidism    • Hepatitis C    • Tobacco abuse    • Gout    • Post-op pain    • SHEBA (acute kidney injury) (HCC)    • Small bowel obstruction (HCC)    • Hypertension      This patient is a 66 y.o. female admitted for acute inpatient rehabilitation with debility secondary to Renal cell carcinoma (HCC).    I led and attended the weekly conference today, and agree with the IDT conference documentation and plan of care as noted below.    Date of conference: 11/15/2021    Goals and barriers: See IDT note.    Biggest barriers: impaired attention    Goals in next week: discharge    CM/social support: family supportive    Anticipated DC date: 11/16    Outpatient PT    Equip: 4WW, tub transfer bench    Follow up: PCP, surgery - Dr. Banegas, urology - Dr. Mccracken, wound clinic      Medical Decision Making and Plan:    Debility  Generalized weakness, improved  PT/OT, 1.5 hr each discipline, 5 days per week    Renal cell carcinoma  Partial nephrectomy 10/27 Dr. Mccracken  Outpatient follow up    Pain management  Scheduled Tylenol  Gabapentin  Lidocaine patch  As needed oxycodone, last use 11/15  Currently with good control    Small bowel obstruction status post   S/P surgical repair  11/2 Dr. Banegas   Pain management  Outpatient follow-up with surgery     Postoperative ileus, improved   Continue simethicone  X-ray per above     Wound dehiscence  Wound care consult  Wound VAC placed  Surgeon updated     Acute kidney injury, continues  Avoid nephrotoxins  Renally dose medications   Consult hospitalist, appreciate assistance    Hypertension  Amlodipine  Consult hospitalist, appreciate assistance    Hypothyroidism  Abnormal thyroid studies  Levothyroxine  Outpatient follow-up with her PCP     Normocytic anemia  Improved  Monitor with weekly labs     Tobacco  use  Nicotine patch  Counseling provided     History of gout  Allopurinol     GI prophylaxis  Omeprazole    Bowel program  Continue bowel medications  Last BM 11/15    Bladder program  Check PVRs - 48, 96  Not retaining  Bladder scan for no voids  ICP for over 400 cc  Scheduled toileting    Bilateral edema in the feet  MALCOM hose    DVT prophylaxis  Heparin due to impaired kidney function    Total time:  40 minutes.  I spent greater than 50% of the time for patient care, counseling, and coordination on this date, including patient face-to face time, unit/floor time with review of records/pertinent lab data and studies, as well as discussing diagnostic evaluation/work up, planned therapeutic interventions, and future disposition of care, as per the interval events/subjective and the assessment and plan as noted above.             Mya Deng M.D.   Physical Medicine and Rehabilitation

## 2021-11-15 NOTE — PROGRESS NOTES
Patient care assumed. Report received from [facility] JOSEPH Schultz. Patient is alert and calm, resting in bed. Call light and bedside table within reach. Will continue to monitor.

## 2021-11-15 NOTE — THERAPY
"Physical Therapy   Daily Treatment     Patient Name: Thais Munroe  Age:  66 y.o., Sex:  female  Medical Record #: [id number]  Today's Date: 11/15/2021     Precautions  Precautions: Fall Risk  Comments: abdominal incision with wound vac; IND trial with amb in room using FWW    Subjective    Patient agreeable to PT; requesting to continue trial for more independence from earlier session this a.m.     Objective       11/15/21 1031   Precautions   Precautions Fall Risk   Comments abdominal incision with wound vac; IND trial with amb in room using FWW   Vitals   O2 (LPM) 0   O2 Delivery Device None - Room Air   Pain   Intervention Education;Rest   Pain 0 - 10 Group   Location Abdomen   Location Orientation Lower   Description Intermittent  (\"pulling\")   Comfort Goal 0   Therapist Pain Assessment During Activity  (resolves at rest)   Gait Functional Level of Assist    Gait Level Of Assist   (IND with FWW in room; SPV with 4WW use today)   Assistive Device 4 Wheel Walker   Distance (Feet) 250   # of Times Distance was Traveled 3  (and 2x in-room/bathroom distances with FWW instead)   Deviation Bradykinetic;Decreased Heel Strike  (impaired task attention; cueing prn for re-direction to task)   Stairs Functional Level of Assist   Level of Assist with Stairs Supervised   # of Stairs Climbed   (4 reps of 6\" curb with 4WW use)   Stairs Description Assist device/equipment;Extra time;Supervision for safety;Verbal cueing   Transfer Functional Level of Assist   Bed, Chair, Wheelchair Transfer Modified Independent   Bed Chair Wheelchair Transfer Description Increased time  (FWW, bed rail; self-manages wound vac)   Toilet Transfers Modified Independent   Toilet Transfer Description Adaptive equipment;Grab bar;Increased time  (grab bar, FWW; self-manages wound vac)   Standing Lower Body Exercises   Comments Introduced proper use with 4WW, brakes with sit<>stands and curbs; dsicussed brakes with ramps; 1 rep of sitting on 4WW with " SPV and cueing for new task learning; advised patient to have 4WW brought out of storage and into hospital.  1 rep of car transfer with 4WW at SPV level   Bed Mobility    Supine to Sit Modified Independent   Sit to Supine Modified Independent   Sit to Stand Modified Independent   Scooting Modified Independent   Rolling Modified Independent   Interdisciplinary Plan of Care Collaboration   IDT Collaboration with  Occupational Therapist;Certified Nursing Assistant;Nursing   Patient Position at End of Therapy Seated;Call Light within Reach;Tray Table within Reach;Phone within Reach   Collaboration Comments Discussed CLOF and IND trial with OT; reviewed 4WW for goals; Updated room board; discussed Mod I trial with CNA also; RN for meds admin also   Strengths & Barriers   Strengths Able to follow instructions;Adequate strength;Alert and oriented;Effective communication skills;Independent prior level of function;Making steady progress towards goals;Manages pain appropriately;Motivated for self care and independence;Pleasant and cooperative;Willingly participates in therapeutic activities   Barriers Fatigue;Impaired balance;Impaired carryover of learning   PT Total Time Spent   PT Individual Total Time Spent (Mins) 60   PT Charge Group   PT Gait Training 2   PT Therapeutic Activities 2       Assessment    Patient has improving endurance and activity tolerance; improving mobility noted; initiated use of 4WW; good motivation and participation; still benefits from cueing for task attention and 4WW safety at this time.  Strengths: Able to follow instructions,Adequate strength,Alert and oriented,Effective communication skills,Independent prior level of function,Making steady progress towards goals,Manages pain appropriately,Motivated for self care and independence,Pleasant and cooperative,Willingly participates in therapeutic activities  Barriers: Fatigue,Impaired balance,Impaired carryover of learning    Plan    4WW use with  ambulation & curbs, Core/trunk stabilization and HEP, Education, Safety & balance with standing    Passport items to be completed:  Passport items to be completed:  Get in/out of bed safely, in/out of a vehicle, safely use mobility device, walk or wheel around home/community, navigate up and down stairs, show how to get up/down from the ground, ensure home is accessible, demonstrate HEP, complete caregiver training    Physical Therapy Problems (Active)     Problem: Mobility     Dates: Start: 11/10/21       Goal: STG-Within one week, patient will ambulate community distances x200 feet with a 4WW at SPV level.     Dates: Start: 11/10/21          Goal: STG-Within one week, patient will ambulate up/down three curbs with a 4WW at SBA level.     Dates: Start: 11/10/21          Goal: STG-Within one week, patient will ambulate in-room distances with a 4WW at IND level.     Dates: Start: 11/10/21             Problem: Mobility Transfers     Dates: Start: 11/10/21       Goal: STG-Within one week, patient will transfer bed to chair with a 4WW at IND level.     Dates: Start: 11/10/21          Goal: STG-Within one week, patient will transfer in/out of car with a 4WW at SPV level.     Dates: Start: 11/10/21             Problem: PT-Long Term Goals     Dates: Start: 11/10/21       Goal: LTG-By discharge, patient will ambulate x200 feet with a 4WW at SPV level.     Dates: Start: 11/10/21          Goal: LTG-By discharge, patient will transfer one surface to another with a 4WW at IND level.     Dates: Start: 11/10/21          Goal: LTG-By discharge, patient will transfer in/out of a car with a 4WW at SPV level.     Dates: Start: 11/10/21          Goal: LTG-By discharge, patient will ambulate up/down three curbs with a 4WW at SBA level.     Dates: Start: 11/10/21

## 2021-11-15 NOTE — THERAPY
Occupational Therapy  Daily Treatment     Patient Name: Thais Munroe  Age:  66 y.o., Sex:  female  Medical Record #: [id number]  Today's Date: 11/15/2021     Precautions  Precautions: Fall Risk  Comments: abdominal incision with wound vac; IND trial with amb in room using FWW         Subjective    Pt received up in w/c, agreeable to OT session, clothing setup for shower     Objective       11/15/21 1231   Precautions   Precautions Fall Risk   Comments abdominal incision with wound vac; IND trial with amb in room using FWW   Functional Level of Assist   Grooming Modified Independent   Grooming Description Standing at sink   Bathing Supervision   Bathing Description Grab bar;Hand held shower;Tub bench   Upper Body Dressing Modified Independent   Lower Body Dressing Modified Independent   Lower Body Dressing Description Increased time   Toileting Modified Independent   Toileting Description Grab bar;Increased time;Adaptive equipment  (FWW)   Tub / Shower Transfers Supervised   Tub Shower Transfer Description Grab bar;Shower bench;Adaptive equipment  (FWW)   OT Total Time Spent   OT Individual Total Time Spent (Mins) 60   OT Charge Group   OT Self Care / ADL 3   OT Therapy Activity 1       Assessment    Pt tolerated session well, completing ADL routine at primarily modified independent level using FWW, supervision for showering/shower transfer to maximize safety, demos improving balance for standing tasks and mobility w/o a device though still benefits from FWW vs 4WW to maximize safety and for back pain management.     Strengths: Alert and oriented,Independent prior level of function,Motivated for self care and independence,Pleasant and cooperative,Willingly participates in therapeutic activities  Barriers: Decreased endurance,Generalized weakness,Impaired activity tolerance,Impaired balance,Pain (impaired attention and memory)    Plan    IADLs/kitchen mobility with no AD vs 4WW, standing balance, general strength  and endurance building      Occupational Therapy Goals (Active)     Problem: OT Long Term Goals     Dates: Start: 11/10/21       Goal: LTG-By discharge, patient will complete basic self care tasks with supervision to modified independence     Dates: Start: 11/10/21          Goal: LTG-By discharge, patient will perform bathroom transfers with supervision to modified independence using LRAD     Dates: Start: 11/10/21

## 2021-11-15 NOTE — THERAPY
"Physical Therapy   Daily Treatment     Patient Name: Thais Munroe  Age:  66 y.o., Sex:  female  Medical Record #: [id number]  Today's Date: 11/14/2021     Precautions  Precautions: Fall Risk  Comments: abdominal incision with wound vac    Subjective    Patient agreeable to therapy; tired after \"a lot of therapy yesterday\"     Objective       11/14/21 1401   Precautions   Precautions Fall Risk   Comments abdominal incision with wound vac   Transfer Functional Level of Assist   Bed, Chair, Wheelchair Transfer Stand by Assist   Toilet Transfers Stand by Assist   Toilet Transfer Description Grab bar;Set-up of equipment;Verbal cueing   Sitting Lower Body Exercises   Nustep Resistance Level 4  (10min; self-limited due to LE fatigue)   PT Total Time Spent   PT Individual Total Time Spent (Mins) 30   PT Charge Group   PT Therapeutic Exercise 1   PT Therapeutic Activities 1       Assessment    Self-limited Nu-step work due to bilateral LE fatigue; able to perform transfers with SBA, requires verbal cues to avoid tangling in wound vac lines    Strengths: Able to follow instructions,Alert and oriented,Effective communication skills,Making steady progress towards goals,Motivated for self care and independence,Pleasant and cooperative,Willingly participates in therapeutic activities  Barriers: Constipation,Decreased endurance,Generalized weakness,Home accessibility,Impaired activity tolerance,Impaired balance,Impaired carryover of learning,Lack of motivation,Limited mobility,Pain    Plan  FWW ambulation, Core/trunk stabilization as tolerated, Ther ex to improve activity tolerance and endurance, Education, Activity tolerance     Passport items to be completed:  Get in/out of bed safely, in/out of a vehicle, safely use mobility device, walk or wheel around home/community, navigate up and down stairs, show how to get up/down from the ground, ensure home is accessible, demonstrate HEP, complete caregiver training      Physical " Therapy Problems (Active)     Problem: Mobility     Dates: Start: 11/10/21       Goal: STG-Within one week, patient will ambulate community distances x200 feet with a 4WW at SPV level.     Dates: Start: 11/10/21          Goal: STG-Within one week, patient will ambulate up/down three curbs with a 4WW at SBA level.     Dates: Start: 11/10/21          Goal: STG-Within one week, patient will ambulate in-room distances with a 4WW at IND level.     Dates: Start: 11/10/21             Problem: Mobility Transfers     Dates: Start: 11/10/21       Goal: STG-Within one week, patient will transfer bed to chair with a 4WW at IND level.     Dates: Start: 11/10/21          Goal: STG-Within one week, patient will transfer in/out of car with a 4WW at SPV level.     Dates: Start: 11/10/21             Problem: PT-Long Term Goals     Dates: Start: 11/10/21       Goal: LTG-By discharge, patient will ambulate x200 feet with a 4WW at SPV level.     Dates: Start: 11/10/21          Goal: LTG-By discharge, patient will transfer one surface to another with a 4WW at IND level.     Dates: Start: 11/10/21          Goal: LTG-By discharge, patient will transfer in/out of a car with a 4WW at SPV level.     Dates: Start: 11/10/21          Goal: LTG-By discharge, patient will ambulate up/down three curbs with a 4WW at SBA level.     Dates: Start: 11/10/21

## 2021-11-15 NOTE — THERAPY
Occupational Therapy  Daily Treatment     Patient Name: Thais Munroe  Age:  66 y.o., Sex:  female  Medical Record #: [id number]  Today's Date: 11/15/2021     Precautions  Precautions: (P) Fall Risk  Comments: (P) abdominal incision with wound vac         Subjective    Pt received up in w/c, agreeable to OT session, would like to be able to take herself to the bathroom without calling     Objective       11/15/21 0931   Precautions   Precautions Fall Risk   Comments abdominal incision with wound vac   Functional Level of Assist   Bed, Chair, Wheelchair Transfer Modified Independent   Bed Chair Wheelchair Transfer Description Adaptive equipment   Toilet Transfers Modified Independent   Toilet Transfer Description Grab bar;Increased time;Adaptive equipment  (FWW)   Tub / Shower Transfers Stand by Assist   Tub Shower Transfer Description Verbal cueing  (dry run TTB, FWW approach)   Bed Mobility    Supine to Sit Modified Independent   Sit to Supine Modified Independent   OT Total Time Spent   OT Individual Total Time Spent (Mins) 30   OT Charge Group   OT Self Care / ADL 2       Assessment    Pt tolerated session well, focus on safety with bathroom mobility, DME, and self management of wound vac. Pt completed bed mobility with good attention to logroll, requires use of bedrail at this time, safely able to manage FWW in/out of bathroom for toileting, cleared to be mod I with FWW at this time with plan to progress to 4WW by d/c. Discussed DME recs for home - pt will need tub transfer bench with L handle, recommended grab bar placement at tub entry under showerhead and on inner wall, pt verbalized understanding, will follow up with printed/written info for carryover. Donned socks using figure 4, no longer requires sock aid.     Strengths: Alert and oriented,Independent prior level of function,Motivated for self care and independence,Pleasant and cooperative,Willingly participates in therapeutic activities  Barriers:  Decreased endurance,Generalized weakness,Impaired activity tolerance,Impaired balance,Pain (impaired attention and memory)    Plan    ADLs with AE, IADLs, functional mobility, standing balance, general strength and endurance building    Occupational Therapy Goals (Active)     Problem: Bathing     Dates: Start: 11/10/21       Goal: STG-Within one week, patient will bathe with setup/supervision     Dates: Start: 11/10/21             Problem: Dressing     Dates: Start: 11/10/21       Goal: STG-Within one week, patient will dress LB with setup using AE as needed     Dates: Start: 11/10/21             Problem: Functional Transfers     Dates: Start: 11/10/21       Goal: STG-Within one week, patient will transfer to toilet with supervision using LRAD     Dates: Start: 11/10/21             Problem: OT Long Term Goals     Dates: Start: 11/10/21       Goal: LTG-By discharge, patient will complete basic self care tasks with supervision to modified independence     Dates: Start: 11/10/21          Goal: LTG-By discharge, patient will perform bathroom transfers with supervision to modified independence using LRAD     Dates: Start: 11/10/21             Problem: Toileting     Dates: Start: 11/10/21       Goal: STG-Within one week, patient will complete toileting tasks with supervision     Dates: Start: 11/10/21

## 2021-11-15 NOTE — CARE PLAN
The patient is Watcher - Medium risk of patient condition declining or worsening    Shift Goals  Clinical Goals: safety  Patient Goals: pain mangement    Problem: Bladder / Voiding  Goal: Patient will establish and maintain regular urinary output  Outcome: Progressing Patient voiding adequate amounts of clear, yellow urine. Denies dysuria and flank pain: afebrile. Will continue to monitor.       Problem: Skin Integrity  Goal: Patient's skin integrity will be maintained or improve  Outcome: Progressing wound vac in place on abdomen, minimal amount of drainage noted in canister.

## 2021-11-15 NOTE — CARE PLAN
Problem: Bathing  Goal: STG-Within one week, patient will bathe with setup/supervision  Outcome: Met     Problem: Dressing  Goal: STG-Within one week, patient will dress LB with setup using AE as needed  Outcome: Met     Problem: Toileting  Goal: STG-Within one week, patient will complete toileting tasks with supervision  Outcome: Met     Problem: Functional Transfers  Goal: STG-Within one week, patient will transfer to toilet with supervision using LRAD  Outcome: Met

## 2021-11-15 NOTE — ASSESSMENT & PLAN NOTE
S/P robotic assisted laparoscopic left partial nephrectomy on 10/27/21 by Dr. Mccracken  Pathology +RCC  Needs F/U

## 2021-11-15 NOTE — CARE PLAN
Problem: Mobility Transfers  Goal: STG-Within one week, patient will transfer bed to chair with a 4WW at IND level.  Outcome: Not Met  Note: Partially Met: using FWW at IND level within room  Goal: STG-Within one week, patient will transfer in/out of car with a 4WW at SPV level.  Outcome: Met     Problem: Mobility  Goal: STG-Within one week, patient will ambulate community distances x200 feet with a 4WW at SPV level.  Outcome: Met  Goal: STG-Within one week, patient will ambulate up/down three curbs with a 4WW at SBA level.  Outcome: Met  Goal: STG-Within one week, patient will ambulate in-room distances with a 4WW at IND level.  Outcome: Not Met  Note: Partially Met: using FWW at IND level within room

## 2021-11-15 NOTE — CARE PLAN
Problem: Knowledge Deficit - Standard  Goal: Patient and family/care givers will demonstrate understanding of plan of care, disease process/condition, diagnostic tests and medications  Outcome: Progressing     Problem: Skin Integrity  Goal: Patient's skin integrity will be maintained or improve  Outcome: Progressing     Problem: Mobility  Goal: Patient's capacity to carry out activities will improve  Outcome: Progressing   The patient is Stable - Low risk of patient condition declining or worsening    Shift Goals  Clinical Goals: safety   Patient Goals: pain management     Progress made toward(s) clinical / shift goals:      Patient is not progressing towards the following goals:

## 2021-11-15 NOTE — PROGRESS NOTES
Hospital Medicine Daily Progress Note      Chief Complaint  Post-operative Ileus  Hypertension    Interval Problem Update  Pt endorses abdominal pain but denies nausea or vomiting.    Review of Systems  Review of Systems   Constitutional: Negative for chills and fever.   HENT: Negative.    Eyes: Negative.    Respiratory: Negative for cough and shortness of breath.    Cardiovascular: Negative for chest pain and palpitations.   Gastrointestinal: Positive for abdominal pain. Negative for nausea and vomiting.   Musculoskeletal:        Wound pain    Skin: Negative for itching and rash.   Endo/Heme/Allergies: Negative for polydipsia. Does not bruise/bleed easily.        Physical Exam  Temp:  [36.3 °C (97.4 °F)-36.8 °C (98.3 °F)] 36.8 °C (98.3 °F)  Pulse:  [82-88] 82  Resp:  [18] 18  BP: (114-140)/(84-86) 114/86  SpO2:  [91 %] 91 %    Physical Exam  Vitals reviewed.   Constitutional:       General: She is not in acute distress.     Appearance: Normal appearance. She is not ill-appearing.   HENT:      Head: Normocephalic and atraumatic.      Right Ear: External ear normal.      Left Ear: External ear normal.      Nose: Nose normal.      Mouth/Throat:      Pharynx: Oropharynx is clear.   Eyes:      General:         Right eye: No discharge.         Left eye: No discharge.      Extraocular Movements: Extraocular movements intact.      Conjunctiva/sclera: Conjunctivae normal.   Cardiovascular:      Rate and Rhythm: Normal rate and regular rhythm.   Pulmonary:      Effort: Pulmonary effort is normal. No respiratory distress.      Breath sounds: Normal breath sounds. No wheezing.   Abdominal:      General: Bowel sounds are normal. There is distension.      Palpations: Abdomen is soft.      Tenderness: There is abdominal tenderness. There is no guarding or rebound.   Musculoskeletal:      Cervical back: Normal range of motion and neck supple.      Right lower leg: No edema.      Left lower leg: No edema.   Skin:     General: Skin  is warm and dry.   Neurological:      Mental Status: She is alert and oriented to person, place, and time.         Fluids    Intake/Output Summary (Last 24 hours) at 11/15/2021 1407  Last data filed at 11/15/2021 0830  Gross per 24 hour   Intake 820 ml   Output --   Net 820 ml           Assessment/Plan  * Renal cell carcinoma (HCC)- (present on admission)  Assessment & Plan  S/P robotic assisted laparoscopic left partial nephrectomy on 10/27/21 by Dr. Mccracken  Pathology +RCC  Needs F/U    Acquired hypothyroidism- (present on admission)  Assessment & Plan  TSH 21.6 and FT4 1.28  On Synthroid  Needs F/U    Hepatitis C- (present on admission)  Assessment & Plan  Details unknown  Hepatology F/U    Hypertension- (present on admission)  Assessment & Plan  Observe blood pressure trends on Norvasc    Gout- (present on admission)  Assessment & Plan  On Allopurinol  Monitor for acute flare-ups    Small bowel obstruction (HCC)- (present on admission)  Assessment & Plan  Pt initially had colonic pseudo-obstruction S/P colonoscopic decompression  Then developed SBO S/P reduction and repair of incarcerated incisional hernia on 11/2/21 by Dr. Banegas  Now w/ post op ileus  KUB significant improvement in gaseous dilated bowel loops/ileus  Continue scheduled high dose Simethicone and aggressive bowel regimen  Check F/U labs in AM   Wound care and pain control per Physiatry     Full Code

## 2021-11-15 NOTE — PROGRESS NOTES
Hospital Medicine Daily Progress Note      Chief Complaint  Post-operative Ileus  Hypertension    Interval Problem Update  Abdomen seems more distended today.    Review of Systems  Review of Systems   Constitutional: Negative for chills and fever.   HENT: Negative.    Eyes: Negative.    Respiratory: Negative for cough and shortness of breath.    Cardiovascular: Negative for chest pain and palpitations.   Gastrointestinal: Positive for abdominal pain. Negative for nausea and vomiting.   Musculoskeletal:        Wound pain    Skin: Negative for itching and rash.   Endo/Heme/Allergies: Negative for polydipsia. Does not bruise/bleed easily.        Physical Exam  Temp:  [36.3 °C (97.4 °F)-36.8 °C (98.3 °F)] 36.8 °C (98.3 °F)  Pulse:  [82-88] 82  Resp:  [18] 18  BP: (114-140)/(84-86) 114/86  SpO2:  [91 %] 91 %    Physical Exam  Vitals reviewed.   Constitutional:       General: She is not in acute distress.     Appearance: Normal appearance. She is not ill-appearing.   HENT:      Head: Normocephalic and atraumatic.      Right Ear: External ear normal.      Left Ear: External ear normal.      Nose: Nose normal.      Mouth/Throat:      Pharynx: Oropharynx is clear.   Eyes:      General:         Right eye: No discharge.         Left eye: No discharge.      Extraocular Movements: Extraocular movements intact.      Conjunctiva/sclera: Conjunctivae normal.   Cardiovascular:      Rate and Rhythm: Normal rate and regular rhythm.   Pulmonary:      Effort: Pulmonary effort is normal. No respiratory distress.      Breath sounds: Normal breath sounds. No wheezing.   Abdominal:      General: Bowel sounds are normal. There is distension.      Palpations: Abdomen is soft.      Tenderness: There is abdominal tenderness. There is no guarding or rebound.   Musculoskeletal:      Cervical back: Normal range of motion and neck supple.      Right lower leg: No edema.      Left lower leg: No edema.   Skin:     General: Skin is warm and dry.    Neurological:      Mental Status: She is alert and oriented to person, place, and time.         Fluids    Intake/Output Summary (Last 24 hours) at 11/15/2021 1418  Last data filed at 11/15/2021 0830  Gross per 24 hour   Intake 820 ml   Output --   Net 820 ml           Assessment/Plan  * Renal cell carcinoma (HCC)- (present on admission)  Assessment & Plan  S/P robotic assisted laparoscopic left partial nephrectomy on 10/27/21 by Dr. Mccracken  Pathology +RCC  Needs F/U    Acquired hypothyroidism- (present on admission)  Assessment & Plan  TSH 21.6 and FT4 1.28  On Synthroid  Needs F/U    Hepatitis C- (present on admission)  Assessment & Plan  Details unknown  Hepatology F/U    Hypertension- (present on admission)  Assessment & Plan  Blood pressure controlled on Norvasc    Gout- (present on admission)  Assessment & Plan  On Allopurinol  Monitor for acute flare-ups    Small bowel obstruction (HCC)- (present on admission)  Assessment & Plan  Pt initially had colonic pseudo-obstruction S/P colonoscopic decompression  Then developed SBO S/P reduction and repair of incarcerated incisional hernia on 11/2/21 by Dr. Banegas  Now w/ post op ileus  KUB 11/10/21 significant improvement in gaseous dilated bowel loops/ileus  KUB 11/12/21 no bowel obstruction  KUB 11/13/21 increasing bowel gas without definite obstruction  Will increase Simethicone  Continue bowel regimen  Wound care and pain control per Physiatry     Full Code

## 2021-11-16 VITALS
RESPIRATION RATE: 18 BRPM | SYSTOLIC BLOOD PRESSURE: 132 MMHG | WEIGHT: 177.6 LBS | HEART RATE: 82 BPM | DIASTOLIC BLOOD PRESSURE: 78 MMHG | HEIGHT: 66 IN | BODY MASS INDEX: 28.54 KG/M2 | TEMPERATURE: 98.4 F | OXYGEN SATURATION: 91 %

## 2021-11-16 PROBLEM — Z78.9 IMPAIRED MOBILITY AND ADLS: Status: ACTIVE | Noted: 2021-11-16

## 2021-11-16 PROBLEM — Z74.09 IMPAIRED MOBILITY AND ADLS: Status: ACTIVE | Noted: 2021-11-16

## 2021-11-16 PROCEDURE — A9270 NON-COVERED ITEM OR SERVICE: HCPCS | Performed by: PHYSICAL MEDICINE & REHABILITATION

## 2021-11-16 PROCEDURE — 700102 HCHG RX REV CODE 250 W/ 637 OVERRIDE(OP): Performed by: HOSPITALIST

## 2021-11-16 PROCEDURE — 700111 HCHG RX REV CODE 636 W/ 250 OVERRIDE (IP): Performed by: PHYSICAL MEDICINE & REHABILITATION

## 2021-11-16 PROCEDURE — A9270 NON-COVERED ITEM OR SERVICE: HCPCS | Performed by: HOSPITALIST

## 2021-11-16 PROCEDURE — 700102 HCHG RX REV CODE 250 W/ 637 OVERRIDE(OP): Performed by: PHYSICAL MEDICINE & REHABILITATION

## 2021-11-16 PROCEDURE — 99239 HOSP IP/OBS DSCHRG MGMT >30: CPT | Performed by: PHYSICAL MEDICINE & REHABILITATION

## 2021-11-16 RX ORDER — OXYCODONE HYDROCHLORIDE 5 MG/1
5 TABLET ORAL EVERY 6 HOURS PRN
Qty: 28 TABLET | Refills: 0 | Status: SHIPPED | OUTPATIENT
Start: 2021-11-16 | End: 2021-11-23

## 2021-11-16 RX ORDER — SIMETHICONE 80 MG
160 TABLET,CHEWABLE ORAL 4 TIMES DAILY
Qty: 270 TABLET | Refills: 2 | COMMUNITY
Start: 2021-11-16

## 2021-11-16 RX ORDER — AMOXICILLIN 250 MG
2 CAPSULE ORAL 2 TIMES DAILY
Qty: 120 TABLET | Refills: 2 | COMMUNITY
Start: 2021-11-16

## 2021-11-16 RX ORDER — POLYETHYLENE GLYCOL 3350 17 G/17G
17 POWDER, FOR SOLUTION ORAL 2 TIMES DAILY
Qty: 60 EACH | Refills: 2 | COMMUNITY
Start: 2021-11-16

## 2021-11-16 RX ORDER — LEVOTHYROXINE SODIUM 0.05 MG/1
50 TABLET ORAL
Qty: 30 TABLET | Refills: 2 | Status: SHIPPED | OUTPATIENT
Start: 2021-11-17

## 2021-11-16 RX ORDER — LIDOCAINE 4 G/G
1 PATCH TOPICAL DAILY
Qty: 30 PATCH | Refills: 2 | COMMUNITY

## 2021-11-16 RX ADMIN — SIMETHICONE 160 MG: 80 TABLET, CHEWABLE ORAL at 11:24

## 2021-11-16 RX ADMIN — LEVOTHYROXINE SODIUM 50 MCG: 50 TABLET ORAL at 05:38

## 2021-11-16 RX ADMIN — HEPARIN SODIUM 5000 UNITS: 5000 INJECTION, SOLUTION INTRAVENOUS; SUBCUTANEOUS at 14:15

## 2021-11-16 RX ADMIN — ALLOPURINOL 300 MG: 300 TABLET ORAL at 08:57

## 2021-11-16 RX ADMIN — OXYCODONE 5 MG: 5 TABLET ORAL at 14:16

## 2021-11-16 RX ADMIN — GABAPENTIN 200 MG: 100 CAPSULE ORAL at 08:57

## 2021-11-16 RX ADMIN — HEPARIN SODIUM 5000 UNITS: 5000 INJECTION, SOLUTION INTRAVENOUS; SUBCUTANEOUS at 05:38

## 2021-11-16 RX ADMIN — POLYETHYLENE GLYCOL 3350 1 PACKET: 17 POWDER, FOR SOLUTION ORAL at 09:06

## 2021-11-16 RX ADMIN — ACETAMINOPHEN 1000 MG: 500 TABLET, FILM COATED ORAL at 14:16

## 2021-11-16 RX ADMIN — ACETAMINOPHEN 1000 MG: 500 TABLET, FILM COATED ORAL at 08:57

## 2021-11-16 RX ADMIN — Medication 2000 UNITS: at 08:56

## 2021-11-16 RX ADMIN — NICOTINE TRANSDERMAL SYSTEM 21 MG: 21 PATCH, EXTENDED RELEASE TRANSDERMAL at 09:05

## 2021-11-16 RX ADMIN — OXYCODONE 5 MG: 5 TABLET ORAL at 09:09

## 2021-11-16 RX ADMIN — OMEPRAZOLE 20 MG: 20 CAPSULE, DELAYED RELEASE ORAL at 08:57

## 2021-11-16 RX ADMIN — SIMETHICONE 160 MG: 80 TABLET, CHEWABLE ORAL at 08:56

## 2021-11-16 RX ADMIN — GABAPENTIN 200 MG: 100 CAPSULE ORAL at 14:16

## 2021-11-16 RX ADMIN — SENNOSIDES AND DOCUSATE SODIUM 2 TABLET: 50; 8.6 TABLET ORAL at 08:57

## 2021-11-16 ASSESSMENT — PAIN DESCRIPTION - PAIN TYPE
TYPE: ACUTE PAIN
TYPE: ACUTE PAIN

## 2021-11-16 ASSESSMENT — ACTIVITIES OF DAILY LIVING (ADL)
TOILETING_LEVEL_OF_ASSIST: ABLE TO COMPLETE TOILETING WITHOUT ASSIST
SHOWER_TRANSFER_LEVEL_OF_ASSIST: REQUIRES SUPERVISION WITH SHOWER TRANSFER
TOILET_TRANSFER_LEVEL_OF_ASSIST: ABLE TO COMPLETE TOILET TRANSFER WITHOUT ASSIST

## 2021-11-16 NOTE — DISCHARGE PLANNING
Case Management Discharge Instructions        Discharge Location: Home with Outpatient Services     Agency Name / Address / Phone: [facility]: [phone]     Home Health: Physical Therapist     Outpatient Services: Wound Care     Comments: [facility] will call you to set up first appointment.  You will get your wound care at [facility].  They can be reached at [phone].  Please call them when you get home for appointment dates and times.  Your wound vac is provided by : [phone].           NOTE: This information can be found in your final discharge packet that your nurse will give you.         Follow-up Information:     [facility]  [address]  [address]  [place] [address] [phone]  Please call your primary care doctor to schedule a hospital follow up appointment.       Tino Banegas M.D.  [address]  [place] [address]  [phone]  On 11/22/2021  9am-Dr. Banegas's office will email you to check in on-line before your appointment (2 days prior to appointment).       Peter Mccracken M.D.  [address]  [place] [address]  [phone]  On 11/22/2021  2:30pm

## 2021-11-16 NOTE — THERAPY
Physical Therapy   Daily Treatment     Patient Name: Thais Munroe  Age:  66 y.o., Sex:  female  Medical Record #: [id number]  Today's Date: 11/15/2021     Precautions  Precautions: Fall Risk  Comments: abdominal incision with wound vac; IND trial with amb in room using FWW    Subjective    Patient agreeable to PT.     Objective       11/15/21 1531   Vitals   O2 (LPM) 0   O2 Delivery Device None - Room Air   Stairs Functional Level of Assist   Level of Assist with Stairs Supervised   # of Stairs Climbed 12   Stairs Description Assist device/equipment;Extra time;Hand rails;Supervision for safety   Transfer Functional Level of Assist   Bed, Chair, Wheelchair Transfer Modified Independent   Bed Chair Wheelchair Transfer Description Adaptive equipment;Increased time   Toilet Transfers Modified Independent   Toilet Transfer Description Adaptive equipment;Grab bar;Increased time   Standing Lower Body Exercises   Comments Completed pt's passport, d/c mobility IRF-Tim, reviewed HEP and focus on in-home safety, one rep of floor recovery with SBA at [facility]. Pt reports will be ready for d/c tomorrow and understands wound vac management with mobility.   Bed Mobility    Supine to Sit Modified Independent   Sit to Supine Modified Independent   Sit to Stand Modified Independent   Scooting Modified Independent   Rolling Modified Independent   Interdisciplinary Plan of Care Collaboration   IDT Collaboration with  Therapy Tech;Nursing   Patient Position at End of Therapy Seated;Edge of Bed;Call Light within Reach;Tray Table within Reach;Phone within Reach   Collaboration Comments Completed pt's passport, updated d/c date on room board.   PT Total Time Spent   PT Individual Total Time Spent (Mins) 45   PT Charge Group   PT Gait Training 1   PT Therapeutic Activities 2       Assessment    Patient has good performance with stairs, transfers, walking over uneven surfaces, and retrieving an object from ground (all per d/c flowsheet); pt  is ready for next level of care; demonstrates understanding with verbal educations.    Strengths: Able to follow instructions,Adequate strength,Alert and oriented,Effective communication skills,Independent prior level of function,Making steady progress towards goals,Manages pain appropriately,Motivated for self care and independence,Pleasant and cooperative,Willingly participates in therapeutic activities  Barriers: Fatigue,Impaired balance,Impaired carryover of learning    Plan    D/c home tomorrow    Passport items to be completed: None.    Physical Therapy Problems (Active)     Problem: Mobility     Dates: Start: 11/10/21       Goal: STG-Within one week, patient will ambulate in-room distances with a 4WW at IND level.     Dates: Start: 11/10/21       Goal Note filed on 11/15/21 1344 by Dillon Hollis PT     Partially Met: using FWW at IND level within room               Problem: Mobility Transfers     Dates: Start: 11/10/21       Goal: STG-Within one week, patient will transfer bed to chair with a 4WW at IND level.     Dates: Start: 11/10/21       Goal Note filed on 11/15/21 1344 by Dillon Hollis, PT     Partially Met: using FWW at IND level within room               Problem: PT-Long Term Goals     Dates: Start: 11/10/21       Goal: LTG-By discharge, patient will ambulate x200 feet with a 4WW at SPV level.     Dates: Start: 11/10/21          Goal: LTG-By discharge, patient will transfer one surface to another with a 4WW at IND level.     Dates: Start: 11/10/21          Goal: LTG-By discharge, patient will transfer in/out of a car with a 4WW at SPV level.     Dates: Start: 11/10/21          Goal: LTG-By discharge, patient will ambulate up/down three curbs with a 4WW at SBA level.     Dates: Start: 11/10/21

## 2021-11-16 NOTE — CARE PLAN
Problem: Mobility  Goal: STG-Within one week, patient will ambulate community distances x200 feet with a 4WW at SPV level.  Outcome: Met  Goal: STG-Within one week, patient will ambulate up/down three curbs with a 4WW at SBA level.  Outcome: Met  Goal: STG-Within one week, patient will ambulate in-room distances with a 4WW at IND level.  11/15/2021 1622 by Dillon Hollis, PT  Outcome: Discharged - Not Met  Note: Used FWW today per preference  11/15/2021 1344 by Dillon Hollis, PT  Outcome: Not Met  Note: Partially Met: using FWW at IND level within room     Problem: Mobility Transfers  Goal: STG-Within one week, patient will transfer bed to chair with a 4WW at IND level.  11/15/2021 1622 by Dillon Hollis, PT  Outcome: Discharged - Not Met  Note: Used FWW today per preference  11/15/2021 1344 by Dillon Hollis, PT  Outcome: Not Met  Note: Partially Met: using FWW at IND level within room  Goal: STG-Within one week, patient will transfer in/out of car with a 4WW at SPV level.  Outcome: Met     Problem: PT-Long Term Goals  Goal: LTG-By discharge, patient will ambulate x200 feet with a 4WW at SPV level.  Outcome: Met  Goal: LTG-By discharge, patient will transfer one surface to another with a 4WW at IND level.  Outcome: Discharged - Not Met  Note: Used FWW today per preference  Goal: LTG-By discharge, patient will transfer in/out of a car with a 4WW at SPV level.  Outcome: Discharged - Not Met  Note: Used FWW today per preference  Goal: LTG-By discharge, patient will ambulate up/down three curbs with a 4WW at SBA level.  Outcome: Met

## 2021-11-16 NOTE — CARE PLAN
Problem: Knowledge Deficit - Standard  Goal: Patient and family/care givers will demonstrate understanding of plan of care, disease process/condition, diagnostic tests and medications  Outcome: Progressing   Pt education given regarding plan of care, pt shows good understanding, will continue to reinforce education and continue to monitor.   Problem: Pain - Standard  Goal: Alleviation of pain or a reduction in pain to the patient’s comfort goal  Outcome: Progressing   Pt education given regarding pain control , pt shows good understanding, will continue to reinforce education and continue to monitor.

## 2021-11-16 NOTE — DISCHARGE INSTRUCTIONS
Physical Therapy Discharge Instructions for Thais Munroe    11/15/2021    Level of Assist Required for Ambulation: Supervision on Curbs,Supervision on Stairs,No Assist on Flat Surfaces  Distance Patient May Ambulate: 250 feet  Device Recommended for Ambulation: 4-Wheeled Walker  Level of Assist Required for Transfers:  (Supervision car transfers; No Assist required at bed/toilet)  Device Recommended for Transfers: 4-Wheeled Walker  Home Exercise Program: Refer to Home Exercise Program Handout for Details  Prosthesis / Orthosis Recommendation / Location: No Prosthesis  or Orthosis Recommended    Occupational Therapy Discharge Instructions for Thais Munroe    11/16/2021    Level of Assist Required for Eating: Able to Complete Eating without Assist  Level of Assist Required for Grooming: Able to Complete Grooming without Assist  Level of Assist Required for Dressing: Able to Complete Dressing without Assist  Level of Assist Required for Toileting: Able to Complete Toileting without Assist  Level of Assist Required for Toilet Transfer: Able to Complete Toilet Transfer without Assist  Level of Assist Required for Bathing: Requires Supervision with Bathing  Level of Assist Required for Shower Transfer: Requires Supervision with Shower Transfer  Equipment for Shower Transfer: Tub Transfer Bench,Grab Bars in Tub / Shower  Level of Assist Required for Home Mgmt: Able to Complete Home Management without Assist  Level of Assist Required for Meal Prep: Able to Complete Meal Preparation without Assist  Driving: Please Contact Physician Prior to Driving  Comments: It was great working with you Thais! Enjoy being home. [place]

## 2021-11-16 NOTE — CARE PLAN
The patient is Stable - Low risk of patient condition declining or worsening    Shift Goals  Clinical Goals: safety  Patient Goals: pain mangement    Problem: Skin Integrity  Goal: Patient's skin integrity will be maintained or improve  Outcome: Progressing  Note:   Meño Score: 19    Patient's skin remains intact and free from new or accidental injury this shift; no s/s of infection. RN wound protocol checked. Encouraged hydration and educated about the importance of nutrition to keep skin integrity. Will continue to monitor.       Problem: Infection  Goal: Patient will remain free from infection  Outcome: Progressing  Note:   Patient remains free from s/s infection; afebrile.  Will continue to monitor.

## 2021-11-16 NOTE — DISCHARGE SUMMARY
"Admission Date: 11/9/2021    Discharge Date: 11/16/2021    Attending Provider: Mya Deng MD    Admission Diagnosis:   Active Hospital Problems    Diagnosis    • *Renal cell carcinoma (HCC)    • Impaired mobility and ADLs    • Vitamin D deficiency    • Dysuria    • Wound dehiscence    • Ileus (HCC)    • Normocytic anemia    • Acquired hypothyroidism    • Hepatitis C    • Tobacco abuse    • Gout    • Post-op pain    • SHEBA (acute kidney injury) (HCC)    • Small bowel obstruction (HCC)    • Hypertension        Discharge Diagnosis:  Active Hospital Problems    Diagnosis    • *Renal cell carcinoma (HCC)    • Impaired mobility and ADLs    • Vitamin D deficiency    • Dysuria    • Wound dehiscence    • Ileus (HCC)    • Normocytic anemia    • Acquired hypothyroidism    • Hepatitis C    • Tobacco abuse    • Gout    • Post-op pain    • SHEBA (acute kidney injury) (HCC)    • Small bowel obstruction (HCC)    • Hypertension        HPI per H&P:  Per Dr. Bruce's consult \"The patient is a 66 y.o. female with a past medical history of hypertension, hep C, tobacco abuse, history of blood clots, hypothyroidism, renal mass;  who presented on 10/27/2021 12:21 PM for resection of left adrenal mass with Dr. Mccracken.  Patient underwent robotic assisted partial left nephrectomy without complication.  Postop course was complicated by abdominal pain, nausea and vomiting.  She then developed hypotension requiring transfer to the ICU.  CT abdomen showed distention of the ascending and transverse colon with wall thickening, inflammation, edema and possible pneumatosis.  NG tube was placed, GI was consulted and she underwent colonoscopy with decompression on 11/1 with Jesus Bland MD.  No mechanical obstruction was identified.  Repeat imaging showed SBO with incarcerated incisional hernia, requiring return to the OR on 11/2 with Tino Banegas MD for reduction and repair.\"     Pathology returned as renal cell carcinoma.  Plan for outpatient " follow-up with surgery and oncology for treatment plan after discharge.       Postoperatively patient with issues with pain control, acute kidney injury, ileus requiring an NG tube.  Since that time her NG tube has been removed, her JARRETT drain has been removed, and she has been moving her bowels.  TSH elevated at 18.2 with a free T4 of 0.80 on 11/4.     Patient current reports abdominal pain, and some distention though she said it is much improved compared to yesterday.  She does have swelling in her feet but she reports this is a chronic problem she has even at home.  She reports a history of memory difficulties even prior to the surgery.  She reports she was delirious when on morphine at the acute hospital.     Patient was evaluated by Rehab Medicine physician and Physical Therapy and Occupational Therapy and determined to be appropriate for acute inpatient rehab and was transferred to [facility] on 11/9/2021 11:31 AM.       Rehab Hospital Course by Problem List:    Debility  Generalized weakness, improved  PT/OT, 1.5 hr each discipline, 5 days per week     Renal cell carcinoma  Partial nephrectomy 10/27 Dr. Mccracken  Outpatient follow up     Pain management  Scheduled Tylenol  Gabapentin  Lidocaine patch  As needed oxycodone, last use 11/15    I discussed the risks and benefits of using opioid medications for pain control.  I discussed the risk of addiction, potential for overdose leading respiratory depression, which could be fatal.      I encouraged the patient to take this medication sparingly with the expressed goal of weaning off the medication as soon as is clinically appropriate.      I informed the patient that we are only able to provide a 7 day supply of these medications at discharge and no refills will be provided by the [facility] medical team.  No replacement prescriptions will be provided for lost prescriptions.  Any medication refill would need to be provided by the  patient's primary pain management provider at that provider's discretion.  The patient's primary provider may decide that ongoing opioid medications are no longer necessary.      NARxCHECK score was: Narcotic 0  Sedative 0  Date of last prescription: N/A    I answered the patient's questions regarding this treatment, and the patient indicated understanding and willingness to proceed.       Small bowel obstruction status post   S/P surgical repair  11/2 Dr. Banegas   Pain management  Outpatient follow-up with surgery     Postoperative ileus, improved   Continue simethicone  X-ray per above     Wound dehiscence  Wound care consult  Wound VAC placed  Surgeon updated     Acute kidney injury, continues  Avoid nephrotoxins  Renally dose medications     Hypertension  Amlodipine     Hypothyroidism  Abnormal thyroid studies  Levothyroxine  Outpatient follow-up with her PCP     Normocytic anemia  Improved     Tobacco use  Nicotine patch  Counseling provided     History of gout  Allopurinol     GI prophylaxis  Omeprazole     Bowel program  Continue bowel medications  Last BM 11/15     Bladder program  Check PVRs - 48, 96  Not retaining     Bilateral edema in the feet  MALCOM hose     DVT prophylaxis  Heparin due to impaired kidney function    Functional Status at Discharge  Eating:  Independent  Eating Description:     Grooming:  Modified Independent  Grooming Description:  Standing at sink  Bathing:  Supervision  Bathing Description:  Grab bar,Hand held shower,Tub bench  Upper Body Dressing:  Modified Independent  Upper Body Dressing Description:  Set-up of equipment  Lower Body Dressing:  Modified Independent  Lower Body Dressing Description:  Increased time  Discharge Location : Home  Patient Discharging with Assist of: Family   Level of Supervision Required: Intermittent Supervision  Recommended Equipment for Discharge: 4-Wheeled Walker;Grab Bars in Tub / Shower;Tub Transfer Bench;Reacher  Recommended Services Upon Discharge:  No Follow-Up Occupational Therapy Recommended  Long Term Goals Met: 3  Long Term Goals Not Met: 0  Criteria for Termination of Services: Maximum Function Achieved for Inpatient Rehabilitation  Walk:   (IND with FWW in room; SPV with 4WW use today)  Distance Walked:  250  Number of Times Distance Was Traveled:  3 (and 2x in-room/bathroom distances with FWW instead)  Assistive Device:  4 Wheel Walker  Gait Deviation:  Bradykinetic,Decreased Heel Strike (impaired task attention; cueing prn for re-direction to task)  Wheelchair:  Modified Independent  Distance Propelled:  150x2   Wheelchair Description:  Extra time  Stairs Supervised  Stairs Description Assist device/equipment,Extra time,Hand rails,Supervision for safety  Discharge Location: Home  Patient Discharging with Assist of: Family  Level of Supervision Required Upon Discharge: Intermittent Supervision  Recommended Equipment for Discharge: 4-Wheeled Walker  Recommeded Services Upon Discharge: Outpatient Physical Therapy  Long Term Goals Met: 2  Long Term Goals Not Met: 2  Reason(s) for Goals Not Met: Patient used FWW with transfers today instead of a 4WW.  Criteria for Termination of Services: Maximum Function Achieved for Inpatient Rehabilitation  Comprehension:  Independent  Comprehension Description:     Expression:  Independent  Expression Description:     Social Interaction:     Social Interaction Description:     Problem Solving:  Supervision  Problem Solving Description:  Verbal cueing  Memory:  Supervision  Memory Description:  Verbal cueing,Therapy schedule       I, Mya Deng M.D., personally performed a complete drug regimen review and no potential clinically significant medication issues were identified.     Discharge Medication:     Medication List      START taking these medications      Instructions   Cholecalciferol 2000 UNIT Tabs  Start taking on: November 17, 2021   Take 1 Tablet by mouth every day.  Dose: 2,000 Units     oxyCODONE  immediate-release 5 MG Tabs  Commonly known as: ROXICODONE   Take 1 Tablet by mouth every 6 hours as needed for Severe Pain for up to 7 days.  Dose: 5 mg     polyethylene glycol/lytes 17 g Pack  Commonly known as: MIRALAX   Take 1 Packet by mouth 2 times a day.  Dose: 17 g     senna-docusate 8.6-50 MG Tabs  Commonly known as: PERICOLACE or SENOKOT S   Take 2 Tablets by mouth 2 times a day.  Dose: 2 Tablet     simethicone 80 MG Chew  Commonly known as: MYLICON   Chew 2 Tablets 4 times a day.  Dose: 160 mg        CHANGE how you take these medications      Instructions   levothyroxine 50 MCG Tabs  Start taking on: November 17, 2021  What changed:   · medication strength  · how much to take  Commonly known as: SYNTHROID   Take 1 Tablet by mouth every morning on an empty stomach.  Dose: 50 mcg        CONTINUE taking these medications      Instructions   acetaminophen 500 MG Tabs  Commonly known as: TYLENOL   Take 1,000 mg by mouth one time. Indications: Pain  Dose: 1,000 mg     allopurinol 300 MG Tabs  Commonly known as: ZYLOPRIM   allopurinol 300 mg tablet   TAKE 1 TABLET BY MOUTH EVERY DAY     amLODIPine 10 MG Tabs  Commonly known as: NORVASC   Take 10 mg by mouth every evening.  Dose: 10 mg     [employer] Nicotine 21 MG/24HR Pt24  Generic drug: nicotine   Place 1 Patch on the skin every 24 hours.  Dose: 1 Patch     gabapentin 300 MG Caps  Commonly known as: NEURONTIN   Take 600 mg by mouth 2 times a day.  Dose: 600 mg     Lidocaine 4 % Ptch   Apply 1 Patch topically every day.  Dose: 1 Patch        STOP taking these medications    amoxicillin-clavulanate 875-125 MG Tabs  Commonly known as: AUGMENTIN     valsartan 320 MG tablet  Commonly known as: DIOVAN            Discharge Diet:  Regular    Discharge Activity:  Do not return to work or driving until cleared by a physician.         Disposition:  Patient to discharge home with family support and community resources.     Discharge Location: Home with Outpatient  Services     Agency Name / Address / Phone: [facility]: [phone]     Home Health: Physical Therapist     Outpatient Services: Wound Care     Comments: [facility] will call you to set up first appointment.  You will get your wound care at [facility].  They can be reached at [phone].  Please call them when you get home for appointment dates and times.  Your wound vac is provided by : [phone].           Follow-up Information:     CHAS[facility] Rodriguez  [address]  [address]  [place] [address] [phone]  Please call your primary care doctor to schedule a hospital follow up appointment.       Tino Banegas M.D.  [address] [facility] [address]  [place] [address]  [phone]  On 11/22/2021  9am-Dr. Banegas's office will email you to check in on-line before your appointment (2 days prior to appointment).       Peter Mccracken M.D.  [address]  [place] [address]  [phone]  On 11/22/2021  2:30pm                Condition on Discharge:  Good    More than 35 minutes was spent on discharging this patient, including face-to-face time, prescription management, and the dictation of this note.    Mya Deng M.D.    Date of Service: 11/16/2021

## 2021-11-17 ENCOUNTER — TELEPHONE (OUTPATIENT)
Dept: WOUND CARE | Facility: MEDICAL CENTER | Age: 66
End: 2021-11-17

## 2021-11-17 ASSESSMENT — ENCOUNTER SYMPTOMS
VOMITING: 0
NAUSEA: 0
POLYDIPSIA: 0
BRUISES/BLEEDS EASILY: 0
EYES NEGATIVE: 1
ABDOMINAL PAIN: 1
COUGH: 0
CHILLS: 0
PALPITATIONS: 0
ROS GI COMMENTS: +BLOATING
SHORTNESS OF BREATH: 0
FEVER: 0

## 2021-11-17 NOTE — TELEPHONE ENCOUNTER
Talked with patient, believes wound vac last changed on Saturday. Discussed with her how to take dressing off and apply saline moistened gauze to wound bed and cover dressing to secure with tape or underwear. Thais expressed understanding. Also advised to bring wound vac, dressing kit and cannister to appointment on Friday, 11/19/2021. Patient is unable to make appointment time tomorrow because of transportation, family.

## 2021-11-17 NOTE — PROGRESS NOTES
Patient discharged to home per order.  Discharge instructions reviewed with patient and daughter; they verbalize understanding and signed copies placed in chart.  Patient has all belongings; signed copy of form in chart.  Patient left facility at 1510 via private vehicel accompanied by rehab staff and daughter.  Have enjoyed working with this pleasant patient.

## 2021-11-17 NOTE — DOCUMENTATION QUERY
DOCUMENTATION QUERY    PROVIDERS: Please select “Cosign w/ note”to reply to query.    To better represent the severity of illness of your patient, please review the following information and exercise your independent professional judgment in responding to this query.     Post-operative ileus is documented in the Progress Notes. Based upon the clinical findings, risk factors, and treatment, was the documented post-operative ileus an expected or unexpected result (complication) of the procedures done on 11/1 and 11/2?    · Post-op ileus is an expected result of the procedures done on 11/1 and 11/2  · Post-op ileus is NOT an expected result (meaning complication) of the procedures done on 11/1 and 11/2  · Unable to determine          The medical record reflects the following:   Clinical Findings  Patient underwent colonscopy with decompression on 11/1/21   Patient underwent surgery for SBO with incarcerated incisional    Hernia on 11/2/21.     Post operative ileus documented upon Rehab Admit on 11/9     11/15 Progress Notes Dr. ANDRIA Matt   Small bowel obstruction (HCC)- (present on admission)  Assessment & Plan  Pt initially had colonic pseudo-obstruction S/P colonoscopic decompression  Then developed SBO S/P reduction and repair of incarcerated incisional hernia on 11/2/21 by Dr. Banegas  Now w/ post op ileus  KUB 11/10/21 significant improvement in gaseous dilated bowel loops/ileus  KUB 11/12/21 no bowel obstruction  KUB 11/13/21 increasing bowel gas without definite obstruction  KUB 11/14/21 decreased bowel gas, no obstruction  Continue Simethicone and bowel regimen  Wound care and pain control per Physiatry        Treatment  Simethicone and bowel regimen    Risk Factors  Renal cell carcinoma,    Location within medical record  History and Physical, Progress Notes and Discharge Summary     Thank you,   Leena Gamez  Coding   [email or web address]

## 2021-11-19 ENCOUNTER — OFFICE VISIT (OUTPATIENT)
Dept: WOUND CARE | Facility: MEDICAL CENTER | Age: 66
End: 2021-11-19
Attending: PHYSICAL MEDICINE & REHABILITATION
Payer: COMMERCIAL

## 2021-11-19 VITALS
HEART RATE: 86 BPM | RESPIRATION RATE: 19 BRPM | OXYGEN SATURATION: 98 % | DIASTOLIC BLOOD PRESSURE: 86 MMHG | SYSTOLIC BLOOD PRESSURE: 141 MMHG | TEMPERATURE: 97.4 F

## 2021-11-19 DIAGNOSIS — T81.31XD DEHISCENCE OF OPERATIVE WOUND, SUBSEQUENT ENCOUNTER: Primary | ICD-10-CM

## 2021-11-19 DIAGNOSIS — R52 PAIN ASSOCIATED WITH WOUND: ICD-10-CM

## 2021-11-19 DIAGNOSIS — Z72.0 TOBACCO ABUSE: ICD-10-CM

## 2021-11-19 DIAGNOSIS — T14.8XXA PAIN ASSOCIATED WITH WOUND: ICD-10-CM

## 2021-11-19 PROCEDURE — 99213 OFFICE O/P EST LOW 20 MIN: CPT | Mod: 25 | Performed by: NURSE PRACTITIONER

## 2021-11-19 PROCEDURE — 97605 NEG PRS WND THER DME<=50SQCM: CPT

## 2021-11-19 PROCEDURE — 99214 OFFICE O/P EST MOD 30 MIN: CPT

## 2021-11-19 PROCEDURE — 11042 DBRDMT SUBQ TIS 1ST 20SQCM/<: CPT

## 2021-11-19 PROCEDURE — 11042 DBRDMT SUBQ TIS 1ST 20SQCM/<: CPT | Performed by: NURSE PRACTITIONER

## 2021-11-19 RX ORDER — GABAPENTIN 600 MG/1
TABLET ORAL
COMMUNITY

## 2021-11-19 RX ORDER — AMLODIPINE BESYLATE 10 MG/1
1 TABLET ORAL
COMMUNITY

## 2021-11-19 RX ORDER — AMLODIPINE AND VALSARTAN 10; 320 MG/1; MG/1
TABLET ORAL
COMMUNITY
Start: 2021-10-15

## 2021-11-19 ASSESSMENT — ENCOUNTER SYMPTOMS
DIZZINESS: 0
SHORTNESS OF BREATH: 0
VOMITING: 0
CHILLS: 0
NERVOUS/ANXIOUS: 0
COUGH: 0
FEVER: 0
NAUSEA: 1
DIARRHEA: 0
PALPITATIONS: 0
CONSTIPATION: 0
DEPRESSION: 0
BACK PAIN: 1

## 2021-11-19 NOTE — PATIENT INSTRUCTIONS
-Keep your wound dressing clean, dry, and intact.    -Wound vac may not have any drainage in tube or cannister & it will still be working.   Change cannister if it does become full by pressing tab on side of machine to remove canister and snap on new one. Full canister can be thrown in the trash. If cannister fills with bright red blood - go to ER. Dressing will be changed every MWF at the wound clini.  If you are having issues with your wound VAC, please consider patching leaks, changing the canister, or calling [phone] for troubleshooting. If the wound VAC has been off or un-operational for over 2 hours, call wound care center to inform them and remove all dressings including black foam and replace with normal saline damp gauze.     -Should you experience any significant changes in your wound(s), such as infection (redness, swelling, localized heat, increased pain, fever > 101 F, chills) or have any questions regarding your home care instructions, please contact the wound center at [phone]. If after hours, contact your primary care physician or go to the hospital emergency room.
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
## 2021-11-22 ENCOUNTER — NON-PROVIDER VISIT (OUTPATIENT)
Dept: WOUND CARE | Facility: MEDICAL CENTER | Age: 66
End: 2021-11-22
Attending: PHYSICAL MEDICINE & REHABILITATION
Payer: COMMERCIAL

## 2021-11-22 NOTE — NON-PROVIDER
Patient late canceled for wound care appointment per PARS; patient reported she saw her surgeon who she reported removed the VAC/NPWT and some staples, and added steri strips to site and advised her to follow up for her Wednesday wound care appointment.

## 2021-11-23 ENCOUNTER — TELEPHONE (OUTPATIENT)
Dept: WOUND CARE | Facility: MEDICAL CENTER | Age: 66
End: 2021-11-23

## 2021-11-23 NOTE — TELEPHONE ENCOUNTER
Thais left message on 11/22/2021 in late afternoon about wound vac that was taken off by surgeon and continues to alarm. Unable to leave message as no identifiers. [employer] 24 hour support line with [employer]  at her appointment  On 11/23/2021.

## 2021-11-24 ENCOUNTER — NON-PROVIDER VISIT (OUTPATIENT)
Dept: WOUND CARE | Facility: MEDICAL CENTER | Age: 66
End: 2021-11-24
Attending: PHYSICAL MEDICINE & REHABILITATION
Payer: COMMERCIAL

## 2021-11-24 PROCEDURE — 97602 WOUND(S) CARE NON-SELECTIVE: CPT

## 2021-11-24 NOTE — PATIENT INSTRUCTIONS
Should you experience any significant changes in your wound(s) such as infection (redness, swelling, localized heat, increased pain, fever >101 F, chills) or have any questions regarding your home care instructions, please contact the wound center [phone]. If after hours, contact your primary care physician or go the hospital emergency room.  Keep dressing clean and dry to best of your ability while bathing, can cover with press & seal saranwrap. Only change dressing if over saturated, soiled or its falling off with the silver hydrofiber (Aquacel Ag to inferior wound) and the hydrocolloid (thin yellow piece to superior wound).

## 2021-11-24 NOTE — PROCEDURES
Non selective debridement with moist gauze to remove non-viable biofilm from wound bed on left lower lateral abdomen.

## 2021-11-26 ENCOUNTER — APPOINTMENT (OUTPATIENT)
Dept: WOUND CARE | Facility: MEDICAL CENTER | Age: 66
End: 2021-11-26
Attending: PHYSICAL MEDICINE & REHABILITATION
Payer: COMMERCIAL

## 2021-11-29 ENCOUNTER — NON-PROVIDER VISIT (OUTPATIENT)
Dept: WOUND CARE | Facility: MEDICAL CENTER | Age: 66
End: 2021-11-29
Attending: PHYSICAL MEDICINE & REHABILITATION
Payer: COMMERCIAL

## 2021-11-29 PROCEDURE — 97602 WOUND(S) CARE NON-SELECTIVE: CPT

## 2021-11-29 NOTE — PROCEDURES
Non selective with puracyn spray and gauze to remove non viable tissue from wound beds. Patient tolerated well. Superior wound resolved today. Advised patient to send vac back to [facility].

## 2021-11-29 NOTE — PATIENT INSTRUCTIONS
-Keep your wound dressing clean, dry, and intact.    -Change your dressing if it becomes soiled, soaked, or falls off.    -Should you experience any significant changes in your wound(s), such as infection (redness, swelling, localized heat, increased pain, fever > 101 F, chills) or have any questions regarding your home care instructions, please contact the wound center at [phone]. If after hours, contact your primary care physician or go to the hospital emergency room.

## 2021-12-06 ENCOUNTER — NON-PROVIDER VISIT (OUTPATIENT)
Dept: WOUND CARE | Facility: MEDICAL CENTER | Age: 66
End: 2021-12-06
Attending: PHYSICAL MEDICINE & REHABILITATION
Payer: MEDICARE

## 2021-12-13 ENCOUNTER — OFFICE VISIT (OUTPATIENT)
Dept: WOUND CARE | Facility: MEDICAL CENTER | Age: 66
End: 2021-12-13
Attending: PHYSICAL MEDICINE & REHABILITATION
Payer: COMMERCIAL

## 2021-12-13 VITALS
DIASTOLIC BLOOD PRESSURE: 85 MMHG | RESPIRATION RATE: 20 BRPM | HEART RATE: 105 BPM | TEMPERATURE: 97.6 F | OXYGEN SATURATION: 99 % | SYSTOLIC BLOOD PRESSURE: 142 MMHG

## 2021-12-13 DIAGNOSIS — Z72.0 TOBACCO ABUSE: ICD-10-CM

## 2021-12-13 DIAGNOSIS — T81.31XD DEHISCENCE OF OPERATIVE WOUND, SUBSEQUENT ENCOUNTER: Primary | ICD-10-CM

## 2021-12-13 PROCEDURE — 99212 OFFICE O/P EST SF 10 MIN: CPT | Performed by: NURSE PRACTITIONER

## 2021-12-13 PROCEDURE — 99212 OFFICE O/P EST SF 10 MIN: CPT

## 2021-12-13 ASSESSMENT — ENCOUNTER SYMPTOMS
SHORTNESS OF BREATH: 0
BACK PAIN: 1
FEVER: 0
CHILLS: 0
COUGH: 0
DEPRESSION: 0
PALPITATIONS: 0
CONSTIPATION: 0
NERVOUS/ANXIOUS: 0
DIZZINESS: 0
DIARRHEA: 0

## 2021-12-13 NOTE — PATIENT INSTRUCTIONS
-Resolved wound be fragile for a few days, bathe and dry area gently, only ever regains a maximum of 80% of the tensile strength of the surrounding skin, remodeling of scar can continue for 6mo - a year. Contact PCP for a referral back her if any problems with area opening and draining again.    -Should you experience any significant changes in your wound(s), such as infection (redness, swelling, localized heat, increased pain, fever > 101 F, chills) or have any questions regarding your home care instructions, please contact the wound center at [phone]. If after hours, contact your primary care physician or go to the hospital emergency room.

## 2021-12-13 NOTE — PROGRESS NOTES
Provider Encounter- Full Thickness wound    HISTORY OF PRESENT ILLNESS  Wound History:    START OF CARE IN CLINIC: 11/19/2021    REFERRING PROVIDER: Dr. Mya Deng     WOUND- Full Thickness Wound   LOCATION: Left lower quadrant of abdomen   HISTORY: Patient underwent resection of a left adrenal mass with Dr. Mccracken on 10/27/2021.  She underwent robotic assisted partial left nephrectomy without complication.  Postop course was complicated by abdominal pain, nausea, and vomiting. She then developed hypotension requiring transfer to the ICU.  CT abdomen showed distention of the ascending and transverse colon with wall thickening, inflammation, edema and possible pneumatosis.  NG tube was placed, GI was consulted and she underwent colonoscopy with decompression on 11/1 with Jesus Bland MD.  No mechanical obstruction was identified.  Repeat imaging showed SBO with incarcerated incisional hernia, requiring return to the OR on 11/2 with Tino Banegas MD for reduction and repair.  Postoperatively, her left lower quadrant incision partially dehisced, and a wound VAC was placed.  She was transferred to rehab hospital on 11/9 where she remained till 11/16, at which time she was discharged to home and referred to [facility] for management of her wound.           Pertinent Medical History: Hep C, tobacco abuse, blood clots, renal mass    TOBACCO USE: Current some day smoker    Patient's problem list, allergies, and current medications reviewed and updated in Epic    Interval History:  11/19/2021 Initial clinic visit with RUBEN Campos, FNP-BC, CWOCN, CFCN.  Patient states she is feeling well overall, denies fevers, chills, nausea, vomiting, cough or shortness of breath.  She has a follow-up appointment with her surgeon on Monday.  She believes her remaining staples will be removed from her incision at that time.    12/13/2021: Clinic visit with RUBEN Agarwal.  Patient's wound has 100% epithelialized.  Patient  instructed to place CeraVe lotion to newly epithelialized tissue 2 times a day.      REVIEW OF SYSTEMS:   Review of Systems   Constitutional: Negative for chills and fever.        Reports her appetite is bit diminished   Respiratory: Negative for cough and shortness of breath.    Cardiovascular: Negative for chest pain and palpitations.   Gastrointestinal: Negative for constipation and diarrhea.   Genitourinary: Negative for dysuria.   Musculoskeletal: Positive for back pain.        History of chronic back pain   Neurological: Negative for dizziness.   Psychiatric/Behavioral: Negative for depression. The patient is not nervous/anxious.        PHYSICAL EXAMINATION:   /85 (BP Location: Right arm, Patient Position: Sitting)   Pulse (!) 105 Comment: Rn notified  Temp 36.4 °C (97.6 °F) (Temporal)   Resp 20   SpO2 99%     Physical Exam  HENT:      Head: Normocephalic.   Eyes:      Pupils: Pupils are equal, round, and reactive to light.   Cardiovascular:      Rate and Rhythm: Normal rate.   Pulmonary:      Effort: Pulmonary effort is normal.   Abdominal:      Comments: Abdomen distended but soft   Skin:     Comments: Incision has gone on to progress to 100% epithelialization.  Patient has no open wounds at this time we will discharge the patient from [facility] at this time   Neurological:      General: No focal deficit present.      Mental Status: She is alert and oriented to person, place, and time.   Psychiatric:         Mood and Affect: Mood normal.         Behavior: Behavior normal.         Thought Content: Thought content normal.         Judgment: Judgment normal.         WOUND ASSESSMENT  Patient's wound has 100% epithelialized we discharge me to proceed this time.      Pertinent Labs and Diagnostics:    Labs:     A1c: No results found for: HBA1C       IMAGING: Presurgical CT scan from 11/2/2021 showed partial SBO    VASCULAR STUDIES: N/A    LAST  WOUND CULTURE:  DATE : None found in epic             ASSESSMENT  AND PLAN:   1. Dehiscence of operative wound, subsequent encounter  Comments: Resolved see above      2. Tobacco abuse  Comments: Complicating factor.  Impaired wound healing potential    Patient has previously been counseled of the adverse effects of smoking on not only wound healing but cardiovascular and respiratory health.  Patient reports that she is trying to cut back at this time.  Patient to follow-up with PCP.        PATIENT EDUCATION  - Importance of adequate nutrition for wound healing  -Advised to go to ER for any increased redness, swelling, drainage, or odor, or if patient develops fever, chills, nausea or vomiting.     My total time spent caring for the patient on the day of the encounter was 10 minutes.   This does not include time spent on separately billable procedures/tests.      Please note that this note may have been created using voice recognition software. I have worked with technical experts from [employer] to optimize the interface.  I have made every reasonable attempt to correct obvious errors, but there may be errors of grammar and possibly content that I did not discover before finalizing the note.    N

## 2021-12-21 ENCOUNTER — PHYSICAL THERAPY (OUTPATIENT)
Dept: PHYSICAL THERAPY | Facility: REHABILITATION | Age: 66
End: 2021-12-21
Attending: PHYSICAL MEDICINE & REHABILITATION
Payer: COMMERCIAL

## 2021-12-21 DIAGNOSIS — M53.86 LOW BACK DERANGEMENT SYNDROME: ICD-10-CM

## 2021-12-21 DIAGNOSIS — R26.89 BALANCE PROBLEM: ICD-10-CM

## 2021-12-21 PROCEDURE — 97161 PT EVAL LOW COMPLEX 20 MIN: CPT

## 2021-12-21 ASSESSMENT — ENCOUNTER SYMPTOMS
PAIN SCALE AT LOWEST: 0
PAIN SCALE: 3

## 2021-12-21 NOTE — OP THERAPY EVALUATION
Outpatient Physical Therapy  INITIAL EVALUATION    [facility] Physical Therapy [place] [address]  [address]  [place] [address]  Phone:  [phone]  Fax:  [phone]    Date of Evaluation: 12/21/2021    Patient: Thais Munroe  YOB: 1955  MRN: [id number]     Referring Provider: Mya Deng M.D.  [address]  [address]  [place] [address]   Referring Diagnosis Malignant neoplasm of unspecified kidney, except renal pelvis [C64.9]     Time Calculation  Start time: 1020  Stop time: 1110 Time Calculation (min): 50 minutes         Chief Complaint: No chief complaint on file.    Visit Diagnoses     ICD-10-CM   1. Balance problem  R26.89   2. Low back derangement syndrome  M53.86       Date of onset of impairment: 10/27/2021    Subjective   History of Present Illness:     History of chief complaint:  Patient is 66 y.o. female with a diagnosis of Renal cell carcinoma and underwent  resection of Left adrenal mass and partial Left nephrectomy, Abdominal distension, and GI colonoscopy with decompression on 10/27/21.  Patient was in the hospital for 3 weeks and received in-patient PT. Patient spent time in the ICU w/ an NG tube and struggled with BP post-recovery--patient reports slow post surgical wound closure with final visit with the wound clinic about 2 weeks ago with complete closure of abdominal wound.   Patient  denies any limits with functional mobility prior to surgery.  Patient did report a history of chronic LBP and was scheduled for a consult with a spine specialist prior to surgery but no consultation occurred.  Patient reports continue sharp abdominal wall pain after eating.  Patient reports difficulty getting dress, bathing and reports that she furniture walks and no longer use an a/d in her home. Patient denies falls but reports multiple bouts of LOB.  Patient repots having a shower chair and has placed a grab bar above her tub.  Patient reports that her daughter and  her family lives with her.         Prior level of function:  Unemployed//none    Pain:     Current pain rating:  3    At best pain ratin    Location:  L lower abdominal quadrant pain, slight L sided low back pain    Aggravating factors:  Abdominal pain worsens after eating  In/out of house with 2 steps and threshold w/o difficulty( patient reports assist form daughter  Shop with cart and walk/stand for more than 10 minutes w/o sitting.  Sit-stand from toilet w/o u.e. assist  Unable to get up/down from floor w/o assist          Past Medical History:   Diagnosis Date   • Blood clotting disorder (HCC)     leg    • Cancer (HCC)     basil cell   • Cancer (HCC) 10/13/2021    renal   • Coughing blood 10/13/2021    pt states dry clear cough   • Dental disorder 10/13/2021    upper lower dentures   • Disorder of thyroid    • Hepatitis C    • Hypertension 10/13/2021    pt states well controlled on meds   • Pain 10/13/2021    spine   • Urinary incontinence      Past Surgical History:   Procedure Laterality Date   • UMBILICAL HERNIA REPAIR  2021    Procedure: REPAIR, HERNIA, INCISIONAL - INCARCERATED;  Surgeon: Tino Banegas M.D.;  Location: SURGERY [facility];  Service: General   • PB COLONOSCOPY,DIAGNOSTIC N/A 2021    Procedure: COLONOSCOPY;  Surgeon: Jesus Bland M.D.;  Location: SURGERY SAME DAY [facility];  Service: Gastroenterology   • PB LAP,PARTIAL NEPHRECTOMY Left 10/27/2021    Procedure: NEPHRECTOMY, PARTIAL, ROBOT-ASSISTED, USING DA NAT XI;  Surgeon: Peter Mccracken M.D.;  Location: SURGERY [facility];  Service: Uro Robotic   • PB ULTRASONIC GUIDANCE, INTRAOPERATIVE Left 10/27/2021    Procedure: ULTRASOUND GUIDANCE;  Surgeon: Peter Mccracken M.D.;  Location: SURGERY [facility];  Service: Uro Robotic   • OTHER  1960    tonsils   • OTHER      basal cell removal back       Precautions:       Objective   Observation and functional movement:  Wide BRANDI, walk and furniture  "grabbing            Therapeutic Treatments and Modalities:     Therapeutic Treatment and Modalities Summary: Sit-stand w/o u.e. hourly 3-5 reps hourly--hep  Gait trg with spc-- patient to purchase one--instructed how to adjust height--hep( isnturcted patient to use 100% when she is walking for balance safety--  TUG: w/o a/d 23\"  10 meter walk test 20\"--.5m/s  Instructed patient in HEP that she is to perform until next f/u    Time-based treatments/modalities:           Assessment, Response and Plan:   Assessment details:  Patient presents with noted gait asymmetries, increased BRANDI and tendency to reach for walls/furntiure to stabilize herself.  Patient presents with significant generallized l.e. weakness with gross motor assessment bilateral 3+/5 with noted 3/5 strength for bilateral glut- min/./med.  Patient also presents as a fall risk with poor hip and ankle balance strategies and decreased reaction times for stepping strategies.  Informed patient that she was a fall risk and need to ambulate with a spc 100% of the time.  Patient stated that she will go to [facility] and get a cane.  Patient should do well for goals if she is compliant with her HEP/POC  Prognosis: good    Goals:   Short Term Goals:     In/out of house with 2 steps and threshold w/ CGA   Shop with cart and walk/stand for more than 10 minutes w/o sitting.  Sit-stand from toilet w/o u.e. assist   up/down from floor w/cga  30\" sit-stand >6 attempts  TUG< 18\"  10 meter walk test >.66m/s      Long Term Goals:    In/out of house with 2 steps and threshold w/ sba  Shop with cart and walk/stand for more than 30 minutes w/o sitting.   up/down from floor w/sba  30\" sit-stand >10 attempts  TUG< 13\"  10 meter walk test >.8m/s    Plan:   Therapy options:  Physical therapy treatment to continue  Planned therapy interventions:  Therapeutic Exercise (CPT 22879), E Stim Unattended (CPT 90490), Neuromuscular Re-education (CPT 57406) and Gait Training (CPT " 37439)  Frequency:  2x week  Duration in weeks:  8  Duration in visits:  16  Plan details:  Post chain strength, gait trg, neuro re-ed-balance trg,  E-stim for pain mangement      Functional Assessment Used        Referring provider co-signature:  I have reviewed this plan of care and my co-signature certifies the need for services.    Certification Period: 12/21/2021 to  02/23/22    Physician Signature: ________________________________ Date: ______________

## 2021-12-23 ENCOUNTER — APPOINTMENT (OUTPATIENT)
Dept: PHYSICAL THERAPY | Facility: REHABILITATION | Age: 66
End: 2021-12-23
Attending: PHYSICAL MEDICINE & REHABILITATION
Payer: COMMERCIAL

## 2021-12-28 ENCOUNTER — APPOINTMENT (OUTPATIENT)
Dept: PHYSICAL THERAPY | Facility: REHABILITATION | Age: 66
End: 2021-12-28
Attending: PHYSICAL MEDICINE & REHABILITATION
Payer: COMMERCIAL

## 2021-12-30 ENCOUNTER — APPOINTMENT (OUTPATIENT)
Dept: PHYSICAL THERAPY | Facility: REHABILITATION | Age: 66
End: 2021-12-30
Attending: PHYSICAL MEDICINE & REHABILITATION
Payer: COMMERCIAL

## 2022-01-04 ENCOUNTER — PHYSICAL THERAPY (OUTPATIENT)
Dept: PHYSICAL THERAPY | Facility: REHABILITATION | Age: 67
End: 2022-01-04
Attending: PHYSICAL MEDICINE & REHABILITATION
Payer: MEDICARE

## 2022-01-04 DIAGNOSIS — M53.86 LOW BACK DERANGEMENT SYNDROME: ICD-10-CM

## 2022-01-04 PROCEDURE — 97112 NEUROMUSCULAR REEDUCATION: CPT

## 2022-01-04 PROCEDURE — 97110 THERAPEUTIC EXERCISES: CPT

## 2022-01-04 PROCEDURE — 97116 GAIT TRAINING THERAPY: CPT

## 2022-01-04 PROCEDURE — 97530 THERAPEUTIC ACTIVITIES: CPT

## 2022-01-04 NOTE — OP THERAPY DAILY TREATMENT
Outpatient Physical Therapy  DAILY TREATMENT     [facility] Physical [address].  [address]  [place] [address]  Phone:  [phone]  Fax:  [phone]    Date: 01/04/2022    Patient: Thais Munroe  YOB: 1955  MRN: [id number]     Time Calculation    Start time: 1105  Stop time: 1152 Time Calculation (min): 47 minutes         Chief Complaint: No chief complaint on file.    Visit #: 2    SUBJECTIVE:  Back is sore today and the weather makes everything ache more.  Patient reported that she tried to do sit-stand regularly    OBJECTIVE:  Reviewed gait sequencing and measure spc height           Therapeutic Treatments and Modalities:     Therapeutic Treatment and Modalities Summary: Gait trg with spc  Nu step x 10 level to at 80 spm pace  Reviewed sit-stand w/o ue. Assist  Tandem gait trg walk line w/ cga   2x4 forward and backwards// tired and a little dizzy afterwards  Side step 2x4 w/ balloon volley   HEP;  Tandem gait in home with spc  Straddle stance with slef balloon volley    Time-based treatments/modalities:    Physical Therapy Timed Treatment Charges  Gait training minutes (CPT 32462): 15 minutes  Neuromusc re-ed, balance, coor, post minutes (CPT 80381): 15 minutes  Therapeutic activity minutes (CPT 51751): 5 minutes  Therapeutic exercise minutes (CPT 15921): 10 minutes      Pain rating (1-10) before treatment:  5/10 lbp w/ l.e. numbness  Pain rating (1-10) after treatment:  1    ASSESSMENT:   Patient reported back pain was her greatest limit    to treatment today.  Patient reported a significant decrease in back pain after treatment.  Patient continues to struggle with tandem balance  PLAN/RECOMMENDATIONS:   Progress core stab, endurance and dynamic tandem balance progression w/ visual challenges

## 2022-01-06 ENCOUNTER — PHYSICAL THERAPY (OUTPATIENT)
Dept: PHYSICAL THERAPY | Facility: REHABILITATION | Age: 67
End: 2022-01-06
Attending: PHYSICAL MEDICINE & REHABILITATION
Payer: MEDICARE

## 2022-01-06 DIAGNOSIS — R26.89 BALANCE PROBLEM: ICD-10-CM

## 2022-01-06 DIAGNOSIS — M53.86 LOW BACK DERANGEMENT SYNDROME: ICD-10-CM

## 2022-01-06 PROCEDURE — 97014 ELECTRIC STIMULATION THERAPY: CPT

## 2022-01-06 PROCEDURE — 97110 THERAPEUTIC EXERCISES: CPT

## 2022-01-06 NOTE — OP THERAPY DAILY TREATMENT
"  Outpatient Physical Therapy  DAILY TREATMENT     [facility] Physical Therapy [address].  [address]  [place] [address]  Phone:  [phone]  Fax:  [phone]    Date: 01/06/2022    Patient: Thais Munroe  YOB: 1955  MRN: [id number]     Time Calculation                   Chief Complaint: No chief complaint on file.    Visit #: 3    SUBJECTIVE:  Felt good when seh left but  Sat in a waiting room and drove home with increased pain after last visit.  Sore today    OBJECTIVE:            Therapeutic Treatments and Modalities:     Therapeutic Treatment and Modalities Summary: Sit tall x 30\" hard .... a little better\"  Gait trg with spc--reviwed  S/l running man--bilateral to fatigue  Reviewed sit-stand w/o ue. Assist  Ball roll with BFB // limited hip dissociation  Cambodian 5/5 l/s mhp x 15      Time-based treatments/modalities:           Pain rating (1-10) before treatment:  4-5/10 lbp  Pain rating (1-10) after treatment:  \" better\"    ASSESSMENT:   Limited due to back pain and weakness-- significant post/lat hip weakness and limited hip dissociation.   patient did state that despite her back pain that she feels that she waks faster with bigger steps  PLAN/RECOMMENDATIONS:   Progress core stab, endurance and dynamic tandem balance progression w/ visual challenges    "

## 2022-01-11 ENCOUNTER — PHYSICAL THERAPY (OUTPATIENT)
Dept: PHYSICAL THERAPY | Facility: REHABILITATION | Age: 67
End: 2022-01-11
Attending: PHYSICAL MEDICINE & REHABILITATION
Payer: MEDICARE

## 2022-01-11 DIAGNOSIS — M53.86 LOW BACK DERANGEMENT SYNDROME: ICD-10-CM

## 2022-01-11 PROCEDURE — 97014 ELECTRIC STIMULATION THERAPY: CPT

## 2022-01-11 PROCEDURE — 97110 THERAPEUTIC EXERCISES: CPT

## 2022-01-11 NOTE — OP THERAPY DAILY TREATMENT
"  Outpatient Physical Therapy  DAILY TREATMENT     [facility] Physical Therapy [address]  [address]  [place] [address]  Phone:  [phone]  Fax:  [phone]    Date: 01/11/2022    Patient: Thais Munroe  YOB: 1955  MRN: [id number]     Time Calculation    Start time: 0930  Stop time: 1015 Time Calculation (min): 45 minutes         Chief Complaint: No chief complaint on file.    Visit #: 4    SUBJECTIVE:  A little more soreness in her back with ex but reported increased strength with getting up from floor    OBJECTIVE:            Therapeutic Treatments and Modalities:     Therapeutic Treatment and Modalities Summary: Sit tall x 30\" hard .... a little better\"    S/l running man--reviewed form 10 reps--reviewed importance of monitoring how she feels after ex.  Bridge marching 5 reps ea. Leg--hep  Ball roll with BFB // limited hip dissociation  Kittitian 5/5 l/s w/ ball roll and BFB mhp x 15      Time-based treatments/modalities:    Physical Therapy Timed Treatment Charges  Therapeutic exercise minutes (CPT 89005): 30 minutes      Pain rating (1-10) before treatment: 7-8/10 lbp  Pain rating (1-10) after treatment:  \" better. less pain\"  5/10    ASSESSMENT:   Patient reported  side lying ex made her back worse and has only been doing them 2/day.  Patient reported good relief with ex and understands that she needs to pay attention to how she feels right after her ex not after she makes her bed or scrubs the floor.  Question HEP compliance  PLAN/RECOMMENDATIONS:   Progress core stab, endurance and dynamic tandem balance progression w/ visual challenges    "

## 2022-01-13 ENCOUNTER — PHYSICAL THERAPY (OUTPATIENT)
Dept: PHYSICAL THERAPY | Facility: REHABILITATION | Age: 67
End: 2022-01-13
Attending: PHYSICAL MEDICINE & REHABILITATION
Payer: MEDICARE

## 2022-01-13 DIAGNOSIS — M53.86 LOW BACK DERANGEMENT SYNDROME: ICD-10-CM

## 2022-01-13 PROCEDURE — 97530 THERAPEUTIC ACTIVITIES: CPT

## 2022-01-13 NOTE — OP THERAPY DAILY TREATMENT
"  Outpatient Physical Therapy  DAILY TREATMENT     [facility] Physical Therapy [address].  [address]  [place] [address]  Phone:  [phone]  Fax:  [phone]    Date: 01/13/2022    Patient: Thais Munroe  YOB: 1955  MRN: [id number]     Time Calculation    Start time: 1015  Stop time: 1100 Time Calculation (min): 45 minutes         Chief Complaint: No chief complaint on file.    Visit #: 5    SUBJECTIVE:  Doing well able to make and prep stew yesterday w/o much soreness    OBJECTIVE:            Therapeutic Treatments and Modalities:     Therapeutic Treatment and Modalities Summary: Supine ball roll x 3'  Ball bridge x 1' x 2    S/l running man--reviewed form 10 reps--reviewed importance of monitoring how she feels after ex.  6 minute walk test >1000 ft  Nu step level #2 x   15' with good endurance  Balloon volley// rest with 02 sat drop below 86 andhr 112    Physical Therapy Timed Treatment Charges  Therapeutic activity minutes (CPT 57429): 40 minutes      Pain rating (1-10) before treatment: 7-8/10 lbp  Pain rating (1-10) after treatment:  \" better. less pain\"  5/10    ASSESSMENT:   Decreasing pain with improved standing and walking tolerance.  Good dynamic balance with pt's greatest limit being endurance with increased HR > 110 and 02 desaturation below 86%  PLAN/RECOMMENDATIONS:   Progress core stab, endurance and dynamic tandem balance progression w/ visual challenges    "

## 2022-01-18 ENCOUNTER — PHYSICAL THERAPY (OUTPATIENT)
Dept: PHYSICAL THERAPY | Facility: REHABILITATION | Age: 67
End: 2022-01-18
Attending: PHYSICAL MEDICINE & REHABILITATION
Payer: MEDICARE

## 2022-01-18 DIAGNOSIS — R26.89 BALANCE PROBLEM: ICD-10-CM

## 2022-01-18 DIAGNOSIS — M53.86 LOW BACK DERANGEMENT SYNDROME: ICD-10-CM

## 2022-01-18 PROCEDURE — 97110 THERAPEUTIC EXERCISES: CPT

## 2022-01-18 NOTE — OP THERAPY DAILY TREATMENT
"  Outpatient Physical Therapy  DAILY TREATMENT     [facility] Physical Therapy [address].  [address]  [place] [address]  Phone:  [phone]  Fax:  [phone]    Date: 01/18/2022    Patient: Thais Munroe  YOB: 1955  MRN: [id number]     Time Calculation    Start time: 0939  Stop time: 1020 Time Calculation (min): 41 minutes         Chief Complaint: No chief complaint on file.    Visit #: 6    SUBJECTIVE:  Struggling today due to bout of gout over the weekend and struggling to move due to L ankle soreness.  Patient reports cleaning her bathroom over the weekend but was sore because of it.  Patient reports doing more with less pain but still struggles with endurance    OBJECTIVE:            Therapeutic Treatments and Modalities:     Therapeutic Treatment and Modalities Summary: Black bosu bilateral and tandem balance with lateral and a/p wt shift  Ball bridges x 1  Ball roll with focus on hip dissociation  supermans x 2 x 30\"--up from floor with ball CGA  Balls squat x 10 w/ gh flexion      Time-based treatments/modalities:    Physical Therapy Timed Treatment Charges  Therapeutic exercise minutes (CPT 77355): 40 minutes      Pain rating (1-10) before treatment:  2  Pain rating (1-10) after treatment: 0  Better after ex.    ASSESSMENT:   Patient reporting improved tolerance to activity and tolerated a vigorous ex pgm w/o pain    PLAN/RECOMMENDATIONS:   Progress core stab, balance trg.       "

## 2022-01-20 ENCOUNTER — PHYSICAL THERAPY (OUTPATIENT)
Dept: PHYSICAL THERAPY | Facility: REHABILITATION | Age: 67
End: 2022-01-20
Attending: PHYSICAL MEDICINE & REHABILITATION
Payer: MEDICARE

## 2022-01-20 DIAGNOSIS — M53.86 LOW BACK DERANGEMENT SYNDROME: ICD-10-CM

## 2022-01-20 DIAGNOSIS — R26.89 BALANCE PROBLEM: ICD-10-CM

## 2022-01-20 PROCEDURE — 97112 NEUROMUSCULAR REEDUCATION: CPT

## 2022-01-20 PROCEDURE — 97110 THERAPEUTIC EXERCISES: CPT

## 2022-01-20 PROCEDURE — 97116 GAIT TRAINING THERAPY: CPT

## 2022-01-20 NOTE — OP THERAPY DAILY TREATMENT
Outpatient Physical Therapy  DAILY TREATMENT     [facility] Physical Therapy [address]  [address]  [place] [address]  Phone:  [phone]  Fax:  [phone]    Date: 01/20/2022    Patient: Thais Munroe  YOB: 1955  MRN: [id number]     Time Calculation    Start time: 0930  Stop time: 1030 Time Calculation (min): 60 minutes         Chief Complaint: No chief complaint on file.    Visit #: 7    SUBJECTIVE:  Patient reports improved balance and walk longer but still struggles with l.e. strength and endurance.  Getting up from low chair and floor is still very hard    OBJECTIVE:            Therapeutic Treatments and Modalities:     Therapeutic Treatment and Modalities Summary:   Shuttle 2-4 bands on Cocodrilo Dog board 10 reps ea. progression  Black bosu bilateral and tandem balance with lateral and a/p wt shift    Balls squat x 10 w/ gh flexion  Gait trg with tanden gait ad finger wall drag--hep  1/2 roller --a/p ankle strategies  Walking with ball toss/catch  Sit-stand w/o u.e. assist//easier        Time-based treatments/modalities:    Physical Therapy Timed Treatment Charges  Neuromusc re-ed, balance, coor, post minutes (CPT 36639): 20 minutes  Therapeutic exercise minutes (CPT 74250): 20 minutes      Pain rating (1-10) before treatment:  2--slight lbp  Pain rating (1-10) after treatment: 0  Better after ex.    ASSESSMENT:   Patient reporting improved tolerance to activity and tolerated a vigorous ex pgm w/o pain    PLAN/RECOMMENDATIONS:   Progress core stab, balance trg.

## 2022-01-25 ENCOUNTER — PHYSICAL THERAPY (OUTPATIENT)
Dept: PHYSICAL THERAPY | Facility: REHABILITATION | Age: 67
End: 2022-01-25
Attending: PHYSICAL MEDICINE & REHABILITATION
Payer: MEDICARE

## 2022-01-25 DIAGNOSIS — R26.89 BALANCE PROBLEM: ICD-10-CM

## 2022-01-25 PROCEDURE — 97530 THERAPEUTIC ACTIVITIES: CPT

## 2022-01-25 PROCEDURE — 97116 GAIT TRAINING THERAPY: CPT

## 2022-01-25 NOTE — OP THERAPY DISCHARGE SUMMARY
"  Outpatient Physical Therapy  DISCHARGE SUMMARY NOTE      [facility] Physical Therapy [facility]  [address]  [address]  [place] [address]  Phone:  [phone]  Fax:  [phone]    Date of Visit: 01/25/2022    Patient: Thais Munroe  YOB: 1955  MRN: [id number]     Referring Provider: Mya Deng M.D.  [address]  [place] [address]   Referring Diagnosis Malignant neoplasm of unspecified kidney, except renal pelvis [C64.9]         Functional Assessment Used        Your patient is being discharged from Physical Therapy with the following comments:   · Goals met    SUBJECTIVE:  Patient reports waking in the middle of the  Night with gout flare up L knee with some R foot pain.  Patient reports that she feels a little better with her balance  But still struggles --no falls  OBJECTIVE:  10 meter walk test 1.4 m/s  Tug 10,2 seconds  30 \" sit-stand 7 attempts              ASSESSMENT:   All but one goal met and patient still struggles with tandem balance but improving.  Patient able to get up from floor independently.    PLAN/RECOMMENDATIONS:   D/c to an independent HEP    Francois Gonzalez, PT, DPT, OCS    Date: 1/25/2022       "

## 2022-01-25 NOTE — OP THERAPY DAILY TREATMENT
"  Outpatient Physical Therapy  DAILY TREATMENT     [facility] Physical [address]  [address]  [place] [address]  Phone:  [phone]  Fax:  [phone]    Date: 01/25/2022    Patient: Thais Munroe  YOB: 1955  MRN: [id number]     Time Calculation    Start time: 0930  Stop time: 0955 Time Calculation (min): 25 minutes         Chief Complaint: No chief complaint on file.    Visit #: 8    SUBJECTIVE:  Patient reports waking in the middle of the  Night with gout flare up L knee with some R foot pain.  Patient reports that she feels a little better with her balance  But still struggles --no falls  OBJECTIVE:  10 meter walk test 1.4 m/s  Tug 10,2 seconds  30 \" sit-stand 7 attempts          Therapeutic Treatments and Modalities:     Therapeutic Treatment and Modalities Summary: Reviewed HEP  Ball wall squat  Tandem gait with finger drag of walk line with spc( sig-zag heel toe walk)  floor recover--4 attempts indep        Time-based treatments/modalities:    Physical Therapy Timed Treatment Charges  Gait training minutes (CPT 41516): 10 minutes  Therapeutic activity minutes (CPT 43313): 15 minutes      Pain rating (1-10) before treatment:  L knee R foot-\" little\"  Pain rating (1-10) after treatment: 0  Better after ex.    ASSESSMENT:   All but one goal met and patient still struggles with tandem balance but improving    PLAN/RECOMMENDATIONS:   D/c to an independent HEP       "

## 2022-01-27 ENCOUNTER — APPOINTMENT (OUTPATIENT)
Dept: PHYSICAL THERAPY | Facility: REHABILITATION | Age: 67
End: 2022-01-27
Attending: PHYSICAL MEDICINE & REHABILITATION
Payer: MEDICARE

## 2022-02-08 ENCOUNTER — APPOINTMENT (OUTPATIENT)
Dept: PHYSICAL THERAPY | Facility: REHABILITATION | Age: 67
End: 2022-02-08
Attending: PHYSICAL MEDICINE & REHABILITATION
Payer: MEDICARE

## 2022-02-10 ENCOUNTER — APPOINTMENT (OUTPATIENT)
Dept: PHYSICAL THERAPY | Facility: REHABILITATION | Age: 67
End: 2022-02-10
Attending: PHYSICAL MEDICINE & REHABILITATION
Payer: MEDICARE

## 2022-05-31 ENCOUNTER — APPOINTMENT (OUTPATIENT)
Dept: RADIOLOGY | Facility: MEDICAL CENTER | Age: 67
End: 2022-05-31
Attending: UROLOGY
Payer: MEDICARE

## 2022-05-31 DIAGNOSIS — C64.9 RENAL CELL CARCINOMA, UNSPECIFIED LATERALITY (HCC): ICD-10-CM

## 2022-05-31 PROCEDURE — A9576 INJ PROHANCE MULTIPACK: HCPCS

## 2022-05-31 PROCEDURE — 700117 HCHG RX CONTRAST REV CODE 255

## 2022-05-31 PROCEDURE — 74183 MRI ABD W/O CNTR FLWD CNTR: CPT | Mod: MH

## 2022-05-31 RX ADMIN — GADOTERIDOL 15 ML: 279.3 INJECTION, SOLUTION INTRAVENOUS at 14:26

## 2022-11-09 ENCOUNTER — PATIENT MESSAGE (OUTPATIENT)
Dept: HEALTH INFORMATION MANAGEMENT | Facility: OTHER | Age: 67
End: 2022-11-09

## 2022-11-23 ENCOUNTER — HOSPITAL ENCOUNTER (OUTPATIENT)
Dept: RADIOLOGY | Facility: MEDICAL CENTER | Age: 67
End: 2022-11-23
Attending: UROLOGY
Payer: MEDICARE

## 2022-11-23 DIAGNOSIS — C64.9 MALIGNANT NEOPLASM OF KIDNEY EXCLUDING RENAL PELVIS, UNSPECIFIED LATERALITY (HCC): ICD-10-CM

## 2022-11-23 DIAGNOSIS — C64.9 MALIGNANT NEOPLASM OF KIDNEY, UNSPECIFIED LATERALITY (HCC): ICD-10-CM

## 2022-11-23 PROCEDURE — A9576 INJ PROHANCE MULTIPACK: HCPCS | Performed by: UROLOGY

## 2022-11-23 PROCEDURE — 71046 X-RAY EXAM CHEST 2 VIEWS: CPT

## 2022-11-23 PROCEDURE — 74183 MRI ABD W/O CNTR FLWD CNTR: CPT

## 2022-11-23 PROCEDURE — 700117 HCHG RX CONTRAST REV CODE 255: Performed by: UROLOGY

## 2022-11-23 RX ADMIN — GADOTERIDOL 15 ML: 279.3 INJECTION, SOLUTION INTRAVENOUS at 16:29

## 2023-11-21 ENCOUNTER — APPOINTMENT (OUTPATIENT)
Dept: RADIOLOGY | Facility: MEDICAL CENTER | Age: 68
End: 2023-11-21
Attending: UROLOGY
Payer: MEDICARE

## 2023-11-21 DIAGNOSIS — C64.9 RENAL CELL CARCINOMA, UNSPECIFIED LATERALITY (HCC): ICD-10-CM

## 2023-11-21 DIAGNOSIS — C64.9 MALIGNANT NEOPLASM OF KIDNEY EXCLUDING RENAL PELVIS, UNSPECIFIED LATERALITY (HCC): ICD-10-CM

## 2023-11-21 PROCEDURE — 700117 HCHG RX CONTRAST REV CODE 255: Mod: UD | Performed by: UROLOGY

## 2023-11-21 PROCEDURE — 74183 MRI ABD W/O CNTR FLWD CNTR: CPT

## 2023-11-21 PROCEDURE — A9579 GAD-BASE MR CONTRAST NOS,1ML: HCPCS | Mod: UD | Performed by: UROLOGY

## 2023-11-21 PROCEDURE — 71046 X-RAY EXAM CHEST 2 VIEWS: CPT

## 2023-11-21 RX ADMIN — GADOTERIDOL 15 ML: 279.3 INJECTION, SOLUTION INTRAVENOUS at 13:44

## 2024-08-18 NOTE — PROGRESS NOTES
"Rehab Progress Note     Date of Service: 11/10/2021  Chief Complaint: Follow-up debility due to abdominal surgery    Interval Events (Subjective)    Patient seen and examined today in her room.  She reports her abdomen continues to improve.  She has moved her bowels since admission.  She continues to report some slight pain around her incision.  She reports she slept well last night.  She has no new complaints today.    ROS: No changes to bowel, bladder, pain, mood, or sleep.       Objective:  VITAL SIGNS: /81   Pulse 87   Temp 36.2 °C (97.2 °F) (Tympanic)   Resp 18   Ht 1.676 m (5' 6\")   Wt 86.4 kg (190 lb 7.6 oz)   SpO2 98%   BMI 30.74 kg/m²   Gen: alert, no apparent distress, hyper verbose  CV: regular rate and rhythm, no murmurs, peripheral edema in bilateral feet  Resp: clear to ascultation bilaterally, normal respiratory effort  GI: soft, non-tender abdomen, bowel sounds hypoactive, mild distention      Recent Results (from the past 72 hour(s))   CBC WITH DIFFERENTIAL    Collection Time: 11/08/21 12:30 AM   Result Value Ref Range    WBC 11.9 (H) 4.8 - 10.8 K/uL    RBC 3.32 (L) 4.20 - 5.40 M/uL    Hemoglobin 9.9 (L) 12.0 - 16.0 g/dL    Hematocrit 30.1 (L) 37.0 - 47.0 %    MCV 90.7 81.4 - 97.8 fL    MCH 29.8 27.0 - 33.0 pg    MCHC 32.9 (L) 33.6 - 35.0 g/dL    RDW 46.9 35.9 - 50.0 fL    Platelet Count 290 164 - 446 K/uL    MPV 9.9 9.0 - 12.9 fL    Neutrophils-Polys 64.70 44.00 - 72.00 %    Lymphocytes 23.20 22.00 - 41.00 %    Monocytes 7.50 0.00 - 13.40 %    Eosinophils 3.10 0.00 - 6.90 %    Basophils 0.60 0.00 - 1.80 %    Immature Granulocytes 0.90 0.00 - 0.90 %    Nucleated RBC 0.00 /100 WBC    Neutrophils (Absolute) 7.68 (H) 2.00 - 7.15 K/uL    Lymphs (Absolute) 2.76 1.00 - 4.80 K/uL    Monos (Absolute) 0.89 (H) 0.00 - 0.85 K/uL    Eos (Absolute) 0.37 0.00 - 0.51 K/uL    Baso (Absolute) 0.07 0.00 - 0.12 K/uL    Immature Granulocytes (abs) 0.11 0.00 - 0.11 K/uL    NRBC (Absolute) 0.00 K/uL   Basic " [facility]  Progress Note  Name: Juan San I  MRN: [id number]  Unit/Bed#: -01 I Date of Admission: 8/15/2024   Date of Service: 8/18/2024 I Hospital Day: 3    Assessment & Plan   Type 2 diabetes mellitus with hyperglycemia, without long-term current use of insulin (HCC)  Assessment & Plan  Lab Results   Component Value Date    HGBA1C 7.9 (H) 05/19/2024       Recent Labs     08/16/24 2052 08/17/24  1212 08/17/24  1532 08/17/24 2055   POCGLU 114 188* 145* 151*         Blood Sugar Average: Last 72 hrs:  (P) 153A1c 7.9  Hold metformin and start sliding scale while hospitalized      Anxiety and depression  Assessment & Plan  Resume home Lexapro, Atarax, Remeron    Acute on chronic bilateral low back pain with sciatica  Assessment & Plan  Acute on chronic back pain status post lumbar fusion  Follows with [facility]  Pain control with lidocaine, Tylenol, oxycodone, Dilaudid as needed    Status post lumbar spinal fusion  Assessment & Plan  Patient is status post lumbar spinal fusion at [facility]  He is on oral doxycycline suppressive therapy  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    Hypertensive urgency  Assessment & Plan  Severely elevated blood pressures into the 220s  Multifactorial as he did not take his home antihypertensive this morning and he is in severe back pain  Treat pain with analgesic regimen  Resume home regimen of Procardia 120 mg daily, Toprol-XL 50 mg twice daily  Monitor blood pressure trend closely    * Sepsis with acute organ dysfunction (HCC)  Assessment & Plan  Meeting sepsis criteria with tachycardia, tachypnea, leukocytosis  Possibly secondary to hardware infection and back versus bacteremia  Lactic acid is 4.7  Repeat lactic acid now  Cont  IV hydration  cont IV  Metabolic Panel    Collection Time: 11/08/21 12:30 AM   Result Value Ref Range    Sodium 132 (L) 135 - 145 mmol/L    Potassium 3.5 (L) 3.6 - 5.5 mmol/L    Chloride 102 96 - 112 mmol/L    Co2 21 20 - 33 mmol/L    Glucose 98 65 - 99 mg/dL    Bun 15 8 - 22 mg/dL    Creatinine 1.33 0.50 - 1.40 mg/dL    Calcium 8.8 8.5 - 10.5 mg/dL    Anion Gap 9.0 7.0 - 16.0   ESTIMATED GFR    Collection Time: 11/08/21 12:30 AM   Result Value Ref Range    GFR If  48 (A) >60 mL/min/1.73 m 2    GFR If Non African American 40 (A) >60 mL/min/1.73 m 2   Basic Metabolic Panel    Collection Time: 11/09/21  4:18 AM   Result Value Ref Range    Sodium 136 135 - 145 mmol/L    Potassium 3.7 3.6 - 5.5 mmol/L    Chloride 105 96 - 112 mmol/L    Co2 21 20 - 33 mmol/L    Glucose 95 65 - 99 mg/dL    Bun 12 8 - 22 mg/dL    Creatinine 1.12 0.50 - 1.40 mg/dL    Calcium 8.4 (L) 8.5 - 10.5 mg/dL    Anion Gap 10.0 7.0 - 16.0   CBC WITH DIFFERENTIAL    Collection Time: 11/09/21  4:18 AM   Result Value Ref Range    WBC 10.2 4.8 - 10.8 K/uL    RBC 3.09 (L) 4.20 - 5.40 M/uL    Hemoglobin 9.3 (L) 12.0 - 16.0 g/dL    Hematocrit 28.2 (L) 37.0 - 47.0 %    MCV 91.3 81.4 - 97.8 fL    MCH 30.1 27.0 - 33.0 pg    MCHC 33.0 (L) 33.6 - 35.0 g/dL    RDW 48.2 35.9 - 50.0 fL    Platelet Count 307 164 - 446 K/uL    MPV 10.1 9.0 - 12.9 fL    Neutrophils-Polys 65.30 44.00 - 72.00 %    Lymphocytes 20.70 (L) 22.00 - 41.00 %    Monocytes 9.50 0.00 - 13.40 %    Eosinophils 2.80 0.00 - 6.90 %    Basophils 0.70 0.00 - 1.80 %    Immature Granulocytes 1.00 (H) 0.00 - 0.90 %    Nucleated RBC 0.00 /100 WBC    Neutrophils (Absolute) 6.67 2.00 - 7.15 K/uL    Lymphs (Absolute) 2.11 1.00 - 4.80 K/uL    Monos (Absolute) 0.97 (H) 0.00 - 0.85 K/uL    Eos (Absolute) 0.29 0.00 - 0.51 K/uL    Baso (Absolute) 0.07 0.00 - 0.12 K/uL    Immature Granulocytes (abs) 0.10 0.00 - 0.11 K/uL    NRBC (Absolute) 0.00 K/uL   ESTIMATED GFR    Collection Time: 11/09/21  4:18 AM   Result Value Ref  cefepime and vancomycin   blood cultures neg thus far  ID consult - MRI prostate pending          VTE  Prophylaxis:   Pharmacologic: in place  Mechanical VTE Prophylaxis in Place: Yes    Patient Centered Rounds: I have performed bedside rounds with nursing staff today.    Discussions with Specialists or Other Care Team Provider: case management    Education and Discussions with Family / Patient: yes    Mobility:   Basic Mobility Inpatient Raw Score: 21  JH-HLM Goal: 6: Walk 10 steps or more  JH-HLM Achieved: 7: Walk 25 feet or more      Current Length of Stay: 3 day(s)    Current Patient Status: Inpatient        Code Status: Level 1 - Full Code    Discharge Plan: Pt will require continued inpatient hospitalization.    Subjective:   Pt denies any prostate sx  Denies fever   Doing ok overall    Patient is seen and examined at bedside.  All other ROS are negative.    Objective:     Vitals:   Temp (24hrs), Av.6 °F (36.4 °C), Min:97.5 °F (36.4 °C), Max:97.7 °F (36.5 °C)    Temp:  [97.5 °F (36.4 °C)-97.7 °F (36.5 °C)] 97.7 °F (36.5 °C)  HR:  [67-76] 76  Resp:  [17] 17  BP: (140-159)/(72-80) 154/79  SpO2:  [97 %-99 %] 99 %  Body mass index is 25.17 kg/m².     Input and Output Summary (last 24 hours):       Intake/Output Summary (Last 24 hours) at 2024 0845  Last data filed at 2024 0358  Gross per 24 hour   Intake 510 ml   Output 975 ml   Net -465 ml       Physical Exam:       GEN: No acute distress, comfortable  HEEENT: No JVD, PERRLA, no scleral icterus  RESP: Lungs clear to auscultation bilaterally  CV: RRR, +s1/s2   ABD: SOFT NON TENDER, POSITIVE BOWEL SOUNDS, NO DISTENTION  PSYCH: CALM  NEURO: Mentation baseline, NO FOCAL DEFICITS  SKIN: NO RASH  EXTREM: NO EDEMA    Additional Data:     Labs:    Results from last 7 days   Lab Units 24  0344   WBC Thousand/uL 11.86*   HEMOGLOBIN g/dL 10.5*   HEMATOCRIT % 34.2*   PLATELETS Thousands/uL 327   SEGS PCT % 71   LYMPHO PCT % 14   MONO PCT % 8   EOS PCT % 5      Results from last 7 days   Lab Units 08/18/24  0344   SODIUM mmol/L 138   POTASSIUM mmol/L 3.3*   CHLORIDE mmol/L 103   CO2 mmol/L 25   BUN mg/dL 10   CREATININE mg/dL 0.69   ANION GAP mmol/L 10   CALCIUM mg/dL 8.6   ALBUMIN g/dL 4.0   TOTAL BILIRUBIN mg/dL 0.44   ALK PHOS U/L 72   ALT U/L 10   AST U/L 22   GLUCOSE RANDOM mg/dL 100     Results from last 7 days   Lab Units 08/15/24  1438   INR  0.99     Results from last 7 days   Lab Units 08/17/24  2055 08/17/24  1532 08/17/24  1212 08/16/24  2052 08/16/24  1611 08/16/24  1150 08/16/24  0800 08/15/24  1942   POC GLUCOSE mg/dl 151* 145* 188* 114 152* 173* 131 170*         Results from last 7 days   Lab Units 08/15/24  1839 08/15/24  1438   LACTIC ACID mmol/L 2.8* 4.7*   PROCALCITONIN ng/ml  --  0.07       Lines/Drains:  Invasive Devices       Peripherally Inserted Central Catheter Line  Duration             PICC Line 06/03/24 Right Brachial 76 days              Peripheral Intravenous Line  Duration             Peripheral IV 08/15/24 Right Antecubital 2 days    Peripheral IV 08/17/24 Left;Ventral (anterior) Forearm <1 day                    Telemetry:        * I Have Reviewed All Lab Data Listed Above.           Imaging:     Results for orders placed during the hospital encounter of 08/15/24    XR chest 1 view portable    Narrative  XR CHEST PORTABLE    INDICATION: vomiting.    COMPARISON: Chest radiograph July 13, 2024    FINDINGS:    Clear lungs. No pneumothorax or pleural effusion.    Normal cardiomediastinal silhouette.    Partially imaged left shoulder arthroplasty. Partially imaged thoracolumbar spinal hardware. No acute osseous abnormality.    Normal upper abdomen.    Impression  No acute cardiopulmonary disease.        Workstation performed: [id number]    Results for orders placed during the hospital encounter of 06/27/24    XR chest pa & lateral    Narrative  XR CHEST PA & LATERAL    INDICATION: reports chills.    COMPARISON: Chest radiograph  Range    GFR If  59 (A) >60 mL/min/1.73 m 2    GFR If Non African American 49 (A) >60 mL/min/1.73 m 2   SARS-CoV-2 PCR (24 hour In-House): Collect NP swab in [facility]    Collection Time: 11/09/21 12:00 PM    Specimen: Nasopharyngeal; Respirate   Result Value Ref Range    SARS-CoV-2 Source NP Swab    CBC with Differential    Collection Time: 11/10/21  6:38 AM   Result Value Ref Range    WBC 9.8 4.8 - 10.8 K/uL    RBC 3.49 (L) 4.20 - 5.40 M/uL    Hemoglobin 10.8 (L) 12.0 - 16.0 g/dL    Hematocrit 32.3 (L) 37.0 - 47.0 %    MCV 92.6 81.4 - 97.8 fL    MCH 30.9 27.0 - 33.0 pg    MCHC 33.4 (L) 33.6 - 35.0 g/dL    RDW 49.1 35.9 - 50.0 fL    Platelet Count 405 164 - 446 K/uL    MPV 10.1 9.0 - 12.9 fL    Neutrophils-Polys 58.40 44.00 - 72.00 %    Lymphocytes 28.50 22.00 - 41.00 %    Monocytes 8.70 0.00 - 13.40 %    Eosinophils 3.00 0.00 - 6.90 %    Basophils 0.60 0.00 - 1.80 %    Immature Granulocytes 0.80 0.00 - 0.90 %    Nucleated RBC 0.00 /100 WBC    Neutrophils (Absolute) 5.73 2.00 - 7.15 K/uL    Lymphs (Absolute) 2.79 1.00 - 4.80 K/uL    Monos (Absolute) 0.85 0.00 - 0.85 K/uL    Eos (Absolute) 0.29 0.00 - 0.51 K/uL    Baso (Absolute) 0.06 0.00 - 0.12 K/uL    Immature Granulocytes (abs) 0.08 0.00 - 0.11 K/uL    NRBC (Absolute) 0.00 K/uL   Comp Metabolic Panel (CMP)    Collection Time: 11/10/21  6:38 AM   Result Value Ref Range    Sodium 134 (L) 135 - 145 mmol/L    Potassium 4.2 3.6 - 5.5 mmol/L    Chloride 103 96 - 112 mmol/L    Co2 22 20 - 33 mmol/L    Anion Gap 9.0 7.0 - 16.0    Glucose 98 65 - 99 mg/dL    Bun 10 8 - 22 mg/dL    Creatinine 1.24 0.50 - 1.40 mg/dL    Calcium 8.8 8.5 - 10.5 mg/dL    AST(SGOT) 41 12 - 45 U/L    ALT(SGPT) 17 2 - 50 U/L    Alkaline Phosphatase 116 (H) 30 - 99 U/L    Total Bilirubin 0.3 0.1 - 1.5 mg/dL    Albumin 3.6 3.2 - 4.9 g/dL    Total Protein 7.4 6.0 - 8.2 g/dL    Globulin 3.8 (H) 1.9 - 3.5 g/dL    A-G Ratio 0.9 g/dL   Magnesium    Collection Time: 11/10/21  6:38 AM   Result  10/9/2023.    FINDINGS:    No consolidation or edema. Scattered right lung calcified granulomas. No pneumothorax or pleural effusion.    Normal cardiomediastinal silhouette.    Partially visualized left shoulder arthroplasty. Partially visualized lumbar spine fusion.    Normal upper abdomen.    Impression  No acute cardiopulmonary disease.        Workstation performed: [id number]      *I have reviewed all imaging reports listed above      Recent Cultures (last 7 days):     Results from last 7 days   Lab Units 08/15/24  1443 08/15/24  1438   BLOOD CULTURE  No Growth at 48 hrs. No Growth at 48 hrs.       Last 24 Hours Medication List:   Current Facility-Administered Medications   Medication Dose Route Frequency Provider Last Rate    acetaminophen  650 mg Oral Q6H PRN Silvio Barrios PA-C      aspirin  81 mg Oral Daily Silvio Barrios PA-C      cefepime  2,000 mg Intravenous Q8H Julia Quiroz MD 2,000 mg (08/18/24 0812)    clonazePAM  1 mg Oral BID Silvio Barrios PA-C      escitalopram  20 mg Oral Daily Silvio Barrios PA-C      finasteride  5 mg Oral Daily Silvio Barrios PA-C      gabapentin  100 mg Oral Daily Silvio Barrios PA-C      HYDROmorphone  0.5 mg Intravenous Q2H PRN Silvio Barrios PA-C      hydrOXYzine HCL  50 mg Oral BID PRN Silvio Barrios PA-C      insulin lispro  1-6 Units Subcutaneous TID AC Silvio Barrios PA-C      insulin lispro  1-6 Units Subcutaneous HS Silvio Barrios PA-C      lactobacillus acidophilus-bulgaricus  1 packet Oral TID With Meals Silvio Barrios PA-C      lidocaine  1 patch Topical Daily Silvio Barrios PA-C      methocarbamol  750 mg Oral Q6H PRN Silvio Barrios PA-C      metoprolol succinate  50 mg Oral BID Silvio Barrios PA-C      mirtazapine  45 mg Oral HS Silvio Barrios PA-C      NIFEdipine  30 mg Oral Daily Silvio Barrios PA-C      NIFEdipine  90 mg Oral QAM Silvio Barrios PA-C      ondansetron  4 mg Intravenous Q6H PRN Silvio Barrios PA-C       Value Ref Range    Magnesium 1.7 1.5 - 2.5 mg/dL   TSH WITH REFLEX TO FT4    Collection Time: 11/10/21  6:38 AM   Result Value Ref Range    TSH 21.600 (H) 0.380 - 5.330 uIU/mL   ESTIMATED GFR    Collection Time: 11/10/21  6:38 AM   Result Value Ref Range    GFR If  53 (A) >60 mL/min/1.73 m 2    GFR If Non  43 (A) >60 mL/min/1.73 m 2   FREE THYROXINE    Collection Time: 11/10/21  6:38 AM   Result Value Ref Range    Free T-4 1.28 0.93 - 1.70 ng/dL       Current Facility-Administered Medications   Medication Frequency   • simethicone (MYLICON) chewable tab 120 mg 4X/DAY WITH MEALS + NIGHTLY   • hydrOXYzine HCl (ATARAX) tablet 50 mg Q6HRS PRN   • melatonin tablet 3 mg HS PRN   • Respiratory Therapy Consult Continuous RT   • Pharmacy Consult Request ...Pain Management Review 1 Each PHARMACY TO DOSE   • hydrALAZINE (APRESOLINE) tablet 10 mg Q8HRS PRN   • acetaminophen (Tylenol) tablet 650 mg Q4HRS PRN   • lactulose 20 GM/30ML solution 30 mL QDAY PRN   • artificial tears ophthalmic solution 1 Drop PRN   • benzocaine-menthol (CEPACOL) lozenge 1 Lozenge Q2HRS PRN   • mag hydrox-al hydrox-simeth (MAALOX PLUS ES or MYLANTA DS) suspension 20 mL Q2HRS PRN   • ondansetron (ZOFRAN ODT) dispertab 4 mg 4X/DAY PRN    Or   • ondansetron (ZOFRAN) syringe/vial injection 4 mg 4X/DAY PRN   • traZODone (DESYREL) tablet 50 mg QHS PRN   • sodium chloride (OCEAN) 0.65 % nasal spray 2 Spray PRN   • oxyCODONE immediate-release (ROXICODONE) tablet 5 mg Q5H PRN    Or   • oxyCODONE immediate release (ROXICODONE) tablet 10 mg Q5H PRN   • allopurinol (ZYLOPRIM) tablet 300 mg DAILY   • amLODIPine (NORVASC) tablet 10 mg Q EVENING   • calcium carbonate (TUMS) chewable tab 1,000 mg TID PRN   • gabapentin (NEURONTIN) capsule 200 mg TID   • heparin injection 5,000 Units Q8HRS   • levothyroxine (SYNTHROID) tablet 50 mcg AM ES   • lidocaine (LIDODERM) 5 % 2 Patch Q24HR   • nicotine (NICODERM) 21 MG/24HR 21 mg Q24HRS   •  omeprazole (PRILOSEC) capsule 20 mg DAILY   • bisacodyl (DULCOLAX) suppository 10 mg DAILY    And   • senna-docusate (PERICOLACE or SENOKOT S) 8.6-50 MG per tablet 2 Tablet BID    And   • polyethylene glycol/lytes (MIRALAX) PACKET 1 Packet DAILY    And   • magnesium hydroxide (MILK OF MAGNESIA) suspension 30 mL QDAY PRN       Orders Placed This Encounter   Procedures   • Diet Order Diet: Low Fiber(GI Soft)     Standing Status:   Standing     Number of Occurrences:   1     Order Specific Question:   Diet:     Answer:   Low Fiber(GI Soft) [2]       Radiology    [id number] VIEW   Final Result      Significant improvement in gaseous dilated bowel loops/ileus.              Assessment:  Active Hospital Problems    Diagnosis    • *Renal cell carcinoma (HCC)    • Wound dehiscence    • Ileus (HCC)    • Normocytic anemia    • Acquired hypothyroidism    • Hepatitis C    • Tobacco abuse    • Gout    • Post-op pain    • SHEBA (acute kidney injury) (HCC)    • Small bowel obstruction (HCC)    • Hypertension      This patient is a 66 y.o. female admitted for acute inpatient rehabilitation with debility secondary to Renal cell carcinoma (HCC).    Medical Decision Making and Plan:    Debility  PT/OT, 1.5 hr each discipline, 5 days per week    Renal cell carcinoma  Partial nephrectomy 10/27 Dr. Mccracken  Outpatient follow up    Pain management  Scheduled Tylenol  Gabapentin, monitor need to increase  Lidocaine patch  As needed oxycodone    Small bowel obstruction status post   S/P surgical repair  11/2  Pain management  Outpatient follow-up with surgery     Postoperative ileus improved,   Continue simethicone  X-ray per above     Wound dehiscence  Wound care consult  May need wound VAC     Acute kidney injury, continues  Avoid nephrotoxins  Renally dose medications   Consult hospitalist     Hypertension  Amlodipine  Consult hospitalist     Hypothyroidism  Abnormal thyroid studies  Levothyroxine  Outpatient follow-up with her  oxyCODONE  5 mg Oral Q4H PRN Silvio Barrios PA-C      Or    oxyCODONE  10 mg Oral Q4H PRN Silvio Barrios PA-C      polyethylene glycol  17 g Oral Daily PRN Silvio Barrios PA-C      pravastatin  20 mg Oral Daily With Dinner Silvio Barrios PA-C      senna  8.6 mg Oral BID Silvio Barrios PA-C      sodium chloride  125 mL/hr Intravenous Continuous Silvio Barrios PA-C 125 mL/hr (08/18/24 0631)    tamsulosin  0.4 mg Oral Daily With Dinner Silvio Barrios PA-C      vancomycin  1,000 mg Intravenous Q12H Silvio Barrios PA-C 1,000 mg (08/18/24 0631)        Today, Patient Was Seen By: Harinder Marin MD    ** Please Note: Dictation voice to text software may have been used in the creation of this document. **       PCP     Normocytic anemia  Improved  Monitor with weekly labs     Tobacco use  Nicotine patch  Will need counseling     History of gout  Allopurinol     GI prophylaxis  Omeprazole    Bowel program  Continue bowel medications  Last BM 11/9    Bladder program  Check PVRs - 48, 96  Bladder scan for no voids  ICP for over 400 cc  Scheduled toileting    Bilateral edema in the feet  MALCOM hose    DVT prophylaxis  Heparin due to impaired kidney function    Total time:  35 minutes.  I spent greater than 50% of the time for patient care, counseling, and coordination on this date, including patient face-to face time, unit/floor time with review of records/pertinent lab data and studies, as well as discussing diagnostic evaluation/work up, planned therapeutic interventions, and future disposition of care, as per the interval events/subjective and the assessment and plan as noted above.      Mya Deng M.D.   Physical Medicine and Rehabilitation

## 2025-01-30 ENCOUNTER — HOSPITAL ENCOUNTER (OUTPATIENT)
Dept: RADIOLOGY | Facility: MEDICAL CENTER | Age: 70
End: 2025-01-30
Attending: PHYSICIAN ASSISTANT
Payer: MEDICARE

## 2025-01-30 DIAGNOSIS — C79.9 METASTASIS FROM MALIGNANT TUMOR OF KIDNEY (HCC): ICD-10-CM

## 2025-01-30 DIAGNOSIS — C64.9 MALIGNANT NEOPLASM OF KIDNEY, UNSPECIFIED LATERALITY (HCC): ICD-10-CM

## 2025-01-30 DIAGNOSIS — C64.9 METASTASIS FROM MALIGNANT TUMOR OF KIDNEY (HCC): ICD-10-CM

## 2025-01-30 PROCEDURE — 71046 X-RAY EXAM CHEST 2 VIEWS: CPT

## 2025-01-30 PROCEDURE — 74183 MRI ABD W/O CNTR FLWD CNTR: CPT

## 2025-01-30 PROCEDURE — 700117 HCHG RX CONTRAST REV CODE 255: Mod: JZ | Performed by: PHYSICIAN ASSISTANT

## 2025-01-30 PROCEDURE — A9579 GAD-BASE MR CONTRAST NOS,1ML: HCPCS | Mod: JZ | Performed by: PHYSICIAN ASSISTANT

## 2025-01-30 RX ADMIN — GADOTERIDOL 15 ML: 279.3 INJECTION, SOLUTION INTRAVENOUS at 13:54

## (undated) DEVICE — LACTATED RINGERS INJ 1000 ML - (14EA/CA 60CA/PF)

## (undated) DEVICE — GOWN SURGEONS X-LARGE - DISP. (30/CA)

## (undated) DEVICE — SUTURE 4-0 MONOCRYL PLUS PS-1 - 27 INCH (36/BX)

## (undated) DEVICE — SUTURE 4-0 MONOCRYL PLUS PS-2 - 27 INCH (36/BX)

## (undated) DEVICE — CLIP LAPRA-TY ABSORB SUTURE - (6/BX)

## (undated) DEVICE — ROBOTIC SURGERY SERVICES

## (undated) DEVICE — BAG RETRIEVAL 10ML (10EA/BX)

## (undated) DEVICE — TUBE E-T HI-LO CUFF 6.5MM (10EA/BX)

## (undated) DEVICE — CLIP HEM-O-LOC GREEN - (14EA/BX)

## (undated) DEVICE — MASK WITH FACE SHIELD (25/BX 4BX/CA)

## (undated) DEVICE — CANISTER SUCTION RIGID RED 1500CC (40EA/CA)

## (undated) DEVICE — SODIUM CHL IRRIGATION 0.9% 1000ML (12EA/CA)

## (undated) DEVICE — Device

## (undated) DEVICE — GLOVE BIOGEL PI INDICATOR SZ 8.5 SURGICAL PF LF - (50PR/BX 4BX/CA)

## (undated) DEVICE — DRESSING TRANSPARENT FILM TEGADERM 4 X 4.75" (50EA/BX)"

## (undated) DEVICE — DEVICE CLOSURE KIT VISTASEAL 4ML (1EA/BX)

## (undated) DEVICE — CANNULA W/SEAL12X100ZTHREAD - (12/BX)

## (undated) DEVICE — SUTURE GENERAL

## (undated) DEVICE — SYSTEM CLEARIFY VISUALIZATION (10EA/PK)

## (undated) DEVICE — SHEARS MONOPOLAR CURVED  DA VINCI 10X'S REUSABLE

## (undated) DEVICE — TUBE CONNECT SUCTION CLEAR 120 X 1/4" (50EA/CA)"

## (undated) DEVICE — CANNULA O2 COMFORT SOFT EAR ADULT 7 FT TUBING (50/CA)

## (undated) DEVICE — DRAPE LAPAROTOMY T SHEET - (12EA/CA)

## (undated) DEVICE — TUBE CONNECTING SUCTION - CLEAR PLASTIC STERILE 72 IN (50EA/CA)

## (undated) DEVICE — MASK ANESTHESIA ADULT  - (100/CA)

## (undated) DEVICE — SYSTEM PEEL & PLACE 13CM INCISIONS

## (undated) DEVICE — SUTURE 3-0 MONOCRYL SH (36PK/BX)

## (undated) DEVICE — MANIFOLD NEPTUNE 1 PORT (20/PK)

## (undated) DEVICE — SET LEADWIRE 5 LEAD BEDSIDE DISPOSABLE ECG (1SET OF 5/EA)

## (undated) DEVICE — GOWN WARMING STANDARD FLEX - (30/CA)

## (undated) DEVICE — COVER TIP ENDOWRIST HOT SHEAR - (10EA/BX) DA VINCI

## (undated) DEVICE — GLOVE BIOGEL PI ORTHO SZ 8 PF LF (40PR/BX)

## (undated) DEVICE — SLEEVE, VASO, THIGH, MED

## (undated) DEVICE — TOWEL STOP TIMEOUT SAFETY FLAG (40EA/CA)

## (undated) DEVICE — ELECTRODE DUAL RETURN W/ CORD - (50/PK)

## (undated) DEVICE — SET SUCTION/IRRIGATION WITH DISPOSABLE TIP (6/CA )PART #0250-070-520 IS A SUB

## (undated) DEVICE — GOWN SURGICAL X-LARGE ULTRA - FILM-REINFORCED (20/CA)

## (undated) DEVICE — CHLORAPREP 26 ML APPLICATOR - ORANGE TINT(25/CA)

## (undated) DEVICE — TUBE E-T HI-LO CUFF 7.0MM (10EA/PK)

## (undated) DEVICE — POUCH FLUID COLLECTION INVISISHIELD - (10/BX)

## (undated) DEVICE — SUTURE 0 COATED VICRYL PLUS - CTX CR(12/BX)18 IN

## (undated) DEVICE — SEAL 5MM-8MM UNIVERSAL  BOX OF 10

## (undated) DEVICE — DERMABOND ADVANCED - (12EA/BX)

## (undated) DEVICE — AIRLESS LAP SPRAY TIP VISTASEAL (3EA/BX)

## (undated) DEVICE — DRAPE ARM  BOX OF 20

## (undated) DEVICE — RESERVOIR SUCTION 100 CC - SILICONE (20EA/CA)

## (undated) DEVICE — GLOVE BIOGEL SZ 7.5 SURGICAL PF LTX - (50PR/BX 4BX/CA)

## (undated) DEVICE — SET EXTENSION WITH 2 PORTS (48EA/CA) ***PART #2C8610 IS A SUBSTITUTE*****

## (undated) DEVICE — SUTURE 2-0 ETHILON FS - (36/BX) 18 INCH

## (undated) DEVICE — SET TUBING PNEUMOCLEAR HIGH FLOW SMOKE EVACUATION (10EA/BX)

## (undated) DEVICE — SPONGE DRAIN 4 X 4IN 6-PLY - (2/PK25PK/BX12BX/CS)

## (undated) DEVICE — SENSOR SPO2 NEO LNCS ADHESIVE (20/BX) SEE USER NOTES

## (undated) DEVICE — FORCEPS FENESTRATED BIPOLAR DA VINCI 10X'S REUSABLE

## (undated) DEVICE — HEAD HOLDER JUNIOR/ADULT

## (undated) DEVICE — SYRINGE 10 ML CONTROL LL (25EA/BX 4BX/CA)

## (undated) DEVICE — TRAY CATHETER FOLEY URINE METER W/STATLOCK 350ML (10EA/CA)

## (undated) DEVICE — KIT ANESTHESIA W/CIRCUIT & 3/LT BAG W/FILTER (20EA/CA)

## (undated) DEVICE — SUTURE 3-0 VICRYL PLUS RB-1 - (36/BX)

## (undated) DEVICE — CANISTER SUCTION 3000ML MECHANICAL FILTER AUTO SHUTOFF MEDI-VAC NONSTERILE LF DISP  (40EA/CA)

## (undated) DEVICE — NEEDLE INSFL 120MM 14GA VRRS - (20/BX)

## (undated) DEVICE — WATER IRRIGATION STERILE 1000ML (12EA/CA)

## (undated) DEVICE — PROTECTOR ULNA NERVE - (36PR/CA)

## (undated) DEVICE — TROCAR Z THREAD12MM OPTICAL - NON BLADED (6/BX)

## (undated) DEVICE — ELECTRODE 850 FOAM ADHESIVE - HYDROGEL RADIOTRNSPRNT (50/PK)

## (undated) DEVICE — TROCAR 5X100 NON BLADED Z-TH - READ KII (6/BX)

## (undated) DEVICE — FILM CASSETTE ENDO

## (undated) DEVICE — SUTURE 2-0 VICRYL PLUS CT-1 36 (36PK/BX)"

## (undated) DEVICE — OBTURATOR BLADELESS STANDARD 8MM (6EA/BX)

## (undated) DEVICE — CLEANER ELECTRO-SURGICAL TIP - (25/BX 4BX/CA)

## (undated) DEVICE — STAPLER SKIN DISP - (6/BX 10BX/CA) VISISTAT

## (undated) DEVICE — BLANKET WARMING LOWER BODY (10EA/CA)

## (undated) DEVICE — SCISSORS 5MM CVD (6EA/BX)

## (undated) DEVICE — NEPTUNE 4 PORT MANIFOLD - (20/PK)

## (undated) DEVICE — PACK MINOR BASIN - (2EA/CA)

## (undated) DEVICE — CLIP HEMOLOCK PURPLE - (14/BX)

## (undated) DEVICE — TOWELS CLOTH SURGICAL - (4/PK 20PK/CA)

## (undated) DEVICE — DRAIN JACKSON PRATT 15FR - (10EA/CA)

## (undated) DEVICE — SUCTION INSTRUMENT YANKAUER BULBOUS TIP W/O VENT (50EA/CA)

## (undated) DEVICE — BLADE SURGICAL #15 - (50/BX 3BX/CA)

## (undated) DEVICE — SUTURE 0 VICRYL PLUS CT-1 - 36 INCH (36/BX)

## (undated) DEVICE — DRAPE COLUMN  BOX OF 20

## (undated) DEVICE — FORCEPS PROGRASP DA VINCI 10X'S REUSABLE

## (undated) DEVICE — NEEDLE DRIVER LARGE DA VINCI 10X'S REUSABLE

## (undated) DEVICE — KIT CUSTOM PROCEDURE SINGLE FOR ENDO  (15/CA)

## (undated) DEVICE — TUBING CLEARLINK DUO-VENT - C-FLO (48EA/CA)

## (undated) DEVICE — DRAPE LARGE 3 QUARTER - (20/CA)